# Patient Record
Sex: FEMALE | Race: WHITE | Employment: OTHER | URBAN - METROPOLITAN AREA
[De-identification: names, ages, dates, MRNs, and addresses within clinical notes are randomized per-mention and may not be internally consistent; named-entity substitution may affect disease eponyms.]

---

## 2020-01-01 ENCOUNTER — ANESTHESIA EVENT (OUTPATIENT)
Dept: ENDOSCOPY | Age: 65
DRG: 853 | End: 2020-01-01
Payer: MEDICARE

## 2020-01-01 ENCOUNTER — APPOINTMENT (OUTPATIENT)
Dept: CT IMAGING | Age: 65
DRG: 853 | End: 2020-01-01
Payer: MEDICARE

## 2020-01-01 ENCOUNTER — ANESTHESIA EVENT (OUTPATIENT)
Dept: OPERATING ROOM | Age: 65
DRG: 853 | End: 2020-01-01
Payer: MEDICARE

## 2020-01-01 ENCOUNTER — ANESTHESIA (OUTPATIENT)
Dept: ENDOSCOPY | Age: 65
DRG: 853 | End: 2020-01-01
Payer: MEDICARE

## 2020-01-01 ENCOUNTER — HOSPITAL ENCOUNTER (INPATIENT)
Age: 65
LOS: 24 days | Discharge: NURSING, REHAB OR LONG-TERM CARE | DRG: 853 | End: 2020-09-09
Attending: FAMILY MEDICINE | Admitting: FAMILY MEDICINE
Payer: MEDICARE

## 2020-01-01 ENCOUNTER — APPOINTMENT (OUTPATIENT)
Dept: GENERAL RADIOLOGY | Age: 65
DRG: 853 | End: 2020-01-01
Payer: MEDICARE

## 2020-01-01 ENCOUNTER — TELEPHONE (OUTPATIENT)
Dept: INFECTIOUS DISEASES | Age: 65
End: 2020-01-01

## 2020-01-01 ENCOUNTER — TELEPHONE (OUTPATIENT)
Dept: SURGERY | Age: 65
End: 2020-01-01

## 2020-01-01 ENCOUNTER — APPOINTMENT (OUTPATIENT)
Dept: INTERVENTIONAL RADIOLOGY/VASCULAR | Age: 65
DRG: 853 | End: 2020-01-01
Payer: MEDICARE

## 2020-01-01 ENCOUNTER — ANESTHESIA (OUTPATIENT)
Dept: OPERATING ROOM | Age: 65
DRG: 853 | End: 2020-01-01
Payer: MEDICARE

## 2020-01-01 ENCOUNTER — OFFICE VISIT (OUTPATIENT)
Dept: SURGERY | Age: 65
End: 2020-01-01

## 2020-01-01 VITALS
RESPIRATION RATE: 1 BRPM | OXYGEN SATURATION: 92 % | DIASTOLIC BLOOD PRESSURE: 66 MMHG | SYSTOLIC BLOOD PRESSURE: 149 MMHG | TEMPERATURE: 96.1 F

## 2020-01-01 VITALS
BODY MASS INDEX: 35.6 KG/M2 | OXYGEN SATURATION: 98 % | RESPIRATION RATE: 18 BRPM | WEIGHT: 226.8 LBS | SYSTOLIC BLOOD PRESSURE: 122 MMHG | HEIGHT: 67 IN | HEART RATE: 87 BPM | TEMPERATURE: 97.5 F | DIASTOLIC BLOOD PRESSURE: 92 MMHG

## 2020-01-01 VITALS
OXYGEN SATURATION: 100 % | SYSTOLIC BLOOD PRESSURE: 91 MMHG | DIASTOLIC BLOOD PRESSURE: 47 MMHG | RESPIRATION RATE: 1 BRPM

## 2020-01-01 VITALS — DIASTOLIC BLOOD PRESSURE: 60 MMHG | TEMPERATURE: 98.1 F | SYSTOLIC BLOOD PRESSURE: 101 MMHG

## 2020-01-01 VITALS
DIASTOLIC BLOOD PRESSURE: 68 MMHG | OXYGEN SATURATION: 100 % | SYSTOLIC BLOOD PRESSURE: 155 MMHG | TEMPERATURE: 96.4 F | RESPIRATION RATE: 1 BRPM

## 2020-01-01 LAB
A/G RATIO: 0.3 (ref 1.1–2.2)
A/G RATIO: 0.5 (ref 1.1–2.2)
A/G RATIO: 0.6 (ref 1.1–2.2)
A/G RATIO: 0.6 (ref 1.1–2.2)
A/G RATIO: 0.7 (ref 1.1–2.2)
A/G RATIO: 0.8 (ref 1.1–2.2)
A/G RATIO: 0.8 (ref 1.1–2.2)
A/G RATIO: 0.9 (ref 1.1–2.2)
A/G RATIO: 1 (ref 1.1–2.2)
ABO/RH: NORMAL
AFP: 2.4 UG/L
ALBUMIN FLUID: 1.1 G/DL
ALBUMIN SERPL-MCNC: 1.6 G/DL (ref 3.4–5)
ALBUMIN SERPL-MCNC: 1.7 G/DL (ref 3.4–5)
ALBUMIN SERPL-MCNC: 1.7 G/DL (ref 3.4–5)
ALBUMIN SERPL-MCNC: 1.8 G/DL (ref 3.4–5)
ALBUMIN SERPL-MCNC: 1.8 G/DL (ref 3.4–5)
ALBUMIN SERPL-MCNC: 1.9 G/DL (ref 3.4–5)
ALBUMIN SERPL-MCNC: 2 G/DL (ref 3.4–5)
ALBUMIN SERPL-MCNC: 2.1 G/DL (ref 3.4–5)
ALBUMIN SERPL-MCNC: 2.2 G/DL (ref 3.4–5)
ALBUMIN SERPL-MCNC: 2.3 G/DL (ref 3.4–5)
ALBUMIN SERPL-MCNC: 2.4 G/DL (ref 3.4–5)
ALBUMIN SERPL-MCNC: 2.4 G/DL (ref 3.4–5)
ALBUMIN SERPL-MCNC: 2.5 G/DL (ref 3.4–5)
ALBUMIN SERPL-MCNC: 2.8 G/DL (ref 3.4–5)
ALBUMIN SERPL-MCNC: 3 G/DL (ref 3.4–5)
ALP BLD-CCNC: 102 U/L (ref 40–129)
ALP BLD-CCNC: 124 U/L (ref 40–129)
ALP BLD-CCNC: 141 U/L (ref 40–129)
ALP BLD-CCNC: 143 U/L (ref 40–129)
ALP BLD-CCNC: 146 U/L (ref 40–129)
ALP BLD-CCNC: 174 U/L (ref 40–129)
ALP BLD-CCNC: 185 U/L (ref 40–129)
ALP BLD-CCNC: 191 U/L (ref 40–129)
ALP BLD-CCNC: 193 U/L (ref 40–129)
ALP BLD-CCNC: 211 U/L (ref 40–129)
ALP BLD-CCNC: 222 U/L (ref 40–129)
ALP BLD-CCNC: 78 U/L (ref 40–129)
ALP BLD-CCNC: 80 U/L (ref 40–129)
ALP BLD-CCNC: 85 U/L (ref 40–129)
ALP BLD-CCNC: 90 U/L (ref 40–129)
ALP BLD-CCNC: 92 U/L (ref 40–129)
ALP BLD-CCNC: 96 U/L (ref 40–129)
ALP BLD-CCNC: 96 U/L (ref 40–129)
ALPHA-1 ANTITRYPSIN PHENOTYPE: ABNORMAL
ALPHA-1 ANTITRYPSIN: 277 MG/DL (ref 90–200)
ALT SERPL-CCNC: 10 U/L (ref 10–40)
ALT SERPL-CCNC: 13 U/L (ref 10–40)
ALT SERPL-CCNC: 14 U/L (ref 10–40)
ALT SERPL-CCNC: 15 U/L (ref 10–40)
ALT SERPL-CCNC: 16 U/L (ref 10–40)
ALT SERPL-CCNC: 16 U/L (ref 10–40)
ALT SERPL-CCNC: 17 U/L (ref 10–40)
ALT SERPL-CCNC: 20 U/L (ref 10–40)
ALT SERPL-CCNC: 6 U/L (ref 10–40)
ALT SERPL-CCNC: 8 U/L (ref 10–40)
ALT SERPL-CCNC: 9 U/L (ref 10–40)
ALT SERPL-CCNC: <5 U/L (ref 10–40)
ALT SERPL-CCNC: <5 U/L (ref 10–40)
AMMONIA: 19 UMOL/L (ref 11–51)
AMMONIA: 23 UMOL/L (ref 11–51)
AMMONIA: 25 UMOL/L (ref 11–51)
AMMONIA: 29 UMOL/L (ref 11–51)
AMMONIA: 48 UMOL/L (ref 11–51)
AMMONIA: 66 UMOL/L (ref 11–51)
ANAEROBIC CULTURE: ABNORMAL
ANAEROBIC CULTURE: ABNORMAL
ANION GAP SERPL CALCULATED.3IONS-SCNC: 10 MMOL/L (ref 3–16)
ANION GAP SERPL CALCULATED.3IONS-SCNC: 10 MMOL/L (ref 3–16)
ANION GAP SERPL CALCULATED.3IONS-SCNC: 11 MMOL/L (ref 3–16)
ANION GAP SERPL CALCULATED.3IONS-SCNC: 12 MMOL/L (ref 3–16)
ANION GAP SERPL CALCULATED.3IONS-SCNC: 15 MMOL/L (ref 3–16)
ANION GAP SERPL CALCULATED.3IONS-SCNC: 6 MMOL/L (ref 3–16)
ANION GAP SERPL CALCULATED.3IONS-SCNC: 7 MMOL/L (ref 3–16)
ANION GAP SERPL CALCULATED.3IONS-SCNC: 8 MMOL/L (ref 3–16)
ANION GAP SERPL CALCULATED.3IONS-SCNC: 9 MMOL/L (ref 3–16)
ANTIBODY SCREEN: NORMAL
APPEARANCE FLUID: NORMAL
APTT: 28.6 SEC (ref 24.2–36.2)
APTT: 35.9 SEC (ref 24.2–36.2)
APTT: 37.5 SEC (ref 24.2–36.2)
APTT: 39.2 SEC (ref 24.2–36.2)
APTT: 39.8 SEC (ref 24.2–36.2)
APTT: 40.7 SEC (ref 24.2–36.2)
APTT: 41.4 SEC (ref 24.2–36.2)
APTT: 42.8 SEC (ref 24.2–36.2)
APTT: 43.8 SEC (ref 24.2–36.2)
APTT: 43.8 SEC (ref 24.2–36.2)
AST SERPL-CCNC: 15 U/L (ref 15–37)
AST SERPL-CCNC: 16 U/L (ref 15–37)
AST SERPL-CCNC: 17 U/L (ref 15–37)
AST SERPL-CCNC: 20 U/L (ref 15–37)
AST SERPL-CCNC: 22 U/L (ref 15–37)
AST SERPL-CCNC: 23 U/L (ref 15–37)
AST SERPL-CCNC: 26 U/L (ref 15–37)
AST SERPL-CCNC: 28 U/L (ref 15–37)
AST SERPL-CCNC: 28 U/L (ref 15–37)
AST SERPL-CCNC: 30 U/L (ref 15–37)
AST SERPL-CCNC: 33 U/L (ref 15–37)
AST SERPL-CCNC: 37 U/L (ref 15–37)
AST SERPL-CCNC: 41 U/L (ref 15–37)
AST SERPL-CCNC: 41 U/L (ref 15–37)
AST SERPL-CCNC: 44 U/L (ref 15–37)
AST SERPL-CCNC: 56 U/L (ref 15–37)
BACTERIA: ABNORMAL /HPF
BASE EXCESS VENOUS: -5.6 MMOL/L (ref -3–3)
BASOPHILS ABSOLUTE: 0 K/UL (ref 0–0.2)
BASOPHILS ABSOLUTE: 0.1 K/UL (ref 0–0.2)
BASOPHILS RELATIVE PERCENT: 0 %
BASOPHILS RELATIVE PERCENT: 0.1 %
BASOPHILS RELATIVE PERCENT: 0.2 %
BASOPHILS RELATIVE PERCENT: 0.3 %
BASOPHILS RELATIVE PERCENT: 0.3 %
BASOPHILS RELATIVE PERCENT: 0.4 %
BASOPHILS RELATIVE PERCENT: 0.5 %
BASOPHILS RELATIVE PERCENT: 0.5 %
BASOPHILS RELATIVE PERCENT: 0.6 %
BASOPHILS RELATIVE PERCENT: 0.6 %
BILIRUB SERPL-MCNC: 0.4 MG/DL (ref 0–1)
BILIRUB SERPL-MCNC: 0.5 MG/DL (ref 0–1)
BILIRUB SERPL-MCNC: 0.6 MG/DL (ref 0–1)
BILIRUB SERPL-MCNC: 0.7 MG/DL (ref 0–1)
BILIRUB SERPL-MCNC: 0.8 MG/DL (ref 0–1)
BILIRUB SERPL-MCNC: 0.8 MG/DL (ref 0–1)
BILIRUB SERPL-MCNC: 0.9 MG/DL (ref 0–1)
BILIRUB SERPL-MCNC: 0.9 MG/DL (ref 0–1)
BILIRUB SERPL-MCNC: 1 MG/DL (ref 0–1)
BILIRUBIN DIRECT: 0.3 MG/DL (ref 0–0.3)
BILIRUBIN DIRECT: 0.4 MG/DL (ref 0–0.3)
BILIRUBIN DIRECT: 0.4 MG/DL (ref 0–0.3)
BILIRUBIN DIRECT: 0.6 MG/DL (ref 0–0.3)
BILIRUBIN DIRECT: <0.2 MG/DL (ref 0–0.3)
BILIRUBIN DIRECT: <0.2 MG/DL (ref 0–0.3)
BILIRUBIN URINE: ABNORMAL
BILIRUBIN, INDIRECT: 0.3 MG/DL (ref 0–1)
BILIRUBIN, INDIRECT: 0.3 MG/DL (ref 0–1)
BILIRUBIN, INDIRECT: 0.4 MG/DL (ref 0–1)
BILIRUBIN, INDIRECT: 0.5 MG/DL (ref 0–1)
BILIRUBIN, INDIRECT: ABNORMAL MG/DL (ref 0–1)
BILIRUBIN, INDIRECT: ABNORMAL MG/DL (ref 0–1)
BLOOD BANK DISPENSE STATUS: NORMAL
BLOOD BANK DISPENSE STATUS: NORMAL
BLOOD BANK PRODUCT CODE: NORMAL
BLOOD BANK PRODUCT CODE: NORMAL
BLOOD CULTURE, ROUTINE: NORMAL
BLOOD CULTURE, ROUTINE: NORMAL
BLOOD, URINE: ABNORMAL
BODY FLUID CULTURE, STERILE: NORMAL
BPU ID: NORMAL
BPU ID: NORMAL
BUN BLDV-MCNC: 10 MG/DL (ref 7–20)
BUN BLDV-MCNC: 12 MG/DL (ref 7–20)
BUN BLDV-MCNC: 15 MG/DL (ref 7–20)
BUN BLDV-MCNC: 19 MG/DL (ref 7–20)
BUN BLDV-MCNC: 20 MG/DL (ref 7–20)
BUN BLDV-MCNC: 21 MG/DL (ref 7–20)
BUN BLDV-MCNC: 21 MG/DL (ref 7–20)
BUN BLDV-MCNC: 22 MG/DL (ref 7–20)
BUN BLDV-MCNC: 23 MG/DL (ref 7–20)
BUN BLDV-MCNC: 25 MG/DL (ref 7–20)
BUN BLDV-MCNC: 27 MG/DL (ref 7–20)
BUN BLDV-MCNC: 29 MG/DL (ref 7–20)
BUN BLDV-MCNC: 30 MG/DL (ref 7–20)
BUN BLDV-MCNC: 31 MG/DL (ref 7–20)
BUN BLDV-MCNC: 35 MG/DL (ref 7–20)
BUN BLDV-MCNC: 36 MG/DL (ref 7–20)
BUN BLDV-MCNC: 40 MG/DL (ref 7–20)
C-REACTIVE PROTEIN: 193.1 MG/L (ref 0–5.1)
CA 125: 150.5 U/ML (ref 0–35)
CALCIUM SERPL-MCNC: 7.7 MG/DL (ref 8.3–10.6)
CALCIUM SERPL-MCNC: 8 MG/DL (ref 8.3–10.6)
CALCIUM SERPL-MCNC: 8.1 MG/DL (ref 8.3–10.6)
CALCIUM SERPL-MCNC: 8.2 MG/DL (ref 8.3–10.6)
CALCIUM SERPL-MCNC: 8.2 MG/DL (ref 8.3–10.6)
CALCIUM SERPL-MCNC: 8.4 MG/DL (ref 8.3–10.6)
CALCIUM SERPL-MCNC: 8.4 MG/DL (ref 8.3–10.6)
CALCIUM SERPL-MCNC: 8.5 MG/DL (ref 8.3–10.6)
CALCIUM SERPL-MCNC: 8.6 MG/DL (ref 8.3–10.6)
CALCIUM SERPL-MCNC: 8.7 MG/DL (ref 8.3–10.6)
CALCIUM SERPL-MCNC: 8.8 MG/DL (ref 8.3–10.6)
CALCIUM SERPL-MCNC: 8.8 MG/DL (ref 8.3–10.6)
CALCIUM SERPL-MCNC: 8.9 MG/DL (ref 8.3–10.6)
CALCIUM SERPL-MCNC: 8.9 MG/DL (ref 8.3–10.6)
CALCIUM SERPL-MCNC: 9 MG/DL (ref 8.3–10.6)
CALCIUM SERPL-MCNC: 9.2 MG/DL (ref 8.3–10.6)
CALCIUM SERPL-MCNC: 9.6 MG/DL (ref 8.3–10.6)
CARBOXYHEMOGLOBIN: 2.9 % (ref 0–1.5)
CEA: 83.3 NG/ML (ref 0–5)
CELL COUNT FLUID TYPE: NORMAL
CHLORIDE BLD-SCNC: 102 MMOL/L (ref 99–110)
CHLORIDE BLD-SCNC: 105 MMOL/L (ref 99–110)
CHLORIDE BLD-SCNC: 105 MMOL/L (ref 99–110)
CHLORIDE BLD-SCNC: 106 MMOL/L (ref 99–110)
CHLORIDE BLD-SCNC: 106 MMOL/L (ref 99–110)
CHLORIDE BLD-SCNC: 107 MMOL/L (ref 99–110)
CHLORIDE BLD-SCNC: 108 MMOL/L (ref 99–110)
CHLORIDE BLD-SCNC: 109 MMOL/L (ref 99–110)
CHLORIDE BLD-SCNC: 109 MMOL/L (ref 99–110)
CHLORIDE BLD-SCNC: 110 MMOL/L (ref 99–110)
CHLORIDE BLD-SCNC: 110 MMOL/L (ref 99–110)
CHLORIDE BLD-SCNC: 111 MMOL/L (ref 99–110)
CHLORIDE BLD-SCNC: 111 MMOL/L (ref 99–110)
CHLORIDE BLD-SCNC: 112 MMOL/L (ref 99–110)
CHLORIDE BLD-SCNC: 113 MMOL/L (ref 99–110)
CHLORIDE BLD-SCNC: 113 MMOL/L (ref 99–110)
CHLORIDE BLD-SCNC: 114 MMOL/L (ref 99–110)
CHLORIDE BLD-SCNC: 94 MMOL/L (ref 99–110)
CHLORIDE BLD-SCNC: 99 MMOL/L (ref 99–110)
CHLORIDE URINE RANDOM: <20 MMOL/L
CLARITY: ABNORMAL
CLOT EVALUATION: NORMAL
CO2: 17 MMOL/L (ref 21–32)
CO2: 17 MMOL/L (ref 21–32)
CO2: 18 MMOL/L (ref 21–32)
CO2: 19 MMOL/L (ref 21–32)
CO2: 20 MMOL/L (ref 21–32)
CO2: 21 MMOL/L (ref 21–32)
CO2: 22 MMOL/L (ref 21–32)
CO2: 22 MMOL/L (ref 21–32)
CO2: 23 MMOL/L (ref 21–32)
CO2: 24 MMOL/L (ref 21–32)
CO2: 25 MMOL/L (ref 21–32)
CO2: 25 MMOL/L (ref 21–32)
COLOR FLUID: YELLOW
COLOR: ABNORMAL
COMMENT UA: ABNORMAL
CREAT SERPL-MCNC: 0.6 MG/DL (ref 0.6–1.2)
CREAT SERPL-MCNC: 0.7 MG/DL (ref 0.6–1.2)
CREAT SERPL-MCNC: 0.8 MG/DL (ref 0.6–1.2)
CREAT SERPL-MCNC: 0.9 MG/DL (ref 0.6–1.2)
CREAT SERPL-MCNC: 0.9 MG/DL (ref 0.6–1.2)
CREAT SERPL-MCNC: 1 MG/DL (ref 0.6–1.2)
CREAT SERPL-MCNC: 1.1 MG/DL (ref 0.6–1.2)
CREAT SERPL-MCNC: 1.4 MG/DL (ref 0.6–1.2)
CREAT SERPL-MCNC: 1.5 MG/DL (ref 0.6–1.2)
CREAT SERPL-MCNC: 1.5 MG/DL (ref 0.6–1.2)
CREAT SERPL-MCNC: 1.6 MG/DL (ref 0.6–1.2)
CREAT SERPL-MCNC: 2 MG/DL (ref 0.6–1.2)
CREAT SERPL-MCNC: 2.3 MG/DL (ref 0.6–1.2)
CREATININE URINE: 119.7 MG/DL (ref 28–259)
CREATININE URINE: 137.3 MG/DL (ref 28–259)
CULTURE SURGICAL: ABNORMAL
CULTURE SURGICAL: ABNORMAL
CULTURE, BLOOD 2: NORMAL
CULTURE, BLOOD 2: NORMAL
D DIMER: 4043 NG/ML DDU (ref 0–229)
D DIMER: 4243 NG/ML DDU (ref 0–229)
D DIMER: 4334 NG/ML DDU (ref 0–229)
D DIMER: 4549 NG/ML DDU (ref 0–229)
D DIMER: 4669 NG/ML DDU (ref 0–229)
D DIMER: 5215 NG/ML DDU (ref 0–229)
D DIMER: >5250 NG/ML DDU (ref 0–229)
DESCRIPTION BLOOD BANK: NORMAL
DESCRIPTION BLOOD BANK: NORMAL
EKG ATRIAL RATE: 95 BPM
EKG DIAGNOSIS: NORMAL
EKG P AXIS: 72 DEGREES
EKG P-R INTERVAL: 118 MS
EKG Q-T INTERVAL: 368 MS
EKG QRS DURATION: 80 MS
EKG QTC CALCULATION (BAZETT): 462 MS
EKG R AXIS: -32 DEGREES
EKG T AXIS: 46 DEGREES
EKG VENTRICULAR RATE: 95 BPM
EOSINOPHILS ABSOLUTE: 0 K/UL (ref 0–0.6)
EOSINOPHILS ABSOLUTE: 0.1 K/UL (ref 0–0.6)
EOSINOPHILS ABSOLUTE: 0.2 K/UL (ref 0–0.6)
EOSINOPHILS RELATIVE PERCENT: 0 %
EOSINOPHILS RELATIVE PERCENT: 0.1 %
EOSINOPHILS RELATIVE PERCENT: 0.3 %
EOSINOPHILS RELATIVE PERCENT: 0.3 %
EOSINOPHILS RELATIVE PERCENT: 0.6 %
EOSINOPHILS RELATIVE PERCENT: 0.7 %
EOSINOPHILS RELATIVE PERCENT: 0.7 %
EOSINOPHILS RELATIVE PERCENT: 0.8 %
EOSINOPHILS RELATIVE PERCENT: 1.2 %
EOSINOPHILS RELATIVE PERCENT: 1.6 %
EOSINOPHILS RELATIVE PERCENT: 1.6 %
EOSINOPHILS RELATIVE PERCENT: 1.8 %
EOSINOPHILS RELATIVE PERCENT: 1.9 %
EOSINOPHILS RELATIVE PERCENT: 2.2 %
EOSINOPHILS RELATIVE PERCENT: 2.3 %
EOSINOPHILS RELATIVE PERCENT: 2.5 %
EOSINOPHILS RELATIVE PERCENT: 2.6 %
EOSINOPHILS RELATIVE PERCENT: 2.9 %
EOSINOPHILS RELATIVE PERCENT: 3.7 %
EPITHELIAL CELLS, UA: 4 /HPF (ref 0–5)
F-ACTIN AB IGG: 25 UNITS (ref 0–19)
FERRITIN: 322.4 NG/ML (ref 15–150)
FERRITIN: 542.4 NG/ML (ref 15–150)
FIBRINOGEN: 311 MG/DL (ref 200–397)
FIBRINOGEN: 326 MG/DL (ref 200–397)
FIBRINOGEN: 347 MG/DL (ref 200–397)
FIBRINOGEN: 385 MG/DL (ref 200–397)
FIBRINOGEN: 442 MG/DL (ref 200–397)
FIBRINOGEN: 442 MG/DL (ref 200–397)
FIBRINOGEN: 528 MG/DL (ref 200–397)
FIBRINOGEN: 575 MG/DL (ref 200–397)
FLUID TYPE: NORMAL
FLUID TYPE: NORMAL
FOLATE: <2 NG/ML (ref 4.78–24.2)
GFR AFRICAN AMERICAN: 26
GFR AFRICAN AMERICAN: 30
GFR AFRICAN AMERICAN: 39
GFR AFRICAN AMERICAN: 42
GFR AFRICAN AMERICAN: 42
GFR AFRICAN AMERICAN: 45
GFR AFRICAN AMERICAN: >60
GFR NON-AFRICAN AMERICAN: 21
GFR NON-AFRICAN AMERICAN: 25
GFR NON-AFRICAN AMERICAN: 32
GFR NON-AFRICAN AMERICAN: 35
GFR NON-AFRICAN AMERICAN: 35
GFR NON-AFRICAN AMERICAN: 38
GFR NON-AFRICAN AMERICAN: 50
GFR NON-AFRICAN AMERICAN: 55
GFR NON-AFRICAN AMERICAN: >60
GLOBULIN: 2.6 G/DL
GLOBULIN: 3 G/DL
GLOBULIN: 3 G/DL
GLOBULIN: 3.1 G/DL
GLOBULIN: 3.3 G/DL
GLOBULIN: 3.4 G/DL
GLOBULIN: 3.5 G/DL
GLOBULIN: 3.5 G/DL
GLOBULIN: 3.7 G/DL
GLOBULIN: 5.8 G/DL
GLUCOSE BLD-MCNC: 100 MG/DL (ref 70–99)
GLUCOSE BLD-MCNC: 100 MG/DL (ref 70–99)
GLUCOSE BLD-MCNC: 101 MG/DL (ref 70–99)
GLUCOSE BLD-MCNC: 101 MG/DL (ref 70–99)
GLUCOSE BLD-MCNC: 102 MG/DL (ref 70–99)
GLUCOSE BLD-MCNC: 103 MG/DL (ref 70–99)
GLUCOSE BLD-MCNC: 107 MG/DL (ref 70–99)
GLUCOSE BLD-MCNC: 109 MG/DL (ref 70–99)
GLUCOSE BLD-MCNC: 109 MG/DL (ref 70–99)
GLUCOSE BLD-MCNC: 111 MG/DL (ref 70–99)
GLUCOSE BLD-MCNC: 112 MG/DL (ref 70–99)
GLUCOSE BLD-MCNC: 113 MG/DL (ref 70–99)
GLUCOSE BLD-MCNC: 114 MG/DL (ref 70–99)
GLUCOSE BLD-MCNC: 115 MG/DL (ref 70–99)
GLUCOSE BLD-MCNC: 117 MG/DL (ref 70–99)
GLUCOSE BLD-MCNC: 118 MG/DL (ref 70–99)
GLUCOSE BLD-MCNC: 119 MG/DL (ref 70–99)
GLUCOSE BLD-MCNC: 120 MG/DL (ref 70–99)
GLUCOSE BLD-MCNC: 121 MG/DL (ref 70–99)
GLUCOSE BLD-MCNC: 123 MG/DL (ref 70–99)
GLUCOSE BLD-MCNC: 125 MG/DL (ref 70–99)
GLUCOSE BLD-MCNC: 127 MG/DL (ref 70–99)
GLUCOSE BLD-MCNC: 128 MG/DL (ref 70–99)
GLUCOSE BLD-MCNC: 128 MG/DL (ref 70–99)
GLUCOSE BLD-MCNC: 130 MG/DL (ref 70–99)
GLUCOSE BLD-MCNC: 135 MG/DL (ref 70–99)
GLUCOSE BLD-MCNC: 135 MG/DL (ref 70–99)
GLUCOSE BLD-MCNC: 136 MG/DL (ref 70–99)
GLUCOSE BLD-MCNC: 137 MG/DL (ref 70–99)
GLUCOSE BLD-MCNC: 137 MG/DL (ref 70–99)
GLUCOSE BLD-MCNC: 140 MG/DL (ref 70–99)
GLUCOSE BLD-MCNC: 140 MG/DL (ref 70–99)
GLUCOSE BLD-MCNC: 142 MG/DL (ref 70–99)
GLUCOSE BLD-MCNC: 147 MG/DL (ref 70–99)
GLUCOSE BLD-MCNC: 149 MG/DL (ref 70–99)
GLUCOSE BLD-MCNC: 149 MG/DL (ref 70–99)
GLUCOSE BLD-MCNC: 151 MG/DL (ref 70–99)
GLUCOSE BLD-MCNC: 154 MG/DL (ref 70–99)
GLUCOSE BLD-MCNC: 156 MG/DL (ref 70–99)
GLUCOSE BLD-MCNC: 160 MG/DL (ref 70–99)
GLUCOSE BLD-MCNC: 164 MG/DL (ref 70–99)
GLUCOSE BLD-MCNC: 166 MG/DL (ref 70–99)
GLUCOSE BLD-MCNC: 167 MG/DL (ref 70–99)
GLUCOSE BLD-MCNC: 176 MG/DL (ref 70–99)
GLUCOSE BLD-MCNC: 202 MG/DL (ref 70–99)
GLUCOSE BLD-MCNC: 224 MG/DL (ref 70–99)
GLUCOSE BLD-MCNC: 228 MG/DL (ref 70–99)
GLUCOSE BLD-MCNC: 295 MG/DL (ref 70–99)
GLUCOSE BLD-MCNC: 64 MG/DL (ref 70–99)
GLUCOSE BLD-MCNC: 67 MG/DL (ref 70–99)
GLUCOSE BLD-MCNC: 68 MG/DL (ref 70–99)
GLUCOSE BLD-MCNC: 68 MG/DL (ref 70–99)
GLUCOSE BLD-MCNC: 69 MG/DL (ref 70–99)
GLUCOSE BLD-MCNC: 71 MG/DL (ref 70–99)
GLUCOSE BLD-MCNC: 75 MG/DL (ref 70–99)
GLUCOSE BLD-MCNC: 76 MG/DL (ref 70–99)
GLUCOSE BLD-MCNC: 78 MG/DL (ref 70–99)
GLUCOSE BLD-MCNC: 80 MG/DL (ref 70–99)
GLUCOSE BLD-MCNC: 80 MG/DL (ref 70–99)
GLUCOSE BLD-MCNC: 82 MG/DL (ref 70–99)
GLUCOSE BLD-MCNC: 82 MG/DL (ref 70–99)
GLUCOSE BLD-MCNC: 85 MG/DL (ref 70–99)
GLUCOSE BLD-MCNC: 86 MG/DL (ref 70–99)
GLUCOSE BLD-MCNC: 86 MG/DL (ref 70–99)
GLUCOSE BLD-MCNC: 87 MG/DL (ref 70–99)
GLUCOSE BLD-MCNC: 87 MG/DL (ref 70–99)
GLUCOSE BLD-MCNC: 88 MG/DL (ref 70–99)
GLUCOSE BLD-MCNC: 90 MG/DL (ref 70–99)
GLUCOSE BLD-MCNC: 92 MG/DL (ref 70–99)
GLUCOSE BLD-MCNC: 92 MG/DL (ref 70–99)
GLUCOSE BLD-MCNC: 95 MG/DL (ref 70–99)
GLUCOSE BLD-MCNC: 96 MG/DL (ref 70–99)
GLUCOSE BLD-MCNC: 98 MG/DL (ref 70–99)
GLUCOSE BLD-MCNC: 98 MG/DL (ref 70–99)
GLUCOSE BLD-MCNC: 99 MG/DL (ref 70–99)
GLUCOSE BLD-MCNC: ABNORMAL MG/DL (ref 70–99)
GLUCOSE URINE: NEGATIVE MG/DL
GRAM STAIN RESULT: ABNORMAL
GRAM STAIN RESULT: ABNORMAL
GRAM STAIN RESULT: NORMAL
HAV AB SERPL IA-ACNC: NEGATIVE
HAV IGM SER IA-ACNC: NORMAL
HBV SURFACE AB TITR SER: <3.5 MIU/ML
HCO3 VENOUS: 17.6 MMOL/L (ref 23–29)
HCT VFR BLD CALC: 19.9 % (ref 36–48)
HCT VFR BLD CALC: 21.9 % (ref 36–48)
HCT VFR BLD CALC: 22.3 % (ref 36–48)
HCT VFR BLD CALC: 23.5 % (ref 36–48)
HCT VFR BLD CALC: 23.6 % (ref 36–48)
HCT VFR BLD CALC: 23.8 % (ref 36–48)
HCT VFR BLD CALC: 23.9 % (ref 36–48)
HCT VFR BLD CALC: 24.8 % (ref 36–48)
HCT VFR BLD CALC: 24.8 % (ref 36–48)
HCT VFR BLD CALC: 25.5 % (ref 36–48)
HCT VFR BLD CALC: 25.7 % (ref 36–48)
HCT VFR BLD CALC: 26.7 % (ref 36–48)
HCT VFR BLD CALC: 27.1 % (ref 36–48)
HCT VFR BLD CALC: 27.4 % (ref 36–48)
HCT VFR BLD CALC: 27.6 % (ref 36–48)
HCT VFR BLD CALC: 28.1 % (ref 36–48)
HCT VFR BLD CALC: 28.4 % (ref 36–48)
HCT VFR BLD CALC: 28.5 % (ref 36–48)
HCT VFR BLD CALC: 28.6 % (ref 36–48)
HCT VFR BLD CALC: 28.8 % (ref 36–48)
HCT VFR BLD CALC: 29.1 % (ref 36–48)
HCT VFR BLD CALC: 29.3 % (ref 36–48)
HCT VFR BLD CALC: 29.6 % (ref 36–48)
HCT VFR BLD CALC: 29.9 % (ref 36–48)
HCT VFR BLD CALC: 30.9 % (ref 36–48)
HCT VFR BLD CALC: 31.8 % (ref 36–48)
HCT VFR BLD CALC: 32.1 % (ref 36–48)
HCT VFR BLD CALC: 33.1 % (ref 36–48)
HEMOGLOBIN: 10.1 G/DL (ref 12–16)
HEMOGLOBIN: 10.9 G/DL (ref 12–16)
HEMOGLOBIN: 6.5 G/DL (ref 12–16)
HEMOGLOBIN: 7.1 G/DL (ref 12–16)
HEMOGLOBIN: 7.1 G/DL (ref 12–16)
HEMOGLOBIN: 7.6 G/DL (ref 12–16)
HEMOGLOBIN: 7.6 G/DL (ref 12–16)
HEMOGLOBIN: 7.8 G/DL (ref 12–16)
HEMOGLOBIN: 8 G/DL (ref 12–16)
HEMOGLOBIN: 8.3 G/DL (ref 12–16)
HEMOGLOBIN: 8.3 G/DL (ref 12–16)
HEMOGLOBIN: 8.6 G/DL (ref 12–16)
HEMOGLOBIN: 8.6 G/DL (ref 12–16)
HEMOGLOBIN: 8.9 G/DL (ref 12–16)
HEMOGLOBIN: 8.9 G/DL (ref 12–16)
HEMOGLOBIN: 9 G/DL (ref 12–16)
HEMOGLOBIN: 9.1 G/DL (ref 12–16)
HEMOGLOBIN: 9.1 G/DL (ref 12–16)
HEMOGLOBIN: 9.3 G/DL (ref 12–16)
HEMOGLOBIN: 9.4 G/DL (ref 12–16)
HEMOGLOBIN: 9.7 G/DL (ref 12–16)
HEMOGLOBIN: 9.7 G/DL (ref 12–16)
HEMOGLOBIN: 9.8 G/DL (ref 12–16)
HEMOGLOBIN: 9.9 G/DL (ref 12–16)
HEPATITIS B CORE IGM ANTIBODY: NORMAL
HEPATITIS B SURFACE ANTIGEN INTERPRETATION: NORMAL
HEPATITIS C ANTIBODY INTERPRETATION: NORMAL
HYALINE CASTS: 21 /LPF (ref 0–8)
INR BLD: 1.08 (ref 0.86–1.14)
INR BLD: 1.09 (ref 0.86–1.14)
INR BLD: 1.11 (ref 0.86–1.14)
INR BLD: 1.12 (ref 0.86–1.14)
INR BLD: 1.12 (ref 0.86–1.14)
INR BLD: 1.13 (ref 0.86–1.14)
INR BLD: 1.17 (ref 0.86–1.14)
INR BLD: 1.19 (ref 0.86–1.14)
INR BLD: 1.2 (ref 0.86–1.14)
INR BLD: 1.2 (ref 0.86–1.14)
INR BLD: 1.22 (ref 0.86–1.14)
INR BLD: 1.23 (ref 0.86–1.14)
INR BLD: 1.32 (ref 0.86–1.14)
INR BLD: 1.34 (ref 0.86–1.14)
INR BLD: 1.44 (ref 0.86–1.14)
INR BLD: 1.46 (ref 0.86–1.14)
INR BLD: 1.65 (ref 0.86–1.14)
IRON SATURATION: 25 % (ref 15–50)
IRON: 26 UG/DL (ref 37–145)
KETONES, URINE: NEGATIVE MG/DL
LACTATE DEHYDROGENASE: 144 U/L (ref 100–190)
LACTIC ACID, SEPSIS: 3 MMOL/L (ref 0.4–1.9)
LACTIC ACID, SEPSIS: 4.5 MMOL/L (ref 0.4–1.9)
LEUKOCYTE ESTERASE, URINE: ABNORMAL
LYMPHOCYTES ABSOLUTE: 0.8 K/UL (ref 1–5.1)
LYMPHOCYTES ABSOLUTE: 0.9 K/UL (ref 1–5.1)
LYMPHOCYTES ABSOLUTE: 1 K/UL (ref 1–5.1)
LYMPHOCYTES ABSOLUTE: 1 K/UL (ref 1–5.1)
LYMPHOCYTES ABSOLUTE: 1.2 K/UL (ref 1–5.1)
LYMPHOCYTES ABSOLUTE: 1.3 K/UL (ref 1–5.1)
LYMPHOCYTES ABSOLUTE: 1.4 K/UL (ref 1–5.1)
LYMPHOCYTES ABSOLUTE: 1.4 K/UL (ref 1–5.1)
LYMPHOCYTES ABSOLUTE: 1.6 K/UL (ref 1–5.1)
LYMPHOCYTES ABSOLUTE: 1.9 K/UL (ref 1–5.1)
LYMPHOCYTES ABSOLUTE: 2 K/UL (ref 1–5.1)
LYMPHOCYTES ABSOLUTE: 2 K/UL (ref 1–5.1)
LYMPHOCYTES RELATIVE PERCENT: 10.4 %
LYMPHOCYTES RELATIVE PERCENT: 10.7 %
LYMPHOCYTES RELATIVE PERCENT: 11.6 %
LYMPHOCYTES RELATIVE PERCENT: 11.6 %
LYMPHOCYTES RELATIVE PERCENT: 12 %
LYMPHOCYTES RELATIVE PERCENT: 12.5 %
LYMPHOCYTES RELATIVE PERCENT: 13 %
LYMPHOCYTES RELATIVE PERCENT: 13.3 %
LYMPHOCYTES RELATIVE PERCENT: 13.5 %
LYMPHOCYTES RELATIVE PERCENT: 14 %
LYMPHOCYTES RELATIVE PERCENT: 14.2 %
LYMPHOCYTES RELATIVE PERCENT: 14.4 %
LYMPHOCYTES RELATIVE PERCENT: 17.8 %
LYMPHOCYTES RELATIVE PERCENT: 18.2 %
LYMPHOCYTES RELATIVE PERCENT: 21.7 %
LYMPHOCYTES RELATIVE PERCENT: 22.1 %
LYMPHOCYTES RELATIVE PERCENT: 6 %
LYMPHOCYTES RELATIVE PERCENT: 7.1 %
LYMPHOCYTES RELATIVE PERCENT: 7.2 %
LYMPHOCYTES RELATIVE PERCENT: 7.5 %
LYMPHOCYTES RELATIVE PERCENT: 8.9 %
LYMPHOCYTES, BODY FLUID: 9 %
MACROPHAGE FLUID: 4 %
MAGNESIUM: 1.6 MG/DL (ref 1.8–2.4)
MAGNESIUM: 1.6 MG/DL (ref 1.8–2.4)
MAGNESIUM: 1.7 MG/DL (ref 1.8–2.4)
MAGNESIUM: 1.8 MG/DL (ref 1.8–2.4)
MAGNESIUM: 1.9 MG/DL (ref 1.8–2.4)
MAGNESIUM: 2.3 MG/DL (ref 1.8–2.4)
MAGNESIUM: 2.6 MG/DL (ref 1.8–2.4)
MCH RBC QN AUTO: 27.5 PG (ref 26–34)
MCH RBC QN AUTO: 27.7 PG (ref 26–34)
MCH RBC QN AUTO: 27.7 PG (ref 26–34)
MCH RBC QN AUTO: 27.9 PG (ref 26–34)
MCH RBC QN AUTO: 28 PG (ref 26–34)
MCH RBC QN AUTO: 28.3 PG (ref 26–34)
MCH RBC QN AUTO: 28.4 PG (ref 26–34)
MCH RBC QN AUTO: 28.4 PG (ref 26–34)
MCH RBC QN AUTO: 28.6 PG (ref 26–34)
MCH RBC QN AUTO: 28.7 PG (ref 26–34)
MCH RBC QN AUTO: 28.7 PG (ref 26–34)
MCH RBC QN AUTO: 28.8 PG (ref 26–34)
MCH RBC QN AUTO: 28.8 PG (ref 26–34)
MCH RBC QN AUTO: 28.9 PG (ref 26–34)
MCH RBC QN AUTO: 28.9 PG (ref 26–34)
MCH RBC QN AUTO: 29 PG (ref 26–34)
MCH RBC QN AUTO: 29.1 PG (ref 26–34)
MCH RBC QN AUTO: 29.2 PG (ref 26–34)
MCH RBC QN AUTO: 29.2 PG (ref 26–34)
MCH RBC QN AUTO: 29.3 PG (ref 26–34)
MCH RBC QN AUTO: 29.4 PG (ref 26–34)
MCH RBC QN AUTO: 29.6 PG (ref 26–34)
MCH RBC QN AUTO: 29.6 PG (ref 26–34)
MCH RBC QN AUTO: 29.7 PG (ref 26–34)
MCHC RBC AUTO-ENTMCNC: 30.6 G/DL (ref 31–36)
MCHC RBC AUTO-ENTMCNC: 30.9 G/DL (ref 31–36)
MCHC RBC AUTO-ENTMCNC: 31.6 G/DL (ref 31–36)
MCHC RBC AUTO-ENTMCNC: 31.6 G/DL (ref 31–36)
MCHC RBC AUTO-ENTMCNC: 31.8 G/DL (ref 31–36)
MCHC RBC AUTO-ENTMCNC: 31.9 G/DL (ref 31–36)
MCHC RBC AUTO-ENTMCNC: 32 G/DL (ref 31–36)
MCHC RBC AUTO-ENTMCNC: 32.1 G/DL (ref 31–36)
MCHC RBC AUTO-ENTMCNC: 32.3 G/DL (ref 31–36)
MCHC RBC AUTO-ENTMCNC: 32.4 G/DL (ref 31–36)
MCHC RBC AUTO-ENTMCNC: 32.4 G/DL (ref 31–36)
MCHC RBC AUTO-ENTMCNC: 32.5 G/DL (ref 31–36)
MCHC RBC AUTO-ENTMCNC: 32.6 G/DL (ref 31–36)
MCHC RBC AUTO-ENTMCNC: 32.7 G/DL (ref 31–36)
MCHC RBC AUTO-ENTMCNC: 32.7 G/DL (ref 31–36)
MCHC RBC AUTO-ENTMCNC: 33 G/DL (ref 31–36)
MCHC RBC AUTO-ENTMCNC: 33.1 G/DL (ref 31–36)
MCHC RBC AUTO-ENTMCNC: 33.2 G/DL (ref 31–36)
MCV RBC AUTO: 86.1 FL (ref 80–100)
MCV RBC AUTO: 86.4 FL (ref 80–100)
MCV RBC AUTO: 86.7 FL (ref 80–100)
MCV RBC AUTO: 87.1 FL (ref 80–100)
MCV RBC AUTO: 88 FL (ref 80–100)
MCV RBC AUTO: 88.1 FL (ref 80–100)
MCV RBC AUTO: 88.5 FL (ref 80–100)
MCV RBC AUTO: 88.7 FL (ref 80–100)
MCV RBC AUTO: 88.7 FL (ref 80–100)
MCV RBC AUTO: 88.8 FL (ref 80–100)
MCV RBC AUTO: 89.1 FL (ref 80–100)
MCV RBC AUTO: 89.3 FL (ref 80–100)
MCV RBC AUTO: 89.7 FL (ref 80–100)
MCV RBC AUTO: 89.8 FL (ref 80–100)
MCV RBC AUTO: 89.9 FL (ref 80–100)
MCV RBC AUTO: 90.6 FL (ref 80–100)
MCV RBC AUTO: 90.6 FL (ref 80–100)
MCV RBC AUTO: 90.8 FL (ref 80–100)
MCV RBC AUTO: 91.3 FL (ref 80–100)
MCV RBC AUTO: 91.5 FL (ref 80–100)
MCV RBC AUTO: 91.7 FL (ref 80–100)
MCV RBC AUTO: 91.9 FL (ref 80–100)
MCV RBC AUTO: 92 FL (ref 80–100)
MCV RBC AUTO: 93 FL (ref 80–100)
METHEMOGLOBIN VENOUS: 0.3 %
MICROSCOPIC EXAMINATION: YES
MITOCHONDRIAL M2 AB, IGG: 2.4 UNITS (ref 0–24.9)
MONOCYTES ABSOLUTE: 0.4 K/UL (ref 0–1.3)
MONOCYTES ABSOLUTE: 0.5 K/UL (ref 0–1.3)
MONOCYTES ABSOLUTE: 0.5 K/UL (ref 0–1.3)
MONOCYTES ABSOLUTE: 0.6 K/UL (ref 0–1.3)
MONOCYTES ABSOLUTE: 0.7 K/UL (ref 0–1.3)
MONOCYTES ABSOLUTE: 0.8 K/UL (ref 0–1.3)
MONOCYTES ABSOLUTE: 0.9 K/UL (ref 0–1.3)
MONOCYTES ABSOLUTE: 0.9 K/UL (ref 0–1.3)
MONOCYTES ABSOLUTE: 1 K/UL (ref 0–1.3)
MONOCYTES ABSOLUTE: 1.1 K/UL (ref 0–1.3)
MONOCYTES ABSOLUTE: 1.1 K/UL (ref 0–1.3)
MONOCYTES ABSOLUTE: 1.5 K/UL (ref 0–1.3)
MONOCYTES RELATIVE PERCENT: 11.4 %
MONOCYTES RELATIVE PERCENT: 11.4 %
MONOCYTES RELATIVE PERCENT: 3.8 %
MONOCYTES RELATIVE PERCENT: 4.1 %
MONOCYTES RELATIVE PERCENT: 4.2 %
MONOCYTES RELATIVE PERCENT: 4.6 %
MONOCYTES RELATIVE PERCENT: 4.7 %
MONOCYTES RELATIVE PERCENT: 5.3 %
MONOCYTES RELATIVE PERCENT: 5.5 %
MONOCYTES RELATIVE PERCENT: 5.9 %
MONOCYTES RELATIVE PERCENT: 6.2 %
MONOCYTES RELATIVE PERCENT: 6.5 %
MONOCYTES RELATIVE PERCENT: 6.5 %
MONOCYTES RELATIVE PERCENT: 6.6 %
MONOCYTES RELATIVE PERCENT: 7 %
MONOCYTES RELATIVE PERCENT: 7.3 %
MONOCYTES RELATIVE PERCENT: 7.4 %
MONOCYTES RELATIVE PERCENT: 8.4 %
MONOCYTES RELATIVE PERCENT: 9.1 %
MONOCYTES RELATIVE PERCENT: 9.2 %
MONOCYTES RELATIVE PERCENT: 9.4 %
NEUTROPHIL, FLUID: 87 %
NEUTROPHILS ABSOLUTE: 10.3 K/UL (ref 1.7–7.7)
NEUTROPHILS ABSOLUTE: 10.3 K/UL (ref 1.7–7.7)
NEUTROPHILS ABSOLUTE: 11.1 K/UL (ref 1.7–7.7)
NEUTROPHILS ABSOLUTE: 11.2 K/UL (ref 1.7–7.7)
NEUTROPHILS ABSOLUTE: 13.6 K/UL (ref 1.7–7.7)
NEUTROPHILS ABSOLUTE: 13.7 K/UL (ref 1.7–7.7)
NEUTROPHILS ABSOLUTE: 14.3 K/UL (ref 1.7–7.7)
NEUTROPHILS ABSOLUTE: 15 K/UL (ref 1.7–7.7)
NEUTROPHILS ABSOLUTE: 15.1 K/UL (ref 1.7–7.7)
NEUTROPHILS ABSOLUTE: 17.9 K/UL (ref 1.7–7.7)
NEUTROPHILS ABSOLUTE: 19.3 K/UL (ref 1.7–7.7)
NEUTROPHILS ABSOLUTE: 3.4 K/UL (ref 1.7–7.7)
NEUTROPHILS ABSOLUTE: 3.9 K/UL (ref 1.7–7.7)
NEUTROPHILS ABSOLUTE: 4.2 K/UL (ref 1.7–7.7)
NEUTROPHILS ABSOLUTE: 6 K/UL (ref 1.7–7.7)
NEUTROPHILS ABSOLUTE: 6.2 K/UL (ref 1.7–7.7)
NEUTROPHILS ABSOLUTE: 6.8 K/UL (ref 1.7–7.7)
NEUTROPHILS ABSOLUTE: 7 K/UL (ref 1.7–7.7)
NEUTROPHILS ABSOLUTE: 7.6 K/UL (ref 1.7–7.7)
NEUTROPHILS ABSOLUTE: 7.7 K/UL (ref 1.7–7.7)
NEUTROPHILS ABSOLUTE: 7.9 K/UL (ref 1.7–7.7)
NEUTROPHILS RELATIVE PERCENT: 63 %
NEUTROPHILS RELATIVE PERCENT: 64.6 %
NEUTROPHILS RELATIVE PERCENT: 71 %
NEUTROPHILS RELATIVE PERCENT: 71.5 %
NEUTROPHILS RELATIVE PERCENT: 71.9 %
NEUTROPHILS RELATIVE PERCENT: 76.2 %
NEUTROPHILS RELATIVE PERCENT: 77 %
NEUTROPHILS RELATIVE PERCENT: 77.1 %
NEUTROPHILS RELATIVE PERCENT: 78.4 %
NEUTROPHILS RELATIVE PERCENT: 78.6 %
NEUTROPHILS RELATIVE PERCENT: 79 %
NEUTROPHILS RELATIVE PERCENT: 80.3 %
NEUTROPHILS RELATIVE PERCENT: 81.1 %
NEUTROPHILS RELATIVE PERCENT: 81.9 %
NEUTROPHILS RELATIVE PERCENT: 82.8 %
NEUTROPHILS RELATIVE PERCENT: 82.9 %
NEUTROPHILS RELATIVE PERCENT: 84.2 %
NEUTROPHILS RELATIVE PERCENT: 85.8 %
NEUTROPHILS RELATIVE PERCENT: 87.6 %
NEUTROPHILS RELATIVE PERCENT: 88.6 %
NEUTROPHILS RELATIVE PERCENT: 88.9 %
NITRITE, URINE: NEGATIVE
NUCLEATED CELLS FLUID: 3581 /CUMM
NUMBER OF CELLS COUNTED FLUID: 100
O2 CONTENT, VEN: 13 VOL %
O2 SAT, VEN: 99 %
O2 THERAPY: ABNORMAL
ORGANISM: ABNORMAL
PCO2, VEN: 26.3 MMHG (ref 40–50)
PDW BLD-RTO: 16.3 % (ref 12.4–15.4)
PDW BLD-RTO: 16.6 % (ref 12.4–15.4)
PDW BLD-RTO: 16.6 % (ref 12.4–15.4)
PDW BLD-RTO: 16.7 % (ref 12.4–15.4)
PDW BLD-RTO: 16.8 % (ref 12.4–15.4)
PDW BLD-RTO: 17 % (ref 12.4–15.4)
PDW BLD-RTO: 17.1 % (ref 12.4–15.4)
PDW BLD-RTO: 17.6 % (ref 12.4–15.4)
PDW BLD-RTO: 17.9 % (ref 12.4–15.4)
PDW BLD-RTO: 18 % (ref 12.4–15.4)
PDW BLD-RTO: 18 % (ref 12.4–15.4)
PDW BLD-RTO: 18.1 % (ref 12.4–15.4)
PDW BLD-RTO: 18.2 % (ref 12.4–15.4)
PDW BLD-RTO: 18.3 % (ref 12.4–15.4)
PDW BLD-RTO: 19.3 % (ref 12.4–15.4)
PDW BLD-RTO: 19.3 % (ref 12.4–15.4)
PDW BLD-RTO: 19.4 % (ref 12.4–15.4)
PDW BLD-RTO: 19.5 % (ref 12.4–15.4)
PDW BLD-RTO: 19.5 % (ref 12.4–15.4)
PDW BLD-RTO: 19.6 % (ref 12.4–15.4)
PDW BLD-RTO: 19.6 % (ref 12.4–15.4)
PDW BLD-RTO: 19.8 % (ref 12.4–15.4)
PDW BLD-RTO: 19.9 % (ref 12.4–15.4)
PDW BLD-RTO: 19.9 % (ref 12.4–15.4)
PDW BLD-RTO: 20.1 % (ref 12.4–15.4)
PDW BLD-RTO: 20.1 % (ref 12.4–15.4)
PERFORMED ON: ABNORMAL
PERFORMED ON: NORMAL
PH UA: 5 (ref 5–8)
PH VENOUS: 7.43 (ref 7.35–7.45)
PHOSPHORUS: 2.2 MG/DL (ref 2.5–4.9)
PHOSPHORUS: 2.3 MG/DL (ref 2.5–4.9)
PHOSPHORUS: 3 MG/DL (ref 2.5–4.9)
PHOSPHORUS: 3.3 MG/DL (ref 2.5–4.9)
PHOSPHORUS: 3.4 MG/DL (ref 2.5–4.9)
PHOSPHORUS: 3.4 MG/DL (ref 2.5–4.9)
PHOSPHORUS: 3.5 MG/DL (ref 2.5–4.9)
PHOSPHORUS: 3.7 MG/DL (ref 2.5–4.9)
PHOSPHORUS: 3.7 MG/DL (ref 2.5–4.9)
PHOSPHORUS: 3.8 MG/DL (ref 2.5–4.9)
PHOSPHORUS: 4 MG/DL (ref 2.5–4.9)
PHOSPHORUS: 4.2 MG/DL (ref 2.5–4.9)
PHOSPHORUS: 4.3 MG/DL (ref 2.5–4.9)
PHOSPHORUS: 4.3 MG/DL (ref 2.5–4.9)
PHOSPHORUS: 4.5 MG/DL (ref 2.5–4.9)
PHOSPHORUS: 4.6 MG/DL (ref 2.5–4.9)
PHOSPHORUS: 5 MG/DL (ref 2.5–4.9)
PHOSPHORUS: 5.2 MG/DL (ref 2.5–4.9)
PLATELET # BLD: 100 K/UL (ref 135–450)
PLATELET # BLD: 105 K/UL (ref 135–450)
PLATELET # BLD: 109 K/UL (ref 135–450)
PLATELET # BLD: 111 K/UL (ref 135–450)
PLATELET # BLD: 112 K/UL (ref 135–450)
PLATELET # BLD: 115 K/UL (ref 135–450)
PLATELET # BLD: 117 K/UL (ref 135–450)
PLATELET # BLD: 118 K/UL (ref 135–450)
PLATELET # BLD: 118 K/UL (ref 135–450)
PLATELET # BLD: 124 K/UL (ref 135–450)
PLATELET # BLD: 128 K/UL (ref 135–450)
PLATELET # BLD: 136 K/UL (ref 135–450)
PLATELET # BLD: 144 K/UL (ref 135–450)
PLATELET # BLD: 150 K/UL (ref 135–450)
PLATELET # BLD: 191 K/UL (ref 135–450)
PLATELET # BLD: 218 K/UL (ref 135–450)
PLATELET # BLD: 225 K/UL (ref 135–450)
PLATELET # BLD: 246 K/UL (ref 135–450)
PLATELET # BLD: 268 K/UL (ref 135–450)
PLATELET # BLD: 73 K/UL (ref 135–450)
PLATELET # BLD: 74 K/UL (ref 135–450)
PLATELET # BLD: 76 K/UL (ref 135–450)
PLATELET # BLD: 79 K/UL (ref 135–450)
PLATELET # BLD: 85 K/UL (ref 135–450)
PLATELET # BLD: 89 K/UL (ref 135–450)
PLATELET # BLD: 96 K/UL (ref 135–450)
PLATELET SLIDE REVIEW: ABNORMAL
PLATELET SLIDE REVIEW: ABNORMAL
PMV BLD AUTO: 7.6 FL (ref 5–10.5)
PMV BLD AUTO: 7.7 FL (ref 5–10.5)
PMV BLD AUTO: 7.9 FL (ref 5–10.5)
PMV BLD AUTO: 8 FL (ref 5–10.5)
PMV BLD AUTO: 8.1 FL (ref 5–10.5)
PMV BLD AUTO: 8.2 FL (ref 5–10.5)
PMV BLD AUTO: 8.2 FL (ref 5–10.5)
PMV BLD AUTO: 8.3 FL (ref 5–10.5)
PMV BLD AUTO: 8.3 FL (ref 5–10.5)
PMV BLD AUTO: 8.4 FL (ref 5–10.5)
PMV BLD AUTO: 8.4 FL (ref 5–10.5)
PMV BLD AUTO: 8.5 FL (ref 5–10.5)
PMV BLD AUTO: 8.6 FL (ref 5–10.5)
PMV BLD AUTO: 9 FL (ref 5–10.5)
PMV BLD AUTO: 9.2 FL (ref 5–10.5)
PMV BLD AUTO: 9.3 FL (ref 5–10.5)
PMV BLD AUTO: 9.4 FL (ref 5–10.5)
PMV BLD AUTO: 9.4 FL (ref 5–10.5)
PMV BLD AUTO: 9.7 FL (ref 5–10.5)
PMV BLD AUTO: 9.9 FL (ref 5–10.5)
PO2, VEN: 194 MMHG (ref 25–40)
POTASSIUM REFLEX MAGNESIUM: 3.6 MMOL/L (ref 3.5–5.1)
POTASSIUM REFLEX MAGNESIUM: 3.6 MMOL/L (ref 3.5–5.1)
POTASSIUM REFLEX MAGNESIUM: 3.7 MMOL/L (ref 3.5–5.1)
POTASSIUM REFLEX MAGNESIUM: 4.4 MMOL/L (ref 3.5–5.1)
POTASSIUM REFLEX MAGNESIUM: 4.5 MMOL/L (ref 3.5–5.1)
POTASSIUM REFLEX MAGNESIUM: 4.6 MMOL/L (ref 3.5–5.1)
POTASSIUM REFLEX MAGNESIUM: 4.6 MMOL/L (ref 3.5–5.1)
POTASSIUM REFLEX MAGNESIUM: 4.7 MMOL/L (ref 3.5–5.1)
POTASSIUM SERPL-SCNC: 3 MMOL/L (ref 3.5–5.1)
POTASSIUM SERPL-SCNC: 3 MMOL/L (ref 3.5–5.1)
POTASSIUM SERPL-SCNC: 3.5 MMOL/L (ref 3.5–5.1)
POTASSIUM SERPL-SCNC: 3.6 MMOL/L (ref 3.5–5.1)
POTASSIUM SERPL-SCNC: 3.6 MMOL/L (ref 3.5–5.1)
POTASSIUM SERPL-SCNC: 3.8 MMOL/L (ref 3.5–5.1)
POTASSIUM SERPL-SCNC: 3.9 MMOL/L (ref 3.5–5.1)
POTASSIUM SERPL-SCNC: 3.9 MMOL/L (ref 3.5–5.1)
POTASSIUM SERPL-SCNC: 4 MMOL/L (ref 3.5–5.1)
POTASSIUM SERPL-SCNC: 4.1 MMOL/L (ref 3.5–5.1)
POTASSIUM SERPL-SCNC: 4.2 MMOL/L (ref 3.5–5.1)
POTASSIUM SERPL-SCNC: 4.3 MMOL/L (ref 3.5–5.1)
POTASSIUM SERPL-SCNC: 4.4 MMOL/L (ref 3.5–5.1)
POTASSIUM SERPL-SCNC: 4.7 MMOL/L (ref 3.5–5.1)
POTASSIUM SERPL-SCNC: 4.9 MMOL/L (ref 3.5–5.1)
POTASSIUM, UR: 79.3 MMOL/L
PRO-BNP: 429 PG/ML (ref 0–124)
PROCALCITONIN: 5.64 NG/ML (ref 0–0.15)
PROTEIN FLUID: 2.1 G/DL
PROTEIN UA: ABNORMAL MG/DL
PROTHROMBIN TIME: 12.5 SEC (ref 10–13.2)
PROTHROMBIN TIME: 12.7 SEC (ref 10–13.2)
PROTHROMBIN TIME: 12.9 SEC (ref 10–13.2)
PROTHROMBIN TIME: 13 SEC (ref 10–13.2)
PROTHROMBIN TIME: 13 SEC (ref 10–13.2)
PROTHROMBIN TIME: 13.1 SEC (ref 10–13.2)
PROTHROMBIN TIME: 13.6 SEC (ref 10–13.2)
PROTHROMBIN TIME: 13.8 SEC (ref 10–13.2)
PROTHROMBIN TIME: 14 SEC (ref 10–13.2)
PROTHROMBIN TIME: 14 SEC (ref 10–13.2)
PROTHROMBIN TIME: 14.2 SEC (ref 10–13.2)
PROTHROMBIN TIME: 14.3 SEC (ref 10–13.2)
PROTHROMBIN TIME: 15.4 SEC (ref 10–13.2)
PROTHROMBIN TIME: 15.6 SEC (ref 10–13.2)
PROTHROMBIN TIME: 16.8 SEC (ref 10–13.2)
PROTHROMBIN TIME: 17 SEC (ref 10–13.2)
PROTHROMBIN TIME: 19.2 SEC (ref 10–13.2)
RBC # BLD: 2.23 M/UL (ref 4–5.2)
RBC # BLD: 2.4 M/UL (ref 4–5.2)
RBC # BLD: 2.46 M/UL (ref 4–5.2)
RBC # BLD: 2.64 M/UL (ref 4–5.2)
RBC # BLD: 2.65 M/UL (ref 4–5.2)
RBC # BLD: 2.71 M/UL (ref 4–5.2)
RBC # BLD: 2.74 M/UL (ref 4–5.2)
RBC # BLD: 2.81 M/UL (ref 4–5.2)
RBC # BLD: 2.89 M/UL (ref 4–5.2)
RBC # BLD: 2.9 M/UL (ref 4–5.2)
RBC # BLD: 3.1 M/UL (ref 4–5.2)
RBC # BLD: 3.12 M/UL (ref 4–5.2)
RBC # BLD: 3.18 M/UL (ref 4–5.2)
RBC # BLD: 3.18 M/UL (ref 4–5.2)
RBC # BLD: 3.2 M/UL (ref 4–5.2)
RBC # BLD: 3.23 M/UL (ref 4–5.2)
RBC # BLD: 3.26 M/UL (ref 4–5.2)
RBC # BLD: 3.27 M/UL (ref 4–5.2)
RBC # BLD: 3.33 M/UL (ref 4–5.2)
RBC # BLD: 3.33 M/UL (ref 4–5.2)
RBC # BLD: 3.37 M/UL (ref 4–5.2)
RBC # BLD: 3.58 M/UL (ref 4–5.2)
RBC # BLD: 3.61 M/UL (ref 4–5.2)
RBC # BLD: 3.76 M/UL (ref 4–5.2)
RBC # BLD: NORMAL 10*6/UL
RBC FLUID: 3600 /CUMM
RBC UA: 7 /HPF (ref 0–4)
REASON FOR REJECTION: NORMAL
REJECTED TEST: NORMAL
SARS-COV-2, NAAT: NOT DETECTED
SARS-COV-2, NAAT: NOT DETECTED
SARS-COV-2, PCR: NOT DETECTED
SEDIMENTATION RATE, ERYTHROCYTE: 36 MM/HR (ref 0–30)
SLIDE REVIEW: ABNORMAL
SLIDE REVIEW: ABNORMAL
SMOOTH MUSCLE AB IGG TITER: ABNORMAL
SODIUM BLD-SCNC: 127 MMOL/L (ref 136–145)
SODIUM BLD-SCNC: 129 MMOL/L (ref 136–145)
SODIUM BLD-SCNC: 131 MMOL/L (ref 136–145)
SODIUM BLD-SCNC: 132 MMOL/L (ref 136–145)
SODIUM BLD-SCNC: 133 MMOL/L (ref 136–145)
SODIUM BLD-SCNC: 134 MMOL/L (ref 136–145)
SODIUM BLD-SCNC: 134 MMOL/L (ref 136–145)
SODIUM BLD-SCNC: 135 MMOL/L (ref 136–145)
SODIUM BLD-SCNC: 135 MMOL/L (ref 136–145)
SODIUM BLD-SCNC: 136 MMOL/L (ref 136–145)
SODIUM BLD-SCNC: 137 MMOL/L (ref 136–145)
SODIUM BLD-SCNC: 138 MMOL/L (ref 136–145)
SODIUM BLD-SCNC: 138 MMOL/L (ref 136–145)
SODIUM BLD-SCNC: 139 MMOL/L (ref 136–145)
SODIUM BLD-SCNC: 140 MMOL/L (ref 136–145)
SODIUM BLD-SCNC: 140 MMOL/L (ref 136–145)
SODIUM BLD-SCNC: 141 MMOL/L (ref 136–145)
SODIUM BLD-SCNC: 142 MMOL/L (ref 136–145)
SODIUM URINE: <20 MMOL/L
SODIUM URINE: <20 MMOL/L
SOLUBLE TRANSFERRIN RECEPT: 2.3 MG/L (ref 1.9–4.4)
SPECIFIC GRAVITY UA: 1.02 (ref 1–1.03)
TCO2 CALC VENOUS: 41 MMOL/L
TOTAL CK: 310 U/L (ref 26–192)
TOTAL CK: 52 U/L (ref 26–192)
TOTAL IRON BINDING CAPACITY: 106 UG/DL (ref 260–445)
TOTAL PROTEIN: 5.1 G/DL (ref 6.4–8.2)
TOTAL PROTEIN: 5.2 G/DL (ref 6.4–8.2)
TOTAL PROTEIN: 5.3 G/DL (ref 6.4–8.2)
TOTAL PROTEIN: 5.4 G/DL (ref 6.4–8.2)
TOTAL PROTEIN: 5.4 G/DL (ref 6.4–8.2)
TOTAL PROTEIN: 5.5 G/DL (ref 6.4–8.2)
TOTAL PROTEIN: 5.5 G/DL (ref 6.4–8.2)
TOTAL PROTEIN: 5.6 G/DL (ref 6.4–8.2)
TOTAL PROTEIN: 5.7 G/DL (ref 6.4–8.2)
TOTAL PROTEIN: 5.8 G/DL (ref 6.4–8.2)
TOTAL PROTEIN: 5.8 G/DL (ref 6.4–8.2)
TOTAL PROTEIN: 5.9 G/DL (ref 6.4–8.2)
TOTAL PROTEIN: 5.9 G/DL (ref 6.4–8.2)
TOTAL PROTEIN: 6 G/DL (ref 6.4–8.2)
TOTAL PROTEIN: 6.7 G/DL (ref 6.4–8.2)
TOTAL PROTEIN: 7.8 G/DL (ref 6.4–8.2)
TRIGL SERPL-MCNC: 98 MG/DL (ref 0–150)
TROPONIN: <0.01 NG/ML
UREA NITROGEN, UR: 989.7 MG/DL (ref 800–1666)
URIC ACID, SERUM: 5.9 MG/DL (ref 2.6–6)
URINE REFLEX TO CULTURE: ABNORMAL
URINE TYPE: ABNORMAL
UROBILINOGEN, URINE: 1 E.U./DL
VITAMIN B-12: 846 PG/ML (ref 211–911)
WBC # BLD: 10.7 K/UL (ref 4–11)
WBC # BLD: 11.1 K/UL (ref 4–11)
WBC # BLD: 11.3 K/UL (ref 4–11)
WBC # BLD: 12.1 K/UL (ref 4–11)
WBC # BLD: 12.9 K/UL (ref 4–11)
WBC # BLD: 13.1 K/UL (ref 4–11)
WBC # BLD: 13.5 K/UL (ref 4–11)
WBC # BLD: 13.7 K/UL (ref 4–11)
WBC # BLD: 15.6 K/UL (ref 4–11)
WBC # BLD: 16.1 K/UL (ref 4–11)
WBC # BLD: 16.8 K/UL (ref 4–11)
WBC # BLD: 17.9 K/UL (ref 4–11)
WBC # BLD: 18.2 K/UL (ref 4–11)
WBC # BLD: 19.2 K/UL (ref 4–11)
WBC # BLD: 20.2 K/UL (ref 4–11)
WBC # BLD: 20.9 K/UL (ref 4–11)
WBC # BLD: 22.5 K/UL (ref 4–11)
WBC # BLD: 5.3 K/UL (ref 4–11)
WBC # BLD: 5.8 K/UL (ref 4–11)
WBC # BLD: 6 K/UL (ref 4–11)
WBC # BLD: 7.8 K/UL (ref 4–11)
WBC # BLD: 8.1 K/UL (ref 4–11)
WBC # BLD: 8.9 K/UL (ref 4–11)
WBC # BLD: 8.9 K/UL (ref 4–11)
WBC # BLD: 9.3 K/UL (ref 4–11)
WBC # BLD: 9.9 K/UL (ref 4–11)
WBC UA: 3 /HPF (ref 0–5)
WOUND/ABSCESS: ABNORMAL

## 2020-01-01 PROCEDURE — 6360000002 HC RX W HCPCS: Performed by: INTERNAL MEDICINE

## 2020-01-01 PROCEDURE — 99024 POSTOP FOLLOW-UP VISIT: CPT | Performed by: SURGERY

## 2020-01-01 PROCEDURE — 0DBM8ZZ EXCISION OF DESCENDING COLON, VIA NATURAL OR ARTIFICIAL OPENING ENDOSCOPIC: ICD-10-PCS | Performed by: INTERNAL MEDICINE

## 2020-01-01 PROCEDURE — 6370000000 HC RX 637 (ALT 250 FOR IP): Performed by: PHYSICIAN ASSISTANT

## 2020-01-01 PROCEDURE — 97164 PT RE-EVAL EST PLAN CARE: CPT

## 2020-01-01 PROCEDURE — 86923 COMPATIBILITY TEST ELECTRIC: CPT

## 2020-01-01 PROCEDURE — 6360000002 HC RX W HCPCS: Performed by: SURGERY

## 2020-01-01 PROCEDURE — 7100000000 HC PACU RECOVERY - FIRST 15 MIN: Performed by: INTERNAL MEDICINE

## 2020-01-01 PROCEDURE — C9113 INJ PANTOPRAZOLE SODIUM, VIA: HCPCS | Performed by: SURGERY

## 2020-01-01 PROCEDURE — 6360000002 HC RX W HCPCS

## 2020-01-01 PROCEDURE — 36415 COLL VENOUS BLD VENIPUNCTURE: CPT

## 2020-01-01 PROCEDURE — 86900 BLOOD TYPING SEROLOGIC ABO: CPT

## 2020-01-01 PROCEDURE — 0DB48ZX EXCISION OF ESOPHAGOGASTRIC JUNCTION, VIA NATURAL OR ARTIFICIAL OPENING ENDOSCOPIC, DIAGNOSTIC: ICD-10-PCS | Performed by: INTERNAL MEDICINE

## 2020-01-01 PROCEDURE — 6360000002 HC RX W HCPCS: Performed by: FAMILY MEDICINE

## 2020-01-01 PROCEDURE — APPNB30 APP NON BILLABLE TIME 0-30 MINS: Performed by: NURSE PRACTITIONER

## 2020-01-01 PROCEDURE — 80076 HEPATIC FUNCTION PANEL: CPT

## 2020-01-01 PROCEDURE — 84238 ASSAY NONENDOCRINE RECEPTOR: CPT

## 2020-01-01 PROCEDURE — 87070 CULTURE OTHR SPECIMN AEROBIC: CPT

## 2020-01-01 PROCEDURE — 82378 CARCINOEMBRYONIC ANTIGEN: CPT

## 2020-01-01 PROCEDURE — 83735 ASSAY OF MAGNESIUM: CPT

## 2020-01-01 PROCEDURE — 85025 COMPLETE CBC W/AUTO DIFF WBC: CPT

## 2020-01-01 PROCEDURE — 1200000000 HC SEMI PRIVATE

## 2020-01-01 PROCEDURE — 2580000003 HC RX 258: Performed by: SURGERY

## 2020-01-01 PROCEDURE — APPSS15 APP SPLIT SHARED TIME 0-15 MINUTES: Performed by: NURSE PRACTITIONER

## 2020-01-01 PROCEDURE — 6370000000 HC RX 637 (ALT 250 FOR IP): Performed by: INTERNAL MEDICINE

## 2020-01-01 PROCEDURE — 13160 SEC CLSR SURG WND/DEHSN XTN: CPT | Performed by: SURGERY

## 2020-01-01 PROCEDURE — 80048 BASIC METABOLIC PNL TOTAL CA: CPT

## 2020-01-01 PROCEDURE — 6370000000 HC RX 637 (ALT 250 FOR IP): Performed by: SURGERY

## 2020-01-01 PROCEDURE — 99222 1ST HOSP IP/OBS MODERATE 55: CPT | Performed by: SURGERY

## 2020-01-01 PROCEDURE — 97116 GAIT TRAINING THERAPY: CPT

## 2020-01-01 PROCEDURE — 80053 COMPREHEN METABOLIC PANEL: CPT

## 2020-01-01 PROCEDURE — 80069 RENAL FUNCTION PANEL: CPT

## 2020-01-01 PROCEDURE — 84540 ASSAY OF URINE/UREA-N: CPT

## 2020-01-01 PROCEDURE — 2580000003 HC RX 258: Performed by: NURSE ANESTHETIST, CERTIFIED REGISTERED

## 2020-01-01 PROCEDURE — 85018 HEMOGLOBIN: CPT

## 2020-01-01 PROCEDURE — 86140 C-REACTIVE PROTEIN: CPT

## 2020-01-01 PROCEDURE — 82570 ASSAY OF URINE CREATININE: CPT

## 2020-01-01 PROCEDURE — 87205 SMEAR GRAM STAIN: CPT

## 2020-01-01 PROCEDURE — 85730 THROMBOPLASTIN TIME PARTIAL: CPT

## 2020-01-01 PROCEDURE — 84295 ASSAY OF SERUM SODIUM: CPT

## 2020-01-01 PROCEDURE — 97530 THERAPEUTIC ACTIVITIES: CPT

## 2020-01-01 PROCEDURE — 94760 N-INVAS EAR/PLS OXIMETRY 1: CPT

## 2020-01-01 PROCEDURE — P9045 ALBUMIN (HUMAN), 5%, 250 ML: HCPCS | Performed by: INTERNAL MEDICINE

## 2020-01-01 PROCEDURE — 85610 PROTHROMBIN TIME: CPT

## 2020-01-01 PROCEDURE — 51702 INSERT TEMP BLADDER CATH: CPT

## 2020-01-01 PROCEDURE — 2060000000 HC ICU INTERMEDIATE R&B

## 2020-01-01 PROCEDURE — 2500000003 HC RX 250 WO HCPCS: Performed by: NURSE ANESTHETIST, CERTIFIED REGISTERED

## 2020-01-01 PROCEDURE — 2500000003 HC RX 250 WO HCPCS: Performed by: INTERNAL MEDICINE

## 2020-01-01 PROCEDURE — 85384 FIBRINOGEN ACTIVITY: CPT

## 2020-01-01 PROCEDURE — 6370000000 HC RX 637 (ALT 250 FOR IP): Performed by: FAMILY MEDICINE

## 2020-01-01 PROCEDURE — 6360000002 HC RX W HCPCS: Performed by: NURSE ANESTHETIST, CERTIFIED REGISTERED

## 2020-01-01 PROCEDURE — 70450 CT HEAD/BRAIN W/O DYE: CPT

## 2020-01-01 PROCEDURE — 2500000003 HC RX 250 WO HCPCS: Performed by: SURGERY

## 2020-01-01 PROCEDURE — 84300 ASSAY OF URINE SODIUM: CPT

## 2020-01-01 PROCEDURE — 97535 SELF CARE MNGMENT TRAINING: CPT

## 2020-01-01 PROCEDURE — 2709999900 HC NON-CHARGEABLE SUPPLY: Performed by: INTERNAL MEDICINE

## 2020-01-01 PROCEDURE — 87116 MYCOBACTERIA CULTURE: CPT

## 2020-01-01 PROCEDURE — P9016 RBC LEUKOCYTES REDUCED: HCPCS

## 2020-01-01 PROCEDURE — 87077 CULTURE AEROBIC IDENTIFY: CPT

## 2020-01-01 PROCEDURE — 84478 ASSAY OF TRIGLYCERIDES: CPT

## 2020-01-01 PROCEDURE — 2580000003 HC RX 258: Performed by: INTERNAL MEDICINE

## 2020-01-01 PROCEDURE — 82550 ASSAY OF CK (CPK): CPT

## 2020-01-01 PROCEDURE — 82436 ASSAY OF URINE CHLORIDE: CPT

## 2020-01-01 PROCEDURE — 82140 ASSAY OF AMMONIA: CPT

## 2020-01-01 PROCEDURE — 36569 INSJ PICC 5 YR+ W/O IMAGING: CPT

## 2020-01-01 PROCEDURE — 88341 IMHCHEM/IMCYTCHM EA ADD ANTB: CPT

## 2020-01-01 PROCEDURE — 85379 FIBRIN DEGRADATION QUANT: CPT

## 2020-01-01 PROCEDURE — 88305 TISSUE EXAM BY PATHOLOGIST: CPT

## 2020-01-01 PROCEDURE — 6360000004 HC RX CONTRAST MEDICATION: Performed by: SURGERY

## 2020-01-01 PROCEDURE — 84100 ASSAY OF PHOSPHORUS: CPT

## 2020-01-01 PROCEDURE — 3700000001 HC ADD 15 MINUTES (ANESTHESIA): Performed by: SURGERY

## 2020-01-01 PROCEDURE — 2580000003 HC RX 258: Performed by: PHYSICIAN ASSISTANT

## 2020-01-01 PROCEDURE — 87015 SPECIMEN INFECT AGNT CONCNTJ: CPT

## 2020-01-01 PROCEDURE — C1751 CATH, INF, PER/CENT/MIDLINE: HCPCS

## 2020-01-01 PROCEDURE — 83516 IMMUNOASSAY NONANTIBODY: CPT

## 2020-01-01 PROCEDURE — 88342 IMHCHEM/IMCYTCHM 1ST ANTB: CPT

## 2020-01-01 PROCEDURE — 0UDB7ZX EXTRACTION OF ENDOMETRIUM, VIA NATURAL OR ARTIFICIAL OPENING, DIAGNOSTIC: ICD-10-PCS | Performed by: OBSTETRICS & GYNECOLOGY

## 2020-01-01 PROCEDURE — 82104 ALPHA-1-ANTITRYPSIN PHENO: CPT

## 2020-01-01 PROCEDURE — 85027 COMPLETE CBC AUTOMATED: CPT

## 2020-01-01 PROCEDURE — 87040 BLOOD CULTURE FOR BACTERIA: CPT

## 2020-01-01 PROCEDURE — 82248 BILIRUBIN DIRECT: CPT

## 2020-01-01 PROCEDURE — 87075 CULTR BACTERIA EXCEPT BLOOD: CPT

## 2020-01-01 PROCEDURE — 84484 ASSAY OF TROPONIN QUANT: CPT

## 2020-01-01 PROCEDURE — P9047 ALBUMIN (HUMAN), 25%, 50ML: HCPCS | Performed by: INTERNAL MEDICINE

## 2020-01-01 PROCEDURE — 85652 RBC SED RATE AUTOMATED: CPT

## 2020-01-01 PROCEDURE — 82105 ALPHA-FETOPROTEIN SERUM: CPT

## 2020-01-01 PROCEDURE — 3700000000 HC ANESTHESIA ATTENDED CARE: Performed by: SURGERY

## 2020-01-01 PROCEDURE — P9047 ALBUMIN (HUMAN), 25%, 50ML: HCPCS | Performed by: PHYSICIAN ASSISTANT

## 2020-01-01 PROCEDURE — 83550 IRON BINDING TEST: CPT

## 2020-01-01 PROCEDURE — C9290 INJ, BUPIVACAINE LIPOSOME: HCPCS | Performed by: SURGERY

## 2020-01-01 PROCEDURE — 92526 ORAL FUNCTION THERAPY: CPT

## 2020-01-01 PROCEDURE — 80074 ACUTE HEPATITIS PANEL: CPT

## 2020-01-01 PROCEDURE — 2580000003 HC RX 258: Performed by: FAMILY MEDICINE

## 2020-01-01 PROCEDURE — 86850 RBC ANTIBODY SCREEN: CPT

## 2020-01-01 PROCEDURE — 74176 CT ABD & PELVIS W/O CONTRAST: CPT

## 2020-01-01 PROCEDURE — 3600000004 HC SURGERY LEVEL 4 BASE: Performed by: SURGERY

## 2020-01-01 PROCEDURE — 84157 ASSAY OF PROTEIN OTHER: CPT

## 2020-01-01 PROCEDURE — 2580000003 HC RX 258

## 2020-01-01 PROCEDURE — 6360000002 HC RX W HCPCS: Performed by: PHYSICIAN ASSISTANT

## 2020-01-01 PROCEDURE — 6370000000 HC RX 637 (ALT 250 FOR IP): Performed by: NURSE PRACTITIONER

## 2020-01-01 PROCEDURE — 74018 RADEX ABDOMEN 1 VIEW: CPT

## 2020-01-01 PROCEDURE — U0002 COVID-19 LAB TEST NON-CDC: HCPCS

## 2020-01-01 PROCEDURE — 6360000002 HC RX W HCPCS: Performed by: NURSE PRACTITIONER

## 2020-01-01 PROCEDURE — 82728 ASSAY OF FERRITIN: CPT

## 2020-01-01 PROCEDURE — 02HV33Z INSERTION OF INFUSION DEVICE INTO SUPERIOR VENA CAVA, PERCUTANEOUS APPROACH: ICD-10-PCS | Performed by: INTERNAL MEDICINE

## 2020-01-01 PROCEDURE — U0003 INFECTIOUS AGENT DETECTION BY NUCLEIC ACID (DNA OR RNA); SEVERE ACUTE RESPIRATORY SYNDROME CORONAVIRUS 2 (SARS-COV-2) (CORONAVIRUS DISEASE [COVID-19]), AMPLIFIED PROBE TECHNIQUE, MAKING USE OF HIGH THROUGHPUT TECHNOLOGIES AS DESCRIBED BY CMS-2020-01-R: HCPCS

## 2020-01-01 PROCEDURE — 96374 THER/PROPH/DIAG INJ IV PUSH: CPT

## 2020-01-01 PROCEDURE — 86304 IMMUNOASSAY TUMOR CA 125: CPT

## 2020-01-01 PROCEDURE — 84133 ASSAY OF URINE POTASSIUM: CPT

## 2020-01-01 PROCEDURE — 0DTF0ZZ RESECTION OF RIGHT LARGE INTESTINE, OPEN APPROACH: ICD-10-PCS | Performed by: SURGERY

## 2020-01-01 PROCEDURE — 86901 BLOOD TYPING SEROLOGIC RH(D): CPT

## 2020-01-01 PROCEDURE — 2700000000 HC OXYGEN THERAPY PER DAY

## 2020-01-01 PROCEDURE — 3609010300 HC COLONOSCOPY W/BIOPSY SINGLE/MULTIPLE: Performed by: INTERNAL MEDICINE

## 2020-01-01 PROCEDURE — 2580000003 HC RX 258: Performed by: NURSE PRACTITIONER

## 2020-01-01 PROCEDURE — 88360 TUMOR IMMUNOHISTOCHEM/MANUAL: CPT

## 2020-01-01 PROCEDURE — 3700000001 HC ADD 15 MINUTES (ANESTHESIA): Performed by: INTERNAL MEDICINE

## 2020-01-01 PROCEDURE — P9047 ALBUMIN (HUMAN), 25%, 50ML: HCPCS | Performed by: SURGERY

## 2020-01-01 PROCEDURE — 83605 ASSAY OF LACTIC ACID: CPT

## 2020-01-01 PROCEDURE — 99233 SBSQ HOSP IP/OBS HIGH 50: CPT | Performed by: INTERNAL MEDICINE

## 2020-01-01 PROCEDURE — 3700000000 HC ANESTHESIA ATTENDED CARE: Performed by: INTERNAL MEDICINE

## 2020-01-01 PROCEDURE — 36430 TRANSFUSION BLD/BLD COMPNT: CPT

## 2020-01-01 PROCEDURE — 3600000003 HC SURGERY LEVEL 3 BASE: Performed by: SURGERY

## 2020-01-01 PROCEDURE — 0DBK8ZX EXCISION OF ASCENDING COLON, VIA NATURAL OR ARTIFICIAL OPENING ENDOSCOPIC, DIAGNOSTIC: ICD-10-PCS | Performed by: INTERNAL MEDICINE

## 2020-01-01 PROCEDURE — 49083 ABD PARACENTESIS W/IMAGING: CPT

## 2020-01-01 PROCEDURE — 0W9J00Z DRAINAGE OF PELVIC CAVITY WITH DRAINAGE DEVICE, OPEN APPROACH: ICD-10-PCS | Performed by: RADIOLOGY

## 2020-01-01 PROCEDURE — 99232 SBSQ HOSP IP/OBS MODERATE 35: CPT | Performed by: SURGERY

## 2020-01-01 PROCEDURE — 99152 MOD SED SAME PHYS/QHP 5/>YRS: CPT

## 2020-01-01 PROCEDURE — 74177 CT ABD & PELVIS W/CONTRAST: CPT

## 2020-01-01 PROCEDURE — 88112 CYTOPATH CELL ENHANCE TECH: CPT

## 2020-01-01 PROCEDURE — 7100000000 HC PACU RECOVERY - FIRST 15 MIN: Performed by: SURGERY

## 2020-01-01 PROCEDURE — 0FB00ZX EXCISION OF LIVER, OPEN APPROACH, DIAGNOSTIC: ICD-10-PCS | Performed by: SURGERY

## 2020-01-01 PROCEDURE — 87186 SC STD MICRODIL/AGAR DIL: CPT

## 2020-01-01 PROCEDURE — 47001 NDL BIOPSY LVR TM OTH MAJ PX: CPT | Performed by: SURGERY

## 2020-01-01 PROCEDURE — 0W9G3ZZ DRAINAGE OF PERITONEAL CAVITY, PERCUTANEOUS APPROACH: ICD-10-PCS | Performed by: RADIOLOGY

## 2020-01-01 PROCEDURE — 99232 SBSQ HOSP IP/OBS MODERATE 35: CPT | Performed by: INTERNAL MEDICINE

## 2020-01-01 PROCEDURE — 89051 BODY FLUID CELL COUNT: CPT

## 2020-01-01 PROCEDURE — 97162 PT EVAL MOD COMPLEX 30 MIN: CPT

## 2020-01-01 PROCEDURE — 3600000014 HC SURGERY LEVEL 4 ADDTL 15MIN: Performed by: SURGERY

## 2020-01-01 PROCEDURE — 71260 CT THORAX DX C+: CPT

## 2020-01-01 PROCEDURE — 0DBL8ZZ EXCISION OF TRANSVERSE COLON, VIA NATURAL OR ARTIFICIAL OPENING ENDOSCOPIC: ICD-10-PCS | Performed by: INTERNAL MEDICINE

## 2020-01-01 PROCEDURE — P9045 ALBUMIN (HUMAN), 5%, 250 ML: HCPCS | Performed by: NURSE ANESTHETIST, CERTIFIED REGISTERED

## 2020-01-01 PROCEDURE — 2709999900 HC NON-CHARGEABLE SUPPLY: Performed by: SURGERY

## 2020-01-01 PROCEDURE — 6360000004 HC RX CONTRAST MEDICATION: Performed by: INTERNAL MEDICINE

## 2020-01-01 PROCEDURE — 85014 HEMATOCRIT: CPT

## 2020-01-01 PROCEDURE — 82042 OTHER SOURCE ALBUMIN QUAN EA: CPT

## 2020-01-01 PROCEDURE — 3609012400 HC EGD TRANSORAL BIOPSY SINGLE/MULTIPLE: Performed by: INTERNAL MEDICINE

## 2020-01-01 PROCEDURE — 92610 EVALUATE SWALLOWING FUNCTION: CPT

## 2020-01-01 PROCEDURE — 84132 ASSAY OF SERUM POTASSIUM: CPT

## 2020-01-01 PROCEDURE — 44160 REMOVAL OF COLON: CPT | Performed by: SURGERY

## 2020-01-01 PROCEDURE — 36592 COLLECT BLOOD FROM PICC: CPT

## 2020-01-01 PROCEDURE — 84550 ASSAY OF BLOOD/URIC ACID: CPT

## 2020-01-01 PROCEDURE — 83540 ASSAY OF IRON: CPT

## 2020-01-01 PROCEDURE — 97166 OT EVAL MOD COMPLEX 45 MIN: CPT

## 2020-01-01 PROCEDURE — 99285 EMERGENCY DEPT VISIT HI MDM: CPT

## 2020-01-01 PROCEDURE — 0DBN8ZZ EXCISION OF SIGMOID COLON, VIA NATURAL OR ARTIFICIAL OPENING ENDOSCOPIC: ICD-10-PCS | Performed by: INTERNAL MEDICINE

## 2020-01-01 PROCEDURE — 88313 SPECIAL STAINS GROUP 2: CPT

## 2020-01-01 PROCEDURE — 6360000002 HC RX W HCPCS: Performed by: RADIOLOGY

## 2020-01-01 PROCEDURE — 0JQ80ZZ REPAIR ABDOMEN SUBCUTANEOUS TISSUE AND FASCIA, OPEN APPROACH: ICD-10-PCS | Performed by: SURGERY

## 2020-01-01 PROCEDURE — 86708 HEPATITIS A ANTIBODY: CPT

## 2020-01-01 PROCEDURE — 86706 HEP B SURFACE ANTIBODY: CPT

## 2020-01-01 PROCEDURE — 97168 OT RE-EVAL EST PLAN CARE: CPT

## 2020-01-01 PROCEDURE — 93005 ELECTROCARDIOGRAM TRACING: CPT | Performed by: PHYSICIAN ASSISTANT

## 2020-01-01 PROCEDURE — 2720000010 HC SURG SUPPLY STERILE: Performed by: SURGERY

## 2020-01-01 PROCEDURE — 82746 ASSAY OF FOLIC ACID SERUM: CPT

## 2020-01-01 PROCEDURE — 7100000001 HC PACU RECOVERY - ADDTL 15 MIN: Performed by: SURGERY

## 2020-01-01 PROCEDURE — 73090 X-RAY EXAM OF FOREARM: CPT

## 2020-01-01 PROCEDURE — 87102 FUNGUS ISOLATION CULTURE: CPT

## 2020-01-01 PROCEDURE — 71045 X-RAY EXAM CHEST 1 VIEW: CPT

## 2020-01-01 PROCEDURE — 93010 ELECTROCARDIOGRAM REPORT: CPT | Performed by: INTERNAL MEDICINE

## 2020-01-01 PROCEDURE — 84145 PROCALCITONIN (PCT): CPT

## 2020-01-01 PROCEDURE — 87206 SMEAR FLUORESCENT/ACID STAI: CPT

## 2020-01-01 PROCEDURE — 74019 RADEX ABDOMEN 2 VIEWS: CPT

## 2020-01-01 PROCEDURE — 99255 IP/OBS CONSLTJ NEW/EST HI 80: CPT | Performed by: INTERNAL MEDICINE

## 2020-01-01 PROCEDURE — 82607 VITAMIN B-12: CPT

## 2020-01-01 PROCEDURE — 83615 LACTATE (LD) (LDH) ENZYME: CPT

## 2020-01-01 PROCEDURE — 83880 ASSAY OF NATRIURETIC PEPTIDE: CPT

## 2020-01-01 PROCEDURE — 81001 URINALYSIS AUTO W/SCOPE: CPT

## 2020-01-01 PROCEDURE — 88307 TISSUE EXAM BY PATHOLOGIST: CPT

## 2020-01-01 PROCEDURE — 82103 ALPHA-1-ANTITRYPSIN TOTAL: CPT

## 2020-01-01 PROCEDURE — C1729 CATH, DRAINAGE: HCPCS

## 2020-01-01 PROCEDURE — 82803 BLOOD GASES ANY COMBINATION: CPT

## 2020-01-01 PROCEDURE — 3600000013 HC SURGERY LEVEL 3 ADDTL 15MIN: Performed by: SURGERY

## 2020-01-01 PROCEDURE — 72125 CT NECK SPINE W/O DYE: CPT

## 2020-01-01 PROCEDURE — 3609010600 HC COLONOSCOPY POLYPECTOMY SNARE/COLD BIOPSY: Performed by: INTERNAL MEDICINE

## 2020-01-01 RX ORDER — SODIUM CHLORIDE 9 MG/ML
INJECTION, SOLUTION INTRAVENOUS CONTINUOUS
Status: DISCONTINUED | OUTPATIENT
Start: 2020-01-01 | End: 2020-01-01

## 2020-01-01 RX ORDER — ALBUMIN (HUMAN) 12.5 G/50ML
SOLUTION INTRAVENOUS
Status: DISPENSED
Start: 2020-01-01 | End: 2020-01-01

## 2020-01-01 RX ORDER — DEXTROSE MONOHYDRATE 25 G/50ML
12.5 INJECTION, SOLUTION INTRAVENOUS PRN
Status: DISCONTINUED | OUTPATIENT
Start: 2020-01-01 | End: 2020-01-01 | Stop reason: HOSPADM

## 2020-01-01 RX ORDER — ALBUMIN (HUMAN) 12.5 G/50ML
65 SOLUTION INTRAVENOUS ONCE
Status: COMPLETED | OUTPATIENT
Start: 2020-01-01 | End: 2020-01-01

## 2020-01-01 RX ORDER — MAGNESIUM HYDROXIDE 1200 MG/15ML
LIQUID ORAL CONTINUOUS PRN
Status: COMPLETED | OUTPATIENT
Start: 2020-01-01 | End: 2020-01-01

## 2020-01-01 RX ORDER — DEXAMETHASONE SODIUM PHOSPHATE 4 MG/ML
INJECTION, SOLUTION INTRA-ARTICULAR; INTRALESIONAL; INTRAMUSCULAR; INTRAVENOUS; SOFT TISSUE PRN
Status: DISCONTINUED | OUTPATIENT
Start: 2020-01-01 | End: 2020-01-01 | Stop reason: SDUPTHER

## 2020-01-01 RX ORDER — ALBUMIN, HUMAN INJ 5% 5 %
12.5 SOLUTION INTRAVENOUS 2 TIMES DAILY
Status: DISCONTINUED | OUTPATIENT
Start: 2020-01-01 | End: 2020-01-01

## 2020-01-01 RX ORDER — ALBUMIN, HUMAN INJ 5% 5 %
SOLUTION INTRAVENOUS PRN
Status: DISCONTINUED | OUTPATIENT
Start: 2020-01-01 | End: 2020-01-01 | Stop reason: SDUPTHER

## 2020-01-01 RX ORDER — LABETALOL HYDROCHLORIDE 5 MG/ML
5 INJECTION, SOLUTION INTRAVENOUS EVERY 10 MIN PRN
Status: DISCONTINUED | OUTPATIENT
Start: 2020-01-01 | End: 2020-01-01 | Stop reason: HOSPADM

## 2020-01-01 RX ORDER — ACETAMINOPHEN 650 MG
TABLET, EXTENDED RELEASE ORAL
Status: COMPLETED | OUTPATIENT
Start: 2020-01-01 | End: 2020-01-01

## 2020-01-01 RX ORDER — ONDANSETRON 2 MG/ML
INJECTION INTRAMUSCULAR; INTRAVENOUS PRN
Status: DISCONTINUED | OUTPATIENT
Start: 2020-01-01 | End: 2020-01-01 | Stop reason: SDUPTHER

## 2020-01-01 RX ORDER — FUROSEMIDE 10 MG/ML
20 INJECTION INTRAMUSCULAR; INTRAVENOUS ONCE
Status: COMPLETED | OUTPATIENT
Start: 2020-01-01 | End: 2020-01-01

## 2020-01-01 RX ORDER — 0.9 % SODIUM CHLORIDE 0.9 %
1000 INTRAVENOUS SOLUTION INTRAVENOUS ONCE
Status: COMPLETED | OUTPATIENT
Start: 2020-01-01 | End: 2020-01-01

## 2020-01-01 RX ORDER — HYDROMORPHONE HCL 110MG/55ML
0.25 PATIENT CONTROLLED ANALGESIA SYRINGE INTRAVENOUS EVERY 5 MIN PRN
Status: DISCONTINUED | OUTPATIENT
Start: 2020-01-01 | End: 2020-01-01 | Stop reason: HOSPADM

## 2020-01-01 RX ORDER — METOCLOPRAMIDE HYDROCHLORIDE 5 MG/ML
10 INJECTION INTRAMUSCULAR; INTRAVENOUS EVERY 6 HOURS
Status: DISPENSED | OUTPATIENT
Start: 2020-01-01 | End: 2020-01-01

## 2020-01-01 RX ORDER — LACTOBACILLUS RHAMNOSUS GG 10B CELL
1 CAPSULE ORAL 2 TIMES DAILY WITH MEALS
Qty: 60 CAPSULE | Refills: 0 | Status: SHIPPED | OUTPATIENT
Start: 2020-01-01

## 2020-01-01 RX ORDER — ACETAMINOPHEN 650 MG/1
650 SUPPOSITORY RECTAL EVERY 6 HOURS PRN
Status: DISCONTINUED | OUTPATIENT
Start: 2020-01-01 | End: 2020-01-01

## 2020-01-01 RX ORDER — 0.9 % SODIUM CHLORIDE 0.9 %
20 INTRAVENOUS SOLUTION INTRAVENOUS ONCE
Status: COMPLETED | OUTPATIENT
Start: 2020-01-01 | End: 2020-01-01

## 2020-01-01 RX ORDER — ALBUMIN (HUMAN) 12.5 G/50ML
12.5 SOLUTION INTRAVENOUS EVERY 6 HOURS
Status: COMPLETED | OUTPATIENT
Start: 2020-01-01 | End: 2020-01-01

## 2020-01-01 RX ORDER — POTASSIUM CHLORIDE 7.45 MG/ML
10 INJECTION INTRAVENOUS PRN
Status: DISCONTINUED | OUTPATIENT
Start: 2020-01-01 | End: 2020-01-01

## 2020-01-01 RX ORDER — KETOROLAC TROMETHAMINE 15 MG/ML
15 INJECTION, SOLUTION INTRAMUSCULAR; INTRAVENOUS EVERY 6 HOURS PRN
Status: DISPENSED | OUTPATIENT
Start: 2020-01-01 | End: 2020-01-01

## 2020-01-01 RX ORDER — PROPOFOL 10 MG/ML
INJECTION, EMULSION INTRAVENOUS PRN
Status: DISCONTINUED | OUTPATIENT
Start: 2020-01-01 | End: 2020-01-01 | Stop reason: SDUPTHER

## 2020-01-01 RX ORDER — ONDANSETRON 2 MG/ML
4 INJECTION INTRAMUSCULAR; INTRAVENOUS EVERY 4 HOURS PRN
Status: DISCONTINUED | OUTPATIENT
Start: 2020-01-01 | End: 2020-01-01 | Stop reason: HOSPADM

## 2020-01-01 RX ORDER — FENTANYL CITRATE 50 UG/ML
25 INJECTION, SOLUTION INTRAMUSCULAR; INTRAVENOUS EVERY 5 MIN PRN
Status: DISCONTINUED | OUTPATIENT
Start: 2020-01-01 | End: 2020-01-01 | Stop reason: HOSPADM

## 2020-01-01 RX ORDER — PANTOPRAZOLE SODIUM 40 MG/1
40 TABLET, DELAYED RELEASE ORAL
Qty: 30 TABLET | Refills: 0 | Status: SHIPPED | OUTPATIENT
Start: 2020-01-01

## 2020-01-01 RX ORDER — LIDOCAINE HYDROCHLORIDE 10 MG/ML
10 INJECTION, SOLUTION EPIDURAL; INFILTRATION; INTRACAUDAL; PERINEURAL ONCE
Status: COMPLETED | OUTPATIENT
Start: 2020-01-01 | End: 2020-01-01

## 2020-01-01 RX ORDER — CIPROFLOXACIN 2 MG/ML
400 INJECTION, SOLUTION INTRAVENOUS EVERY 12 HOURS
Status: DISCONTINUED | OUTPATIENT
Start: 2020-01-01 | End: 2020-01-01

## 2020-01-01 RX ORDER — DEXTROSE MONOHYDRATE 50 MG/ML
100 INJECTION, SOLUTION INTRAVENOUS PRN
Status: DISCONTINUED | OUTPATIENT
Start: 2020-01-01 | End: 2020-01-01 | Stop reason: HOSPADM

## 2020-01-01 RX ORDER — ACETAMINOPHEN 500 MG
1000 TABLET ORAL EVERY 6 HOURS PRN
Status: DISCONTINUED | OUTPATIENT
Start: 2020-01-01 | End: 2020-01-01 | Stop reason: HOSPADM

## 2020-01-01 RX ORDER — MORPHINE SULFATE 2 MG/ML
2 INJECTION, SOLUTION INTRAMUSCULAR; INTRAVENOUS
Status: DISCONTINUED | OUTPATIENT
Start: 2020-01-01 | End: 2020-01-01

## 2020-01-01 RX ORDER — SODIUM CHLORIDE 9 MG/ML
INJECTION, SOLUTION INTRAVENOUS
Status: COMPLETED
Start: 2020-01-01 | End: 2020-01-01

## 2020-01-01 RX ORDER — MIDAZOLAM HYDROCHLORIDE 1 MG/ML
INJECTION INTRAMUSCULAR; INTRAVENOUS
Status: COMPLETED | OUTPATIENT
Start: 2020-01-01 | End: 2020-01-01

## 2020-01-01 RX ORDER — HYDROMORPHONE HCL 110MG/55ML
0.5 PATIENT CONTROLLED ANALGESIA SYRINGE INTRAVENOUS EVERY 5 MIN PRN
Status: DISCONTINUED | OUTPATIENT
Start: 2020-01-01 | End: 2020-01-01 | Stop reason: HOSPADM

## 2020-01-01 RX ORDER — METOCLOPRAMIDE HYDROCHLORIDE 5 MG/ML
10 INJECTION INTRAMUSCULAR; INTRAVENOUS EVERY 6 HOURS
Status: DISCONTINUED | OUTPATIENT
Start: 2020-01-01 | End: 2020-01-01

## 2020-01-01 RX ORDER — PANTOPRAZOLE SODIUM 40 MG/10ML
40 INJECTION, POWDER, LYOPHILIZED, FOR SOLUTION INTRAVENOUS DAILY
Status: DISCONTINUED | OUTPATIENT
Start: 2020-01-01 | End: 2020-01-01 | Stop reason: HOSPADM

## 2020-01-01 RX ORDER — FENTANYL CITRATE 50 UG/ML
INJECTION, SOLUTION INTRAMUSCULAR; INTRAVENOUS
Status: COMPLETED
Start: 2020-01-01 | End: 2020-01-01

## 2020-01-01 RX ORDER — METOCLOPRAMIDE HYDROCHLORIDE 5 MG/ML
10 INJECTION INTRAMUSCULAR; INTRAVENOUS EVERY 6 HOURS
Status: COMPLETED | OUTPATIENT
Start: 2020-01-01 | End: 2020-01-01

## 2020-01-01 RX ORDER — ROCURONIUM BROMIDE 10 MG/ML
INJECTION, SOLUTION INTRAVENOUS PRN
Status: DISCONTINUED | OUTPATIENT
Start: 2020-01-01 | End: 2020-01-01 | Stop reason: SDUPTHER

## 2020-01-01 RX ORDER — SODIUM CHLORIDE 0.9 % (FLUSH) 0.9 %
10 SYRINGE (ML) INJECTION EVERY 12 HOURS SCHEDULED
Status: DISCONTINUED | OUTPATIENT
Start: 2020-01-01 | End: 2020-01-01 | Stop reason: HOSPADM

## 2020-01-01 RX ORDER — BISACODYL 10 MG
SUPPOSITORY, RECTAL RECTAL
Status: DISPENSED
Start: 2020-01-01 | End: 2020-01-01

## 2020-01-01 RX ORDER — LIDOCAINE HYDROCHLORIDE 20 MG/ML
INJECTION, SOLUTION EPIDURAL; INFILTRATION; INTRACAUDAL; PERINEURAL PRN
Status: DISCONTINUED | OUTPATIENT
Start: 2020-01-01 | End: 2020-01-01 | Stop reason: SDUPTHER

## 2020-01-01 RX ORDER — ACETAMINOPHEN 325 MG/1
650 TABLET ORAL EVERY 6 HOURS PRN
Status: DISCONTINUED | OUTPATIENT
Start: 2020-01-01 | End: 2020-01-01

## 2020-01-01 RX ORDER — ALBUMIN, HUMAN INJ 5% 5 %
12.5 SOLUTION INTRAVENOUS EVERY 6 HOURS
Status: DISCONTINUED | OUTPATIENT
Start: 2020-01-01 | End: 2020-01-01

## 2020-01-01 RX ORDER — SODIUM CHLORIDE, SODIUM LACTATE, POTASSIUM CHLORIDE, CALCIUM CHLORIDE 600; 310; 30; 20 MG/100ML; MG/100ML; MG/100ML; MG/100ML
30 INJECTION, SOLUTION INTRAVENOUS ONCE
Status: COMPLETED | OUTPATIENT
Start: 2020-01-01 | End: 2020-01-01

## 2020-01-01 RX ORDER — LACTULOSE 10 G/15ML
20 SOLUTION ORAL 3 TIMES DAILY
Status: DISCONTINUED | OUTPATIENT
Start: 2020-01-01 | End: 2020-01-01 | Stop reason: HOSPADM

## 2020-01-01 RX ORDER — 0.9 % SODIUM CHLORIDE 0.9 %
250 INTRAVENOUS SOLUTION INTRAVENOUS ONCE
Status: COMPLETED | OUTPATIENT
Start: 2020-01-01 | End: 2020-01-01

## 2020-01-01 RX ORDER — DIPHENHYDRAMINE HYDROCHLORIDE 50 MG/ML
12.5 INJECTION INTRAMUSCULAR; INTRAVENOUS
Status: DISCONTINUED | OUTPATIENT
Start: 2020-01-01 | End: 2020-01-01 | Stop reason: HOSPADM

## 2020-01-01 RX ORDER — GLYCOPYRROLATE 0.2 MG/ML
INJECTION INTRAMUSCULAR; INTRAVENOUS PRN
Status: DISCONTINUED | OUTPATIENT
Start: 2020-01-01 | End: 2020-01-01 | Stop reason: SDUPTHER

## 2020-01-01 RX ORDER — PROMETHAZINE HYDROCHLORIDE 25 MG/ML
12.5 INJECTION, SOLUTION INTRAMUSCULAR; INTRAVENOUS EVERY 6 HOURS PRN
Status: DISCONTINUED | OUTPATIENT
Start: 2020-01-01 | End: 2020-01-01 | Stop reason: HOSPADM

## 2020-01-01 RX ORDER — BISACODYL 10 MG
10 SUPPOSITORY, RECTAL RECTAL ONCE
Status: DISCONTINUED | OUTPATIENT
Start: 2020-01-01 | End: 2020-01-01 | Stop reason: HOSPADM

## 2020-01-01 RX ORDER — FOLIC ACID 1 MG/1
1 TABLET ORAL DAILY
Status: DISCONTINUED | OUTPATIENT
Start: 2020-01-01 | End: 2020-01-01 | Stop reason: HOSPADM

## 2020-01-01 RX ORDER — BISACODYL 10 MG
10 SUPPOSITORY, RECTAL RECTAL ONCE
Status: COMPLETED | OUTPATIENT
Start: 2020-01-01 | End: 2020-01-01

## 2020-01-01 RX ORDER — ACETAMINOPHEN 500 MG
500 TABLET ORAL EVERY 8 HOURS PRN
Status: DISCONTINUED | OUTPATIENT
Start: 2020-01-01 | End: 2020-01-01 | Stop reason: HOSPADM

## 2020-01-01 RX ORDER — FENTANYL CITRATE 50 UG/ML
50 INJECTION, SOLUTION INTRAMUSCULAR; INTRAVENOUS EVERY 5 MIN PRN
Status: DISCONTINUED | OUTPATIENT
Start: 2020-01-01 | End: 2020-01-01 | Stop reason: HOSPADM

## 2020-01-01 RX ORDER — SPIRONOLACTONE 25 MG/1
50 TABLET ORAL DAILY
Status: DISCONTINUED | OUTPATIENT
Start: 2020-01-01 | End: 2020-01-01

## 2020-01-01 RX ORDER — SODIUM CHLORIDE 0.9 % (FLUSH) 0.9 %
10 SYRINGE (ML) INJECTION PRN
Status: DISCONTINUED | OUTPATIENT
Start: 2020-01-01 | End: 2020-01-01 | Stop reason: HOSPADM

## 2020-01-01 RX ORDER — OXYCODONE HYDROCHLORIDE 5 MG/1
10 TABLET ORAL PRN
Status: DISCONTINUED | OUTPATIENT
Start: 2020-01-01 | End: 2020-01-01 | Stop reason: HOSPADM

## 2020-01-01 RX ORDER — METOCLOPRAMIDE HYDROCHLORIDE 5 MG/ML
5 INJECTION INTRAMUSCULAR; INTRAVENOUS EVERY 6 HOURS
Status: DISPENSED | OUTPATIENT
Start: 2020-01-01 | End: 2020-01-01

## 2020-01-01 RX ORDER — LIDOCAINE HYDROCHLORIDE 10 MG/ML
5 INJECTION, SOLUTION EPIDURAL; INFILTRATION; INTRACAUDAL; PERINEURAL ONCE
Status: DISCONTINUED | OUTPATIENT
Start: 2020-01-01 | End: 2020-01-01 | Stop reason: HOSPADM

## 2020-01-01 RX ORDER — MORPHINE SULFATE 2 MG/ML
2 INJECTION, SOLUTION INTRAMUSCULAR; INTRAVENOUS EVERY 4 HOURS PRN
Status: DISCONTINUED | OUTPATIENT
Start: 2020-01-01 | End: 2020-01-01

## 2020-01-01 RX ORDER — FERRIC SUBSULFATE 20-22G/100
SOLUTION, NON-ORAL MISCELLANEOUS
Status: COMPLETED | OUTPATIENT
Start: 2020-01-01 | End: 2020-01-01

## 2020-01-01 RX ORDER — DEXTROSE, SODIUM CHLORIDE, AND POTASSIUM CHLORIDE 5; .9; .15 G/100ML; G/100ML; G/100ML
INJECTION INTRAVENOUS CONTINUOUS
Status: DISCONTINUED | OUTPATIENT
Start: 2020-01-01 | End: 2020-01-01

## 2020-01-01 RX ORDER — POTASSIUM CHLORIDE 20 MEQ/1
40 TABLET, EXTENDED RELEASE ORAL EVERY 4 HOURS
Status: COMPLETED | OUTPATIENT
Start: 2020-01-01 | End: 2020-01-01

## 2020-01-01 RX ORDER — FENTANYL CITRATE 50 UG/ML
INJECTION, SOLUTION INTRAMUSCULAR; INTRAVENOUS PRN
Status: DISCONTINUED | OUTPATIENT
Start: 2020-01-01 | End: 2020-01-01 | Stop reason: SDUPTHER

## 2020-01-01 RX ORDER — KETOROLAC TROMETHAMINE 15 MG/ML
15 INJECTION, SOLUTION INTRAMUSCULAR; INTRAVENOUS EVERY 6 HOURS PRN
Status: DISCONTINUED | OUTPATIENT
Start: 2020-01-01 | End: 2020-01-01

## 2020-01-01 RX ORDER — SODIUM CHLORIDE, SODIUM LACTATE, POTASSIUM CHLORIDE, CALCIUM CHLORIDE 600; 310; 30; 20 MG/100ML; MG/100ML; MG/100ML; MG/100ML
INJECTION, SOLUTION INTRAVENOUS CONTINUOUS PRN
Status: DISCONTINUED | OUTPATIENT
Start: 2020-01-01 | End: 2020-01-01 | Stop reason: SDUPTHER

## 2020-01-01 RX ORDER — SODIUM CHLORIDE, SODIUM LACTATE, POTASSIUM CHLORIDE, CALCIUM CHLORIDE 600; 310; 30; 20 MG/100ML; MG/100ML; MG/100ML; MG/100ML
1000 INJECTION, SOLUTION INTRAVENOUS ONCE
Status: COMPLETED | OUTPATIENT
Start: 2020-01-01 | End: 2020-01-01

## 2020-01-01 RX ORDER — PEG-3350, SODIUM SULFATE, SODIUM CHLORIDE, POTASSIUM CHLORIDE, SODIUM ASCORBATE AND ASCORBIC ACID 7.5-2.691G
100 KIT ORAL ONCE
Status: COMPLETED | OUTPATIENT
Start: 2020-01-01 | End: 2020-01-01

## 2020-01-01 RX ORDER — PROMETHAZINE HYDROCHLORIDE 25 MG/1
12.5 TABLET ORAL EVERY 6 HOURS PRN
Status: DISCONTINUED | OUTPATIENT
Start: 2020-01-01 | End: 2020-01-01

## 2020-01-01 RX ORDER — ONDANSETRON 2 MG/ML
4 INJECTION INTRAMUSCULAR; INTRAVENOUS
Status: DISCONTINUED | OUTPATIENT
Start: 2020-01-01 | End: 2020-01-01 | Stop reason: HOSPADM

## 2020-01-01 RX ORDER — MORPHINE SULFATE 2 MG/ML
1 INJECTION, SOLUTION INTRAMUSCULAR; INTRAVENOUS EVERY 6 HOURS PRN
Status: DISCONTINUED | OUTPATIENT
Start: 2020-01-01 | End: 2020-01-01 | Stop reason: SDUPTHER

## 2020-01-01 RX ORDER — LACTULOSE 10 G/15ML
20 SOLUTION ORAL 3 TIMES DAILY
Qty: 2700 ML | Refills: 0 | Status: SHIPPED | OUTPATIENT
Start: 2020-01-01 | End: 2020-10-09

## 2020-01-01 RX ORDER — SODIUM CHLORIDE 9 MG/ML
INJECTION, SOLUTION INTRAVENOUS CONTINUOUS PRN
Status: DISCONTINUED | OUTPATIENT
Start: 2020-01-01 | End: 2020-01-01 | Stop reason: SDUPTHER

## 2020-01-01 RX ORDER — OXYCODONE HYDROCHLORIDE 5 MG/1
5 TABLET ORAL PRN
Status: DISCONTINUED | OUTPATIENT
Start: 2020-01-01 | End: 2020-01-01 | Stop reason: HOSPADM

## 2020-01-01 RX ORDER — PROPOFOL 10 MG/ML
INJECTION, EMULSION INTRAVENOUS CONTINUOUS PRN
Status: DISCONTINUED | OUTPATIENT
Start: 2020-01-01 | End: 2020-01-01 | Stop reason: SDUPTHER

## 2020-01-01 RX ORDER — DEXTROSE, SODIUM CHLORIDE, AND POTASSIUM CHLORIDE 5; .45; .15 G/100ML; G/100ML; G/100ML
INJECTION INTRAVENOUS CONTINUOUS
Status: DISCONTINUED | OUTPATIENT
Start: 2020-01-01 | End: 2020-01-01

## 2020-01-01 RX ORDER — BUPIVACAINE HYDROCHLORIDE 5 MG/ML
INJECTION, SOLUTION EPIDURAL; INTRACAUDAL
Status: COMPLETED | OUTPATIENT
Start: 2020-01-01 | End: 2020-01-01

## 2020-01-01 RX ORDER — MORPHINE SULFATE 4 MG/ML
4 INJECTION, SOLUTION INTRAMUSCULAR; INTRAVENOUS EVERY 4 HOURS PRN
Status: DISCONTINUED | OUTPATIENT
Start: 2020-01-01 | End: 2020-01-01

## 2020-01-01 RX ORDER — INSULIN LISPRO 100 [IU]/ML
0-6 INJECTION, SOLUTION INTRAVENOUS; SUBCUTANEOUS EVERY 4 HOURS
Status: DISCONTINUED | OUTPATIENT
Start: 2020-01-01 | End: 2020-01-01

## 2020-01-01 RX ORDER — FENTANYL CITRATE 50 UG/ML
50 INJECTION, SOLUTION INTRAMUSCULAR; INTRAVENOUS ONCE
Status: COMPLETED | OUTPATIENT
Start: 2020-01-01 | End: 2020-01-01

## 2020-01-01 RX ORDER — LACTOBACILLUS RHAMNOSUS GG 10B CELL
1 CAPSULE ORAL 2 TIMES DAILY WITH MEALS
Status: DISCONTINUED | OUTPATIENT
Start: 2020-01-01 | End: 2020-01-01 | Stop reason: HOSPADM

## 2020-01-01 RX ORDER — GLYCOPYRROLATE 1 MG/5 ML
SYRINGE (ML) INTRAVENOUS PRN
Status: DISCONTINUED | OUTPATIENT
Start: 2020-01-01 | End: 2020-01-01 | Stop reason: SDUPTHER

## 2020-01-01 RX ORDER — OXYMETAZOLINE HYDROCHLORIDE 0.05 G/100ML
2 SPRAY NASAL ONCE
Status: DISPENSED | OUTPATIENT
Start: 2020-01-01 | End: 2020-01-01

## 2020-01-01 RX ORDER — FENTANYL CITRATE 50 UG/ML
INJECTION, SOLUTION INTRAMUSCULAR; INTRAVENOUS
Status: COMPLETED | OUTPATIENT
Start: 2020-01-01 | End: 2020-01-01

## 2020-01-01 RX ORDER — SODIUM CHLORIDE 9 MG/ML
INJECTION, SOLUTION INTRAVENOUS
Status: DISCONTINUED
Start: 2020-01-01 | End: 2020-01-01 | Stop reason: WASHOUT

## 2020-01-01 RX ORDER — NICOTINE POLACRILEX 4 MG
15 LOZENGE BUCCAL PRN
Status: DISCONTINUED | OUTPATIENT
Start: 2020-01-01 | End: 2020-01-01 | Stop reason: HOSPADM

## 2020-01-01 RX ORDER — PHENYLEPHRINE HCL IN 0.9% NACL 1 MG/10 ML
SYRINGE (ML) INTRAVENOUS PRN
Status: DISCONTINUED | OUTPATIENT
Start: 2020-01-01 | End: 2020-01-01 | Stop reason: SDUPTHER

## 2020-01-01 RX ORDER — MORPHINE SULFATE 4 MG/ML
4 INJECTION, SOLUTION INTRAMUSCULAR; INTRAVENOUS
Status: DISCONTINUED | OUTPATIENT
Start: 2020-01-01 | End: 2020-01-01

## 2020-01-01 RX ORDER — LIDOCAINE HYDROCHLORIDE 20 MG/ML
INJECTION, SOLUTION INFILTRATION; PERINEURAL PRN
Status: DISCONTINUED | OUTPATIENT
Start: 2020-01-01 | End: 2020-01-01 | Stop reason: SDUPTHER

## 2020-01-01 RX ORDER — SUCCINYLCHOLINE CHLORIDE 20 MG/ML
INJECTION INTRAMUSCULAR; INTRAVENOUS PRN
Status: DISCONTINUED | OUTPATIENT
Start: 2020-01-01 | End: 2020-01-01 | Stop reason: SDUPTHER

## 2020-01-01 RX ORDER — FUROSEMIDE 10 MG/ML
10 INJECTION INTRAMUSCULAR; INTRAVENOUS 2 TIMES DAILY
Status: DISCONTINUED | OUTPATIENT
Start: 2020-01-01 | End: 2020-01-01

## 2020-01-01 RX ORDER — POTASSIUM CHLORIDE 20 MEQ/1
40 TABLET, EXTENDED RELEASE ORAL PRN
Status: DISCONTINUED | OUTPATIENT
Start: 2020-01-01 | End: 2020-01-01

## 2020-01-01 RX ORDER — MAGNESIUM SULFATE IN WATER 40 MG/ML
2 INJECTION, SOLUTION INTRAVENOUS ONCE
Status: COMPLETED | OUTPATIENT
Start: 2020-01-01 | End: 2020-01-01

## 2020-01-01 RX ORDER — ONDANSETRON 2 MG/ML
4 INJECTION INTRAMUSCULAR; INTRAVENOUS ONCE
Status: COMPLETED | OUTPATIENT
Start: 2020-01-01 | End: 2020-01-01

## 2020-01-01 RX ORDER — ONDANSETRON 2 MG/ML
4 INJECTION INTRAMUSCULAR; INTRAVENOUS EVERY 6 HOURS PRN
Status: DISCONTINUED | OUTPATIENT
Start: 2020-01-01 | End: 2020-01-01

## 2020-01-01 RX ORDER — POLYETHYLENE GLYCOL 3350 17 G/17G
17 POWDER, FOR SOLUTION ORAL DAILY PRN
Status: DISCONTINUED | OUTPATIENT
Start: 2020-01-01 | End: 2020-01-01 | Stop reason: HOSPADM

## 2020-01-01 RX ORDER — LIDOCAINE HYDROCHLORIDE 20 MG/ML
JELLY TOPICAL ONCE
Status: DISCONTINUED | OUTPATIENT
Start: 2020-01-01 | End: 2020-01-01 | Stop reason: HOSPADM

## 2020-01-01 RX ORDER — FOLIC ACID 1 MG/1
1 TABLET ORAL DAILY
Qty: 30 TABLET | Refills: 3 | Status: SHIPPED | OUTPATIENT
Start: 2020-01-01

## 2020-01-01 RX ORDER — PANTOPRAZOLE SODIUM 40 MG/1
40 TABLET, DELAYED RELEASE ORAL
Status: DISCONTINUED | OUTPATIENT
Start: 2020-01-01 | End: 2020-01-01 | Stop reason: SDUPTHER

## 2020-01-01 RX ORDER — FLUCONAZOLE 2 MG/ML
200 INJECTION, SOLUTION INTRAVENOUS EVERY 24 HOURS
Status: DISCONTINUED | OUTPATIENT
Start: 2020-01-01 | End: 2020-01-01 | Stop reason: HOSPADM

## 2020-01-01 RX ORDER — PROMETHAZINE HYDROCHLORIDE 25 MG/ML
6.25 INJECTION, SOLUTION INTRAMUSCULAR; INTRAVENOUS PRN
Status: DISCONTINUED | OUTPATIENT
Start: 2020-01-01 | End: 2020-01-01 | Stop reason: HOSPADM

## 2020-01-01 RX ORDER — MEPERIDINE HYDROCHLORIDE 25 MG/ML
12.5 INJECTION INTRAMUSCULAR; INTRAVENOUS; SUBCUTANEOUS EVERY 5 MIN PRN
Status: DISCONTINUED | OUTPATIENT
Start: 2020-01-01 | End: 2020-01-01 | Stop reason: HOSPADM

## 2020-01-01 RX ADMIN — PANTOPRAZOLE SODIUM 40 MG: 40 INJECTION, POWDER, FOR SOLUTION INTRAVENOUS at 08:19

## 2020-01-01 RX ADMIN — Medication 10 ML: at 21:46

## 2020-01-01 RX ADMIN — POTASSIUM CHLORIDE 10 MEQ: 7.46 INJECTION, SOLUTION INTRAVENOUS at 15:54

## 2020-01-01 RX ADMIN — FENTANYL CITRATE 50 MCG: 50 INJECTION, SOLUTION INTRAMUSCULAR; INTRAVENOUS at 13:02

## 2020-01-01 RX ADMIN — SODIUM CHLORIDE 250 ML: 9 INJECTION, SOLUTION INTRAVENOUS at 12:29

## 2020-01-01 RX ADMIN — FLUCONAZOLE 200 MG: 2 INJECTION, SOLUTION INTRAVENOUS at 15:33

## 2020-01-01 RX ADMIN — ENOXAPARIN SODIUM 30 MG: 30 INJECTION SUBCUTANEOUS at 20:33

## 2020-01-01 RX ADMIN — INSULIN LISPRO 1 UNITS: 100 INJECTION, SOLUTION INTRAVENOUS; SUBCUTANEOUS at 09:22

## 2020-01-01 RX ADMIN — Medication 1 CAPSULE: at 09:54

## 2020-01-01 RX ADMIN — ENOXAPARIN SODIUM 40 MG: 40 INJECTION SUBCUTANEOUS at 08:21

## 2020-01-01 RX ADMIN — LACTULOSE 20 G: 20 SOLUTION ORAL at 13:47

## 2020-01-01 RX ADMIN — ALBUMIN (HUMAN) 12.5 G: 12.5 INJECTION, SOLUTION INTRAVENOUS at 09:44

## 2020-01-01 RX ADMIN — METOCLOPRAMIDE 5 MG: 5 INJECTION, SOLUTION INTRAMUSCULAR; INTRAVENOUS at 13:38

## 2020-01-01 RX ADMIN — MORPHINE SULFATE 2 MG: 2 INJECTION, SOLUTION INTRAMUSCULAR; INTRAVENOUS at 09:18

## 2020-01-01 RX ADMIN — METOCLOPRAMIDE 5 MG: 5 INJECTION, SOLUTION INTRAMUSCULAR; INTRAVENOUS at 23:54

## 2020-01-01 RX ADMIN — PROPOFOL 120 MCG/KG/MIN: 10 INJECTION, EMULSION INTRAVENOUS at 12:16

## 2020-01-01 RX ADMIN — KETOROLAC TROMETHAMINE 15 MG: 15 INJECTION, SOLUTION INTRAMUSCULAR; INTRAVENOUS at 11:53

## 2020-01-01 RX ADMIN — Medication 10 ML: at 09:17

## 2020-01-01 RX ADMIN — RIFAXIMIN 550 MG: 550 TABLET ORAL at 09:04

## 2020-01-01 RX ADMIN — ROCURONIUM BROMIDE 10 MG: 10 INJECTION, SOLUTION INTRAVENOUS at 14:56

## 2020-01-01 RX ADMIN — Medication 10 ML: at 10:29

## 2020-01-01 RX ADMIN — Medication 10 ML: at 08:38

## 2020-01-01 RX ADMIN — IOHEXOL 50 ML: 240 INJECTION, SOLUTION INTRATHECAL; INTRAVASCULAR; INTRAVENOUS; ORAL at 11:24

## 2020-01-01 RX ADMIN — PIPERACILLIN AND TAZOBACTAM 3.38 G: 3; .375 INJECTION, POWDER, LYOPHILIZED, FOR SOLUTION INTRAVENOUS at 01:31

## 2020-01-01 RX ADMIN — METOCLOPRAMIDE 10 MG: 5 INJECTION, SOLUTION INTRAMUSCULAR; INTRAVENOUS at 04:48

## 2020-01-01 RX ADMIN — LACTULOSE 20 G: 20 SOLUTION ORAL at 21:15

## 2020-01-01 RX ADMIN — ENOXAPARIN SODIUM 30 MG: 30 INJECTION SUBCUTANEOUS at 10:09

## 2020-01-01 RX ADMIN — METOCLOPRAMIDE 10 MG: 5 INJECTION, SOLUTION INTRAMUSCULAR; INTRAVENOUS at 03:54

## 2020-01-01 RX ADMIN — MORPHINE SULFATE 4 MG: 4 INJECTION INTRAVENOUS at 03:20

## 2020-01-01 RX ADMIN — LACTULOSE 20 G: 20 SOLUTION ORAL at 09:37

## 2020-01-01 RX ADMIN — RIFAXIMIN 550 MG: 550 TABLET ORAL at 20:33

## 2020-01-01 RX ADMIN — Medication 10 ML: at 23:59

## 2020-01-01 RX ADMIN — SODIUM CHLORIDE: 9 INJECTION, SOLUTION INTRAVENOUS at 02:26

## 2020-01-01 RX ADMIN — SODIUM CHLORIDE: 9 INJECTION, SOLUTION INTRAVENOUS at 15:52

## 2020-01-01 RX ADMIN — MORPHINE SULFATE 1 MG: 2 INJECTION, SOLUTION INTRAMUSCULAR; INTRAVENOUS at 21:26

## 2020-01-01 RX ADMIN — FENTANYL CITRATE 50 MCG: 50 INJECTION, SOLUTION INTRAMUSCULAR; INTRAVENOUS at 15:12

## 2020-01-01 RX ADMIN — LACTULOSE 20 G: 20 SOLUTION ORAL at 20:50

## 2020-01-01 RX ADMIN — LACTULOSE 20 G: 20 SOLUTION ORAL at 13:38

## 2020-01-01 RX ADMIN — LACTULOSE 20 G: 20 SOLUTION ORAL at 07:59

## 2020-01-01 RX ADMIN — LACTULOSE 20 G: 20 SOLUTION ORAL at 23:59

## 2020-01-01 RX ADMIN — FENTANYL CITRATE 50 MCG: 50 INJECTION, SOLUTION INTRAMUSCULAR; INTRAVENOUS at 13:45

## 2020-01-01 RX ADMIN — SODIUM CHLORIDE: 9 INJECTION, SOLUTION INTRAVENOUS at 12:04

## 2020-01-01 RX ADMIN — KETOROLAC TROMETHAMINE 15 MG: 15 INJECTION, SOLUTION INTRAMUSCULAR; INTRAVENOUS at 20:51

## 2020-01-01 RX ADMIN — ENOXAPARIN SODIUM 40 MG: 40 INJECTION SUBCUTANEOUS at 08:44

## 2020-01-01 RX ADMIN — METOCLOPRAMIDE 5 MG: 5 INJECTION, SOLUTION INTRAMUSCULAR; INTRAVENOUS at 05:38

## 2020-01-01 RX ADMIN — ALBUMIN (HUMAN) 12.5 G: 0.25 INJECTION, SOLUTION INTRAVENOUS at 21:35

## 2020-01-01 RX ADMIN — MEROPENEM 1 G: 1 INJECTION, POWDER, FOR SOLUTION INTRAVENOUS at 23:53

## 2020-01-01 RX ADMIN — GLYCOPYRROLATE 0.2 MG: 0.2 INJECTION INTRAMUSCULAR; INTRAVENOUS at 11:54

## 2020-01-01 RX ADMIN — METOCLOPRAMIDE 10 MG: 5 INJECTION, SOLUTION INTRAMUSCULAR; INTRAVENOUS at 17:37

## 2020-01-01 RX ADMIN — POTASSIUM CHLORIDE 10 MEQ: 7.46 INJECTION, SOLUTION INTRAVENOUS at 17:16

## 2020-01-01 RX ADMIN — Medication 1 SPRAY: at 12:29

## 2020-01-01 RX ADMIN — PHENYLEPHRINE HYDROCHLORIDE 200 MCG: 10 INJECTION INTRAVENOUS at 11:54

## 2020-01-01 RX ADMIN — FUROSEMIDE 20 MG: 10 INJECTION, SOLUTION INTRAMUSCULAR; INTRAVENOUS at 00:00

## 2020-01-01 RX ADMIN — SODIUM CHLORIDE, POTASSIUM CHLORIDE, SODIUM LACTATE AND CALCIUM CHLORIDE 1000 ML: 600; 310; 30; 20 INJECTION, SOLUTION INTRAVENOUS at 17:00

## 2020-01-01 RX ADMIN — METOCLOPRAMIDE 10 MG: 5 INJECTION, SOLUTION INTRAMUSCULAR; INTRAVENOUS at 12:28

## 2020-01-01 RX ADMIN — RIFAXIMIN 550 MG: 550 TABLET ORAL at 08:11

## 2020-01-01 RX ADMIN — LACTULOSE 20 G: 20 SOLUTION ORAL at 08:21

## 2020-01-01 RX ADMIN — SUGAMMADEX 200 MG: 100 INJECTION, SOLUTION INTRAVENOUS at 12:08

## 2020-01-01 RX ADMIN — METOCLOPRAMIDE 10 MG: 5 INJECTION, SOLUTION INTRAMUSCULAR; INTRAVENOUS at 11:39

## 2020-01-01 RX ADMIN — POTASSIUM CHLORIDE, DEXTROSE MONOHYDRATE AND SODIUM CHLORIDE: 150; 5; 900 INJECTION, SOLUTION INTRAVENOUS at 05:52

## 2020-01-01 RX ADMIN — FLUCONAZOLE 200 MG: 2 INJECTION, SOLUTION INTRAVENOUS at 18:18

## 2020-01-01 RX ADMIN — CIPROFLOXACIN 400 MG: 2 INJECTION, SOLUTION INTRAVENOUS at 09:57

## 2020-01-01 RX ADMIN — Medication 10 ML: at 08:59

## 2020-01-01 RX ADMIN — SODIUM CHLORIDE: 9 INJECTION, SOLUTION INTRAVENOUS at 01:23

## 2020-01-01 RX ADMIN — RIFAXIMIN 550 MG: 550 TABLET ORAL at 11:23

## 2020-01-01 RX ADMIN — METOCLOPRAMIDE 10 MG: 5 INJECTION, SOLUTION INTRAMUSCULAR; INTRAVENOUS at 18:30

## 2020-01-01 RX ADMIN — KETOROLAC TROMETHAMINE 15 MG: 15 INJECTION, SOLUTION INTRAMUSCULAR; INTRAVENOUS at 16:29

## 2020-01-01 RX ADMIN — Medication 1 CAPSULE: at 18:23

## 2020-01-01 RX ADMIN — ENOXAPARIN SODIUM 30 MG: 30 INJECTION SUBCUTANEOUS at 21:15

## 2020-01-01 RX ADMIN — MORPHINE SULFATE 4 MG: 4 INJECTION INTRAVENOUS at 10:07

## 2020-01-01 RX ADMIN — PANTOPRAZOLE SODIUM 40 MG: 40 TABLET, DELAYED RELEASE ORAL at 10:28

## 2020-01-01 RX ADMIN — FLUCONAZOLE 200 MG: 2 INJECTION, SOLUTION INTRAVENOUS at 13:37

## 2020-01-01 RX ADMIN — ENOXAPARIN SODIUM 30 MG: 30 INJECTION SUBCUTANEOUS at 08:57

## 2020-01-01 RX ADMIN — ONDANSETRON 4 MG: 2 INJECTION INTRAMUSCULAR; INTRAVENOUS at 05:56

## 2020-01-01 RX ADMIN — Medication 10 ML: at 21:47

## 2020-01-01 RX ADMIN — Medication 0.2 MG: at 13:56

## 2020-01-01 RX ADMIN — POTASSIUM CHLORIDE, DEXTROSE MONOHYDRATE AND SODIUM CHLORIDE: 150; 5; 900 INJECTION, SOLUTION INTRAVENOUS at 19:12

## 2020-01-01 RX ADMIN — CIPROFLOXACIN 400 MG: 2 INJECTION, SOLUTION INTRAVENOUS at 10:34

## 2020-01-01 RX ADMIN — ENOXAPARIN SODIUM 30 MG: 30 INJECTION SUBCUTANEOUS at 22:24

## 2020-01-01 RX ADMIN — PANTOPRAZOLE SODIUM 40 MG: 40 INJECTION, POWDER, FOR SOLUTION INTRAVENOUS at 08:45

## 2020-01-01 RX ADMIN — LACTULOSE 20 G: 20 SOLUTION ORAL at 10:09

## 2020-01-01 RX ADMIN — ONDANSETRON 4 MG: 2 INJECTION INTRAMUSCULAR; INTRAVENOUS at 11:48

## 2020-01-01 RX ADMIN — Medication 10 ML: at 09:14

## 2020-01-01 RX ADMIN — SODIUM CHLORIDE: 9 INJECTION, SOLUTION INTRAVENOUS at 23:55

## 2020-01-01 RX ADMIN — Medication 10 ML: at 22:28

## 2020-01-01 RX ADMIN — PANTOPRAZOLE SODIUM 40 MG: 40 INJECTION, POWDER, FOR SOLUTION INTRAVENOUS at 09:37

## 2020-01-01 RX ADMIN — METOCLOPRAMIDE 10 MG: 5 INJECTION, SOLUTION INTRAMUSCULAR; INTRAVENOUS at 01:22

## 2020-01-01 RX ADMIN — POTASSIUM CHLORIDE 10 MEQ: 7.46 INJECTION, SOLUTION INTRAVENOUS at 15:01

## 2020-01-01 RX ADMIN — Medication 10 ML: at 23:58

## 2020-01-01 RX ADMIN — FOLIC ACID 1 MG: 1 TABLET ORAL at 10:10

## 2020-01-01 RX ADMIN — PIPERACILLIN AND TAZOBACTAM 3.38 G: 3; .375 INJECTION, POWDER, LYOPHILIZED, FOR SOLUTION INTRAVENOUS at 09:46

## 2020-01-01 RX ADMIN — METRONIDAZOLE 500 MG: 500 INJECTION, SOLUTION INTRAVENOUS at 05:44

## 2020-01-01 RX ADMIN — LIDOCAINE HYDROCHLORIDE 10 ML: 10 INJECTION, SOLUTION EPIDURAL; INFILTRATION; INTRACAUDAL; PERINEURAL at 09:59

## 2020-01-01 RX ADMIN — LEUCINE, PHENYLALANINE, LYSINE, METHIONINE, ISOLEUCINE, VALINE, HISTIDINE, THREONINE, TRYPTOPHAN, ALANINE, GLYCINE, ARGININE, PROLINE, SERINE, TYROSINE, SODIUM ACETATE, DIBASIC POTASSIUM PHOSPHATE, MAGNESIUM CHLORIDE, SODIUM CHLORIDE, CALCIUM CHLORIDE, DEXTROSE
365; 280; 290; 200; 300; 290; 240; 210; 90; 1035; 515; 575; 340; 250; 20; 340; 261; 51; 59; 33; 20 INJECTION INTRAVENOUS at 17:36

## 2020-01-01 RX ADMIN — CIPROFLOXACIN 400 MG: 2 INJECTION, SOLUTION INTRAVENOUS at 01:46

## 2020-01-01 RX ADMIN — RIFAXIMIN 550 MG: 550 TABLET ORAL at 20:14

## 2020-01-01 RX ADMIN — ENOXAPARIN SODIUM 30 MG: 30 INJECTION SUBCUTANEOUS at 09:13

## 2020-01-01 RX ADMIN — FOLIC ACID 1 MG: 1 TABLET ORAL at 08:36

## 2020-01-01 RX ADMIN — ALBUMIN (HUMAN) 12.5 G: 12.5 INJECTION, SOLUTION INTRAVENOUS at 17:14

## 2020-01-01 RX ADMIN — Medication 10 ML: at 09:05

## 2020-01-01 RX ADMIN — CIPROFLOXACIN 400 MG: 2 INJECTION, SOLUTION INTRAVENOUS at 20:58

## 2020-01-01 RX ADMIN — Medication 1 CAPSULE: at 16:47

## 2020-01-01 RX ADMIN — LACTULOSE 20 G: 20 SOLUTION ORAL at 22:19

## 2020-01-01 RX ADMIN — ASCORBIC ACID, VITAMIN A PALMITATE, CHOLECALCIFEROL, THIAMINE HYDROCHLORIDE, RIBOFLAVIN-5 PHOSPHATE SODIUM, PYRIDOXINE HYDROCHLORIDE, NIACINAMIDE, DEXPANTHENOL, ALPHA-TOCOPHEROL ACETATE, VITAMIN K1, FOLIC ACID, BIOTIN, CYANOCOBALAMIN: 200; 3300; 200; 6; 3.6; 6; 40; 15; 10; 150; 600; 60; 5 INJECTION, SOLUTION INTRAVENOUS at 18:08

## 2020-01-01 RX ADMIN — FUROSEMIDE 10 MG: 10 INJECTION, SOLUTION INTRAMUSCULAR; INTRAVENOUS at 21:13

## 2020-01-01 RX ADMIN — PANTOPRAZOLE SODIUM 40 MG: 40 TABLET, DELAYED RELEASE ORAL at 05:53

## 2020-01-01 RX ADMIN — PIPERACILLIN AND TAZOBACTAM 3.38 G: 3; .375 INJECTION, POWDER, LYOPHILIZED, FOR SOLUTION INTRAVENOUS at 12:07

## 2020-01-01 RX ADMIN — MAGNESIUM GLUCONATE 500 MG ORAL TABLET 400 MG: 500 TABLET ORAL at 21:15

## 2020-01-01 RX ADMIN — SODIUM CHLORIDE: 9 INJECTION, SOLUTION INTRAVENOUS at 19:31

## 2020-01-01 RX ADMIN — POLYETHYLENE GLYCOL 3350, SODIUM SULFATE, SODIUM CHLORIDE, POTASSIUM CHLORIDE, ASCORBIC ACID, SODIUM ASCORBATE 100 G: KIT at 17:31

## 2020-01-01 RX ADMIN — RIFAXIMIN 550 MG: 550 TABLET ORAL at 21:06

## 2020-01-01 RX ADMIN — PANTOPRAZOLE SODIUM 40 MG: 40 INJECTION, POWDER, FOR SOLUTION INTRAVENOUS at 08:08

## 2020-01-01 RX ADMIN — SODIUM CHLORIDE 20 ML: 9 INJECTION, SOLUTION INTRAVENOUS at 11:27

## 2020-01-01 RX ADMIN — Medication 1 CAPSULE: at 16:25

## 2020-01-01 RX ADMIN — INSULIN LISPRO 1 UNITS: 100 INJECTION, SOLUTION INTRAVENOUS; SUBCUTANEOUS at 09:39

## 2020-01-01 RX ADMIN — POTASSIUM CHLORIDE 20 MEQ: 1500 TABLET, EXTENDED RELEASE ORAL at 09:26

## 2020-01-01 RX ADMIN — LACTULOSE 20 G: 20 SOLUTION ORAL at 14:15

## 2020-01-01 RX ADMIN — ALBUMIN (HUMAN) 12.5 G: 12.5 INJECTION, SOLUTION INTRAVENOUS at 04:34

## 2020-01-01 RX ADMIN — MEROPENEM 1 G: 1 INJECTION, POWDER, FOR SOLUTION INTRAVENOUS at 00:07

## 2020-01-01 RX ADMIN — MEROPENEM 1 G: 1 INJECTION, POWDER, FOR SOLUTION INTRAVENOUS at 00:09

## 2020-01-01 RX ADMIN — PANTOPRAZOLE SODIUM 40 MG: 40 INJECTION, POWDER, FOR SOLUTION INTRAVENOUS at 08:33

## 2020-01-01 RX ADMIN — METRONIDAZOLE 500 MG: 500 INJECTION, SOLUTION INTRAVENOUS at 00:00

## 2020-01-01 RX ADMIN — INSULIN LISPRO 1 UNITS: 100 INJECTION, SOLUTION INTRAVENOUS; SUBCUTANEOUS at 12:30

## 2020-01-01 RX ADMIN — MAGNESIUM GLUCONATE 500 MG ORAL TABLET 400 MG: 500 TABLET ORAL at 11:44

## 2020-01-01 RX ADMIN — INSULIN LISPRO 3 UNITS: 100 INJECTION, SOLUTION INTRAVENOUS; SUBCUTANEOUS at 08:55

## 2020-01-01 RX ADMIN — FUROSEMIDE 10 MG: 10 INJECTION, SOLUTION INTRAMUSCULAR; INTRAVENOUS at 14:14

## 2020-01-01 RX ADMIN — POTASSIUM PHOSPHATE, MONOBASIC 250 MG: 500 TABLET, SOLUBLE ORAL at 05:19

## 2020-01-01 RX ADMIN — METOCLOPRAMIDE 10 MG: 5 INJECTION, SOLUTION INTRAMUSCULAR; INTRAVENOUS at 05:08

## 2020-01-01 RX ADMIN — METOCLOPRAMIDE 10 MG: 5 INJECTION, SOLUTION INTRAMUSCULAR; INTRAVENOUS at 00:10

## 2020-01-01 RX ADMIN — POTASSIUM CHLORIDE 10 MEQ: 7.46 INJECTION, SOLUTION INTRAVENOUS at 11:53

## 2020-01-01 RX ADMIN — Medication 100 MCG: at 13:56

## 2020-01-01 RX ADMIN — ENOXAPARIN SODIUM 30 MG: 30 INJECTION SUBCUTANEOUS at 21:55

## 2020-01-01 RX ADMIN — ENOXAPARIN SODIUM 30 MG: 30 INJECTION SUBCUTANEOUS at 10:06

## 2020-01-01 RX ADMIN — LACTULOSE 20 G: 20 SOLUTION ORAL at 21:10

## 2020-01-01 RX ADMIN — MAGNESIUM CITRATE 120 ML: 1.75 LIQUID ORAL at 06:18

## 2020-01-01 RX ADMIN — MEROPENEM 1 G: 1 INJECTION, POWDER, FOR SOLUTION INTRAVENOUS at 23:51

## 2020-01-01 RX ADMIN — MEROPENEM 1 G: 1 INJECTION, POWDER, FOR SOLUTION INTRAVENOUS at 00:12

## 2020-01-01 RX ADMIN — FENTANYL CITRATE 50 MCG: 50 INJECTION, SOLUTION INTRAMUSCULAR; INTRAVENOUS at 17:05

## 2020-01-01 RX ADMIN — FLUCONAZOLE 200 MG: 2 INJECTION, SOLUTION INTRAVENOUS at 15:58

## 2020-01-01 RX ADMIN — LACTULOSE 20 G: 20 SOLUTION ORAL at 16:05

## 2020-01-01 RX ADMIN — PROMETHAZINE HYDROCHLORIDE 12.5 MG: 25 TABLET ORAL at 05:26

## 2020-01-01 RX ADMIN — PANTOPRAZOLE SODIUM 40 MG: 40 INJECTION, POWDER, FOR SOLUTION INTRAVENOUS at 08:36

## 2020-01-01 RX ADMIN — POTASSIUM CHLORIDE 40 MEQ: 1500 TABLET, EXTENDED RELEASE ORAL at 13:27

## 2020-01-01 RX ADMIN — ALBUMIN (HUMAN) 12.5 G: 12.5 INJECTION, SOLUTION INTRAVENOUS at 21:06

## 2020-01-01 RX ADMIN — MORPHINE SULFATE 1 MG: 2 INJECTION, SOLUTION INTRAMUSCULAR; INTRAVENOUS at 05:26

## 2020-01-01 RX ADMIN — MEROPENEM 1 G: 1 INJECTION, POWDER, FOR SOLUTION INTRAVENOUS at 11:23

## 2020-01-01 RX ADMIN — ONDANSETRON 4 MG: 2 INJECTION INTRAMUSCULAR; INTRAVENOUS at 16:06

## 2020-01-01 RX ADMIN — POTASSIUM CHLORIDE 20 MEQ: 1500 TABLET, EXTENDED RELEASE ORAL at 08:58

## 2020-01-01 RX ADMIN — RIFAXIMIN 550 MG: 550 TABLET ORAL at 20:07

## 2020-01-01 RX ADMIN — POLYETHYLENE GLYCOL 3350, SODIUM SULFATE ANHYDROUS, SODIUM BICARBONATE, SODIUM CHLORIDE, POTASSIUM CHLORIDE 4000 ML: 236; 22.74; 6.74; 5.86; 2.97 POWDER, FOR SOLUTION ORAL at 15:54

## 2020-01-01 RX ADMIN — BISACODYL 10 MG: 10 SUPPOSITORY RECTAL at 17:09

## 2020-01-01 RX ADMIN — Medication 10 ML: at 21:11

## 2020-01-01 RX ADMIN — Medication 10 ML: at 21:53

## 2020-01-01 RX ADMIN — ALBUMIN (HUMAN) 12.5 G: 0.25 INJECTION, SOLUTION INTRAVENOUS at 16:25

## 2020-01-01 RX ADMIN — LACTULOSE 20 G: 20 SOLUTION ORAL at 08:36

## 2020-01-01 RX ADMIN — FLUCONAZOLE 200 MG: 2 INJECTION, SOLUTION INTRAVENOUS at 15:05

## 2020-01-01 RX ADMIN — ENOXAPARIN SODIUM 40 MG: 40 INJECTION SUBCUTANEOUS at 14:47

## 2020-01-01 RX ADMIN — ENOXAPARIN SODIUM 30 MG: 30 INJECTION SUBCUTANEOUS at 09:38

## 2020-01-01 RX ADMIN — INSULIN LISPRO 1 UNITS: 100 INJECTION, SOLUTION INTRAVENOUS; SUBCUTANEOUS at 20:40

## 2020-01-01 RX ADMIN — RIFAXIMIN 550 MG: 550 TABLET ORAL at 09:13

## 2020-01-01 RX ADMIN — LACTULOSE 20 G: 20 SOLUTION ORAL at 21:06

## 2020-01-01 RX ADMIN — KETOROLAC TROMETHAMINE 15 MG: 15 INJECTION, SOLUTION INTRAMUSCULAR; INTRAVENOUS at 04:06

## 2020-01-01 RX ADMIN — LACTULOSE 20 G: 20 SOLUTION ORAL at 13:37

## 2020-01-01 RX ADMIN — MEROPENEM 1 G: 1 INJECTION, POWDER, FOR SOLUTION INTRAVENOUS at 11:39

## 2020-01-01 RX ADMIN — MEROPENEM 1 G: 1 INJECTION, POWDER, FOR SOLUTION INTRAVENOUS at 11:31

## 2020-01-01 RX ADMIN — LACTULOSE 20 G: 20 SOLUTION ORAL at 21:35

## 2020-01-01 RX ADMIN — ALBUMIN (HUMAN) 12.5 G: 12.5 INJECTION, SOLUTION INTRAVENOUS at 21:14

## 2020-01-01 RX ADMIN — LACTULOSE 20 G: 20 SOLUTION ORAL at 12:28

## 2020-01-01 RX ADMIN — Medication 10 ML: at 22:27

## 2020-01-01 RX ADMIN — Medication 10 ML: at 05:38

## 2020-01-01 RX ADMIN — ACETAMINOPHEN 650 MG: 325 TABLET, FILM COATED ORAL at 10:50

## 2020-01-01 RX ADMIN — MEROPENEM 1 G: 1 INJECTION, POWDER, FOR SOLUTION INTRAVENOUS at 12:33

## 2020-01-01 RX ADMIN — MEROPENEM 1 G: 1 INJECTION, POWDER, FOR SOLUTION INTRAVENOUS at 14:15

## 2020-01-01 RX ADMIN — PHENYLEPHRINE HYDROCHLORIDE 200 MCG: 10 INJECTION INTRAVENOUS at 12:09

## 2020-01-01 RX ADMIN — METOCLOPRAMIDE 5 MG: 5 INJECTION, SOLUTION INTRAMUSCULAR; INTRAVENOUS at 05:44

## 2020-01-01 RX ADMIN — ENOXAPARIN SODIUM 30 MG: 30 INJECTION SUBCUTANEOUS at 08:18

## 2020-01-01 RX ADMIN — ENOXAPARIN SODIUM 30 MG: 30 INJECTION SUBCUTANEOUS at 09:04

## 2020-01-01 RX ADMIN — FOLIC ACID 1 MG: 1 TABLET ORAL at 09:05

## 2020-01-01 RX ADMIN — ROCURONIUM BROMIDE 40 MG: 10 INJECTION, SOLUTION INTRAVENOUS at 14:13

## 2020-01-01 RX ADMIN — ACETAMINOPHEN 650 MG: 325 TABLET, FILM COATED ORAL at 13:47

## 2020-01-01 RX ADMIN — RIFAXIMIN 550 MG: 550 TABLET ORAL at 08:35

## 2020-01-01 RX ADMIN — Medication 10 ML: at 10:10

## 2020-01-01 RX ADMIN — Medication 10 ML: at 20:59

## 2020-01-01 RX ADMIN — POTASSIUM PHOSPHATE, MONOBASIC 250 MG: 500 TABLET, SOLUBLE ORAL at 21:23

## 2020-01-01 RX ADMIN — ENOXAPARIN SODIUM 30 MG: 30 INJECTION SUBCUTANEOUS at 08:17

## 2020-01-01 RX ADMIN — KETOROLAC TROMETHAMINE 15 MG: 15 INJECTION, SOLUTION INTRAMUSCULAR; INTRAVENOUS at 07:00

## 2020-01-01 RX ADMIN — ALBUMIN (HUMAN) 12.5 G: 12.5 INJECTION, SOLUTION INTRAVENOUS at 02:48

## 2020-01-01 RX ADMIN — ONDANSETRON 4 MG: 2 INJECTION INTRAMUSCULAR; INTRAVENOUS at 02:39

## 2020-01-01 RX ADMIN — SODIUM CHLORIDE 1000 ML: 9 INJECTION, SOLUTION INTRAVENOUS at 14:34

## 2020-01-01 RX ADMIN — CIPROFLOXACIN 400 MG: 2 INJECTION, SOLUTION INTRAVENOUS at 14:29

## 2020-01-01 RX ADMIN — SODIUM CHLORIDE, POTASSIUM CHLORIDE, SODIUM LACTATE AND CALCIUM CHLORIDE 1000 ML: 600; 310; 30; 20 INJECTION, SOLUTION INTRAVENOUS at 18:33

## 2020-01-01 RX ADMIN — PHENYLEPHRINE HYDROCHLORIDE 200 MCG: 10 INJECTION INTRAVENOUS at 12:05

## 2020-01-01 RX ADMIN — SODIUM CHLORIDE: 9 INJECTION, SOLUTION INTRAVENOUS at 14:50

## 2020-01-01 RX ADMIN — CIPROFLOXACIN 400 MG: 2 INJECTION, SOLUTION INTRAVENOUS at 21:52

## 2020-01-01 RX ADMIN — Medication 10 ML: at 20:54

## 2020-01-01 RX ADMIN — RIFAXIMIN 550 MG: 550 TABLET ORAL at 08:36

## 2020-01-01 RX ADMIN — PIPERACILLIN AND TAZOBACTAM 3.38 G: 3; .375 INJECTION, POWDER, LYOPHILIZED, FOR SOLUTION INTRAVENOUS at 11:12

## 2020-01-01 RX ADMIN — Medication 10 ML: at 20:53

## 2020-01-01 RX ADMIN — LACTULOSE 20 G: 20 SOLUTION ORAL at 19:46

## 2020-01-01 RX ADMIN — Medication 10 ML: at 21:56

## 2020-01-01 RX ADMIN — SODIUM CHLORIDE: 9 INJECTION, SOLUTION INTRAVENOUS at 23:53

## 2020-01-01 RX ADMIN — FENTANYL CITRATE 50 MCG: 50 INJECTION, SOLUTION INTRAMUSCULAR; INTRAVENOUS at 16:01

## 2020-01-01 RX ADMIN — FUROSEMIDE 20 MG: 10 INJECTION, SOLUTION INTRAMUSCULAR; INTRAVENOUS at 15:06

## 2020-01-01 RX ADMIN — MEROPENEM 1 G: 1 INJECTION, POWDER, FOR SOLUTION INTRAVENOUS at 00:40

## 2020-01-01 RX ADMIN — MEROPENEM 1 G: 1 INJECTION, POWDER, FOR SOLUTION INTRAVENOUS at 00:00

## 2020-01-01 RX ADMIN — Medication 1 CAPSULE: at 09:13

## 2020-01-01 RX ADMIN — ACETAMINOPHEN 1000 MG: 500 TABLET, FILM COATED ORAL at 03:24

## 2020-01-01 RX ADMIN — PANTOPRAZOLE SODIUM 40 MG: 40 INJECTION, POWDER, FOR SOLUTION INTRAVENOUS at 08:35

## 2020-01-01 RX ADMIN — ENOXAPARIN SODIUM 30 MG: 30 INJECTION SUBCUTANEOUS at 20:42

## 2020-01-01 RX ADMIN — METOCLOPRAMIDE 5 MG: 5 INJECTION, SOLUTION INTRAMUSCULAR; INTRAVENOUS at 19:47

## 2020-01-01 RX ADMIN — LACTULOSE 20 G: 20 SOLUTION ORAL at 09:50

## 2020-01-01 RX ADMIN — MEROPENEM 1 G: 1 INJECTION, POWDER, FOR SOLUTION INTRAVENOUS at 15:45

## 2020-01-01 RX ADMIN — FUROSEMIDE 20 MG: 10 INJECTION, SOLUTION INTRAMUSCULAR; INTRAVENOUS at 14:29

## 2020-01-01 RX ADMIN — Medication 1 CAPSULE: at 08:58

## 2020-01-01 RX ADMIN — MEROPENEM 1 G: 1 INJECTION, POWDER, FOR SOLUTION INTRAVENOUS at 12:59

## 2020-01-01 RX ADMIN — SODIUM CHLORIDE: 9 INJECTION, SOLUTION INTRAVENOUS at 22:43

## 2020-01-01 RX ADMIN — SUGAMMADEX 200 MG: 100 INJECTION, SOLUTION INTRAVENOUS at 15:59

## 2020-01-01 RX ADMIN — MORPHINE SULFATE 4 MG: 4 INJECTION INTRAVENOUS at 21:24

## 2020-01-01 RX ADMIN — RIFAXIMIN 550 MG: 550 TABLET ORAL at 21:55

## 2020-01-01 RX ADMIN — MEROPENEM 1 G: 1 INJECTION, POWDER, FOR SOLUTION INTRAVENOUS at 13:28

## 2020-01-01 RX ADMIN — ENOXAPARIN SODIUM 40 MG: 40 INJECTION SUBCUTANEOUS at 08:27

## 2020-01-01 RX ADMIN — Medication 10 ML: at 21:58

## 2020-01-01 RX ADMIN — ALBUMIN (HUMAN) 12.5 G: 12.5 INJECTION, SOLUTION INTRAVENOUS at 09:09

## 2020-01-01 RX ADMIN — FENTANYL CITRATE 100 MCG: 50 INJECTION, SOLUTION INTRAMUSCULAR; INTRAVENOUS at 11:24

## 2020-01-01 RX ADMIN — INSULIN LISPRO 1 UNITS: 100 INJECTION, SOLUTION INTRAVENOUS; SUBCUTANEOUS at 11:21

## 2020-01-01 RX ADMIN — POTASSIUM CHLORIDE, DEXTROSE MONOHYDRATE AND SODIUM CHLORIDE: 150; 5; 900 INJECTION, SOLUTION INTRAVENOUS at 22:06

## 2020-01-01 RX ADMIN — POLYETHYLENE GLYCOL 3350, SODIUM SULFATE, SODIUM CHLORIDE, POTASSIUM CHLORIDE, ASCORBIC ACID, SODIUM ASCORBATE 100 G: KIT at 22:49

## 2020-01-01 RX ADMIN — PANTOPRAZOLE SODIUM 40 MG: 40 TABLET, DELAYED RELEASE ORAL at 05:19

## 2020-01-01 RX ADMIN — Medication 10 ML: at 08:36

## 2020-01-01 RX ADMIN — PHENYLEPHRINE HYDROCHLORIDE 100 MCG: 10 INJECTION INTRAVENOUS at 11:48

## 2020-01-01 RX ADMIN — METOCLOPRAMIDE 10 MG: 5 INJECTION, SOLUTION INTRAMUSCULAR; INTRAVENOUS at 11:54

## 2020-01-01 RX ADMIN — MEROPENEM 1 G: 1 INJECTION, POWDER, FOR SOLUTION INTRAVENOUS at 23:43

## 2020-01-01 RX ADMIN — LACTULOSE 20 G: 20 SOLUTION ORAL at 09:13

## 2020-01-01 RX ADMIN — CIPROFLOXACIN 400 MG: 2 INJECTION, SOLUTION INTRAVENOUS at 11:01

## 2020-01-01 RX ADMIN — FLUCONAZOLE 200 MG: 2 INJECTION, SOLUTION INTRAVENOUS at 14:48

## 2020-01-01 RX ADMIN — SODIUM CHLORIDE: 9 INJECTION, SOLUTION INTRAVENOUS at 04:32

## 2020-01-01 RX ADMIN — PHENYLEPHRINE HYDROCHLORIDE 200 MCG: 10 INJECTION INTRAVENOUS at 12:17

## 2020-01-01 RX ADMIN — IOPAMIDOL 75 ML: 755 INJECTION, SOLUTION INTRAVENOUS at 13:50

## 2020-01-01 RX ADMIN — METOCLOPRAMIDE 5 MG: 5 INJECTION, SOLUTION INTRAMUSCULAR; INTRAVENOUS at 18:56

## 2020-01-01 RX ADMIN — Medication 1 CAPSULE: at 17:36

## 2020-01-01 RX ADMIN — Medication 10 ML: at 03:54

## 2020-01-01 RX ADMIN — IOHEXOL 50 ML: 240 INJECTION, SOLUTION INTRATHECAL; INTRAVASCULAR; INTRAVENOUS; ORAL at 15:23

## 2020-01-01 RX ADMIN — FUROSEMIDE 3 MG/HR: 10 INJECTION, SOLUTION INTRAMUSCULAR; INTRAVENOUS at 15:05

## 2020-01-01 RX ADMIN — PANTOPRAZOLE SODIUM 40 MG: 40 INJECTION, POWDER, FOR SOLUTION INTRAVENOUS at 10:06

## 2020-01-01 RX ADMIN — MAGNESIUM CITRATE 120 ML: 1.75 LIQUID ORAL at 04:18

## 2020-01-01 RX ADMIN — FOLIC ACID 1 MG: 1 TABLET ORAL at 09:14

## 2020-01-01 RX ADMIN — POTASSIUM CHLORIDE 10 MEQ: 7.46 INJECTION, SOLUTION INTRAVENOUS at 13:49

## 2020-01-01 RX ADMIN — METOCLOPRAMIDE 10 MG: 5 INJECTION, SOLUTION INTRAMUSCULAR; INTRAVENOUS at 23:42

## 2020-01-01 RX ADMIN — INSULIN LISPRO 1 UNITS: 100 INJECTION, SOLUTION INTRAVENOUS; SUBCUTANEOUS at 17:28

## 2020-01-01 RX ADMIN — ONDANSETRON 4 MG: 2 INJECTION INTRAMUSCULAR; INTRAVENOUS at 03:19

## 2020-01-01 RX ADMIN — ASCORBIC ACID, VITAMIN A PALMITATE, CHOLECALCIFEROL, THIAMINE HYDROCHLORIDE, RIBOFLAVIN-5 PHOSPHATE SODIUM, PYRIDOXINE HYDROCHLORIDE, NIACINAMIDE, DEXPANTHENOL, ALPHA-TOCOPHEROL ACETATE, VITAMIN K1, FOLIC ACID, BIOTIN, CYANOCOBALAMIN: 200; 3300; 200; 6; 3.6; 6; 40; 15; 10; 150; 600; 60; 5 INJECTION, SOLUTION INTRAVENOUS at 18:23

## 2020-01-01 RX ADMIN — HYDROMORPHONE HYDROCHLORIDE 0.5 MG: 2 INJECTION, SOLUTION INTRAMUSCULAR; INTRAVENOUS; SUBCUTANEOUS at 17:11

## 2020-01-01 RX ADMIN — DEXTROSE MONOHYDRATE 100 ML/HR: 50 INJECTION, SOLUTION INTRAVENOUS at 13:00

## 2020-01-01 RX ADMIN — METOCLOPRAMIDE 5 MG: 5 INJECTION, SOLUTION INTRAMUSCULAR; INTRAVENOUS at 00:00

## 2020-01-01 RX ADMIN — ENOXAPARIN SODIUM 30 MG: 30 INJECTION SUBCUTANEOUS at 20:06

## 2020-01-01 RX ADMIN — FLUCONAZOLE 200 MG: 2 INJECTION, SOLUTION INTRAVENOUS at 16:04

## 2020-01-01 RX ADMIN — MEROPENEM 1 G: 1 INJECTION, POWDER, FOR SOLUTION INTRAVENOUS at 11:52

## 2020-01-01 RX ADMIN — PANTOPRAZOLE SODIUM 40 MG: 40 INJECTION, POWDER, FOR SOLUTION INTRAVENOUS at 09:50

## 2020-01-01 RX ADMIN — MIDAZOLAM 0.5 MG: 1 INJECTION INTRAMUSCULAR; INTRAVENOUS at 11:37

## 2020-01-01 RX ADMIN — KETOROLAC TROMETHAMINE 15 MG: 15 INJECTION, SOLUTION INTRAMUSCULAR; INTRAVENOUS at 00:10

## 2020-01-01 RX ADMIN — SUCCINYLCHOLINE CHLORIDE 120 MG: 20 INJECTION, SOLUTION INTRAMUSCULAR; INTRAVENOUS at 13:49

## 2020-01-01 RX ADMIN — MEROPENEM 1 G: 1 INJECTION, POWDER, FOR SOLUTION INTRAVENOUS at 15:08

## 2020-01-01 RX ADMIN — SPIRONOLACTONE 50 MG: 25 TABLET ORAL at 09:05

## 2020-01-01 RX ADMIN — INSULIN LISPRO 2 UNITS: 100 INJECTION, SOLUTION INTRAVENOUS; SUBCUTANEOUS at 02:33

## 2020-01-01 RX ADMIN — Medication 10 ML: at 09:55

## 2020-01-01 RX ADMIN — ENOXAPARIN SODIUM 30 MG: 30 INJECTION SUBCUTANEOUS at 21:06

## 2020-01-01 RX ADMIN — ENOXAPARIN SODIUM 30 MG: 30 INJECTION SUBCUTANEOUS at 08:37

## 2020-01-01 RX ADMIN — FOLIC ACID 1 MG: 1 TABLET ORAL at 10:07

## 2020-01-01 RX ADMIN — CIPROFLOXACIN 400 MG: 2 INJECTION, SOLUTION INTRAVENOUS at 14:41

## 2020-01-01 RX ADMIN — INSULIN LISPRO 2 UNITS: 100 INJECTION, SOLUTION INTRAVENOUS; SUBCUTANEOUS at 12:20

## 2020-01-01 RX ADMIN — PROPOFOL 60 MG: 10 INJECTION, EMULSION INTRAVENOUS at 12:15

## 2020-01-01 RX ADMIN — MEROPENEM 1 G: 1 INJECTION, POWDER, FOR SOLUTION INTRAVENOUS at 11:44

## 2020-01-01 RX ADMIN — Medication 10 ML: at 20:15

## 2020-01-01 RX ADMIN — Medication 10 ML: at 22:49

## 2020-01-01 RX ADMIN — PHENYLEPHRINE HYDROCHLORIDE 200 MCG: 10 INJECTION INTRAVENOUS at 12:00

## 2020-01-01 RX ADMIN — ROCURONIUM BROMIDE 50 MG: 10 INJECTION, SOLUTION INTRAVENOUS at 11:31

## 2020-01-01 RX ADMIN — SODIUM CHLORIDE: 9 INJECTION, SOLUTION INTRAVENOUS at 14:12

## 2020-01-01 RX ADMIN — ALBUMIN (HUMAN) 12.5 G: 0.25 INJECTION, SOLUTION INTRAVENOUS at 08:33

## 2020-01-01 RX ADMIN — PIPERACILLIN AND TAZOBACTAM 3.38 G: 3; .375 INJECTION, POWDER, LYOPHILIZED, FOR SOLUTION INTRAVENOUS at 02:50

## 2020-01-01 RX ADMIN — FENTANYL CITRATE 25 MCG: 50 INJECTION, SOLUTION INTRAMUSCULAR; INTRAVENOUS at 11:36

## 2020-01-01 RX ADMIN — PANTOPRAZOLE SODIUM 40 MG: 40 INJECTION, POWDER, FOR SOLUTION INTRAVENOUS at 08:58

## 2020-01-01 RX ADMIN — METRONIDAZOLE 500 MG: 500 INJECTION, SOLUTION INTRAVENOUS at 12:29

## 2020-01-01 RX ADMIN — ASCORBIC ACID, VITAMIN A PALMITATE, CHOLECALCIFEROL, THIAMINE HYDROCHLORIDE, RIBOFLAVIN-5 PHOSPHATE SODIUM, PYRIDOXINE HYDROCHLORIDE, NIACINAMIDE, DEXPANTHENOL, ALPHA-TOCOPHEROL ACETATE, VITAMIN K1, FOLIC ACID, BIOTIN, CYANOCOBALAMIN: 200; 3300; 200; 6; 3.6; 6; 40; 15; 10; 150; 600; 60; 5 INJECTION, SOLUTION INTRAVENOUS at 17:33

## 2020-01-01 RX ADMIN — PIPERACILLIN AND TAZOBACTAM 3.38 G: 3; .375 INJECTION, POWDER, LYOPHILIZED, FOR SOLUTION INTRAVENOUS at 02:37

## 2020-01-01 RX ADMIN — FOLIC ACID 1 MG: 1 TABLET ORAL at 08:35

## 2020-01-01 RX ADMIN — ACETAMINOPHEN 1000 MG: 500 TABLET, FILM COATED ORAL at 21:10

## 2020-01-01 RX ADMIN — SODIUM CHLORIDE: 9 INJECTION, SOLUTION INTRAVENOUS at 10:09

## 2020-01-01 RX ADMIN — ALBUMIN (HUMAN) 12.5 G: 0.25 INJECTION, SOLUTION INTRAVENOUS at 16:53

## 2020-01-01 RX ADMIN — ACETAMINOPHEN 650 MG: 325 TABLET, FILM COATED ORAL at 20:58

## 2020-01-01 RX ADMIN — LACTULOSE 20 G: 20 SOLUTION ORAL at 15:20

## 2020-01-01 RX ADMIN — POTASSIUM CHLORIDE 10 MEQ: 7.46 INJECTION, SOLUTION INTRAVENOUS at 12:50

## 2020-01-01 RX ADMIN — METOCLOPRAMIDE 10 MG: 5 INJECTION, SOLUTION INTRAMUSCULAR; INTRAVENOUS at 18:46

## 2020-01-01 RX ADMIN — ALBUMIN (HUMAN) 12.5 G: 0.25 INJECTION, SOLUTION INTRAVENOUS at 19:47

## 2020-01-01 RX ADMIN — FLUCONAZOLE 200 MG: 2 INJECTION, SOLUTION INTRAVENOUS at 17:26

## 2020-01-01 RX ADMIN — ONDANSETRON 4 MG: 2 INJECTION INTRAMUSCULAR; INTRAVENOUS at 22:58

## 2020-01-01 RX ADMIN — ALBUMIN (HUMAN) 250 ML: 12.5 INJECTION, SOLUTION INTRAVENOUS at 14:51

## 2020-01-01 RX ADMIN — FOLIC ACID 1 MG: 1 TABLET ORAL at 09:37

## 2020-01-01 RX ADMIN — RIFAXIMIN 550 MG: 550 TABLET ORAL at 10:07

## 2020-01-01 RX ADMIN — LIDOCAINE HYDROCHLORIDE 100 MG: 20 INJECTION, SOLUTION INFILTRATION; PERINEURAL at 11:31

## 2020-01-01 RX ADMIN — Medication 10 ML: at 23:47

## 2020-01-01 RX ADMIN — LACTULOSE 20 G: 20 SOLUTION ORAL at 08:58

## 2020-01-01 RX ADMIN — FLUCONAZOLE 200 MG: 2 INJECTION, SOLUTION INTRAVENOUS at 14:15

## 2020-01-01 RX ADMIN — Medication 1 CAPSULE: at 17:37

## 2020-01-01 RX ADMIN — ALBUMIN (HUMAN) 12.5 G: 0.25 INJECTION, SOLUTION INTRAVENOUS at 17:12

## 2020-01-01 RX ADMIN — Medication 1 CAPSULE: at 10:07

## 2020-01-01 RX ADMIN — PROPOFOL 40 MG: 10 INJECTION, EMULSION INTRAVENOUS at 12:24

## 2020-01-01 RX ADMIN — FLUCONAZOLE 200 MG: 2 INJECTION, SOLUTION INTRAVENOUS at 15:20

## 2020-01-01 RX ADMIN — FLUCONAZOLE 200 MG: 2 INJECTION, SOLUTION INTRAVENOUS at 13:48

## 2020-01-01 RX ADMIN — MEROPENEM 1 G: 1 INJECTION, POWDER, FOR SOLUTION INTRAVENOUS at 23:42

## 2020-01-01 RX ADMIN — MORPHINE SULFATE 2 MG: 2 INJECTION, SOLUTION INTRAMUSCULAR; INTRAVENOUS at 08:07

## 2020-01-01 RX ADMIN — MEROPENEM 1 G: 1 INJECTION, POWDER, FOR SOLUTION INTRAVENOUS at 12:20

## 2020-01-01 RX ADMIN — ONDANSETRON 4 MG: 2 INJECTION INTRAMUSCULAR; INTRAVENOUS at 15:00

## 2020-01-01 RX ADMIN — PANTOPRAZOLE SODIUM 40 MG: 40 INJECTION, POWDER, FOR SOLUTION INTRAVENOUS at 10:09

## 2020-01-01 RX ADMIN — FUROSEMIDE 20 MG: 10 INJECTION, SOLUTION INTRAMUSCULAR; INTRAVENOUS at 04:37

## 2020-01-01 RX ADMIN — LEUCINE, PHENYLALANINE, LYSINE, METHIONINE, ISOLEUCINE, VALINE, HISTIDINE, THREONINE, TRYPTOPHAN, ALANINE, GLYCINE, ARGININE, PROLINE, SERINE, TYROSINE, SODIUM ACETATE, DIBASIC POTASSIUM PHOSPHATE, MAGNESIUM CHLORIDE, SODIUM CHLORIDE, CALCIUM CHLORIDE, DEXTROSE
365; 280; 290; 200; 300; 290; 240; 210; 90; 1035; 515; 575; 340; 250; 20; 340; 261; 51; 59; 33; 20 INJECTION INTRAVENOUS at 18:25

## 2020-01-01 RX ADMIN — MEROPENEM 1 G: 1 INJECTION, POWDER, FOR SOLUTION INTRAVENOUS at 14:06

## 2020-01-01 RX ADMIN — METOCLOPRAMIDE 10 MG: 5 INJECTION, SOLUTION INTRAMUSCULAR; INTRAVENOUS at 11:40

## 2020-01-01 RX ADMIN — METOCLOPRAMIDE 10 MG: 5 INJECTION, SOLUTION INTRAMUSCULAR; INTRAVENOUS at 17:05

## 2020-01-01 RX ADMIN — LACTULOSE 20 G: 20 SOLUTION ORAL at 22:10

## 2020-01-01 RX ADMIN — SODIUM CHLORIDE, POTASSIUM CHLORIDE, SODIUM LACTATE AND CALCIUM CHLORIDE: 600; 310; 30; 20 INJECTION, SOLUTION INTRAVENOUS at 15:39

## 2020-01-01 RX ADMIN — PROPOFOL 70 MG: 10 INJECTION, EMULSION INTRAVENOUS at 11:31

## 2020-01-01 RX ADMIN — MORPHINE SULFATE 2 MG: 2 INJECTION, SOLUTION INTRAMUSCULAR; INTRAVENOUS at 20:41

## 2020-01-01 RX ADMIN — RIFAXIMIN 550 MG: 550 TABLET ORAL at 21:11

## 2020-01-01 RX ADMIN — Medication 10 ML: at 02:41

## 2020-01-01 RX ADMIN — Medication 10 ML: at 08:45

## 2020-01-01 RX ADMIN — PROMETHAZINE HYDROCHLORIDE 12.5 MG: 25 INJECTION INTRAMUSCULAR; INTRAVENOUS at 21:15

## 2020-01-01 RX ADMIN — LACTULOSE 20 G: 20 SOLUTION ORAL at 13:42

## 2020-01-01 RX ADMIN — MAGNESIUM GLUCONATE 500 MG ORAL TABLET 400 MG: 500 TABLET ORAL at 10:06

## 2020-01-01 RX ADMIN — FENTANYL CITRATE 25 MCG: 50 INJECTION, SOLUTION INTRAMUSCULAR; INTRAVENOUS at 11:48

## 2020-01-01 RX ADMIN — INSULIN LISPRO 2 UNITS: 100 INJECTION, SOLUTION INTRAVENOUS; SUBCUTANEOUS at 14:15

## 2020-01-01 RX ADMIN — METOCLOPRAMIDE 10 MG: 5 INJECTION, SOLUTION INTRAMUSCULAR; INTRAVENOUS at 16:32

## 2020-01-01 RX ADMIN — Medication 10 ML: at 20:48

## 2020-01-01 RX ADMIN — LACTULOSE 20 G: 20 SOLUTION ORAL at 22:24

## 2020-01-01 RX ADMIN — METOCLOPRAMIDE 5 MG: 5 INJECTION, SOLUTION INTRAMUSCULAR; INTRAVENOUS at 13:00

## 2020-01-01 RX ADMIN — MEROPENEM 1 G: 1 INJECTION, POWDER, FOR SOLUTION INTRAVENOUS at 12:34

## 2020-01-01 RX ADMIN — METOCLOPRAMIDE 10 MG: 5 INJECTION, SOLUTION INTRAMUSCULAR; INTRAVENOUS at 17:00

## 2020-01-01 RX ADMIN — PANTOPRAZOLE SODIUM 40 MG: 40 INJECTION, POWDER, FOR SOLUTION INTRAVENOUS at 09:13

## 2020-01-01 RX ADMIN — ALBUMIN (HUMAN) 12.5 G: 12.5 INJECTION, SOLUTION INTRAVENOUS at 21:11

## 2020-01-01 RX ADMIN — SODIUM CHLORIDE 1000 ML: 9 INJECTION, SOLUTION INTRAVENOUS at 09:17

## 2020-01-01 RX ADMIN — POTASSIUM CHLORIDE, DEXTROSE MONOHYDRATE AND SODIUM CHLORIDE: 150; 5; 450 INJECTION, SOLUTION INTRAVENOUS at 18:47

## 2020-01-01 RX ADMIN — ALBUMIN (HUMAN) 12.5 G: 0.25 INJECTION, SOLUTION INTRAVENOUS at 03:28

## 2020-01-01 RX ADMIN — ENOXAPARIN SODIUM 40 MG: 40 INJECTION SUBCUTANEOUS at 14:37

## 2020-01-01 RX ADMIN — RIFAXIMIN 550 MG: 550 TABLET ORAL at 22:24

## 2020-01-01 RX ADMIN — Medication 1 CAPSULE: at 09:05

## 2020-01-01 RX ADMIN — Medication 10 ML: at 10:07

## 2020-01-01 RX ADMIN — CIPROFLOXACIN 400 MG: 2 INJECTION, SOLUTION INTRAVENOUS at 21:22

## 2020-01-01 RX ADMIN — ALBUMIN (HUMAN) 250 ML: 12.5 INJECTION, SOLUTION INTRAVENOUS at 15:07

## 2020-01-01 RX ADMIN — ALBUMIN (HUMAN) 12.5 G: 0.25 INJECTION, SOLUTION INTRAVENOUS at 22:18

## 2020-01-01 RX ADMIN — IOPAMIDOL 75 ML: 755 INJECTION, SOLUTION INTRAVENOUS at 17:37

## 2020-01-01 RX ADMIN — DEXAMETHASONE SODIUM PHOSPHATE 10 MG: 4 INJECTION, SOLUTION INTRAMUSCULAR; INTRAVENOUS at 11:45

## 2020-01-01 RX ADMIN — Medication 10 ML: at 09:35

## 2020-01-01 RX ADMIN — ENOXAPARIN SODIUM 40 MG: 40 INJECTION SUBCUTANEOUS at 08:59

## 2020-01-01 RX ADMIN — SODIUM CHLORIDE: 9 INJECTION, SOLUTION INTRAVENOUS at 12:33

## 2020-01-01 RX ADMIN — MEROPENEM 1 G: 1 INJECTION, POWDER, FOR SOLUTION INTRAVENOUS at 01:15

## 2020-01-01 RX ADMIN — FLUCONAZOLE 200 MG: 2 INJECTION, SOLUTION INTRAVENOUS at 14:14

## 2020-01-01 RX ADMIN — ALBUMIN (HUMAN) 12.5 G: 12.5 INJECTION, SOLUTION INTRAVENOUS at 14:42

## 2020-01-01 RX ADMIN — LACTULOSE 20 G: 20 SOLUTION ORAL at 09:04

## 2020-01-01 RX ADMIN — ACETAMINOPHEN 1000 MG: 500 TABLET, FILM COATED ORAL at 09:37

## 2020-01-01 RX ADMIN — LACTULOSE 20 G: 20 SOLUTION ORAL at 21:23

## 2020-01-01 RX ADMIN — PANTOPRAZOLE SODIUM 40 MG: 40 INJECTION, POWDER, FOR SOLUTION INTRAVENOUS at 18:46

## 2020-01-01 RX ADMIN — ONDANSETRON 4 MG: 2 INJECTION INTRAMUSCULAR; INTRAVENOUS at 15:34

## 2020-01-01 RX ADMIN — RIFAXIMIN 550 MG: 550 TABLET ORAL at 09:37

## 2020-01-01 RX ADMIN — SODIUM CHLORIDE: 9 INJECTION, SOLUTION INTRAVENOUS at 05:27

## 2020-01-01 RX ADMIN — PIPERACILLIN AND TAZOBACTAM 3.38 G: 3; .375 INJECTION, POWDER, LYOPHILIZED, FOR SOLUTION INTRAVENOUS at 17:31

## 2020-01-01 RX ADMIN — LACTULOSE 20 G: 20 SOLUTION ORAL at 15:11

## 2020-01-01 RX ADMIN — BISACODYL 10 MG: 10 SUPPOSITORY RECTAL at 11:43

## 2020-01-01 RX ADMIN — PROMETHAZINE HYDROCHLORIDE 12.5 MG: 25 TABLET ORAL at 07:02

## 2020-01-01 RX ADMIN — PIPERACILLIN AND TAZOBACTAM 3.38 G: 3; .375 INJECTION, POWDER, LYOPHILIZED, FOR SOLUTION INTRAVENOUS at 18:12

## 2020-01-01 RX ADMIN — LIDOCAINE HYDROCHLORIDE 100 MG: 20 INJECTION, SOLUTION INFILTRATION; PERINEURAL at 12:15

## 2020-01-01 RX ADMIN — MEROPENEM 1 G: 1 INJECTION, POWDER, FOR SOLUTION INTRAVENOUS at 23:45

## 2020-01-01 RX ADMIN — PANTOPRAZOLE SODIUM 40 MG: 40 INJECTION, POWDER, FOR SOLUTION INTRAVENOUS at 07:59

## 2020-01-01 RX ADMIN — LACTULOSE 20 G: 20 SOLUTION ORAL at 20:59

## 2020-01-01 RX ADMIN — Medication 10 ML: at 08:30

## 2020-01-01 RX ADMIN — LACTULOSE 20 G: 20 SOLUTION ORAL at 08:27

## 2020-01-01 RX ADMIN — Medication 10 ML: at 08:17

## 2020-01-01 RX ADMIN — PIPERACILLIN AND TAZOBACTAM 3.38 G: 3; .375 INJECTION, POWDER, LYOPHILIZED, FOR SOLUTION INTRAVENOUS at 02:30

## 2020-01-01 RX ADMIN — MAGNESIUM SULFATE HEPTAHYDRATE 2 G: 40 INJECTION, SOLUTION INTRAVENOUS at 11:43

## 2020-01-01 RX ADMIN — LACTULOSE 20 G: 20 SOLUTION ORAL at 15:45

## 2020-01-01 RX ADMIN — SODIUM CHLORIDE: 9 INJECTION, SOLUTION INTRAVENOUS at 14:45

## 2020-01-01 RX ADMIN — MEROPENEM 1 G: 1 INJECTION, POWDER, FOR SOLUTION INTRAVENOUS at 03:09

## 2020-01-01 RX ADMIN — CIPROFLOXACIN 400 MG: 2 INJECTION, SOLUTION INTRAVENOUS at 10:30

## 2020-01-01 RX ADMIN — ALBUMIN (HUMAN) 12.5 G: 12.5 INJECTION, SOLUTION INTRAVENOUS at 14:14

## 2020-01-01 RX ADMIN — METOCLOPRAMIDE 10 MG: 5 INJECTION, SOLUTION INTRAMUSCULAR; INTRAVENOUS at 12:35

## 2020-01-01 RX ADMIN — MEROPENEM 1 G: 1 INJECTION, POWDER, FOR SOLUTION INTRAVENOUS at 12:28

## 2020-01-01 RX ADMIN — RIFAXIMIN 550 MG: 550 TABLET ORAL at 08:58

## 2020-01-01 RX ADMIN — SPIRONOLACTONE 50 MG: 25 TABLET ORAL at 14:14

## 2020-01-01 RX ADMIN — LACTULOSE 20 G: 20 SOLUTION ORAL at 14:43

## 2020-01-01 RX ADMIN — RIFAXIMIN 550 MG: 550 TABLET ORAL at 09:50

## 2020-01-01 RX ADMIN — METOCLOPRAMIDE 10 MG: 5 INJECTION, SOLUTION INTRAMUSCULAR; INTRAVENOUS at 05:37

## 2020-01-01 RX ADMIN — POTASSIUM CHLORIDE, DEXTROSE MONOHYDRATE AND SODIUM CHLORIDE: 150; 5; 450 INJECTION, SOLUTION INTRAVENOUS at 01:21

## 2020-01-01 RX ADMIN — PROPOFOL 100 MG: 10 INJECTION, EMULSION INTRAVENOUS at 13:49

## 2020-01-01 RX ADMIN — Medication 10 ML: at 21:22

## 2020-01-01 RX ADMIN — CIPROFLOXACIN 400 MG: 2 INJECTION, SOLUTION INTRAVENOUS at 22:11

## 2020-01-01 RX ADMIN — FLUCONAZOLE 200 MG: 2 INJECTION, SOLUTION INTRAVENOUS at 13:34

## 2020-01-01 RX ADMIN — ENOXAPARIN SODIUM 30 MG: 30 INJECTION SUBCUTANEOUS at 09:44

## 2020-01-01 RX ADMIN — PANTOPRAZOLE SODIUM 40 MG: 40 INJECTION, POWDER, FOR SOLUTION INTRAVENOUS at 08:17

## 2020-01-01 RX ADMIN — ENOXAPARIN SODIUM 30 MG: 30 INJECTION SUBCUTANEOUS at 08:36

## 2020-01-01 RX ADMIN — ALBUMIN (HUMAN) 12.5 G: 0.25 INJECTION, SOLUTION INTRAVENOUS at 08:18

## 2020-01-01 RX ADMIN — ENOXAPARIN SODIUM 30 MG: 30 INJECTION SUBCUTANEOUS at 20:14

## 2020-01-01 RX ADMIN — FOLIC ACID 1 MG: 1 TABLET ORAL at 09:50

## 2020-01-01 RX ADMIN — DEXTROSE MONOHYDRATE 12.5 G: 25 INJECTION, SOLUTION INTRAVENOUS at 10:25

## 2020-01-01 RX ADMIN — PANTOPRAZOLE SODIUM 40 MG: 40 INJECTION, POWDER, FOR SOLUTION INTRAVENOUS at 08:30

## 2020-01-01 RX ADMIN — PANTOPRAZOLE SODIUM 40 MG: 40 INJECTION, POWDER, FOR SOLUTION INTRAVENOUS at 09:04

## 2020-01-01 RX ADMIN — LEUCINE, PHENYLALANINE, LYSINE, METHIONINE, ISOLEUCINE, VALINE, HISTIDINE, THREONINE, TRYPTOPHAN, ALANINE, GLYCINE, ARGININE, PROLINE, SERINE, TYROSINE, SODIUM ACETATE, DIBASIC POTASSIUM PHOSPHATE, MAGNESIUM CHLORIDE, SODIUM CHLORIDE, CALCIUM CHLORIDE, DEXTROSE
365; 280; 290; 200; 300; 290; 240; 210; 90; 1035; 515; 575; 340; 250; 20; 340; 261; 51; 59; 33; 20 INJECTION INTRAVENOUS at 17:09

## 2020-01-01 RX ADMIN — METOCLOPRAMIDE 10 MG: 5 INJECTION, SOLUTION INTRAMUSCULAR; INTRAVENOUS at 23:53

## 2020-01-01 RX ADMIN — PIPERACILLIN SODIUM AND TAZOBACTAM SODIUM 4.5 G: 4; .5 INJECTION, POWDER, LYOPHILIZED, FOR SOLUTION INTRAVENOUS at 19:00

## 2020-01-01 RX ADMIN — Medication 10 ML: at 10:06

## 2020-01-01 RX ADMIN — SODIUM CHLORIDE 100 ML: 9 INJECTION, SOLUTION INTRAVENOUS at 22:47

## 2020-01-01 RX ADMIN — PANTOPRAZOLE SODIUM 40 MG: 40 TABLET, DELAYED RELEASE ORAL at 05:39

## 2020-01-01 RX ADMIN — PHENYLEPHRINE HYDROCHLORIDE 50 MCG/MIN: 10 INJECTION INTRAVENOUS at 13:56

## 2020-01-01 RX ADMIN — POTASSIUM CHLORIDE, DEXTROSE MONOHYDRATE AND SODIUM CHLORIDE: 150; 5; 900 INJECTION, SOLUTION INTRAVENOUS at 10:35

## 2020-01-01 RX ADMIN — SODIUM CHLORIDE: 9 INJECTION, SOLUTION INTRAVENOUS at 11:20

## 2020-01-01 RX ADMIN — FUROSEMIDE 10 MG: 10 INJECTION, SOLUTION INTRAMUSCULAR; INTRAVENOUS at 09:08

## 2020-01-01 RX ADMIN — ENOXAPARIN SODIUM 30 MG: 30 INJECTION SUBCUTANEOUS at 21:11

## 2020-01-01 RX ADMIN — METOCLOPRAMIDE 10 MG: 5 INJECTION, SOLUTION INTRAMUSCULAR; INTRAVENOUS at 07:00

## 2020-01-01 RX ADMIN — ENOXAPARIN SODIUM 40 MG: 40 INJECTION SUBCUTANEOUS at 10:27

## 2020-01-01 RX ADMIN — ENOXAPARIN SODIUM 40 MG: 40 INJECTION SUBCUTANEOUS at 07:59

## 2020-01-01 RX ADMIN — ALBUMIN (HUMAN) 12.5 G: 0.25 INJECTION, SOLUTION INTRAVENOUS at 04:14

## 2020-01-01 RX ADMIN — Medication 1 CAPSULE: at 08:35

## 2020-01-01 RX ADMIN — ALBUMIN (HUMAN) 65 G: 0.25 INJECTION, SOLUTION INTRAVENOUS at 12:27

## 2020-01-01 RX ADMIN — RIFAXIMIN 550 MG: 550 TABLET ORAL at 21:14

## 2020-01-01 RX ADMIN — Medication 10 ML: at 08:27

## 2020-01-01 RX ADMIN — LIDOCAINE HYDROCHLORIDE 60 MG: 20 INJECTION, SOLUTION EPIDURAL; INFILTRATION; INTRACAUDAL; PERINEURAL at 13:49

## 2020-01-01 RX ADMIN — PROPOFOL 40 MG: 10 INJECTION, EMULSION INTRAVENOUS at 12:35

## 2020-01-01 RX ADMIN — LACTULOSE 20 G: 20 SOLUTION ORAL at 20:33

## 2020-01-01 RX ADMIN — ACETAMINOPHEN 1000 MG: 500 TABLET, FILM COATED ORAL at 23:46

## 2020-01-01 RX ADMIN — LACTULOSE 20 G: 20 SOLUTION ORAL at 15:49

## 2020-01-01 RX ADMIN — ALBUMIN (HUMAN) 12.5 G: 0.25 INJECTION, SOLUTION INTRAVENOUS at 02:39

## 2020-01-01 RX ADMIN — Medication 1 CAPSULE: at 18:45

## 2020-01-01 RX ADMIN — FUROSEMIDE 3 MG/HR: 10 INJECTION, SOLUTION INTRAMUSCULAR; INTRAVENOUS at 22:40

## 2020-01-01 RX ADMIN — METOCLOPRAMIDE 10 MG: 5 INJECTION, SOLUTION INTRAMUSCULAR; INTRAVENOUS at 23:43

## 2020-01-01 RX ADMIN — ENOXAPARIN SODIUM 30 MG: 30 INJECTION SUBCUTANEOUS at 08:30

## 2020-01-01 RX ADMIN — ONDANSETRON 4 MG: 2 INJECTION INTRAMUSCULAR; INTRAVENOUS at 05:44

## 2020-01-01 RX ADMIN — FOLIC ACID 1 MG: 1 TABLET ORAL at 08:58

## 2020-01-01 RX ADMIN — SODIUM CHLORIDE: 9 INJECTION, SOLUTION INTRAVENOUS at 13:37

## 2020-01-01 RX ADMIN — MIDAZOLAM 0.5 MG: 1 INJECTION INTRAMUSCULAR; INTRAVENOUS at 11:48

## 2020-01-01 RX ADMIN — HYDROMORPHONE HYDROCHLORIDE 0.5 MG: 2 INJECTION, SOLUTION INTRAMUSCULAR; INTRAVENOUS; SUBCUTANEOUS at 17:17

## 2020-01-01 RX ADMIN — FLUCONAZOLE 200 MG: 2 INJECTION, SOLUTION INTRAVENOUS at 14:43

## 2020-01-01 RX ADMIN — MORPHINE SULFATE 2 MG: 2 INJECTION, SOLUTION INTRAMUSCULAR; INTRAVENOUS at 16:40

## 2020-01-01 RX ADMIN — MORPHINE SULFATE 4 MG: 4 INJECTION INTRAVENOUS at 10:16

## 2020-01-01 RX ADMIN — PIPERACILLIN AND TAZOBACTAM 3.38 G: 3; .375 INJECTION, POWDER, LYOPHILIZED, FOR SOLUTION INTRAVENOUS at 17:50

## 2020-01-01 RX ADMIN — METRONIDAZOLE 500 MG: 500 INJECTION, SOLUTION INTRAVENOUS at 13:40

## 2020-01-01 RX ADMIN — ALBUMIN (HUMAN) 12.5 G: 0.25 INJECTION, SOLUTION INTRAVENOUS at 12:32

## 2020-01-01 RX ADMIN — METOCLOPRAMIDE 10 MG: 5 INJECTION, SOLUTION INTRAMUSCULAR; INTRAVENOUS at 22:26

## 2020-01-01 RX ADMIN — PHENYLEPHRINE HYDROCHLORIDE 100 MCG: 10 INJECTION INTRAVENOUS at 11:40

## 2020-01-01 RX ADMIN — MEROPENEM 1 G: 1 INJECTION, POWDER, FOR SOLUTION INTRAVENOUS at 01:09

## 2020-01-01 RX ADMIN — PHENYLEPHRINE HYDROCHLORIDE 200 MCG: 10 INJECTION INTRAVENOUS at 12:13

## 2020-01-01 RX ADMIN — DEXAMETHASONE SODIUM PHOSPHATE 8 MG: 4 INJECTION, SOLUTION INTRAMUSCULAR; INTRAVENOUS at 15:00

## 2020-01-01 RX ADMIN — Medication 10 ML: at 08:24

## 2020-01-01 RX ADMIN — MORPHINE SULFATE 4 MG: 4 INJECTION INTRAVENOUS at 18:46

## 2020-01-01 SDOH — HEALTH STABILITY: MENTAL HEALTH: HOW OFTEN DO YOU HAVE A DRINK CONTAINING ALCOHOL?: NEVER

## 2020-01-01 ASSESSMENT — PAIN SCALES - GENERAL
PAINLEVEL_OUTOF10: 6
PAINLEVEL_OUTOF10: 0
PAINLEVEL_OUTOF10: 8
PAINLEVEL_OUTOF10: 7
PAINLEVEL_OUTOF10: 0
PAINLEVEL_OUTOF10: 0
PAINLEVEL_OUTOF10: 7
PAINLEVEL_OUTOF10: 0
PAINLEVEL_OUTOF10: 0
PAINLEVEL_OUTOF10: 10
PAINLEVEL_OUTOF10: 0
PAINLEVEL_OUTOF10: 9
PAINLEVEL_OUTOF10: 0
PAINLEVEL_OUTOF10: 7
PAINLEVEL_OUTOF10: 0
PAINLEVEL_OUTOF10: 7
PAINLEVEL_OUTOF10: 1
PAINLEVEL_OUTOF10: 0
PAINLEVEL_OUTOF10: 8
PAINLEVEL_OUTOF10: 0
PAINLEVEL_OUTOF10: 10
PAINLEVEL_OUTOF10: 4
PAINLEVEL_OUTOF10: 0
PAINLEVEL_OUTOF10: 5
PAINLEVEL_OUTOF10: 5
PAINLEVEL_OUTOF10: 1
PAINLEVEL_OUTOF10: 0
PAINLEVEL_OUTOF10: 5
PAINLEVEL_OUTOF10: 0
PAINLEVEL_OUTOF10: 0
PAINLEVEL_OUTOF10: 8
PAINLEVEL_OUTOF10: 0
PAINLEVEL_OUTOF10: 4
PAINLEVEL_OUTOF10: 0
PAINLEVEL_OUTOF10: 8
PAINLEVEL_OUTOF10: 0
PAINLEVEL_OUTOF10: 2
PAINLEVEL_OUTOF10: 8
PAINLEVEL_OUTOF10: 0
PAINLEVEL_OUTOF10: 5
PAINLEVEL_OUTOF10: 0
PAINLEVEL_OUTOF10: 7
PAINLEVEL_OUTOF10: 7
PAINLEVEL_OUTOF10: 0
PAINLEVEL_OUTOF10: 10
PAINLEVEL_OUTOF10: 0
PAINLEVEL_OUTOF10: 5
PAINLEVEL_OUTOF10: 4
PAINLEVEL_OUTOF10: 0
PAINLEVEL_OUTOF10: 3
PAINLEVEL_OUTOF10: 4
PAINLEVEL_OUTOF10: 4
PAINLEVEL_OUTOF10: 0
PAINLEVEL_OUTOF10: 0
PAINLEVEL_OUTOF10: 8
PAINLEVEL_OUTOF10: 0
PAINLEVEL_OUTOF10: 8
PAINLEVEL_OUTOF10: 0
PAINLEVEL_OUTOF10: 7
PAINLEVEL_OUTOF10: 5
PAINLEVEL_OUTOF10: 7
PAINLEVEL_OUTOF10: 2
PAINLEVEL_OUTOF10: 5
PAINLEVEL_OUTOF10: 5
PAINLEVEL_OUTOF10: 0
PAINLEVEL_OUTOF10: 6
PAINLEVEL_OUTOF10: 7
PAINLEVEL_OUTOF10: 8
PAINLEVEL_OUTOF10: 0
PAINLEVEL_OUTOF10: 4
PAINLEVEL_OUTOF10: 7

## 2020-01-01 ASSESSMENT — ENCOUNTER SYMPTOMS
APNEA: 0
DIARRHEA: 0
BLOOD IN STOOL: 0
BLOOD IN STOOL: 0
ABDOMINAL DISTENTION: 0
NAUSEA: 0
EYE DISCHARGE: 0
COLOR CHANGE: 0
PHOTOPHOBIA: 0
SHORTNESS OF BREATH: 0
STRIDOR: 0
RHINORRHEA: 0
ABDOMINAL PAIN: 0
ABDOMINAL PAIN: 0
SHORTNESS OF BREATH: 0
CHEST TIGHTNESS: 0
CHOKING: 0
APNEA: 0
COLOR CHANGE: 0
ABDOMINAL PAIN: 0
STRIDOR: 0
BLOOD IN STOOL: 0
CONSTIPATION: 0
VOMITING: 0
VOMITING: 0
CHEST TIGHTNESS: 0
CONSTIPATION: 0
NAUSEA: 1
CHEST TIGHTNESS: 0
CHEST TIGHTNESS: 0
COUGH: 0
RHINORRHEA: 0
APNEA: 0
EYE REDNESS: 0
EYE REDNESS: 0
ABDOMINAL DISTENTION: 0
COUGH: 0
NAUSEA: 0
ABDOMINAL PAIN: 0
NAUSEA: 0
FACIAL SWELLING: 0
ABDOMINAL PAIN: 0
DIARRHEA: 0
BLOOD IN STOOL: 0
CHEST TIGHTNESS: 0
COLOR CHANGE: 0
RHINORRHEA: 0
COUGH: 0
CHEST TIGHTNESS: 0
EYE REDNESS: 0
DIARRHEA: 0
FACIAL SWELLING: 0
COUGH: 0
TROUBLE SWALLOWING: 0
NAUSEA: 0
SHORTNESS OF BREATH: 0
EYE REDNESS: 0
SHORTNESS OF BREATH: 0
PHOTOPHOBIA: 0
PHOTOPHOBIA: 0
COLOR CHANGE: 0
CHEST TIGHTNESS: 0
COLOR CHANGE: 0
FACIAL SWELLING: 0
PHOTOPHOBIA: 0
FACIAL SWELLING: 0
EYE PAIN: 0
BLOOD IN STOOL: 0
BLOOD IN STOOL: 0
VOMITING: 0
CHOKING: 0
NAUSEA: 0
VOMITING: 0
NAUSEA: 0
COUGH: 0
TROUBLE SWALLOWING: 0
COLOR CHANGE: 0
ABDOMINAL PAIN: 0
ABDOMINAL PAIN: 0
SHORTNESS OF BREATH: 0
CHOKING: 0
EYE REDNESS: 0
SHORTNESS OF BREATH: 0
RHINORRHEA: 0
STRIDOR: 0
APNEA: 0
FACIAL SWELLING: 0
DIARRHEA: 0
CONSTIPATION: 0
EYES NEGATIVE: 1
RESPIRATORY NEGATIVE: 1
BACK PAIN: 0
EYE DISCHARGE: 0
NAUSEA: 0
FACIAL SWELLING: 0
DIARRHEA: 0
COLOR CHANGE: 0
FACIAL SWELLING: 0
APNEA: 0
COUGH: 0
CHEST TIGHTNESS: 0
STRIDOR: 0
CHOKING: 0
EYE REDNESS: 0
APNEA: 0
BLOOD IN STOOL: 0
EYE DISCHARGE: 0
ABDOMINAL PAIN: 0
BLOOD IN STOOL: 0
DIARRHEA: 0
CHEST TIGHTNESS: 0
DIARRHEA: 0
APNEA: 0
SHORTNESS OF BREATH: 0
DIARRHEA: 0
CHOKING: 0
EYE DISCHARGE: 0
TROUBLE SWALLOWING: 0
EYE REDNESS: 0
CHOKING: 0
CONSTIPATION: 0
EYE DISCHARGE: 0
SHORTNESS OF BREATH: 0
RHINORRHEA: 0
PHOTOPHOBIA: 0
EYE REDNESS: 0
EYE DISCHARGE: 0
PHOTOPHOBIA: 0
ABDOMINAL PAIN: 0
COUGH: 0
COUGH: 0
SHORTNESS OF BREATH: 0
PHOTOPHOBIA: 0
NAUSEA: 0
STRIDOR: 0
FACIAL SWELLING: 0
BLOOD IN STOOL: 0
COUGH: 0
ABDOMINAL DISTENTION: 0
COUGH: 0
COUGH: 0
EYE REDNESS: 0
SHORTNESS OF BREATH: 1
STRIDOR: 0
CHEST TIGHTNESS: 0
STRIDOR: 0
EYE REDNESS: 0
TROUBLE SWALLOWING: 0
NAUSEA: 0
NAUSEA: 0
EYE DISCHARGE: 0
FACIAL SWELLING: 0
RHINORRHEA: 0
ABDOMINAL PAIN: 0
RHINORRHEA: 0
DIARRHEA: 0
CHEST TIGHTNESS: 0
BLOOD IN STOOL: 0
DIARRHEA: 0
EYE DISCHARGE: 0
ABDOMINAL PAIN: 0
ABDOMINAL PAIN: 1
EYE DISCHARGE: 0
PHOTOPHOBIA: 0
EYE DISCHARGE: 0
TROUBLE SWALLOWING: 0
DIARRHEA: 0
EYE REDNESS: 0
PHOTOPHOBIA: 0
SORE THROAT: 0
SHORTNESS OF BREATH: 0
BLOOD IN STOOL: 0
VOMITING: 0
TROUBLE SWALLOWING: 0
FACIAL SWELLING: 0
ABDOMINAL PAIN: 0
PHOTOPHOBIA: 0
EYE DISCHARGE: 0
ABDOMINAL DISTENTION: 0
FACIAL SWELLING: 0
PHOTOPHOBIA: 0
SHORTNESS OF BREATH: 0
NAUSEA: 0
CHOKING: 0
TROUBLE SWALLOWING: 0

## 2020-01-01 ASSESSMENT — PAIN SCALES - WONG BAKER
WONGBAKER_NUMERICALRESPONSE: 0
WONGBAKER_NUMERICALRESPONSE: 4
WONGBAKER_NUMERICALRESPONSE: 4
WONGBAKER_NUMERICALRESPONSE: 0
WONGBAKER_NUMERICALRESPONSE: 0
WONGBAKER_NUMERICALRESPONSE: 4
WONGBAKER_NUMERICALRESPONSE: 0
WONGBAKER_NUMERICALRESPONSE: 4
WONGBAKER_NUMERICALRESPONSE: 0
WONGBAKER_NUMERICALRESPONSE: 4
WONGBAKER_NUMERICALRESPONSE: 4
WONGBAKER_NUMERICALRESPONSE: 0
WONGBAKER_NUMERICALRESPONSE: 4
WONGBAKER_NUMERICALRESPONSE: 0
WONGBAKER_NUMERICALRESPONSE: 4
WONGBAKER_NUMERICALRESPONSE: 0
WONGBAKER_NUMERICALRESPONSE: 4
WONGBAKER_NUMERICALRESPONSE: 4
WONGBAKER_NUMERICALRESPONSE: 8
WONGBAKER_NUMERICALRESPONSE: 4
WONGBAKER_NUMERICALRESPONSE: 4
WONGBAKER_NUMERICALRESPONSE: 0
WONGBAKER_NUMERICALRESPONSE: 0
WONGBAKER_NUMERICALRESPONSE: 4
WONGBAKER_NUMERICALRESPONSE: 0
WONGBAKER_NUMERICALRESPONSE: 4
WONGBAKER_NUMERICALRESPONSE: 0
WONGBAKER_NUMERICALRESPONSE: 4
WONGBAKER_NUMERICALRESPONSE: 4

## 2020-01-01 ASSESSMENT — PULMONARY FUNCTION TESTS
PIF_VALUE: 18
PIF_VALUE: 3
PIF_VALUE: 4
PIF_VALUE: 32
PIF_VALUE: 16
PIF_VALUE: 3
PIF_VALUE: 20
PIF_VALUE: 19
PIF_VALUE: 3
PIF_VALUE: 2
PIF_VALUE: 21
PIF_VALUE: 20
PIF_VALUE: 23
PIF_VALUE: 23
PIF_VALUE: 3
PIF_VALUE: 19
PIF_VALUE: 1
PIF_VALUE: 20
PIF_VALUE: 25
PIF_VALUE: 1
PIF_VALUE: 19
PIF_VALUE: 23
PIF_VALUE: 18
PIF_VALUE: 19
PIF_VALUE: 0
PIF_VALUE: 3
PIF_VALUE: 3
PIF_VALUE: 24
PIF_VALUE: 1
PIF_VALUE: 3
PIF_VALUE: 19
PIF_VALUE: 3
PIF_VALUE: 20
PIF_VALUE: 20
PIF_VALUE: 32
PIF_VALUE: 3
PIF_VALUE: 1
PIF_VALUE: 3
PIF_VALUE: 19
PIF_VALUE: 1
PIF_VALUE: 20
PIF_VALUE: 3
PIF_VALUE: 3
PIF_VALUE: 6
PIF_VALUE: 4
PIF_VALUE: 19
PIF_VALUE: 19
PIF_VALUE: 23
PIF_VALUE: 19
PIF_VALUE: 19
PIF_VALUE: 2
PIF_VALUE: 27
PIF_VALUE: 3
PIF_VALUE: 3
PIF_VALUE: 2
PIF_VALUE: 18
PIF_VALUE: 19
PIF_VALUE: 19
PIF_VALUE: 4
PIF_VALUE: 19
PIF_VALUE: 19
PIF_VALUE: 32
PIF_VALUE: 4
PIF_VALUE: 3
PIF_VALUE: 20
PIF_VALUE: 32
PIF_VALUE: 1
PIF_VALUE: 3
PIF_VALUE: 19
PIF_VALUE: 19
PIF_VALUE: 4
PIF_VALUE: 0
PIF_VALUE: 19
PIF_VALUE: 3
PIF_VALUE: 19
PIF_VALUE: 3
PIF_VALUE: 1
PIF_VALUE: 42
PIF_VALUE: 1
PIF_VALUE: 19
PIF_VALUE: 32
PIF_VALUE: 2
PIF_VALUE: 3
PIF_VALUE: 19
PIF_VALUE: 3
PIF_VALUE: 32
PIF_VALUE: 20
PIF_VALUE: 20
PIF_VALUE: 19
PIF_VALUE: 19
PIF_VALUE: 3
PIF_VALUE: 13
PIF_VALUE: 3
PIF_VALUE: 19
PIF_VALUE: 4
PIF_VALUE: 19
PIF_VALUE: 3
PIF_VALUE: 3
PIF_VALUE: 16
PIF_VALUE: 19
PIF_VALUE: 19
PIF_VALUE: 32
PIF_VALUE: 3
PIF_VALUE: 3
PIF_VALUE: 20
PIF_VALUE: 24
PIF_VALUE: 25
PIF_VALUE: 1
PIF_VALUE: 18
PIF_VALUE: 3
PIF_VALUE: 1
PIF_VALUE: 26
PIF_VALUE: 3
PIF_VALUE: 2
PIF_VALUE: 19
PIF_VALUE: 25
PIF_VALUE: 20
PIF_VALUE: 3
PIF_VALUE: 19
PIF_VALUE: 3
PIF_VALUE: 0
PIF_VALUE: 21
PIF_VALUE: 3
PIF_VALUE: 19
PIF_VALUE: 24
PIF_VALUE: 21
PIF_VALUE: 21
PIF_VALUE: 20
PIF_VALUE: 3
PIF_VALUE: 28
PIF_VALUE: 18
PIF_VALUE: 21
PIF_VALUE: 19
PIF_VALUE: 19
PIF_VALUE: 23
PIF_VALUE: 3
PIF_VALUE: 20
PIF_VALUE: 32
PIF_VALUE: 21
PIF_VALUE: 3
PIF_VALUE: 20
PIF_VALUE: 0
PIF_VALUE: 2
PIF_VALUE: 19
PIF_VALUE: 15
PIF_VALUE: 1
PIF_VALUE: 3
PIF_VALUE: 5
PIF_VALUE: 5
PIF_VALUE: 18
PIF_VALUE: 24
PIF_VALUE: 1
PIF_VALUE: 22
PIF_VALUE: 21
PIF_VALUE: 21
PIF_VALUE: 20
PIF_VALUE: 3
PIF_VALUE: 1
PIF_VALUE: 32
PIF_VALUE: 1
PIF_VALUE: 3
PIF_VALUE: 3
PIF_VALUE: 20
PIF_VALUE: 3
PIF_VALUE: 26
PIF_VALUE: 29
PIF_VALUE: 19
PIF_VALUE: 20
PIF_VALUE: 20
PIF_VALUE: 19
PIF_VALUE: 19
PIF_VALUE: 3
PIF_VALUE: 19
PIF_VALUE: 1
PIF_VALUE: 4
PIF_VALUE: 32
PIF_VALUE: 19
PIF_VALUE: 3
PIF_VALUE: 24
PIF_VALUE: 0
PIF_VALUE: 19
PIF_VALUE: 19
PIF_VALUE: 26
PIF_VALUE: 30
PIF_VALUE: 20
PIF_VALUE: 0
PIF_VALUE: 27
PIF_VALUE: 32
PIF_VALUE: 2
PIF_VALUE: 2
PIF_VALUE: 32
PIF_VALUE: 3
PIF_VALUE: 2
PIF_VALUE: 19
PIF_VALUE: 19
PIF_VALUE: 2
PIF_VALUE: 3
PIF_VALUE: 3
PIF_VALUE: 21
PIF_VALUE: 32
PIF_VALUE: 1
PIF_VALUE: 19
PIF_VALUE: 21
PIF_VALUE: 18
PIF_VALUE: 21
PIF_VALUE: 5
PIF_VALUE: 3
PIF_VALUE: 30
PIF_VALUE: 32
PIF_VALUE: 22
PIF_VALUE: 5
PIF_VALUE: 3
PIF_VALUE: 3
PIF_VALUE: 16
PIF_VALUE: 19
PIF_VALUE: 18
PIF_VALUE: 18
PIF_VALUE: 19
PIF_VALUE: 6
PIF_VALUE: 32
PIF_VALUE: 3
PIF_VALUE: 32
PIF_VALUE: 3
PIF_VALUE: 3
PIF_VALUE: 19
PIF_VALUE: 27
PIF_VALUE: 21
PIF_VALUE: 15
PIF_VALUE: 3
PIF_VALUE: 3
PIF_VALUE: 19
PIF_VALUE: 1
PIF_VALUE: 2
PIF_VALUE: 5
PIF_VALUE: 32
PIF_VALUE: 0
PIF_VALUE: 23
PIF_VALUE: 25
PIF_VALUE: 27
PIF_VALUE: 19
PIF_VALUE: 31
PIF_VALUE: 4
PIF_VALUE: 19
PIF_VALUE: 3
PIF_VALUE: 21
PIF_VALUE: 3
PIF_VALUE: 19
PIF_VALUE: 3
PIF_VALUE: 3
PIF_VALUE: 32
PIF_VALUE: 1
PIF_VALUE: 19
PIF_VALUE: 21
PIF_VALUE: 26
PIF_VALUE: 24
PIF_VALUE: 1
PIF_VALUE: 18
PIF_VALUE: 19
PIF_VALUE: 4
PIF_VALUE: 1
PIF_VALUE: 27
PIF_VALUE: 3
PIF_VALUE: 4
PIF_VALUE: 20
PIF_VALUE: 3
PIF_VALUE: 31
PIF_VALUE: 21
PIF_VALUE: 19
PIF_VALUE: 3
PIF_VALUE: 19
PIF_VALUE: 27
PIF_VALUE: 32
PIF_VALUE: 19
PIF_VALUE: 32
PIF_VALUE: 4
PIF_VALUE: 20
PIF_VALUE: 3
PIF_VALUE: 19
PIF_VALUE: 6
PIF_VALUE: 3
PIF_VALUE: 18
PIF_VALUE: 20
PIF_VALUE: 32
PIF_VALUE: 29
PIF_VALUE: 0
PIF_VALUE: 2

## 2020-01-01 ASSESSMENT — PAIN DESCRIPTION - DESCRIPTORS
DESCRIPTORS: OTHER (COMMENT)
DESCRIPTORS: CRAMPING;SORE
DESCRIPTORS: ACHING
DESCRIPTORS: SHARP
DESCRIPTORS: ACHING
DESCRIPTORS: SHARP
DESCRIPTORS: SHARP

## 2020-01-01 ASSESSMENT — PAIN DESCRIPTION - LOCATION
LOCATION: ABDOMEN
LOCATION: PELVIS;PERINEUM
LOCATION: ABDOMEN

## 2020-01-01 ASSESSMENT — PAIN DESCRIPTION - PAIN TYPE
TYPE: SURGICAL PAIN
TYPE: SURGICAL PAIN
TYPE: ACUTE PAIN
TYPE: SURGICAL PAIN
TYPE: ACUTE PAIN
TYPE: SURGICAL PAIN
TYPE: ACUTE PAIN
TYPE: SURGICAL PAIN
TYPE: ACUTE PAIN
TYPE: SURGICAL PAIN

## 2020-01-01 ASSESSMENT — PAIN DESCRIPTION - ONSET
ONSET: ON-GOING

## 2020-01-01 ASSESSMENT — PAIN DESCRIPTION - ORIENTATION
ORIENTATION: RIGHT
ORIENTATION: LOWER
ORIENTATION: MID
ORIENTATION: LOWER;UPPER
ORIENTATION: MID
ORIENTATION: LOWER
ORIENTATION: MID

## 2020-01-01 ASSESSMENT — PAIN DESCRIPTION - FREQUENCY
FREQUENCY: INTERMITTENT

## 2020-01-01 ASSESSMENT — PAIN DESCRIPTION - PROGRESSION
CLINICAL_PROGRESSION: NOT CHANGED
CLINICAL_PROGRESSION: GRADUALLY IMPROVING

## 2020-01-01 ASSESSMENT — LIFESTYLE VARIABLES: SMOKING_STATUS: 1

## 2020-01-01 ASSESSMENT — PAIN - FUNCTIONAL ASSESSMENT: PAIN_FUNCTIONAL_ASSESSMENT: 0-10

## 2020-08-16 PROBLEM — N73.9 PELVIC ABSCESS IN FEMALE: Status: ACTIVE | Noted: 2020-01-01

## 2020-08-16 NOTE — ED PROVIDER NOTES
This patient was seen by the Mid-Level Provider. I have seen and examined the patient, agree with the workup, evaluation, management and diagnosis. Care plan has been discussed. My assessment reveals a 58-year-old female with a fall. This is a 58-year-old female with apparent history of frequent falls who presents after being found on the ground by her family. When the patient arrived she was disheveled, incontinent. She denies any specific complaints. The patient's work-up today was extensive. Her work-up included a CT of the abdomen that shows findings worrisome for possible abscess versus perforation. These findings were discussed with surgery on-call who will be consulting on the patient. The patient be admitted to the medical service for IV hydration and antibiotics. She has no peritoneal signs at this time. The patient's work-up did include a CBC that shows a leukocytosis. She was admitted and stable condition. Radiology results:    CT ABDOMEN PELVIS WO CONTRAST Additional Contrast? None   Final Result   1. Non loculated fluid and gas collection in the pelvis concerning for   perforated viscus/possible abscess formation. 2. Prominent thickening of the ascending colon which may be related to   infectious or inflammatory process or underlying neoplastic process. 3. Bowel-gas pattern typical of adynamic ileus. 4. Hepatic cirrhosis with numerous hepatic nodules suggestive of   regenerative/siderotic nodules. MRI abdomen without and with IV contrast   correlation recommended. Metastatic disease is a consideration. 5. Hepatosplenomegaly and multifocal collateral vessels about the stomach   esophagus concerning for varices, all suggestive of portal venous   hypertension. 6. Abdominal and pelvic ascites. 7.  Diverticulosis coli without CT evidence of acute diverticulitis. Critical results were called by Dr. Jess Lynch to [address] [place] [address]   on 8/16/2020 at 18:09.          CT Cervical Spine WO Contrast   Final Result   No acute abnormality of the cervical spine. CT Head WO Contrast   Final Result   No acute intracranial abnormality. XR CHEST PORTABLE   Final Result   No acute process. XR RADIUS ULNA LEFT (2 VIEWS)   Final Result   No acute osseous abnormality. LABS:    Labs Reviewed   CBC WITH AUTO DIFFERENTIAL - Abnormal; Notable for the following components:       Result Value    WBC 22.5 (*)     RBC 3.76 (*)     Hemoglobin 10.9 (*)     Hematocrit 33.1 (*)     RDW 16.7 (*)     Neutrophils Absolute 19.3 (*)     Monocytes Absolute 1.5 (*)     All other components within normal limits    Narrative:     Performed at:  [facility]  [address],  [place], [facility]   Phone [phone]   COMPREHENSIVE METABOLIC PANEL W/ REFLEX TO MG FOR LOW K - Abnormal; Notable for the following components:    Sodium 127 (*)     Chloride 94 (*)     CO2 18 (*)     Glucose 127 (*)     BUN 29 (*)     CREATININE 1.6 (*)     GFR Non- 32 (*)     GFR  39 (*)     Alb 2.0 (*)     Albumin/Globulin Ratio 0.3 (*)     Alkaline Phosphatase 143 (*)     ALT 8 (*)     All other components within normal limits    Narrative:     Performed at:  [facility]  [address] [facility], [facility]   Phone [phone]   URINE RT REFLEX TO CULTURE - Abnormal; Notable for the following components:    Color, UA ORANGE (*)     Clarity, UA CLOUDY (*)     Bilirubin Urine SMALL (*)     Blood, Urine LARGE (*)     Protein, UA TRACE (*)     Leukocyte Esterase, Urine SMALL (*)     All other components within normal limits    Narrative:     Performed at:  [facility]  [address] [facility], [facility]   Phone [phone]   CK - Abnormal; Notable for the following components:     Total  (*)     All other components within normal limits    Narrative: Performed at:  [facility] [address], [place], [facility]   Phone [phone]   LACTATE, SEPSIS - Abnormal; Notable for the following components:    Lactic Acid, Sepsis 4.5 (*)     All other components within normal limits    Narrative:     Lisa Rice  [facility] tel. [phone],  Chemistry results called to and read back by estefani worley, 2020 17:47, by  ANDREW  Performed at:  [facility] [address] [facility] [facility],  [place], [facility]   Phone [phone]   MICROSCOPIC URINALYSIS - Abnormal; Notable for the following components:    Bacteria, UA RARE (*)     Hyaline Casts, UA 21 (*)     RBC, UA 7 (*)     All other components within normal limits    Narrative:     Performed at:  [facility] [address],  [place], [facility]   Phone [phone]   CULTURE, BLOOD 1   CULTURE, BLOOD 2   TROPONIN    Narrative:     Performed at:  [facility] [address], [place], [facility]   Phone [phone]   LACTATE, SEPSIS   PROCALCITONIN           EKG:    Sinus rhythm at a rate of 95 beats a minute with no acute ST elevations or depressions or pathologic Q waves. Exam:    Well-nourished female in no acute distress. Her abdominal exam was unremarkable and nontender. Medical decision makin-year-old female who was found on the ground by family members. She denies any specific complaints at this time. The patient's work-up did include some laboratory results showing an acute kidney injury, and elevated lactic acid level and a possible intra-abdominal free air versus abscess. Surgery has been consulted and the patient has been admitted to the medical service. FINAL IMPRESSION:    1. Septicemia ([address]    2. PAPITO (acute kidney injury) ([facility]    3. Intra-abdominal free air of unknown etiology    4.  Fall, initial encounter      Critical care time: 40 minutes of critical care time spent with this patient with multiple visits to the bedside.         Velma Khoury MD  08/16/20 5503

## 2020-08-16 NOTE — ED NOTES
Pt states nauesa is better. Pt is resting in bed. Started 2nd liter of fluids, and ATB. Pt is on a continuous pulse oximetry and telemetry monitoring. Pt continued on cycling blood pressure. Fall risk precautions in place, call light in reach, bed side table within reach, bed alarm on, will continue to monitor.          Denise Lazaro, [place]  08/16/20 3360

## 2020-08-16 NOTE — ED PROVIDER NOTES
[facility]        Pt Name: Rox Dumont  MRN: [id number]  [place] 1955  Date of evaluation: 8/16/2020  Provider: MARGUERITE Nash  PCP: No primary care provider on file. I have seen and evaluated this patient with my supervising physician Dr. Raffaele Johnson. CHIEF COMPLAINT       Chief Complaint   Patient presents with    Fall     pt brought in [facility], pt has had frequent falls at home. EMS reports that the home is very unclean and stuff everywhere. pt has no complaints, states she has been having vaginal bleeding for years, but not sure if she has it today        HISTORY OF PRESENT ILLNESS   (Location, Timing/Onset, Context/Setting, Quality, Duration, Modifying Factors, Severity, Associated Signs and Symptoms)  Note limiting factors. Rox Dumont is a 76 y.o. female presents to the emergency department via EMS for fall. Patient reports that when she was getting out of bed this morning, she stood up quickly, felt lightheaded and fell slightly forward catching herself with her left arm. Denies head injury. Per EMS report, daughter called because he was concerned because patient has had multiple falls over the last 2 to 3 weeks. Patient states that she has not followed with a doctor in many years, is not taking any medications at home. Patient initially denied any medical complaints, later said that she had a little bit of nausea, dysuria, urinary hesitancy over the last 4 to 5 days. Denies chest pain, shortness of breath, abdominal pain, vomiting, headache, vision changes, numbness, weakness. EMS reported that the house was filthy, there were bugs in the house and large amounts of trash, children's toys. Daughter has plans to move in with the patient tomorrow. EMS planned on filing report with adult protective services.     Nursing Notes were all reviewed and agreed with or any disagreements were addressed in the is not diaphoretic. HENT:      Head: Normocephalic and atraumatic. Nose: No congestion or rhinorrhea. Eyes:      General: No scleral icterus. Conjunctiva/sclera: Conjunctivae normal.   Neck:      Musculoskeletal: Normal range of motion and neck supple. No muscular tenderness. Cardiovascular:      Rate and Rhythm: Normal rate and regular rhythm. Pulses: Normal pulses. Heart sounds: Normal heart sounds. No murmur. No friction rub. No gallop. Pulmonary:      Effort: Pulmonary effort is normal. No respiratory distress. Breath sounds: Normal breath sounds. No stridor. No wheezing, rhonchi or rales. Abdominal:      General: There is no distension. Palpations: Abdomen is soft. Tenderness: There is no abdominal tenderness. There is no right CVA tenderness, left CVA tenderness, guarding or rebound. Hernia: No hernia is present. Musculoskeletal: Normal range of motion. Comments: Multicolored subacute ecchymosis of radial left forearm. No specific bony tenderness appreciated. Distal motor and sensory of upper and lower extremities intact. Full range of motion of upper and lower extremities. Skin:     General: Skin is warm and dry. Capillary Refill: Capillary refill takes less than 2 seconds. Neurological:      General: No focal deficit present. Mental Status: She is alert and oriented to person, place, and time. Cranial Nerves: No cranial nerve deficit. Sensory: No sensory deficit. Motor: No weakness.       Coordination: Coordination normal.   Psychiatric:         Mood and Affect: Mood normal.         Behavior: Behavior normal.         DIAGNOSTIC RESULTS   LABS:    Labs Reviewed   CBC WITH AUTO DIFFERENTIAL - Abnormal; Notable for the following components:       Result Value    WBC 22.5 (*)     RBC 3.76 (*)     Hemoglobin 10.9 (*)     Hematocrit 33.1 (*)     RDW 16.7 (*)     Neutrophils Absolute 19.3 (*)     Monocytes Absolute 1.5 (*) All other components within normal limits    Narrative:     Performed at:  [facility] [address], [employer]   Phone [phone]   COMPREHENSIVE METABOLIC PANEL W/ REFLEX TO MG FOR LOW K - Abnormal; Notable for the following components:    Sodium 127 (*)     Chloride 94 (*)     CO2 18 (*)     Glucose 127 (*)     BUN 29 (*)     CREATININE 1.6 (*)     GFR Non- 32 (*)     GFR  39 (*)     Alb 2.0 (*)     Albumin/Globulin Ratio 0.3 (*)     Alkaline Phosphatase 143 (*)     ALT 8 (*)     All other components within normal limits    Narrative:     Performed at:  [facility] [address], [employer]   Phone [phone]   URINE RT REFLEX TO CULTURE - Abnormal; Notable for the following components:    Color, UA ORANGE (*)     Clarity, UA CLOUDY (*)     Bilirubin Urine SMALL (*)     Blood, Urine LARGE (*)     Protein, UA TRACE (*)     Leukocyte Esterase, Urine SMALL (*)     All other components within normal limits    Narrative:     Performed at:  [facility] [address], [employer]   Phone [phone]   CK - Abnormal; Notable for the following components:     Total  (*)     All other components within normal limits    Narrative:     Performed at:  [facility] [address], [employer]   Phone [phone]   LACTATE, SEPSIS - Abnormal; Notable for the following components:    Lactic Acid, Sepsis 3.0 (*)     All other components within normal limits    Narrative:     Performed at:  [facility] [address], [employer]   Phone [phone]   LACTATE, SEPSIS - Abnormal; Notable for the following components:    Lactic Acid, Sepsis 4.5 (*)     All other components within normal limits    Narrative:     Wayne Williamson. [phone],  Chemistry results called to and read back by estefani worley, 08/16/2020 17:47, by  ANDREW  Performed at:  [facility] [address] [facility], [facility]   Phone [phone]   MICROSCOPIC URINALYSIS - Abnormal; Notable for the following components:    Bacteria, UA RARE (*)     Hyaline Casts, UA 21 (*)     RBC, UA 7 (*)     All other components within normal limits    Narrative:     Performed at:  [facility] [address], [place], [address] [place] [address]   Phone [phone]   PROCALCITONIN - Abnormal; Notable for the following components:    Procalcitonin 5.64 (*)     All other components within normal limits    Narrative:     Performed at:  [facility] [address], [place], [address] [place] [address]   Phone [phone]   CULTURE, BLOOD 1   CULTURE, BLOOD 2   TROPONIN    Narrative:     Performed at:  [facility] [address], [place], [facility]   Phone [phone]   HEPATITIS PANEL, ACUTE       All other labs were within normal range or not returned as of this dictation. EKG: All EKG's are interpreted by the Emergency Department Physician in the absence of a cardiologist.  Please see their note for interpretation of EKG. RADIOLOGY:   Non-plain film images such as CT, Ultrasound and MRI are read by the radiologist. Plain radiographic images are visualized and preliminarily interpreted by the ED Provider with the below findings:        Interpretation per the Radiologist below, if available at the time of this note:    CT ABDOMEN PELVIS WO CONTRAST Additional Contrast? None   Final Result   1. Non loculated fluid and gas collection in the pelvis concerning for   perforated viscus/possible abscess formation. 2. Prominent thickening of the ascending colon which may be related to   infectious or inflammatory process or underlying neoplastic process. 3. Bowel-gas pattern typical of adynamic ileus.    4. Hepatic cirrhosis with numerous hepatic nodules suggestive of   regenerative/siderotic nodules. MRI abdomen without and with IV contrast   correlation recommended. Metastatic disease is a consideration. 5. Hepatosplenomegaly and multifocal collateral vessels about the stomach   esophagus concerning for varices, all suggestive of portal venous   hypertension. 6. Abdominal and pelvic ascites. 7.  Diverticulosis coli without CT evidence of acute diverticulitis. Critical results were called by Dr. Carly Fournier to [address] [place] [address]   on 8/16/2020 at 18:09. CT Cervical Spine WO Contrast   Final Result   No acute abnormality of the cervical spine. CT Head WO Contrast   Final Result   No acute intracranial abnormality. XR CHEST PORTABLE   Final Result   No acute process. XR RADIUS ULNA LEFT (2 VIEWS)   Final Result   No acute osseous abnormality. No results found.         PROCEDURES   Unless otherwise noted below, none     Procedures    CRITICAL CARE TIME   N/A    CONSULTS:  IP CONSULT TO SOCIAL WORK  IP CONSULT TO GENERAL SURGERY  IP CONSULT TO HOSPITALIST  IP CONSULT TO GI      EMERGENCY DEPARTMENT COURSE and DIFFERENTIAL DIAGNOSIS/MDM:   Vitals:    Vitals:    08/16/20 1926 08/16/20 1930 08/16/20 1945 08/16/20 2100   BP:  (!) 109/52 (!) 115/58 108/75   Pulse: 93 92 94 93   Resp: 25 20 21 20   Temp:    97.7 °F (36.5 °C)   TempSrc:    Oral   SpO2: 94% 96% 93% 94%   Weight:       Height:           Patient was given the following medications:  Medications   ondansetron (ZOFRAN) injection 4 mg (4 mg Intravenous Given 8/16/20 1606)   lactated ringers infusion 1,000 mL (1,000 mLs Intravenous New Bag 8/16/20 1700)   lactated ringers infusion 2,994 mL (1,000 mLs Intravenous New Bag 8/16/20 1833)   piperacillin-tazobactam (ZOSYN) 4.5 g in dextrose 5 % 100 mL IVPB (mini-bag) (4.5 g Intravenous New Bag 8/16/20 1900)           58-year-old female presents the emergency department via EMS after being found down at her home by her daughter. Patient states that she had a fall this morning, denies syncope or head injury. Patient initially denied any complaints, then complained of mild nausea, dysuria, urinary hesitancy. She not have any abdominal tenderness on her exam.  I ordered labs which were notable for leukocytosis, lactic acid was added which was elevated 4.5 concerning for severe hypoperfusion. Chemistries notable for PAPITO, GFR 32. Urine did not show obvious signs of infection, I did not have a source for her leukocytosis and elevated lactic acid and therefore ordered a CT of her abdomen pelvis without contrast for further assessment. CT of his of her abdomen was notable for intra-abdominal free air versus abscess as well as other nonemergent findings. Repeat abdominal exams were non-peritoneal.  Discussed the case with on-call general surgeon Dr. Haider Ervin, recommended the patient remain n.p.o. and that he would follow her in the hospital for that as her abdominal exam is not peritoneal at this time that emergent surgery can be held for if patient is worsening symptoms. Hospitalist Dr. Leonel Vela agreed to admit the patient. FINAL IMPRESSION      1. Septicemia ([address]    2. PAPITO (acute kidney injury) ([facility]    3. Intra-abdominal free air of unknown etiology    4. Fall, initial encounter          DISPOSITION/PLAN   DISPOSITION Admitted 08/16/2020 06:47:08 PM      PATIENT REFERREDTO:  No follow-up provider specified. DISCHARGE MEDICATIONS:  There are no discharge medications for this patient. DISCONTINUED MEDICATIONS:  There are no discharge medications for this patient.              (Please note that portions of this note were completed with a voice recognition program.  Efforts were made to edit the dictations but occasionally words are mis-transcribed.)    MARGUERITE Bautista (electronically signed)         MARGUERITE Bautista  08/16/20 2766

## 2020-08-17 NOTE — CARE COORDINATION
Discharge Planning Assessment    SW discharge planner met with patient and her daughter to discuss reason for admission, current living situation, and potential needs at the time of discharge    Demographics/Insurance verified Yes/No: yes Medicare    Current type of dwelling: Patient lives in a [place] home. No steps to enter. Patient from ECF/SW confirmed with: N/A    Living arrangements: Lives with daughter. EMS stated that when patient was transport from home that home was very unkept. Level of function/Support: Patient states that she needs assistance with ADLs and IADLs. Has had recent falls. Patient's daughter stating that patient uses furniture to ambulate. PCP: No PCP    Last Visit to PCP:    DME: Has walker. Does not use at home. Active with any community resources/agencies/skilled home care: None    Medication compliance issues:None. Patient states that she takes no medication. Financial issues that could impact healthcare:None        Tentative discharge plan: PT/OT evaluations pending. Patient stating that if SNF placement is recommended that she is agreeable to placement. Patient also stated that she is agreeable to [facility] if recommended. Discussed and provided facilities of choice if transition to a skilled nursing facility is required at the time of discharge    Discussed with patient and/or family that on the day of discharge home tentative time of discharge will be between 10 AM and noon. Transportation at the time of discharge: Family    SW/CM will continue to follow progress and update patient's discharge plan a needed.     Electronically signed by JAMILA Anderson on 8/17/2020 at 12:35 PM

## 2020-08-17 NOTE — CONSULTS
[facility] GENERAL AND LAPAROSCOPIC SURGERY                       PATIENT NAME: Joseph Shaikh     ADMISSION DATE: 8/16/2020  2:18 PM      TODAY'S DATE: 8/17/2020    Reason for Consult:  Abd pain    Requesting Physician:  Dr. Faith Boswell:              The patient is a 76 y.o. female who presents with abd pain, mid, mild, constant, more wiuth pressure. Was found down at home per daughter, and brought to ER. Initially denied pain. Pt admitted. Has had several falls, and somewhat forgetful. No recent medical care, no prior recent colonoscopy. No prior EGD, no known PUD, no prior intestinal surgery. Past Medical History:    History reviewed. No pertinent past medical history. Past Surgical History:        Procedure Laterality Date    EYE SURGERY      TONSILLECTOMY         Current Medications:   Current Facility-Administered Medications: piperacillin-tazobactam (ZOSYN) 3.375 g in dextrose 5 % 50 mL IVPB extended infusion (mini-bag), 3.375 g, Intravenous, Q8H  0.9 % sodium chloride infusion, , Intravenous, Continuous  morphine injection 4 mg, 4 mg, Intravenous, Q4H PRN  sodium chloride flush 0.9 % injection 10 mL, 10 mL, Intravenous, 2 times per day  sodium chloride flush 0.9 % injection 10 mL, 10 mL, Intravenous, PRN  acetaminophen (TYLENOL) tablet 650 mg, 650 mg, Oral, Q6H PRN **OR** acetaminophen (TYLENOL) suppository 650 mg, 650 mg, Rectal, Q6H PRN  polyethylene glycol (GLYCOLAX) packet 17 g, 17 g, Oral, Daily PRN  promethazine (PHENERGAN) tablet 12.5 mg, 12.5 mg, Oral, Q6H PRN **OR** ondansetron (ZOFRAN) injection 4 mg, 4 mg, Intravenous, Q6H PRN  enoxaparin (LOVENOX) injection 40 mg, 40 mg, Subcutaneous, Daily  Prior to Admission medications    Not on File        Allergies:  Patient has no known allergies. Social History:    reports that she has been smoking cigarettes. She has a 25.00 pack-year smoking history.  She does not have any smokeless tobacco history on for input(s): PHOS in the last 72 hours. INR: No results for input(s): INR in the last 72 hours. LIPASE: No results for input(s): LIPASE in the last 72 hours. AMYLASE: No results for input(s): AMYLASE in the last 72 hours. Radiology Review:     Ct Abdomen Pelvis Wo Contrast Additional Contrast? None    Result Date: 8/16/2020  EXAMINATION: CT OF THE ABDOMEN AND PELVIS WITHOUT CONTRAST 8/16/2020 5:11 pm TECHNIQUE: CT of the abdomen and pelvis was performed without the administration of intravenous contrast. Multiplanar reformatted images are provided for review. Dose modulation, iterative reconstruction, and/or weight based adjustment of the mA/kV was utilized to reduce the radiation dose to as low as reasonably achievable. COMPARISON: None. HISTORY: Acute nausea FINDINGS: Lower Chest: FOUR solid soft tissue pulmonary nodules right lower lobe, 7 x 8 mm (axial image 19), 11 x 12 mm (axial 11), 9 x 9 mm (axial image 10) and 4 x 4 mm (axial image 10). 3 mm pulmonary nodules are seen posterior basal segment left lower lobe. Mild nodular fullness about the distal esophagus. Heart size is upper normal. Organs: Prominent caudate lobe hypertrophy and poly lobular configuration of the liver as well as diffuse tiny hypoattenuating lesions throughout the liver suggestive of hepatic cirrhosis and siderotic nodules. No discrete mass lesion evident. 19 cm craniocaudal liver length. The spleen is mildly enlarged. The kidneys, gallbladder, adrenal glands and pancreas appear unremarkable. Multifocal collateral vessels are seen about the gastrohepatic ligament extending to the posterior mediastinum. GI/Bowel: Prominent diffuse thickening of the wall of the ascending colon. Other portions of colon and small bowel appear unremarkable with exception of few gas distended small bowel loops typical of adynamic ileus. There are diverticula involving the descending and sigmoid colon.  Pelvis: Fluid collection within the pelvis HISTORY: ORDERING SYSTEM PROVIDED HISTORY: Fall TECHNOLOGIST PROVIDED HISTORY: Reason for exam:->Fall Reason for Exam: Frequent falls at home. Bruising mid shaft L forearm Acuity: Acute Type of Exam: Initial FINDINGS: The lungs are without acute focal process. There is no effusion or pneumothorax. The cardiomediastinal silhouette is without acute process. The osseous structures are without acute process. No acute process. IMPRESSION/RECOMMENDATIONS:    Sepsis, likely related to intraabdominal abscess. Improved from admit with IVF, and antibiotics  Needs better CT, po and IV contrast to delineate bowel and abscess areas, then likely IR  perc drain. After that, would do GI studies, needs colonoscopy and EGD. Liver labs / farrell also ordered for concern of liver pathology / Cirrhosis. Hold diet today until after CT is completed. Additional IVF ordered. If Cr not low enough, then wait til tomorrow to do repeat scan.         Alex Thapa

## 2020-08-17 NOTE — PROGRESS NOTES
[facility] PROGRESS NOTE    8/17/2020 12:32 PM        Name: Flaca Adan . Admitted: 8/16/2020  Primary Care Provider: No primary care provider on file. (Tel: None)    Brief Course:    Patient is a 67-year-old  female with history of tobacco use who presented to ED after she was found on the floor by her daughter. As per daughter, patient was probably on the floor all night. Patient reports multiple falls over the last couple of months, decreased appetite and weight loss of over 60 obese in the last 1 year. CT abdomen/pelvis in ED showed pelvic abscess versus viscus perforation. Noted to have colonic wall thickening concerning for malignancy, liver cirrhosis and portal hypertension. Patient has been smoking 2 PPD for over 50 years. Does not have a PCP. Never had cancer screening. CC: History of multiple falls, found on floor at home  Subjective: Patient reports feeling weak. Unable to void any urine. Her daughter Maria Teresa Joel is at bedside. All her questions have been answered in detail.      Reviewed interval ancillary notes    Current Medications  piperacillin-tazobactam (ZOSYN) 3.375 g in dextrose 5 % 50 mL IVPB extended infusion (mini-bag), Q8H  0.9 % sodium chloride infusion, Continuous  morphine injection 4 mg, Q4H PRN  sodium chloride flush 0.9 % injection 10 mL, 2 times per day  sodium chloride flush 0.9 % injection 10 mL, PRN  acetaminophen (TYLENOL) tablet 650 mg, Q6H PRN    Or  acetaminophen (TYLENOL) suppository 650 mg, Q6H PRN  polyethylene glycol (GLYCOLAX) packet 17 g, Daily PRN  promethazine (PHENERGAN) tablet 12.5 mg, Q6H PRN    Or  ondansetron (ZOFRAN) injection 4 mg, Q6H PRN  enoxaparin (LOVENOX) injection 40 mg, Daily        Objective:  /64   Pulse 86   Temp 97.8 °F (36.6 °C) (Oral)   Resp 18   Ht 5' 7\" (1.702 m)   Wt 215 lb 9.6 oz (97.8 kg)   SpO2 94%   BMI 33.77 kg/m² No intake or output data in the 24 hours ending 08/17/20 1232   Wt Readings from Last 3 Encounters:   08/17/20 215 lb 9.6 oz (97.8 kg)       General appearance: Obese white female, lethargic, responding appropriately  Eyes: Sclera clear. Pupils equal.  ENT: Dry oral mucosa. Trachea midline, no adenopathy. Cardiovascular: Regular rhythm, normal S1, S2. No murmur. No edema in lower extremities  Respiratory: Not using accessory muscles. Good inspiratory effort. Clear to auscultation bilaterally, no wheeze or crackles. GI: Abdomen soft, diffuse mild tenderness, not distended, normal bowel sounds  Musculoskeletal: No cyanosis in digits, neck supple  Neurology: CN 2-12 grossly intact. No speech or motor deficits  Psych: Normal affect. Lethargic, oriented to place and person  Skin: Warm, dry, normal turgor  Extremity exam shows brisk capillary refill. Peripheral pulses are palpable in lower extremities     Labs and Tests:  CBC:   Recent Labs     08/16/20  1610 08/17/20  1028   WBC 22.5* 19.2*   HGB 10.9* 9.7*    246     BMP:    Recent Labs     08/16/20  1610 08/17/20  0032 08/17/20  1028   * 129* 132*   K 4.7  --  4.7   CL 94*  --  99   CO2 18*  --  23   BUN 29*  --  40*   CREATININE 1.6*  --  1.5*   GLUCOSE 127*  --  88     Hepatic:   Recent Labs     08/16/20  1610 08/17/20  1028   AST 37 28   ALT 8* 8*   BILITOT 1.0 0.9   ALKPHOS 143* 141*     CT ABDOMEN PELVIS WO CONTRAST Additional Contrast? None   Final Result   1. Non loculated fluid and gas collection in the pelvis concerning for   perforated viscus/possible abscess formation. 2. Prominent thickening of the ascending colon which may be related to   infectious or inflammatory process or underlying neoplastic process. 3. Bowel-gas pattern typical of adynamic ileus. 4. Hepatic cirrhosis with numerous hepatic nodules suggestive of   regenerative/siderotic nodules. MRI abdomen without and with IV contrast   correlation recommended.   Metastatic disease is a consideration. 5. Hepatosplenomegaly and multifocal collateral vessels about the stomach   esophagus concerning for varices, all suggestive of portal venous   hypertension. 6. Abdominal and pelvic ascites. 7.  Diverticulosis coli without CT evidence of acute diverticulitis. Critical results were called by Dr. Johnathan Metz to [address] [place] [address]   on 8/16/2020 at 18:09. CT Cervical Spine WO Contrast   Final Result   No acute abnormality of the cervical spine. CT Head WO Contrast   Final Result   No acute intracranial abnormality. XR CHEST PORTABLE   Final Result   No acute process. XR RADIUS ULNA LEFT (2 VIEWS)   Final Result   No acute osseous abnormality. CT ABDOMEN PELVIS W IV CONTRAST Additional Contrast? Oral    (Results Pending)     Problem List  Active Problems:    Pelvic abscess in female  Resolved Problems:    * No resolved hospital problems. *     Assessment & Plan:     #1. Abnormal CT abdomen/pelvis without contrast:  In ED, patient was noted to have thickening of the ascending colon and nonloculated fluid/gas collection in the pelvis concerning for perforation vs viscus rupture. General surgery consulted. Patient is receiving IV fluids after which she will undergo CT abdomen/pelvis with IV contrast for further evaluation. Nephrology service consulted given her renal insufficiency. N.p.o. for now for possible surgical intervention. #2.  Liver cirrhosis and portal hypertension:  Patient denies history of alcohol abuse. Etiology unclear. CT abdomen without contrast showed abdominal and pelvic ascites. CT abd/pelvis with IV contrast for further evaluation. GI service consulted. Labs for further work-up ordered. #3. Hyponatremia:  Serum sodium level of 127 on presentation. Likely from volume depletion and poor oral intake. Patient received IV fluids and repeat serum sodium level of 132.   Further work-up and management as per nephrology service. #4.  Renal insufficiency:  Patient's baseline is unknown. She did not have medical care as outpatient. Not on any nephrotoxic meds. However patient will need IV contrast for further evaluation of the above issues.   Nephrology service on board.      IV Access: peripheral  Amado: no  Diet: Diet NPO Effective Now  Code:Full Code  DVT PPX lovenox sq qd  Disposition - pending evaluation      Channing Navarro MD   8/17/2020 12:32 PM

## 2020-08-17 NOTE — CARE COORDINATION
EDD ordered COVID test stat for possible placement. SW notified pt's RN, Navjot Short. SW/CM will continue to follow progress and update patient's discharge plan a needed.     Electronically signed by JAMILA Hayward on 8/17/2020 at 3:23 PM

## 2020-08-17 NOTE — FLOWSHEET NOTE
Inserted F/C immediate return of 500 sathish hazy urine. Urine spec sent. Sent note to Dr. Colleen Hirsch to confirm if ok to proceed with CT scan. Spoke with Dr. Jevon Denney who states pt needs to stay NPO. Fluid bolus running per Dr. Anayeli Roberts.

## 2020-08-17 NOTE — PLAN OF CARE
Problem: Falls - Risk of:  Goal: Will remain free from falls  Description: Will remain free from falls  Outcome: Met This Shift     Problem: Skin Integrity:  Goal: Absence of new skin breakdown  Description: Absence of new skin breakdown  Outcome: Met This Shift     Problem: Safety:  Goal: Free from accidental physical injury  Description: Free from accidental physical injury  Outcome: Met This Shift     Problem: Daily Care:  Goal: Daily care needs are met  Description: Daily care needs are met  Outcome: Met This Shift     Problem: Pain:  Goal: Patient's pain/discomfort is manageable  Description: Patient's pain/discomfort is manageable  Outcome: Met This Shift   Pt remained free of injury and no new skin breakdown was observed. Pain was controlled well. Daily care needs met this shift.

## 2020-08-17 NOTE — CONSULTS
MD Sherita Ruffin MD Epimenio Blowers, MD               Office: [phone]                      Fax: [phone]             [facility]                   NEPHROLOGY INITIAL CONSULT NOTE:     PATIENT NAME: Flaca Adan  : 1955  MRN: [id number]  REASON FOR CONSULT: I am asked to see this patient in consultation for my opinion regarding management of Acute Kidney Injury needing CT w/ IV contrast . My recommendations will be communicated by way of shared medical record. PRIMARY CARE PHYSICIAN: No primary care provider on file. IMPRESSION:       PAPITO (on ? CKD): Oligouric  - BL Scr- unknown ---> 1.6, eGFR 32  on admission  : Etiology of PAPITO - presumed pre-renal / early ATN / unlikely HRS     - other differentials: ruled out obstruction , unlikely AIN or GN,   - UA : bili, trace pro, LE - follow Ucx, ? AIN - unlikely    : large bl. + only 7 RBC = myoglobulinemia     - Renal imaging: w/ CT - kidney unremarkable   - CK - 310 on admission   - Pro-calcitonin 5.6       RECOMMENDATIONS:   Re: CI-PAPITO:   - This patient is at risk: (per Butch risk scoring)   - would suggest to avoid in non-urgent situation  - would try to maximize BEHZAD prophyalaxic measure with :     : IVFs  W/ NS: 1 mL/kg/hour for 6 to 12 hours preprocedure, intraprocedure, and for 6 to 12 hours     = NS at 100 cc/hr      + High dose statin (but more helpful if h/o CAD)     + NAC + Ascorbic acid (these may help, but inconsistent data) - cautious w/ LFts     - check urine lytes  - avoid hypotonic fluids like LR as w/ hyponatremia  - empirical iv abx  - f/u UCx.    - w/ ascites - bladder scan may not be accurate, low threshold for perkins insertion  - Sx consult f/u    - At higher risk of worsening renal fx - needing close f/u       Associated problems:   - Azotemia: moderate - worsening - ?pre-renal  - Electrolytes: K: WNL  : check Mag in am   - Volume status: mild hypo-volemic, although w/ ascites   : Na: Hyponatremia - moderate - 127 on admission - likely hypovolemia / PAPITO / Cirrhosis   : BP: Ok to keep slightly higher   - Acid-Base: Acidosis - mild - non-AGMA - d/to LA / PAPITO / GI loss - improving   - Anemia: worsening - follow        Other problems: Management per primary and other consulting teams. # Cirrhosis , Portal HTN : new diagnosis, no h/o EtOH, w/u ordered by GI     # Sepsis, intra-abdominal abscess     Hospital Problems           Last Modified POA    Pelvic abscess in female 8/16/2020 Yes          : other supportive care :   - Check daily renal function panel with electrolytes-phosphorus  - Strict monitoring of I/Os, daily weight  - Renal feeds/diet  - Current medications reviewed. - Nephrotoxic medications have been discontinued. - Dose adjusted and appropriate. - Dose meds for eGFR <15 mL/min/1.73m2 during PAPITO    - Avoid heavy opioids due to renal failure - may use very low dose dilaudid / fentanyl with close monitoring of CNS and respiratory depression. Please refer to the orders. High Complexity. Multiple complex problems. Discussed with patient, her daughter and treatment team- referring Dr Edwige Victor and GI team: Ashly Butts. Himanshu Espitia ,      Thank you for allowing me to participate in this patient's care. Please do not hesitate to contact me with any questions/concerns. We will follow along with you. Bianca Villa MD       [facility] [address] [place]: [phone] or [address] [employer]  Fax: [phone]        ========================================================   ========================================================       CHIEF COMPLAINT:   Chief Complaint   Patient presents with    Fall     pt brought in [facility], pt has had frequent falls at home. EMS reports that the home is very unclean and stuff everywhere.  pt has no complaints, states she has been having vaginal bleeding for years, but not sure if she has it today      History Obtained From:  patient, family member - her daughter + treatment team + Electronic Medical Records    HPI: Ms. Leeanna Acevedo is a 76 y.o. female with significant past medical history as below: History reviewed. No pertinent past medical history. Presents with Fall (pt brought in [facility], pt has had frequent falls at home. EMS reports that the home is very unclean and stuff everywhere. pt has no complaints, states she has been having vaginal bleeding for years, but not sure if she has it today )    Admitted with Pelvic abscess in female [N73.9]  Pelvic abscess in female [N73.9]   Found to have PAPITO , so we are called for that. She does not go any drs so unclear medical history, unclear CKD duration. Regarding: PAPITO ?on ? CKD  · Duration: acute? · Location: kidneys  · Severity: unknown BL -> Scr 1.6, eGFR 32 on admission   · Timing: continous  · Context (ex: related to condition): pelvic abscess, hypovolemia, cirrhosis   · Modifying factors (ex: medications, interventions): IVF   · Associated signs & symptoms (ex: edema, SOB): As mentioned above in CC and HPI      Past medical, Surgical, Social, Family medical history reviewed by me. PAST MEDICAL HISTORY: History reviewed. No pertinent past medical history. PAST SURGICAL HISTORY:   Past Surgical History:   Procedure Laterality Date    EYE SURGERY      TONSILLECTOMY       FAMILY HISTORY: History reviewed. No pertinent family history.   SOCIAL HISTORY:   Social History     Socioeconomic History    Marital status: Single     Spouse name: None    Number of children: None    Years of education: None    Highest education level: None   Occupational History    None   Social Needs    Financial resource strain: None    Food insecurity     Worry: None     Inability: None    Transportation needs     Medical: None     Non-medical: None   Tobacco Use    Smoking status: Current Every Day Smoker     Packs/day: 0.50     Years: 50.00     Pack years: 25.00 Types: Cigarettes   Substance and Sexual Activity    Alcohol use: Never     Frequency: Never    Drug use: Never    Sexual activity: None   Lifestyle    Physical activity     Days per week: None     Minutes per session: None    Stress: None   Relationships    Social connections     Talks on phone: None     Gets together: None     Attends Restorationism service: None     Active member of club or organization: None     Attends meetings of clubs or organizations: None     Relationship status: None    Intimate partner violence     Fear of current or ex partner: None     Emotionally abused: None     Physically abused: None     Forced sexual activity: None   Other Topics Concern    None   Social History Narrative    None          MEDICATIONS: reviewed by me. Medications Prior to Admission:  No current facility-administered medications on file prior to encounter. No current outpatient medications on file prior to encounter. No medications prior to admission. Scheduled Meds:   piperacillin-tazobactam  3.375 g Intravenous Q8H    sodium chloride flush  10 mL Intravenous 2 times per day    enoxaparin  40 mg Subcutaneous Daily     Continuous Infusions:   sodium chloride 100 mL/hr at 08/16/20 2355     PRN Meds:.morphine, sodium chloride flush, acetaminophen **OR** acetaminophen, polyethylene glycol, promethazine **OR** ondansetron      Allergies reviewed by me: Patient has no known allergies. REVIEW OF SYSTEMS:  As mentioned in chief complaint and HPI , Subjective   All other 10-point review of systems: negative. PHYSICAL EXAM:  Recent vital signs and recent I/Os reviewed by me.      Wt Readings from Last 3 Encounters:   08/17/20 215 lb 9.6 oz (97.8 kg)     BP Readings from Last 3 Encounters:   08/17/20 106/64     Patient Vitals for the past 24 hrs:   BP Temp Temp src Pulse Resp SpO2 Height Weight   08/17/20 0845 106/64 97.8 °F (36.6 °C) Oral 86 18 94 % -- --   08/17/20 0728 -- -- -- -- -- -- -- 215 lb 9.6 oz (97.8 kg)   08/17/20 0350 123/68 98.2 °F (36.8 °C) Oral 88 18 92 % -- --   08/17/20 0000 (!) 126/90 98.4 °F (36.9 °C) Oral 94 22 93 % -- --   08/16/20 2100 108/75 97.7 °F (36.5 °C) Oral 93 20 94 % -- --   08/16/20 1945 (!) 115/58 -- -- 94 21 93 % -- --   08/16/20 1930 (!) 109/52 -- -- 92 20 96 % -- --   08/16/20 1926 -- -- -- 93 25 94 % -- --   08/16/20 1922 -- -- -- 89 17 -- -- --   08/16/20 1921 -- -- -- 89 19 -- -- --   08/16/20 1920 -- -- -- 90 18 -- -- --   08/16/20 1919 -- -- -- 91 18 -- -- --   08/16/20 1845 (!) 113/59 -- -- 95 19 -- -- --   08/16/20 1700 (!) 125/56 -- -- 93 20 92 % -- --   08/16/20 1645 126/64 -- -- 99 18 93 % -- --   08/16/20 1630 (!) 121/49 -- -- 102 22 95 % -- --   08/16/20 1600 115/67 -- -- 96 20 94 % -- --   08/16/20 1545 130/65 -- -- 96 14 96 % -- --   08/16/20 1443 129/75 97 °F (36.1 °C) Oral 102 22 94 % 5' 7\" (1.702 m) 220 lb (99.8 kg)     No intake or output data in the 24 hours ending 08/17/20 1233    Physical Exam  Vitals signs reviewed. Constitutional:       General: She is not in acute distress. Comments: Obese body habitus   HENT:      Head: Normocephalic and atraumatic. Right Ear: External ear normal.      Left Ear: External ear normal.      Mouth/Throat:      Mouth: Mucous membranes are not dry. Eyes:      General: No scleral icterus. Conjunctiva/sclera: Conjunctivae normal.   Neck:      Musculoskeletal: Neck supple. Thyroid: No thyroid mass. Vascular: No JVD. Trachea: Trachea normal.   Cardiovascular:      Rate and Rhythm: Normal rate and regular rhythm. Pulmonary:      Effort: Pulmonary effort is normal.      Breath sounds: Normal breath sounds. Abdominal:      General: Bowel sounds are normal. There is distension. Palpations: Abdomen is soft. Musculoskeletal:         General: No deformity. Right lower leg: Edema present. Left lower leg: Edema (mild) present. Skin:     General: Skin is warm and dry. Neurological:      Mental Status: She is alert and oriented to person, place, and time. Psychiatric:         Behavior: Behavior normal.            DATA:  Diagnostic tests reviewed by me for today's visit:    Recent Labs     08/16/20  1610 08/17/20  1028   WBC 22.5* 19.2*   HCT 33.1* 29.1*    246     Iron Saturation:  No components found for: PERCENTFE  FERRITIN:  No results found for: FERRITIN  IRON:  No results found for: IRON  TIBC:  No results found for: TIBC    Recent Labs     08/16/20  1610 08/17/20  0032 08/17/20  1028   * 129* 132*   K 4.7  --  4.7   CL 94*  --  99   CO2 18*  --  23   BUN 29*  --  40*   CREATININE 1.6*  --  1.5*     Recent Labs     08/16/20 1610 08/17/20  1028   CALCIUM 9.6 8.9     No results for input(s): PH, PCO2, PO2 in the last 72 hours.     Invalid input(s): German Marino    ABG:  No results found for: PH, PCO2, PO2, HCO3, BE, THGB, TCO2, O2SAT  VBG:  No results found for: PHVEN, [id number], BEVEN, Q5OWTRWB    LDH:  No results found for: LDH  Uric Acid:  No results found for: LABURIC, URICACID    PT/INR:    Lab Results   Component Value Date    PROTIME 17.0 08/17/2020    INR 1.46 08/17/2020     Warfarin PT/INR:  No components found for: PTPATWAR, PTINRWAR  PTT:  No results found for: APTT, PTT[APTT}  Last 3 Troponin:    Lab Results   Component Value Date    TROPONINI <0.01 08/16/2020       U/A:    Lab Results   Component Value Date    COLORU ORANGE 08/16/2020    PROTEINU TRACE 08/16/2020    PHUR 5.0 08/16/2020    WBCUA 3 08/16/2020    RBCUA 7 08/16/2020    BACTERIA RARE 08/16/2020    CLARITYU CLOUDY 08/16/2020    SPECGRAV 1.022 08/16/2020    LEUKOCYTESUR SMALL 08/16/2020    UROBILINOGEN 1.0 08/16/2020    BILIRUBINUR SMALL 08/16/2020    BLOODU LARGE 08/16/2020    GLUCOSEU Negative 08/16/2020     Microalbumen/Creatinine ratio:  No components found for: RUCREAT  24 Hour Urine for Protein:  No components found for: Salvador Garcia, [id number], UTV3  24 Hour Urine for Creatinine Clearance:  No components found for: CREAT4, UHRS10, UTV10  Urine Toxicology:  No components found for: IAMMENTA, IBARBIT, IBENZO, ICOCAINE, IMARTHC, IOPIATES, IPHENCYC    HgBA1c:  No results found for: LABA1C  RPR:  No results found for: RPR  HIV:  No results found for: HIV  KULWANT:  No results found for: ANATITER, KULWANT  RF:  No results found for: RF  DSDNA:  No components found for: DNA  AMYLASE:  No results found for: AMYLASE  LIPASE:  No results found for: LIPASE  Fibrinogen Level:  No components found for: FIB       BELOW MENTIONED RADIOLOGY STUDY RESULTS BY ME:    Ct Abdomen Pelvis Wo Contrast Additional Contrast? None    Result Date: 8/16/2020  EXAMINATION: CT OF THE ABDOMEN AND PELVIS WITHOUT CONTRAST 8/16/2020 5:11 pm TECHNIQUE: CT of the abdomen and pelvis was performed without the administration of intravenous contrast. Multiplanar reformatted images are provided for review. Dose modulation, iterative reconstruction, and/or weight based adjustment of the mA/kV was utilized to reduce the radiation dose to as low as reasonably achievable. COMPARISON: None. HISTORY: Acute nausea FINDINGS: Lower Chest: FOUR solid soft tissue pulmonary nodules right lower lobe, 7 x 8 mm (axial image 19), 11 x 12 mm (axial 11), 9 x 9 mm (axial image 10) and 4 x 4 mm (axial image 10). 3 mm pulmonary nodules are seen posterior basal segment left lower lobe. Mild nodular fullness about the distal esophagus. Heart size is upper normal. Organs: Prominent caudate lobe hypertrophy and poly lobular configuration of the liver as well as diffuse tiny hypoattenuating lesions throughout the liver suggestive of hepatic cirrhosis and siderotic nodules. No discrete mass lesion evident. 19 cm craniocaudal liver length. The spleen is mildly enlarged. The kidneys, gallbladder, adrenal glands and pancreas appear unremarkable.   Multifocal collateral vessels are seen about the gastrohepatic ligament extending to the posterior mediastinum. GI/Bowel: Prominent diffuse thickening of the wall of the ascending colon. Other portions of colon and small bowel appear unremarkable with exception of few gas distended small bowel loops typical of adynamic ileus. There are diverticula involving the descending and sigmoid colon. Pelvis: Fluid collection within the pelvis without definite margins has air bubbles within it concerning for sequela from perforated bowel/abscess formation. Uterus and adnexal structures appear unremarkable. Peritoneum/Retroperitoneum: Abdominal ascites. Calcific atherosclerosis aorta. Bones/Soft Tissues: No acute superficial soft tissue or osseous structure abnormality evident. 1. Non loculated fluid and gas collection in the pelvis concerning for perforated viscus/possible abscess formation. 2. Prominent thickening of the ascending colon which may be related to infectious or inflammatory process or underlying neoplastic process. 3. Bowel-gas pattern typical of adynamic ileus. 4. Hepatic cirrhosis with numerous hepatic nodules suggestive of regenerative/siderotic nodules. MRI abdomen without and with IV contrast correlation recommended. Metastatic disease is a consideration. 5. Hepatosplenomegaly and multifocal collateral vessels about the stomach esophagus concerning for varices, all suggestive of portal venous hypertension. 6. Abdominal and pelvic ascites. 7.  Diverticulosis coli without CT evidence of acute diverticulitis. Critical results were called by Dr. Citlaly Turner to [address] [place] [address] on 8/16/2020 at 18:09. Xr Radius Ulna Left (2 Views)    Result Date: 8/16/2020  EXAMINATION: TWO XRAY VIEWS OF THE LEFT FOREARM 8/16/2020 2:55 pm COMPARISON: None. HISTORY: ORDERING SYSTEM PROVIDED HISTORY: Fall TECHNOLOGIST PROVIDED HISTORY: Reason for exam:->Fall Reason for Exam: Frequent falls at home.  Bruising mid shaft L forearm Acuity: Acute Type of Exam: Initial FINDINGS: There is no evidence of acute fracture. There is normal alignment. No acute joint abnormality. No focal osseous lesion. No focal soft tissue abnormality. No acute osseous abnormality. Ct Head Wo Contrast    Result Date: 8/16/2020  EXAMINATION: CT OF THE HEAD WITHOUT CONTRAST  8/16/2020 3:15 pm TECHNIQUE: CT of the head was performed without the administration of intravenous contrast. Dose modulation, iterative reconstruction, and/or weight based adjustment of the mA/kV was utilized to reduce the radiation dose to as low as reasonably achievable. COMPARISON: None. HISTORY: ORDERING SYSTEM PROVIDED HISTORY: Fall TECHNOLOGIST PROVIDED HISTORY: Reason for exam:->Fall Has a \"code stroke\" or \"stroke alert\" been called? ->No Reason for Exam: Fall Acuity: Acute Type of Exam: Initial FINDINGS: BRAIN/VENTRICLES: The ventricles and sulci are diffusely enlarged. Low attenuation is seen in the periventricular and subcortical white matter. No acute intracranial hemorrhage or acute infarct is identified. ORBITS: The visualized portion of the orbits demonstrate no acute abnormality. SINUSES: The visualized paranasal sinuses and mastoid air cells demonstrate no acute abnormality. SOFT TISSUES/SKULL:  No acute abnormality of the visualized skull or soft tissues. No acute intracranial abnormality. Ct Cervical Spine Wo Contrast    Result Date: 8/16/2020  EXAMINATION: CT OF THE CERVICAL SPINE WITHOUT CONTRAST 8/16/2020 3:15 pm TECHNIQUE: CT of the cervical spine was performed without the administration of intravenous contrast. Multiplanar reformatted images are provided for review. Dose modulation, iterative reconstruction, and/or weight based adjustment of the mA/kV was utilized to reduce the radiation dose to as low as reasonably achievable. COMPARISON: None.  HISTORY: ORDERING SYSTEM PROVIDED HISTORY: Fall TECHNOLOGIST PROVIDED HISTORY: Reason for exam:->Fall Reason for Exam: Fall Acuity: Acute Type of Exam: Initial FINDINGS: BONES/ALIGNMENT: There is no acute fracture or traumatic malalignment. DEGENERATIVE CHANGES: Multilevel degenerative changes. SOFT TISSUES: There is no prevertebral soft tissue swelling. No acute abnormality of the cervical spine. Xr Chest Portable    Result Date: 8/16/2020  EXAMINATION: ONE XRAY VIEW OF THE CHEST 8/16/2020 2:55 pm COMPARISON: None. HISTORY: ORDERING SYSTEM PROVIDED HISTORY: Fall TECHNOLOGIST PROVIDED HISTORY: Reason for exam:->Fall Reason for Exam: Frequent falls at home. Bruising mid shaft L forearm Acuity: Acute Type of Exam: Initial FINDINGS: The lungs are without acute focal process. There is no effusion or pneumothorax. The cardiomediastinal silhouette is without acute process. The osseous structures are without acute process. No acute process.

## 2020-08-17 NOTE — CONSULTS
Gastroenterology Consult Note      Patient: Greg Valerio  : 1955  Acct#:      Date:  2020    Subjective:       History of Present Illness  Patient is a 76 y.o.  female admitted with Pelvic abscess in female [N73.9]  Pelvic abscess in female [N73.9] who is seen in consult for cirrhosis, colitis. She is mildly confused and gives limited history. She was found on the floor at home but her daughter. She has had multiple falls over the past few months. Also with decreased appetite and unintentional weight loss of 60 pounds. W/u in the ED revealed PAPITO, hyponatremia. CT with nonloculated fluid and gas collection in the pelvis concerning for perforation and abscess. CT also showed cirrhotic appearing liver. She denies abdominal pain, N/V. No melena or hematochezia. No prior dx of liver disease. No prior alcohol abuse. No prior colonoscopy. History reviewed. No pertinent past medical history. Past Surgical History:   Procedure Laterality Date    EYE SURGERY      TONSILLECTOMY        Past Endoscopic History: none     Admission Meds  No current facility-administered medications on file prior to encounter. No current outpatient medications on file prior to encounter. Allergies  No Known Allergies   Social   Social History     Tobacco Use    Smoking status: Current Every Day Smoker     Packs/day: 0.50     Years: 50.00     Pack years: 25.00     Types: Cigarettes   Substance Use Topics    Alcohol use: Never     Frequency: Never        History reviewed. No pertinent family history.       Review of Systems  Constitutional: negative for fevers, chills + sweats    Ears, nose, mouth, throat, and face: negative for nasal congestion and sore throat   Respiratory: negative for cough and shortness of breath   Cardiovascular: negative for chest pain and dyspnea   Gastrointestinal: see hpi   Genitourinary:negative for dysuria and frequency   Integument/breast: negative for pruritus and rash   Hematologic/lymphatic: negative for bleeding and easy bruising   Musculoskeletal:negative for arthralgias and myalgias   Neurological: negative for dizziness and weakness         Physical Exam  Blood pressure 123/68, pulse 88, temperature 98.2 °F (36.8 °C), temperature source Oral, resp. rate 18, height 5' 7\" (1.702 m), weight 215 lb 9.6 oz (97.8 kg), SpO2 92 %. General appearance: alert, cooperative, no distress, appears stated age  Eyes: Anicteric  Head: Normocephalic, without obvious abnormality  Lungs: clear to auscultation bilaterally, Normal Effort  Heart: regular rate and rhythm, normal S1 and S2, no murmurs or rubs  Abdomen: obese, soft, mild lower abdominal tenderness. Bowel sounds normal. No masses,  no organomegaly. Extremities: atraumatic, no cyanosis or edema  Skin: warm and dry, no jaundice  Neuro: Grossly intact, alert but slow to respond. Oriented to place and time but not person. No asterixis. Musculoskeletal: 5/5  strength BUE      Data Review:    Recent Labs     08/16/20  1610   WBC 22.5*   HGB 10.9*   HCT 33.1*   MCV 88.1        Recent Labs     08/16/20  1610 08/17/20  0032   * 129*   K 4.7  --    CL 94*  --    CO2 18*  --    BUN 29*  --    CREATININE 1.6*  --      Recent Labs     08/16/20  1610   AST 37   ALT 8*   BILITOT 1.0   ALKPHOS 143*     No results for input(s): LIPASE, AMYLASE in the last 72 hours. No results for input(s): PROTIME, INR in the last 72 hours. No results for input(s): PTT in the last 72 hours. No results for input(s): OCCULTBLD in the last 72 hours. Imaging Studies:                 CT-scan of abdomen and pelvis wo contrast 8/16/20  FINDINGS:    Lower Chest: FOUR solid soft tissue pulmonary nodules right lower lobe, 7 x 8    mm (axial image 19), 11 x 12 mm (axial 11), 9 x 9 mm (axial image 10) and 4 x    4 mm (axial image 10).   3 mm pulmonary nodules are seen posterior basal    segment left lower lobe.  Mild nodular fullness about the distal esophagus. Heart size is upper normal.         Organs: Prominent caudate lobe hypertrophy and poly lobular configuration of    the liver as well as diffuse tiny hypoattenuating lesions throughout the    liver suggestive of hepatic cirrhosis and siderotic nodules.  No discrete    mass lesion evident.  19 cm craniocaudal liver length.  The spleen is mildly    enlarged.  The kidneys, gallbladder, adrenal glands and pancreas appear    unremarkable.  Multifocal collateral vessels are seen about the gastrohepatic    ligament extending to the posterior mediastinum.         GI/Bowel: Prominent diffuse thickening of the wall of the ascending colon. Other portions of colon and small bowel appear unremarkable with exception of    few gas distended small bowel loops typical of adynamic ileus.  There are    diverticula involving the descending and sigmoid colon.         Pelvis: Fluid collection within the pelvis without definite margins has air    bubbles within it concerning for sequela from perforated bowel/abscess    formation.  Uterus and adnexal structures appear unremarkable.         Peritoneum/Retroperitoneum: Abdominal ascites.  Calcific atherosclerosis    aorta.         Bones/Soft Tissues: No acute superficial soft tissue or osseous structure    abnormality evident.                Impression    1. Non loculated fluid and gas collection in the pelvis concerning for    perforated viscus/possible abscess formation. 2. Prominent thickening of the ascending colon which may be related to    infectious or inflammatory process or underlying neoplastic process. 3. Bowel-gas pattern typical of adynamic ileus. 4. Hepatic cirrhosis with numerous hepatic nodules suggestive of    regenerative/siderotic nodules.  MRI abdomen without and with IV contrast    correlation recommended.  Metastatic disease is a consideration.     5. Hepatosplenomegaly and multifocal collateral vessels about the cirrhosis and portal HTN. She denies abd pain to me. Daughter states MS is at baseline, but depressed. On exam there is mild asterixis, abd soft and NT. She has a leukocytosis. She needs repeat CT with IV and po contrast to better evaluate for pelvic abscess, colitis, liver lesions, etc.  Her Creatinine is 1.5, so will need Nephrology guidance for IV contrast.  Will consider proceeding with EGD/Colonoscopy depending on above. Cirrhosis w/u ordered.     Marlene Villalobos MD  [address]  8/17/2020

## 2020-08-18 NOTE — PROGRESS NOTES
Physical/Occupational Therapy  Agustin Model  PT/OT evaluations on hold as the pt just returned from having a pelvic abscess drain placed. Pt is sleeping soundly. Discussed with RN. PT/OT will follow-up with this pt as the schedule allows. Thanks.   Apolonia Freeman, PT DPT [id number]  Marquis Vazquez, OTR/L [id number]

## 2020-08-18 NOTE — PROGRESS NOTES
the hepatic dome.  Hepatic metastatic disease is a consideration as is    cirrhosis with regenerative nodules and hepatocellular carcinoma. 4. Bilateral lower lobe pulmonary nodules suspicious for metastatic disease. 5. Abdominal and pelvic ascites. 6. Sequela from portosystemic shunting with numerous collateral vessels about    the stomach, left upper quadrant and distal esophagus.  Relatively small    esophageal and gastric varices. 7.  Calcific atherosclerotic disease aorta.               ASSESSMENT :    Cirrhosis - new diagnosis per CT. No alcohol history. Likely from fatty liver. Some ascites on CT. Seems mildly confused initially but ammonia was normal.      Abnormal CT of the colon and liver - CT with thickening of the ascending colon concerning for malignancy. Also with liver and lung lesions concerning for metastatic disease. Pt denies diarrhea. No prior colonoscopy.      Pelvic abscess - CT with nonloculated fluid and gas collection in the pelvis which could have been from diverticulitis. S/p IR drainage today.      Hyponatremia     PAPITO     PLAN :  - full liquids  - antibiotics  - If pt doing well tomorrow will give bowel prep and plan for colonoscopy Thursday  - can do screening EGD at that time as well to eval for varices and f/u CT findings of mild nodular fullness about the distal esophagus.   - hepatitis panel, f-actin, AMA, A1AT  - f/u AFP  - f/u Hep A total AB  and Hep B surface AB for vaccination purposes     Discussed with Dr. Stephen Mayes, PA-BIBI  [facility] [email or web address] [facility]  I have personally performed a face to face diagnostic evaluation on this patient. I have interviewed and examined the patient and I agree with the findings and recommended plan of care. In summary, my findings and plan are the following: As above, now s/p perc drainage of pelvic abscess. She is somnolent but awakens. Abd is soft and NT.   Liver lesion(s) were not amenable to IR-guided biopsy. So will need to get tissue dx from right colon mass. Will plan colon prep tomorrow if feeling better and proceed with EGD/Colonoscopy Thurs.     Meghana Dumont MD  [address]  8/18/2020

## 2020-08-18 NOTE — PROGRESS NOTES
[facility]        Progress Note    Patient Name: Natacha Albert  MRN: [id number]  YOB: 1955  Date of Evaluation: 2020    Chief Complaint: Abdominal pain    Subjective:  No acute events overnight  Pain improving  No complaints of nausea or vomiting  Resting in bed at this time      Vital Signs:  Patient Vitals for the past 24 hrs:   BP Temp Temp src Pulse Resp SpO2 Weight   20 1215 138/82 98 °F (36.7 °C) Oral 94 18 94 % --   20 1155 (!) 152/62 -- -- 95 15 92 % --   20 1150 (!) 146/71 -- -- 93 15 97 % --   20 1145 (!) 145/67 -- -- 94 15 97 % --   20 1140 (!) 153/67 -- -- 97 10 96 % --   20 1132 139/79 -- -- 78 16 98 % --   20 1045 103/67 98.2 °F (36.8 °C) Oral 85 18 92 % --   20 0900 116/79 98 °F (36.7 °C) Oral 84 18 92 % --   20 0745 -- -- -- -- -- -- 217 lb 8 oz (98.7 kg)   20 0345 109/82 98.2 °F (36.8 °C) Oral 85 16 91 % --   20 0012 125/77 98 °F (36.7 °C) Oral 82 16 93 % --   20 2030 103/66 97.8 °F (36.6 °C) Oral 86 16 93 % --   20 1700 125/79 98 °F (36.7 °C) Oral 95 18 94 % --      TEMPERATURE HISTORY 24H: Temp (24hrs), Av °F (36.7 °C), Min:97.8 °F (36.6 °C), Max:98.2 °F (36.8 °C)    BLOOD PRESSURE HISTORY: Systolic (25EJO), SANTOS:720 , Min:103 , UIV:079    Diastolic (16DYT), DED:85, Min:62, Max:82      Intake/Output:  I/O last 3 completed shifts:  In: -   Out: 1625 [Urine:1625]  I/O this shift:  In: -   Out: 630 [Urine:500; Drains:130]  Drain/tube Output:  Closed/Suction Drain Left Back Bulb 10 Taiwanese-Output (ml): 50 ml    Physical Exam:  General: awake, alert, oriented to  person, place, time  Cardiovascular:  regular rate and rhythm and no murmur noted  Lungs: clear to auscultation  Abdomen: soft, non-distended, right-sided and lower tenderness without peritonitis, bowel sounds present     Labs:  CBC:    Recent Labs     20  1610 20  1028 20  0455 WBC 22.5* 19.2* 17.9*   HGB 10.9* 9.7* 8.9*   HCT 33.1* 29.1* 27.4*    246 218     BMP:    Recent Labs     08/16/20  1610 08/17/20  0032 08/17/20  1028 08/18/20  0455   * 129* 132* 133*   K 4.7  --  4.7 4.2   CL 94*  --  99 102   CO2 18*  --  23 19*   BUN 29*  --  40* 29*   CREATININE 1.6*  --  1.5* 1.1   GLUCOSE 127*  --  88 76     Hepatic:    Recent Labs     08/16/20  1610 08/17/20  1028   AST 37 28   ALT 8* 8*   BILITOT 1.0 0.9   ALKPHOS 143* 141*     Amylase:  No results found for: AMYLASE  Lipase:  No results found for: LIPASE   Mag:  No results found for: MG  Phos:     Lab Results   Component Value Date    PHOS 4.3 08/18/2020      Coags:   Lab Results   Component Value Date    PROTIME 17.0 08/17/2020    INR 1.46 08/17/2020       Cultures:  Anaerobic culture  No results found for: LABANAE  Fungus stain  No results found for requested labs within last 30 days. Gram stain  No results found for requested labs within last 30 days. Organism  No results found for: [facility]  Surgical culture  No results found for: CXSURG  Blood culture 1  Results in Past 30 Days  Result Component Current Result Ref Range Previous Result Ref Range   Blood Culture, Routine No Growth to date. Any change in status will be called. (8/16/2020)  Not in Time Range      Blood culture 2  Results in Past 30 Days  Result Component Current Result Ref Range Previous Result Ref Range   Culture, Blood 2 No Growth to date. Any change in status will be called. (8/16/2020)  Not in Time Range      Fecal occult  No results found for requested labs within last 30 days. GI bacterial pathogens by PCR  No results found for requested labs within last 30 days. C. difficile  No results found for requested labs within last 30 days.      Urine culture  No results found for: LABURIN    Pathology:  No relevant pathology     Imaging:  I have personally reviewed the following films:    Ct Abdomen Pelvis Wo Contrast Additional Contrast? None    Result Date: 8/16/2020  EXAMINATION: CT OF THE ABDOMEN AND PELVIS WITHOUT CONTRAST 8/16/2020 5:11 pm TECHNIQUE: CT of the abdomen and pelvis was performed without the administration of intravenous contrast. Multiplanar reformatted images are provided for review. Dose modulation, iterative reconstruction, and/or weight based adjustment of the mA/kV was utilized to reduce the radiation dose to as low as reasonably achievable. COMPARISON: None. HISTORY: Acute nausea FINDINGS: Lower Chest: FOUR solid soft tissue pulmonary nodules right lower lobe, 7 x 8 mm (axial image 19), 11 x 12 mm (axial 11), 9 x 9 mm (axial image 10) and 4 x 4 mm (axial image 10). 3 mm pulmonary nodules are seen posterior basal segment left lower lobe. Mild nodular fullness about the distal esophagus. Heart size is upper normal. Organs: Prominent caudate lobe hypertrophy and poly lobular configuration of the liver as well as diffuse tiny hypoattenuating lesions throughout the liver suggestive of hepatic cirrhosis and siderotic nodules. No discrete mass lesion evident. 19 cm craniocaudal liver length. The spleen is mildly enlarged. The kidneys, gallbladder, adrenal glands and pancreas appear unremarkable. Multifocal collateral vessels are seen about the gastrohepatic ligament extending to the posterior mediastinum. GI/Bowel: Prominent diffuse thickening of the wall of the ascending colon. Other portions of colon and small bowel appear unremarkable with exception of few gas distended small bowel loops typical of adynamic ileus. There are diverticula involving the descending and sigmoid colon. Pelvis: Fluid collection within the pelvis without definite margins has air bubbles within it concerning for sequela from perforated bowel/abscess formation. Uterus and adnexal structures appear unremarkable. Peritoneum/Retroperitoneum: Abdominal ascites. Calcific atherosclerosis aorta.  Bones/Soft Tissues: No acute superficial soft tissue or osseous structure abnormality evident. 1. Non loculated fluid and gas collection in the pelvis concerning for perforated viscus/possible abscess formation. 2. Prominent thickening of the ascending colon which may be related to infectious or inflammatory process or underlying neoplastic process. 3. Bowel-gas pattern typical of adynamic ileus. 4. Hepatic cirrhosis with numerous hepatic nodules suggestive of regenerative/siderotic nodules. MRI abdomen without and with IV contrast correlation recommended. Metastatic disease is a consideration. 5. Hepatosplenomegaly and multifocal collateral vessels about the stomach esophagus concerning for varices, all suggestive of portal venous hypertension. 6. Abdominal and pelvic ascites. 7.  Diverticulosis coli without CT evidence of acute diverticulitis. Critical results were called by Dr. Tien Gabriel to [address] [place] [address] on 8/16/2020 at 18:09. Xr Radius Ulna Left (2 Views)    Result Date: 8/16/2020  EXAMINATION: TWO XRAY VIEWS OF THE LEFT FOREARM 8/16/2020 2:55 pm COMPARISON: None. HISTORY: ORDERING SYSTEM PROVIDED HISTORY: Fall TECHNOLOGIST PROVIDED HISTORY: Reason for exam:->Fall Reason for Exam: Frequent falls at home. Bruising mid shaft L forearm Acuity: Acute Type of Exam: Initial FINDINGS: There is no evidence of acute fracture. There is normal alignment. No acute joint abnormality. No focal osseous lesion. No focal soft tissue abnormality. No acute osseous abnormality. Ct Head Wo Contrast    Result Date: 8/16/2020  EXAMINATION: CT OF THE HEAD WITHOUT CONTRAST  8/16/2020 3:15 pm TECHNIQUE: CT of the head was performed without the administration of intravenous contrast. Dose modulation, iterative reconstruction, and/or weight based adjustment of the mA/kV was utilized to reduce the radiation dose to as low as reasonably achievable. COMPARISON: None.  HISTORY: ORDERING SYSTEM PROVIDED HISTORY: Fall TECHNOLOGIST PROVIDED HISTORY: Reason for exam:->Fall Has a \"code stroke\" or \"stroke alert\" been called? ->No Reason for Exam: Fall Acuity: Acute Type of Exam: Initial FINDINGS: BRAIN/VENTRICLES: The ventricles and sulci are diffusely enlarged. Low attenuation is seen in the periventricular and subcortical white matter. No acute intracranial hemorrhage or acute infarct is identified. ORBITS: The visualized portion of the orbits demonstrate no acute abnormality. SINUSES: The visualized paranasal sinuses and mastoid air cells demonstrate no acute abnormality. SOFT TISSUES/SKULL:  No acute abnormality of the visualized skull or soft tissues. No acute intracranial abnormality. Ct Cervical Spine Wo Contrast    Result Date: 8/16/2020  EXAMINATION: CT OF THE CERVICAL SPINE WITHOUT CONTRAST 8/16/2020 3:15 pm TECHNIQUE: CT of the cervical spine was performed without the administration of intravenous contrast. Multiplanar reformatted images are provided for review. Dose modulation, iterative reconstruction, and/or weight based adjustment of the mA/kV was utilized to reduce the radiation dose to as low as reasonably achievable. COMPARISON: None. HISTORY: ORDERING SYSTEM PROVIDED HISTORY: Fall TECHNOLOGIST PROVIDED HISTORY: Reason for exam:->Fall Reason for Exam: Fall Acuity: Acute Type of Exam: Initial FINDINGS: BONES/ALIGNMENT: There is no acute fracture or traumatic malalignment. DEGENERATIVE CHANGES: Multilevel degenerative changes. SOFT TISSUES: There is no prevertebral soft tissue swelling. No acute abnormality of the cervical spine.      Ct Abscess Drainage W Cath Placement S&i    Result Date: 8/18/2020  PROCEDURE: CT GUIDEDPELVICABSCESS DRAINAGE CATHETER PLACEMENT MODERATE CONSCIOUS SEDATION <Completed Date> HISTORY: ORDERING SYSTEM PROVIDED HISTORY: Pelvic abscess possibly diverticular in origin TECHNOLOGIST PROVIDED HISTORY: Reason for exam:->Pelvic abscess possibly diverticular in origin Reason for Exam: Pelvic Abscess Acuity: Unknown Type of Exam: Unknown SEDATION: 1 mgversed and 50 mcg fentanyl were titrated intravenously for moderate sedation monitored under my direction. Total intraservice time of sedation was 21 minutes. The patient's vital signs were monitored throughout the procedure and recorded in the patient's medical record by the nurse. TECHNIQUE: Informed consent was obtained after a detailed explanation of the procedure including risks, benefits, and alternatives. Universal protocol was followed. The patient was placed on the CT table in the prone position. A suitable skin site was prepped and draped in sterile fashion following CT localization. An 18 gauge needle was advanced under CT guidance into the pelvic fluid collection and a 0.035 guidewire was used to place a 10 Frisian abscess drainage catheter after the fashion tract was dilated. The catheter was sutured to the skin and the patient tolerated the procedure well. The catheter was attached to CAROLINE suction drainage. Dose modulation, iterative reconstruction, and/or weight based adjustment of the mA/kV was utilized to reduce the radiation dose to as low as reasonably achievable. DLP: 201.39 mGy-cm Estimated blood loss: Less than 5 cc FINDINGS: A total of 80 mL of purulent fluid was removed and sent for diagnostic tests. Successful CT guided placement of a drainage catheter in the pelvic abscess. Ct Abdomen Pelvis W Iv Contrast Additional Contrast? Oral    Result Date: 8/17/2020  EXAMINATION: CT OF THE ABDOMEN AND PELVIS WITH CONTRAST 8/17/2020 5:35 pm TECHNIQUE: CT of the abdomen and pelvis was performed with the administration of intravenous contrast. Multiplanar reformatted images are provided for review. Dose modulation, iterative reconstruction, and/or weight based adjustment of the mA/kV was utilized to reduce the radiation dose to as low as reasonably achievable.  COMPARISON: CT abdomen and pelvis 08/16/2020 HISTORY: ORDERING SYSTEM PROVIDED HISTORY: Pelvic abscess on non contrast CT Reason for Exam: follow up scan from yesterday Acuity: Acute Type of Exam: Subsequent/Follow-up FINDINGS: Lower Chest: Minimal subsegmental atelectasis posterior both lung bases. Pulmonary nodules right and left lower lobes. Cardiac and posterior mediastinal structures visualized are unremarkable. Liver: Hepatic morphologic features of cirrhosis with diffuse tiny nodular densities throughout the liver. Not evident on the CT performed yesterday's an area of masslike density posterior at the hepatic dome axial image 31 measuring 2.8 cm. Other organs: The spleen, pancreas, adrenal glands, gallbladder and kidneys appear unremarkable. GI/Bowel: Multifocal diverticula involve the descending and sigmoid colon without evidence of acute inflammatory change. Inflammatory changes may be present in the pelvis adjacent to apparent abscess, however evaluation the bowel is significantly limited because of the presence of ascites as well. There is prominent masslike thickening involving the ascending colon concerning for malignancy, conglomerate member measurement 9 x 12 x 10 cm axial image 114, coronal image 81. Pelvis: As demonstrated on yesterday's study there is a prominent air-fluid level collection, following contrast showing a thin rim of enhancement, separate from the bowel, 5.5 x 10 cm axial series 2, image 164, craniocaudal length 7 cm on coronal image 119. Prominent hypoattenuation central uterus, 5 cm axial series 2, image 158. Ascites is noted. Urinary bladder is decompressed by Amado catheter. Peritoneum/Retroperitoneum: Ascites is present. 12 mm ann hepatis lymph node axial image 70.  13 mm portacaval lymph node axial image 79. Multifocal collateral vessels are seen about the spleen, stomach and distal esophagus. Bones/Soft Tissues: No acute superficial soft tissue or osseous structure abnormality evident.      1. Pelvic air/fluid collection almost certainly reflects an abscess or giant colonic diverticula. This may be the result of acute diverticulitis. 2. Mass lesion ascending colon almost certainly reflecting primary adenocarcinoma of the colon. 3. Diffuse hepatic lesions with newly visualized (contrast enhancement) mass at the hepatic dome. Hepatic metastatic disease is a consideration as is cirrhosis with regenerative nodules and hepatocellular carcinoma. 4. Bilateral lower lobe pulmonary nodules suspicious for metastatic disease. 5. Abdominal and pelvic ascites. 6. Sequela from portosystemic shunting with numerous collateral vessels about the stomach, left upper quadrant and distal esophagus. Relatively small esophageal and gastric varices. 7.  Calcific atherosclerotic disease aorta. Xr Chest Portable    Result Date: 8/16/2020  EXAMINATION: ONE XRAY VIEW OF THE CHEST 8/16/2020 2:55 pm COMPARISON: None. HISTORY: ORDERING SYSTEM PROVIDED HISTORY: Fall TECHNOLOGIST PROVIDED HISTORY: Reason for exam:->Fall Reason for Exam: Frequent falls at home. Bruising mid shaft L forearm Acuity: Acute Type of Exam: Initial FINDINGS: The lungs are without acute focal process. There is no effusion or pneumothorax. The cardiomediastinal silhouette is without acute process. The osseous structures are without acute process. No acute process. Scheduled Meds:   piperacillin-tazobactam  3.375 g Intravenous Q8H    sodium chloride flush  10 mL Intravenous 2 times per day    enoxaparin  40 mg Subcutaneous Daily     Continuous Infusions:   sodium chloride 100 mL/hr at 08/18/20 0123     PRN Meds:.lip balm petroleum free, morphine, sodium chloride flush, acetaminophen **OR** acetaminophen, polyethylene glycol, promethazine **OR** ondansetron      Assessment:  76 y.o. female admitted with   1. Septicemia ([facility] [address]    2. PAPITO (acute kidney injury) ([facility] [address]    3. Intra-abdominal free air of unknown etiology    4.  Fall, initial encounter        Intraabdominal/pelvic abscess, status-post CT-guided drain placement on 8/18/2020  Abnormal CT of colon/liver concerning for malignancy  Sepsis   Cirrhosis  Acute kidney injury  Hyponatremia      Plan:  1. IR drainage of pelvic abscess today  2. NPO for procedure  3. IV hydration; monitor and correct electrolytes  4. Antibiotics; leukocytosis improving  5. Activity as tolerated  6. PRN analgesics and antiemetics--minimizing narcotics as tolerated  7. DVT prophylaxis with Lovenox  8. Management of medical comorbid etiologies per primary team and consulting services    EDUCATION:  Educated patient on plan of care and disease process--all questions answered. Plans discussed with patient and nursing. Reviewed and discussed with Dr. Danish Nuñez.       Signed:  VIN Agosto - CNP  8/18/2020 1:49 PM     Surg Staff:   Pt seen and examined with NP  See full note above  Tender in RLQ and lower abd  IR abscess drain today  Cont IV and atbx- overall improving  Prep and colonoscopy this week as pt improvers / tolerates  DW pt, all Q answered    Lara Lao

## 2020-08-18 NOTE — PLAN OF CARE
Nutrition Problem #1: Inadequate protein-energy intake  Intervention: Food and/or Nutrient Delivery: Continue Current Diet, Start Oral Nutrition Supplement  Nutritional Goals: Pt will consume at least 50% of meals and supplements

## 2020-08-18 NOTE — FLOWSHEET NOTE
Pt had small amount of carolina blood from eulalio area eulalio care completed and pt repositioned. Pt blood sugar 69 8oz of juice given. Daughter at bedside.

## 2020-08-18 NOTE — PROGRESS NOTES
Comprehensive Nutrition Assessment    Type and Reason for Visit:  Positive Nutrition Screen(MST 5)    Nutrition Recommendations/Plan:   1. Trial Ensure Enlive BID  2. Encourage PO intake  3. Document all PO intake in flow sheets     Nutrition Assessment:  Difficult to assess pt's nutrition status upon admission as pt is able to provide minimal information, answering most questions as \"I don't know. \" Per pt's sister at bedside, pt has had a 60 lb unintentional weight loss over the past 6-7 months despite good appetite and adequate PO intake. I am unable to confirm this weight loss in EMR as their is no weight hx for review. Pt does not think she has eaten anything during admission. Full liquid diet currently ordered. Will offer Ensure Enlive BID. Malnutrition Assessment:  Malnutrition Status:  Insufficient data      Estimated Daily Nutrient Needs:  Energy (kcal):  1082-4496; Weight Used for Energy Requirements:  Current(99 kg)     Protein (g):  119-149 grams; Weight Used for Protein Requirements:  Current(99 kg; 1.2-1.5 grams per kg)        Fluid (ml/day):  1 mL per kcal; Weight Used for Fluid Requirements:  Current      Nutrition Related Findings:  No edema noted. Na+ 133. Poor historian. Wounds:  None       Current Nutrition Therapies:    DIET FULL LIQUID; Anthropometric Measures:  · Height: 5' 7\" (170.2 cm)  · Current Body Weight: 217 lb (98.4 kg)   · Ideal Body Weight: 135 lbs  · BMI: 34  · BMI Categories: Obese Class 1 (BMI 30.0-34. 9)       Nutrition Diagnosis:   · Inadequate protein-energy intake related to inadequate protein-energy intake as evidenced by intake 0-25%      Nutrition Interventions:   Food and/or Nutrient Delivery:  Continue Current Diet, Start Oral Nutrition Supplement  Nutrition Education/Counseling:  Education not indicated   Coordination of Nutrition Care:  Continued Inpatient Monitoring    Goals:  Pt will consume at least 50% of meals and supplements       Nutrition

## 2020-08-18 NOTE — BRIEF OP NOTE
Brief Postoperative Note    Giselle Martinez  YOB: 1955  [id number]    Pre-operative Diagnosis: pelvic abscess    Post-operative Diagnosis: Same    Procedure: CT-guided drain placement    Anesthesia: Moderate Sedation    Surgeons: Baylee Andrade MD    Estimated Blood Loss: Less than 5 mL    Complications: None    Specimens: Was Obtained: 80mL purulent fluid drained and sent for culture    Findings: Successful placement of a 10F drain in the pelvic abscess.     Electronically signed by Baylee Andrade MD on 8/18/2020 at 12:07 PM

## 2020-08-18 NOTE — PROGRESS NOTES
Shift assessment complete and night medications given. Patient is resting in bed at this time. Denies additional needs. Call light is in reach.  Will continue to monitor

## 2020-08-18 NOTE — PROGRESS NOTES
[facility] PROGRESS NOTE    8/18/2020 2:55 PM        Name: Acacia Evans . Admitted: 8/16/2020  Primary Care Provider: No primary care provider on file. (Tel: None)    Brief Course:  Patient is a 54-year-old  female with history of tobacco use who presented to ED after she was found on the floor by her daughter. As per daughter, patient was probably on the floor all night. Patient reports multiple falls over the last couple of months, decreased appetite and weight loss of over 60 obese in the last 1 year. CT abdomen/pelvis in ED showed pelvic abscess versus viscus perforation. Noted to have colonic wall thickening concerning for malignancy, liver cirrhosis and portal hypertension. Patient has been smoking 2 PPD for over 50 years. Does not have a PCP. Never had cancer screening. CC: History of multiple falls, found on floor at home  Subjective: Patient is NPO.  Scheduled to undergo pelvic abscess drainage and tube placement later today.       Reviewed interval ancillary notes    Current Medications  lip balm petroleum free (PHYTOPLEX) stick, PRN  piperacillin-tazobactam (ZOSYN) 3.375 g in dextrose 5 % 50 mL IVPB extended infusion (mini-bag), Q8H  0.9 % sodium chloride infusion, Continuous  morphine injection 4 mg, Q4H PRN  sodium chloride flush 0.9 % injection 10 mL, 2 times per day  sodium chloride flush 0.9 % injection 10 mL, PRN  acetaminophen (TYLENOL) tablet 650 mg, Q6H PRN    Or  acetaminophen (TYLENOL) suppository 650 mg, Q6H PRN  polyethylene glycol (GLYCOLAX) packet 17 g, Daily PRN  promethazine (PHENERGAN) tablet 12.5 mg, Q6H PRN    Or  ondansetron (ZOFRAN) injection 4 mg, Q6H PRN  enoxaparin (LOVENOX) injection 40 mg, Daily    Objective:  /82   Pulse 94   Temp 98 °F (36.7 °C) (Oral)   Resp 18   Ht 5' 7\" (1.702 m)   Wt 217 lb 8 oz (98.7 kg)   SpO2 94%   BMI 34.07 kg/m² with newly visualized (contrast enhancement) mass   at the hepatic dome. Hepatic metastatic disease is a consideration as is   cirrhosis with regenerative nodules and hepatocellular carcinoma. 4. Bilateral lower lobe pulmonary nodules suspicious for metastatic disease. 5. Abdominal and pelvic ascites. 6. Sequela from portosystemic shunting with numerous collateral vessels about   the stomach, left upper quadrant and distal esophagus. Relatively small   esophageal and gastric varices. 7.  Calcific atherosclerotic disease aorta. CT ABDOMEN PELVIS WO CONTRAST Additional Contrast? None   Final Result   1. Non loculated fluid and gas collection in the pelvis concerning for   perforated viscus/possible abscess formation. 2. Prominent thickening of the ascending colon which may be related to   infectious or inflammatory process or underlying neoplastic process. 3. Bowel-gas pattern typical of adynamic ileus. 4. Hepatic cirrhosis with numerous hepatic nodules suggestive of   regenerative/siderotic nodules. MRI abdomen without and with IV contrast   correlation recommended. Metastatic disease is a consideration. 5. Hepatosplenomegaly and multifocal collateral vessels about the stomach   esophagus concerning for varices, all suggestive of portal venous   hypertension. 6. Abdominal and pelvic ascites. 7.  Diverticulosis coli without CT evidence of acute diverticulitis. Critical results were called by Dr. Toma Beard to [address] [place] [address]   on 8/16/2020 at 18:09. CT Cervical Spine WO Contrast   Final Result   No acute abnormality of the cervical spine. CT Head WO Contrast   Final Result   No acute intracranial abnormality. XR CHEST PORTABLE   Final Result   No acute process. XR RADIUS ULNA LEFT (2 VIEWS)   Final Result   No acute osseous abnormality.              Problem List  Active Problems:    Pelvic abscess in female    Septicemia [facility])  Resolved Problems:    * No resolved hospital problems. *       Assessment & Plan:     Colonic mass with possible mets to liver and lungs:  Liver cirrhosis and portal hypertension:  GI service consulted. Labs for further work-up ordered. GI planning to perform EGD and colonoscopy with biopsy of colonic mass on Thursday. Pelvic abscess:   General surgery consulted. Underwent abscess drainage and tube placement earlier today.      Hyponatremia:  Serum sodium level improved with iv fluids.      Renal insufficiency:  Improved compared to yesterday. Likely pre-renal cause. On iv fluids. Updated pt's sister (was at bedside) about pt's new findings on CT, and plan of care. She told me that she would update pt's daughter Praveen Max of the clinical findings and plan.      IV Access: peripheral  Amado: no  Diet: DIET FULL LIQUID;  Dietary Nutrition Supplements: Standard High Calorie Oral Supplement  Code:Full Code  DVT PPX heparin sq tid  Disposition pending acute issues      Marcie Pérez MD   8/18/2020 2:55 PM

## 2020-08-18 NOTE — PROGRESS NOTES
MD Kirby Kingston MD Jarvis Lambing, MD               Office: [phone]                      Fax: [phone]             [facility]                   NEPHROLOGY INPATIENT PROGRESS NOTE:     PATIENT NAME: Dylan Golden  : 1955  MRN: [id number]       IMPRESSION:       PAPITO (on ? CKD): better   : Oligouric -> non-oliguric   - BL Scr- unknown ---> 1.6, eGFR 32  on admission  : Etiology of PAPITO - presumed pre-renal / early ATN / unlikely HRS     - other differentials: ruled out obstruction , unlikely AIN or GN,   - UA : bili, trace pro, LE - follow Ucx, ? AIN - unlikely    : large bl. + only 7 RBC = myoglobulinemia     - Renal imaging: w/ CT - kidney unremarkable   - CK - 310 on admission   - Pro-calcitonin 5.6     - urine lytes - all very lower = pre-renal ,    : likely hypovolemia - not concerning for HRS or CRS       RECOMMENDATIONS:   - continue IVF for 1 more day   - avoid hypotonic fluids like LR as w/ hyponatremia  - empirical iv abx  - f/u UCx.    - w/ ascites - bladder scan may not be accurate, low threshold for perkins insertion  - Sx consult f/u    - At higher risk of worsening renal fx - needing close f/u       Associated problems:   - Azotemia: moderate - worsening - ?pre-renal  - Electrolytes: K: WNL  : check Mag in am - order   - Volume status: mild hypo-volemic, although w/ ascites - better   : Na: Hyponatremia - moderate - 127 on admission - likely hypovolemia / PAPITO / Cirrhosis - improving at goal   : BP: Ok to keep slightly higher   - Acid-Base: Acidosis - mild - non-AGMA - d/to LA / PAPITO / GI loss - improving   - Anemia: worsening - follow        Other problems: Management per primary and other consulting teams.      # Cirrhosis , Portal HTN : new diagnosis, no h/o EtOH, w/u ordered by GI     # Sepsis, intra-abdominal abscess     Hospital Problems           Last Modified POA    Pelvic abscess in female 2020 Yes    Septicemia ([address] chief complaint and HPI , Subjective     PHYSICAL EXAM:  Recent vital signs and recent I/Os reviewed by me. Wt Readings from Last 3 Encounters:   08/18/20 217 lb 8 oz (98.7 kg)     BP Readings from Last 3 Encounters:   08/18/20 109/82     Patient Vitals for the past 24 hrs:   BP Temp Temp src Pulse Resp SpO2 Weight   08/18/20 0745 -- -- -- -- -- -- 217 lb 8 oz (98.7 kg)   08/18/20 0345 109/82 98.2 °F (36.8 °C) Oral 85 16 91 % --   08/18/20 0012 125/77 98 °F (36.7 °C) Oral 82 16 93 % --   08/17/20 2030 103/66 97.8 °F (36.6 °C) Oral 86 16 93 % --   08/17/20 1700 125/79 98 °F (36.7 °C) Oral 95 18 94 % --   08/17/20 1345 118/82 98.1 °F (36.7 °C) Oral 92 18 95 % --       Intake/Output Summary (Last 24 hours) at 8/18/2020 0910  Last data filed at 8/18/2020 0348  Gross per 24 hour   Intake --   Output 1625 ml   Net -1625 ml       Physical Exam  Vitals signs reviewed. Constitutional:       General: She is not in acute distress. Comments: Obese body habitus   HENT:      Head: Normocephalic and atraumatic. Right Ear: External ear normal.      Left Ear: External ear normal.      Mouth/Throat:      Mouth: Mucous membranes are not dry. Eyes:      General: No scleral icterus. Conjunctiva/sclera: Conjunctivae normal.   Neck:      Musculoskeletal: Neck supple. Thyroid: No thyroid mass. Vascular: No JVD. Trachea: Trachea normal.   Cardiovascular:      Rate and Rhythm: Normal rate and regular rhythm. Pulmonary:      Effort: Pulmonary effort is normal.      Breath sounds: Normal breath sounds. Abdominal:      General: Bowel sounds are normal. There is distension. Palpations: Abdomen is soft. Musculoskeletal:         General: No deformity. Right lower leg: Edema present. Left lower leg: Edema (mild) present. Skin:     General: Skin is warm and dry. Neurological:      Mental Status: She is alert and oriented to person, place, and time.    Psychiatric:         Behavior: Behavior normal.            DATA:  Diagnostic tests reviewed by me for today's visit:    Recent Labs     08/16/20  1610 08/17/20  1028 08/18/20  0455   WBC 22.5* 19.2* 17.9*   HCT 33.1* 29.1* 27.4*    246 218     Iron Saturation:  No components found for: PERCENTFE  FERRITIN:  No results found for: FERRITIN  IRON:    Lab Results   Component Value Date    IRON 26 08/17/2020     TIBC:    Lab Results   Component Value Date    TIBC 106 08/17/2020       Recent Labs     08/16/20  1610 08/17/20  0032 08/17/20  1028 08/18/20  0455   * 129* 132* 133*   K 4.7  --  4.7 4.2   CL 94*  --  99 102   CO2 18*  --  23 19*   BUN 29*  --  40* 29*   CREATININE 1.6*  --  1.5* 1.1     Recent Labs     08/16/20  1610 08/17/20  1028 08/18/20  0455   CALCIUM 9.6 8.9 8.2*   PHOS  --   --  4.3     No results for input(s): PH, PCO2, PO2 in the last 72 hours.     Invalid input(s): Percell Kevin    ABG:  No results found for: PH, PCO2, PO2, HCO3, BE, THGB, TCO2, O2SAT  VBG:  No results found for: PHVEN, HWT5LVH, BEVEN, F2CHDALD    LDH:  No results found for: LDH  Uric Acid:    Lab Results   Component Value Date    LABURIC 5.9 08/17/2020       PT/INR:    Lab Results   Component Value Date    PROTIME 17.0 08/17/2020    INR 1.46 08/17/2020     Warfarin PT/INR:  No components found for: PTPATWAR, PTINRWAR  PTT:  No results found for: APTT, PTT[APTT}  Last 3 Troponin:    Lab Results   Component Value Date    TROPONINI <0.01 08/16/2020       U/A:    Lab Results   Component Value Date    COLORU ORANGE 08/16/2020    PROTEINU TRACE 08/16/2020    PHUR 5.0 08/16/2020    WBCUA 3 08/16/2020    RBCUA 7 08/16/2020    BACTERIA RARE 08/16/2020    CLARITYU CLOUDY 08/16/2020    SPECGRAV 1.022 08/16/2020    LEUKOCYTESUR SMALL 08/16/2020    UROBILINOGEN 1.0 08/16/2020    BILIRUBINUR SMALL 08/16/2020    BLOODU LARGE 08/16/2020    GLUCOSEU Negative 08/16/2020     Microalbumen/Creatinine ratio:  No components found for: RUCREAT  24 Hour Urine for Protein: of Exam: Initial FINDINGS: There is no evidence of acute fracture. There is normal alignment. No acute joint abnormality. No focal osseous lesion. No focal soft tissue abnormality. No acute osseous abnormality. Ct Head Wo Contrast    Result Date: 8/16/2020  EXAMINATION: CT OF THE HEAD WITHOUT CONTRAST  8/16/2020 3:15 pm TECHNIQUE: CT of the head was performed without the administration of intravenous contrast. Dose modulation, iterative reconstruction, and/or weight based adjustment of the mA/kV was utilized to reduce the radiation dose to as low as reasonably achievable. COMPARISON: None. HISTORY: ORDERING SYSTEM PROVIDED HISTORY: Fall TECHNOLOGIST PROVIDED HISTORY: Reason for exam:->Fall Has a \"code stroke\" or \"stroke alert\" been called? ->No Reason for Exam: Fall Acuity: Acute Type of Exam: Initial FINDINGS: BRAIN/VENTRICLES: The ventricles and sulci are diffusely enlarged. Low attenuation is seen in the periventricular and subcortical white matter. No acute intracranial hemorrhage or acute infarct is identified. ORBITS: The visualized portion of the orbits demonstrate no acute abnormality. SINUSES: The visualized paranasal sinuses and mastoid air cells demonstrate no acute abnormality. SOFT TISSUES/SKULL:  No acute abnormality of the visualized skull or soft tissues. No acute intracranial abnormality. Ct Cervical Spine Wo Contrast    Result Date: 8/16/2020  EXAMINATION: CT OF THE CERVICAL SPINE WITHOUT CONTRAST 8/16/2020 3:15 pm TECHNIQUE: CT of the cervical spine was performed without the administration of intravenous contrast. Multiplanar reformatted images are provided for review. Dose modulation, iterative reconstruction, and/or weight based adjustment of the mA/kV was utilized to reduce the radiation dose to as low as reasonably achievable. COMPARISON: None.  HISTORY: ORDERING SYSTEM PROVIDED HISTORY: Fall TECHNOLOGIST PROVIDED HISTORY: Reason for exam:->Fall Reason for Exam: Fall Acuity: Acute Type of Exam: Initial FINDINGS: BONES/ALIGNMENT: There is no acute fracture or traumatic malalignment. DEGENERATIVE CHANGES: Multilevel degenerative changes. SOFT TISSUES: There is no prevertebral soft tissue swelling. No acute abnormality of the cervical spine. Xr Chest Portable    Result Date: 8/16/2020  EXAMINATION: ONE XRAY VIEW OF THE CHEST 8/16/2020 2:55 pm COMPARISON: None. HISTORY: ORDERING SYSTEM PROVIDED HISTORY: Fall TECHNOLOGIST PROVIDED HISTORY: Reason for exam:->Fall Reason for Exam: Frequent falls at home. Bruising mid shaft L forearm Acuity: Acute Type of Exam: Initial FINDINGS: The lungs are without acute focal process. There is no effusion or pneumothorax. The cardiomediastinal silhouette is without acute process. The osseous structures are without acute process. No acute process.

## 2020-08-19 NOTE — PROGRESS NOTES
[facility]  Gastroenterology Progress Note    Sharmon Soulier is a 76 y.o. female patient. Active Problems:    Pelvic abscess in female    Septicemia [facility])  Resolved Problems:    * No resolved hospital problems. *      SUBJECTIVE:  Reports lower abdominal pain. ROS:  No fever, chills  No chest pain, palpitations  No SOB, cough  Gastrointestinal ROS: no N/V/D     Physical    VITALS:  BP 99/63   Pulse 78   Temp 97.9 °F (36.6 °C) (Oral)   Resp 18   Ht 5' 7\" (1.702 m)   Wt 217 lb 8 oz (98.7 kg)   SpO2 94%   BMI 34.07 kg/m²   TEMPERATURE:  Current - Temp: 97.9 °F (36.6 °C); Max - Temp  Av °F (36.7 °C)  Min: 97.6 °F (36.4 °C)  Max: 98.3 °F (36.8 °C)    NAD  Regular rate   Lungs CTA Bilaterally  Abdomen soft, ND, diffuse tenderness,  No guarding or rebound. Bowel sounds normal.  Alert and oriented to place and person. No asterixis. Data    Data Review:    Recent Labs     20  1028 20  0455 20  0452   WBC 19.2* 17.9* 12.9*   HGB 9.7* 8.9* 8.6*   HCT 29.1* 27.4* 27.1*   MCV 86.4 86.1 86.7    218 191     Recent Labs     20  1028 20  0455 20  0452   * 133* 135*   K 4.7 4.2 3.8   CL 99 102 108   CO2 23 19* 21   PHOS  --  4.3 3.3   BUN 40* 29* 20   CREATININE 1.5* 1.1 0.9     Recent Labs     20  1610 20  1028   AST 37 28   ALT 8* 8*   BILIDIR  --  0.6*   BILITOT 1.0 0.9   ALKPHOS 143* 141*     No results for input(s): LIPASE, AMYLASE in the last 72 hours. Recent Labs     20  1028   PROTIME 17.0*   INR 1.46*     No results for input(s): PTT in the last 72 hours. CT abd/pelvis w IV and PO contrast 20  Impression    1. Pelvic air/fluid collection almost certainly reflects an abscess or giant    colonic diverticula.  This may be the result of acute diverticulitis. 2. Mass lesion ascending colon almost certainly reflecting primary    adenocarcinoma of the colon.     3. Diffuse hepatic lesions with newly visualized (contrast enhancement) mass    at the hepatic dome.  Hepatic metastatic disease is a consideration as is    cirrhosis with regenerative nodules and hepatocellular carcinoma. 4. Bilateral lower lobe pulmonary nodules suspicious for metastatic disease. 5. Abdominal and pelvic ascites. 6. Sequela from portosystemic shunting with numerous collateral vessels about    the stomach, left upper quadrant and distal esophagus.  Relatively small    esophageal and gastric varices. 7.  Calcific atherosclerotic disease aorta.               ASSESSMENT :    Cirrhosis - new diagnosis per CT. No alcohol history. Likely from fatty liver. Some ascites on CT. Seems mildly confused (baseline per her daughter) but ammonia was normal.   Negative: AMA. f-actin weak positive at 25. Abnormal CT of the colon and liver - CT with thickening of the ascending colon concerning for malignancy. Also with liver and lung lesions concerning for metastatic disease. Pt denies diarrhea. No prior colonoscopy. Liver lesions no amenable to IR biopsy so will plan colonoscopy with bx.     Pelvic abscess - CT with nonloculated fluid and gas collection in the pelvis which could have been from diverticulitis. S/p IR drainage 8/18.          PLAN :  - clear liquids  - antibiotics  - bowel prep today  - NPO midnight  - plan colonoscopy tomorrow to eval area of abnormal colon on CT  - can do screening EGD at that time as well to eval for varices and f/u CT findings of mild nodular fullness about the distal esophagus.   - hepatitis panel, A1AT  - f/u AFP  - f/u Hep B surface AB for vaccination purposes. Will need Hep A vaccine.     Discussed with Dr. Jaz Carter, PA-C  [facility]  I have personally performed a face to face diagnostic evaluation on this patient. I have interviewed and examined the patient and I agree with the findings and recommended plan of care.   In summary, my findings and plan are the following: As above, more alert today, still some abd discomfort but tolerating liquid diet. On exam has right posterior pelvic drain, abd soft but BLQ tender R>L. Will start clear liquids and bowel prep today in anticipation of EGD and colonoscopy in AM 8/20 as above. Discussed risks and benefits including increased risk of perforation in this setting. They agree to proceed.     Marck Lawrence MD  [address]  8/19/2020

## 2020-08-19 NOTE — CARE COORDINATION
SW spoke with pt's daughter regarding discharge planning. Daughter, Mya Chew, states he has not been able to have a conversation with pt due to pt's confusion. States pt is normally able to carry on conversation with her and express her wishes. She requested SW follow up with her at later time.     Mariam Arana MSW, [address]

## 2020-08-19 NOTE — PROGRESS NOTES
Physical Therapy    Facility/Department: [address]  Initial Assessment    NAME: Ema Gonzalez  : 1955  MRN: [id number]    Date of Service: 2020    Discharge Giana Simons scored a  on the AM-PAC short mobility form. Current research shows that an AM-PAC score of 17 or less is typically not associated with a discharge to the patient's home setting. Based on the patient's AM-PAC score and their current functional mobility deficits, it is recommended that the patient have 3-5 sessions per week of Physical Therapy at d/c to increase the patient's independence. Please see assessment section for further patient specific details. If patient discharges prior to next session this note will serve as a discharge summary. Please see below for the latest assessment towards goals. 3-5 sessions per week, Subacute/Skilled Nursing Facility   PT Equipment Recommendations  Equipment Needed: No    Assessment   Body structures, Functions, Activity limitations: Decreased functional mobility ; Decreased ADL status; Decreased ROM; Decreased strength;Decreased safe awareness;Decreased cognition;Decreased endurance;Decreased balance; Increased pain;Decreased posture  Assessment: Pt with increased pain and decreased mobility and balance, frequent falls. Pt is not functioning at her normal level and in need of skilled PT services to address these issues. Treatment Diagnosis: weakness, difficulty with gait. Prognosis: Good  Decision Making: Medium Complexity  PT Education: PT Role;Plan of Care;Transfer Training;General Safety;Orientation; Family Education; Functional Mobility Training  Patient Education: Pt needing reinforcement with education, family verbalized good understanding. Barriers to Learning: cognition  REQUIRES PT FOLLOW UP: Yes  Activity Tolerance  Activity Tolerance: Patient limited by cognitive status  Activity Tolerance: Much difficulty following instructions.        Patient Diagnosis(es): The primary encounter diagnosis was Septicemia ([facility] [address] Diagnoses of PAPITO (acute kidney injury) ([facility] [address] Intra-abdominal free air of unknown etiology, and Fall, initial encounter were also pertinent to this visit. has no past medical history on file. has a past surgical history that includes Tonsillectomy and Eye surgery. Restrictions  Restrictions/Precautions  Restrictions/Precautions: Fall Risk(high fall risk)  Position Activity Restriction  Other position/activity restrictions: This is a 58-year-old female with apparent history of frequent falls who presents after being found on the ground by her family. When the patient arrived she was disheveled, incontinent. She denies any specific complaints. The patient's work-up today was extensive. Her work-up included a CT of the abdomen that shows findings worrisome for possible abscess versus perforation. Questionable colon mass with metastatic lesions to liver and lung. Diagnosed with sepsis. Vision/Hearing      Vision : pt reports \"I dont see very well\"  Hearing : [place]  Subjective  General  Chart Reviewed: Yes  Response To Previous Treatment: Patient with no complaints from previous session. Family / Caregiver Present: Yes(OT for eval, 2 children for part of treatment who assisted with social hx questions.)  Diagnosis: pelvic abcess  Follows Commands: Impaired  Other (Comment): slow to respond and needing many repeat instructions to follow commands. General Comment  Comments: Pt supine in bed upon arrival. Decreased alertness, slow to respond. Juice spilled all over gown. Subjective  Subjective: Pt reporting lower abdomen pain. Agreeable to getting up in chair and bathed.   Pain Screening  Patient Currently in Pain: Yes  Pain Assessment  Pain Assessment: Faces  Lee-Baker Pain Rating: Hurts little more  Pain Type: Acute pain  Pain Location: Abdomen  Pain Orientation: Lower  Vital Signs  Patient Currently in Pain: Yes Orientation  Orientation  Overall Orientation Status: Impaired  Orientation Level: Oriented to person;Disoriented to situation;Disoriented to time;Disoriented to place  Social/Functional History  Social/Functional History  Lives With: Alone  Type of Home: House  Home Layout: One level(doesn't go to basement)  Home Access: Stairs to enter without rails  Entrance Stairs - Number of Steps: 1 step to landing, plus 1 step  Bathroom Shower/Tub: Tub/Shower unit, Shower chair with back(half bath & full bath in the home)  Bathroom Toilet: Standard  Bathroom Equipment: Grab bars in shower, Hand-held shower, Shower chair  Bathroom Accessibility: Walker accessible(1 bathroom, but both a tight fit.)  Home Equipment: Rolling walker  ADL Assistance: Needs assistance(occasional supervision for safety with shower; sits on EOB)  Homemaking Assistance: Needs assistance(Pt does some housework, dtr assists, son does yardwork.)  Homemaking Responsibilities: (family brings food)  Ambulation Assistance: Independent(furniture walks, not using the walker)  Transfer Assistance: Independent  Active : Yes(hasn't been driving recently, but does)  Leisure & Hobbies: TV, games on phone, reading  Additional Comments: Daughter able to stay w/pt 24/7. Frequent falls recently. Pt poor historian. Son & daughter assisted with PLOF. Cognition        Objective     Observation/Palpation  Observation: pelvic drain post L buttock area. AROM RLE (degrees)  RLE AROM: WFL  AROM LLE (degrees)  LLE AROM : WFL  Strength RLE  Comment: grossly +3/5  Strength LLE  Comment: grossly +3/5     Sensation  Overall Sensation Status: WFL  Bed mobility  Supine to Sit: Moderate assistance  Scooting: Moderate assistance   Comment: Pt sat EOB 25 min for gown change, hygiene. Much additional time needed due to cognition. Some blood noted from eulalio area on karis pad. Eulalio hygiene performed. Pt attempting to help but very slow with follow through.   Transfers  Sit to Stand: Moderate Assistance  Stand to sit: Modified independent  Comment: Pt needing repeat VCs/TCs for transfers. Ambulation  Ambulation?: Yes  Ambulation 1  Surface: level tile  Device: Rolling Walker  Other Apparatus: (pelvic drain, catheter, IV)  Assistance: Moderate assistance  Gait Deviations: Slow Emily;Decreased step length;Decreased step height;Shuffles  Distance: Pt amb 5 ft with RW and mod A. Comments: Pt not following VCs and needing chair brought up behind her, TCs/VCs constantly for task. Pt moving very slowly and taking much additional time to complete task. Stairs/Curb  Stairs?: No     Balance  Posture: Fair  Sitting - Static: Good  Sitting - Dynamic: Fair;-  Standing - Static: Fair(with RW)  Standing - Dynamic: Fair(with RW)        Plan   Plan  Times per week: 3-5x/week  Times per day: Daily  Current Treatment Recommendations: Strengthening, Balance Training, Functional Mobility Training, Transfer Training, Cognitive/Perceptual Training, ADL/Self-care Training, Endurance Training, Gait Training, Stair training, Safety Education & Training, Home Exercise Program, Pain Management, Cognitive Reorientation, Patient/Caregiver Education & Training, Equipment Evaluation, Education, & procurement  Safety Devices  Type of devices: All fall risk precautions in place, Call light within reach, Chair alarm in place, Gait belt, Left in chair, Nurse notified  Restraints  Initially in place: No    G-Code       OutComes Score                                                  AM-PAC Score  AM-PAC Inpatient Mobility Raw Score : 12 (08/19/20 1315)  AM-PAC Inpatient T-Scale Score : 35.33 (08/19/20 1315)  Mobility Inpatient CMS 0-100% Score: 68.66 (08/19/20 1315)  Mobility Inpatient CMS G-Code Modifier : CL (08/19/20 1315)          Goals  Short term goals  Time Frame for Short term goals: To be met by DC. Short term goal 1: Pt to perform bed mob with SBA. Short term goal 2: Pt to perform transfers with SBA.   Short term goal 3: Pt to perform amb with AAD and SBA for 100 ft. Short term goal 4: Pt to amb up 2 steps with CGA. Long term goals  Time Frame for Long term goals : LTGs=STGs  Patient Goals   Patient goals : Did not state.        Therapy Time   Individual Concurrent Group Co-treatment   Time In 3739         Time Out 1258         Minutes 55         Timed Code Treatment Minutes: [address] [place], [id number]

## 2020-08-19 NOTE — PROGRESS NOTES
([facility] [address] 8/17/2020 Yes          : other supportive care :   - Check daily renal function panel with electrolytes-phosphorus  - Strict monitoring of I/Os, daily weight  - Renal feeds/diet  - Current medications reviewed. - Nephrotoxic medications have been discontinued. - Dose adjusted and appropriate. - Dose meds for eGFR <15 mL/min/1.73m2 during PAPITO    - Avoid heavy opioids due to renal failure - may use very low dose dilaudid / fentanyl with close monitoring of CNS and respiratory depression. Please refer to the orders. High Complexity. Multiple complex problems. Discussed with patient, and treatment team- hospitalist (Dr Brendan Bagley)     Thank you for allowing me to participate in this patient's care. Please do not hesitate to contact me with any questions/concerns. We will follow along with you. Earline Forbes MD       [facility] [address] [place]: [phone] or [address]  Fax: [phone]        ========================================================   ========================================================   SUBJECTIVE:   Seen resting in bed   Had no active complaints,   Renal function still better      Past medical, Surgical, Social, Family medical history reviewed by me. MEDICATIONS: reviewed by me. Medications Prior to Admission:  No current facility-administered medications on file prior to encounter. No current outpatient medications on file prior to encounter. No medications prior to admission.      Scheduled Meds:   piperacillin-tazobactam  3.375 g Intravenous Q8H    sodium chloride flush  10 mL Intravenous 2 times per day    enoxaparin  40 mg Subcutaneous Daily     Continuous Infusions:   sodium chloride 100 mL/hr at 08/18/20 5053     PRN Meds:.lip balm petroleum free, morphine, sodium chloride flush, acetaminophen **OR** acetaminophen, polyethylene glycol, promethazine **OR** ondansetron    REVIEW OF SYSTEMS:  As mentioned in chief Right lower leg: Edema present. Left lower leg: Edema (mild) present. Skin:     General: Skin is warm and dry. Neurological:      Mental Status: She is alert and oriented to person, place, and time. Psychiatric:         Behavior: Behavior normal.            DATA:  Diagnostic tests reviewed by me for today's visit:    Recent Labs     08/16/20  1610 08/17/20  1028 08/18/20  0455 08/19/20  0452   WBC 22.5* 19.2* 17.9* 12.9*   HCT 33.1* 29.1* 27.4* 27.1*    246 218 191     Iron Saturation:  No components found for: PERCENTFE  FERRITIN:  No results found for: FERRITIN  IRON:    Lab Results   Component Value Date    IRON 26 08/17/2020     TIBC:    Lab Results   Component Value Date    TIBC 106 08/17/2020       Recent Labs     08/16/20  1610 08/17/20  0032 08/17/20  1028 08/18/20  0455 08/19/20  0452   * 129* 132* 133* 135*   K 4.7  --  4.7 4.2 3.8   CL 94*  --  99 102 108   CO2 18*  --  23 19* 21   BUN 29*  --  40* 29* 20   CREATININE 1.6*  --  1.5* 1.1 0.9     Recent Labs     08/16/20  1610 08/17/20  1028 08/18/20  0455 08/19/20  0452   CALCIUM 9.6 8.9 8.2* 8.4   PHOS  --   --  4.3 3.3     No results for input(s): PH, PCO2, PO2 in the last 72 hours.     Invalid input(s): Johann Carroll    ABG:  No results found for: PH, PCO2, PO2, HCO3, BE, THGB, TCO2, O2SAT  VBG:  No results found for: PHVEN, VXH8AIU, BEVEN, A0WXTISN    LDH:  No results found for: LDH  Uric Acid:    Lab Results   Component Value Date    LABURIC 5.9 08/17/2020       PT/INR:    Lab Results   Component Value Date    PROTIME 17.0 08/17/2020    INR 1.46 08/17/2020     Warfarin PT/INR:  No components found for: PTPATWAR, PTINRWAR  PTT:  No results found for: APTT, PTT[APTT}  Last 3 Troponin:    Lab Results   Component Value Date    TROPONINI <0.01 08/16/2020       U/A:    Lab Results   Component Value Date    COLORU ORANGE 08/16/2020    PROTEINU TRACE 08/16/2020    PHUR 5.0 08/16/2020    WBCUA 3 08/16/2020    RBCUA 7 configuration of the liver as well as diffuse tiny hypoattenuating lesions throughout the liver suggestive of hepatic cirrhosis and siderotic nodules. No discrete mass lesion evident. 19 cm craniocaudal liver length. The spleen is mildly enlarged. The kidneys, gallbladder, adrenal glands and pancreas appear unremarkable. Multifocal collateral vessels are seen about the gastrohepatic ligament extending to the posterior mediastinum. GI/Bowel: Prominent diffuse thickening of the wall of the ascending colon. Other portions of colon and small bowel appear unremarkable with exception of few gas distended small bowel loops typical of adynamic ileus. There are diverticula involving the descending and sigmoid colon. Pelvis: Fluid collection within the pelvis without definite margins has air bubbles within it concerning for sequela from perforated bowel/abscess formation. Uterus and adnexal structures appear unremarkable. Peritoneum/Retroperitoneum: Abdominal ascites. Calcific atherosclerosis aorta. Bones/Soft Tissues: No acute superficial soft tissue or osseous structure abnormality evident. 1. Non loculated fluid and gas collection in the pelvis concerning for perforated viscus/possible abscess formation. 2. Prominent thickening of the ascending colon which may be related to infectious or inflammatory process or underlying neoplastic process. 3. Bowel-gas pattern typical of adynamic ileus. 4. Hepatic cirrhosis with numerous hepatic nodules suggestive of regenerative/siderotic nodules. MRI abdomen without and with IV contrast correlation recommended. Metastatic disease is a consideration. 5. Hepatosplenomegaly and multifocal collateral vessels about the stomach esophagus concerning for varices, all suggestive of portal venous hypertension. 6. Abdominal and pelvic ascites. 7.  Diverticulosis coli without CT evidence of acute diverticulitis.  Critical results were called by Dr. Zoë Wright to [facility] on 8/16/2020 at 18:09. Xr Radius Ulna Left (2 Views)    Result Date: 8/16/2020  EXAMINATION: TWO XRAY VIEWS OF THE LEFT FOREARM 8/16/2020 2:55 pm COMPARISON: None. HISTORY: ORDERING SYSTEM PROVIDED HISTORY: Fall TECHNOLOGIST PROVIDED HISTORY: Reason for exam:->Fall Reason for Exam: Frequent falls at home. Bruising mid shaft L forearm Acuity: Acute Type of Exam: Initial FINDINGS: There is no evidence of acute fracture. There is normal alignment. No acute joint abnormality. No focal osseous lesion. No focal soft tissue abnormality. No acute osseous abnormality. Ct Head Wo Contrast    Result Date: 8/16/2020  EXAMINATION: CT OF THE HEAD WITHOUT CONTRAST  8/16/2020 3:15 pm TECHNIQUE: CT of the head was performed without the administration of intravenous contrast. Dose modulation, iterative reconstruction, and/or weight based adjustment of the mA/kV was utilized to reduce the radiation dose to as low as reasonably achievable. COMPARISON: None. HISTORY: ORDERING SYSTEM PROVIDED HISTORY: Fall TECHNOLOGIST PROVIDED HISTORY: Reason for exam:->Fall Has a \"code stroke\" or \"stroke alert\" been called? ->No Reason for Exam: Fall Acuity: Acute Type of Exam: Initial FINDINGS: BRAIN/VENTRICLES: The ventricles and sulci are diffusely enlarged. Low attenuation is seen in the periventricular and subcortical white matter. No acute intracranial hemorrhage or acute infarct is identified. ORBITS: The visualized portion of the orbits demonstrate no acute abnormality. SINUSES: The visualized paranasal sinuses and mastoid air cells demonstrate no acute abnormality. SOFT TISSUES/SKULL:  No acute abnormality of the visualized skull or soft tissues. No acute intracranial abnormality.      Ct Cervical Spine Wo Contrast    Result Date: 8/16/2020  EXAMINATION: CT OF THE CERVICAL SPINE WITHOUT CONTRAST 8/16/2020 3:15 pm TECHNIQUE: CT of the cervical spine was performed without the administration of intravenous contrast. Multiplanar reformatted images are provided for review. Dose modulation, iterative reconstruction, and/or weight based adjustment of the mA/kV was utilized to reduce the radiation dose to as low as reasonably achievable. COMPARISON: None. HISTORY: ORDERING SYSTEM PROVIDED HISTORY: Fall TECHNOLOGIST PROVIDED HISTORY: Reason for exam:->Fall Reason for Exam: Fall Acuity: Acute Type of Exam: Initial FINDINGS: BONES/ALIGNMENT: There is no acute fracture or traumatic malalignment. DEGENERATIVE CHANGES: Multilevel degenerative changes. SOFT TISSUES: There is no prevertebral soft tissue swelling. No acute abnormality of the cervical spine. Xr Chest Portable    Result Date: 8/16/2020  EXAMINATION: ONE XRAY VIEW OF THE CHEST 8/16/2020 2:55 pm COMPARISON: None. HISTORY: ORDERING SYSTEM PROVIDED HISTORY: Fall TECHNOLOGIST PROVIDED HISTORY: Reason for exam:->Fall Reason for Exam: Frequent falls at home. Bruising mid shaft L forearm Acuity: Acute Type of Exam: Initial FINDINGS: The lungs are without acute focal process. There is no effusion or pneumothorax. The cardiomediastinal silhouette is without acute process. The osseous structures are without acute process. No acute process.

## 2020-08-19 NOTE — PROGRESS NOTES
[facility] PROGRESS NOTE    8/19/2020 1:26 PM        Name: Davidson Mayen . Admitted: 8/16/2020  Primary Care Provider: No primary care provider on file. (Tel: None)    Brief Course:    Patient is a 59-year-old  female with history of tobacco use who presented to ED after she was found on the floor by her daughter. Zeyad Hester per daughter, patient was probably on the floor all night.  Patient reports multiple falls over the last couple of months, decreased appetite and weight loss of over 60 obese in the last 1 year.  CT abdomen/pelvis in ED showed pelvic abscess versus viscus perforation.  Noted to have colonic wall thickening concerning for malignancy, liver cirrhosis and portal hypertension.  Patient has been smoking 2 PPD for over 50 years. Marijane Prader not have a PCP. Chas Brewer had cancer screening.     CC: History of multiple falls, found on floor at home  Subjective: Underwent placement of drain in the pelvic cavity for drainage of pelvic abscess. Noted to have purulent drain in the tube.   As per family, patient is more lethargic today.     Reviewed interval ancillary notes    Current Medications  lip balm petroleum free (PHYTOPLEX) stick, PRN  piperacillin-tazobactam (ZOSYN) 3.375 g in dextrose 5 % 50 mL IVPB extended infusion (mini-bag), Q8H  0.9 % sodium chloride infusion, Continuous  morphine injection 4 mg, Q4H PRN  sodium chloride flush 0.9 % injection 10 mL, 2 times per day  sodium chloride flush 0.9 % injection 10 mL, PRN  acetaminophen (TYLENOL) tablet 650 mg, Q6H PRN    Or  acetaminophen (TYLENOL) suppository 650 mg, Q6H PRN  polyethylene glycol (GLYCOLAX) packet 17 g, Daily PRN  promethazine (PHENERGAN) tablet 12.5 mg, Q6H PRN    Or  ondansetron (ZOFRAN) injection 4 mg, Q6H PRN  enoxaparin (LOVENOX) injection 40 mg, Daily      Objective:  /65   Pulse 88   Temp 97.9 °F (36.6 °C) (Oral)   Resp 18 Ht 5' 7\" (1.702 m)   Wt 217 lb 8 oz (98.7 kg)   SpO2 96%   BMI 34.07 kg/m²     Intake/Output Summary (Last 24 hours) at 8/19/2020 1326  Last data filed at 8/19/2020 1134  Gross per 24 hour   Intake 35 ml   Output 955 ml   Net -920 ml      Wt Readings from Last 3 Encounters:   08/19/20 217 lb 8 oz (98.7 kg)       General appearance: Obese white female, lethargic,  working with therapy during my visit  Cardiovascular: Regular rhythm, normal S1, S2. No murmur. No edema in lower extremities  Respiratory: Not using accessory muscles. Good inspiratory effort. Clear to auscultation bilaterally, no wheeze or crackles. GI: Abdomen soft, diffuse mild tenderness, not distended, normal bowel sounds  Musculoskeletal: No cyanosis in digits, neck supple  Neurology: CN 2-12 grossly intact. No speech or motor deficits  Psych: Normal affect.  Lethargic, oriented to place and person  Skin: Warm, dry, normal turgor  Extremity exam shows brisk capillary refill.  Peripheral pulses are palpable in lower extremities     Labs and Tests:  CBC:   Recent Labs     08/17/20  1028 08/18/20  0455 08/19/20  0452   WBC 19.2* 17.9* 12.9*   HGB 9.7* 8.9* 8.6*    218 191     BMP:    Recent Labs     08/17/20  1028 08/18/20  0455 08/19/20  0452   * 133* 135*   K 4.7 4.2 3.8   CL 99 102 108   CO2 23 19* 21   BUN 40* 29* 20   CREATININE 1.5* 1.1 0.9   GLUCOSE 88 76 90     Hepatic:   Recent Labs     08/16/20  1610 08/17/20  1028   AST 37 28   ALT 8* 8*   BILITOT 1.0 0.9   ALKPHOS 143* 141*     CT ABSCESS DRAINAGE W CATH PLACEMENT S&I   Final Result   Successful CT guided placement of a drainage catheter in the pelvic abscess. CT ABDOMEN PELVIS W IV CONTRAST Additional Contrast? Oral   Final Result   1. Pelvic air/fluid collection almost certainly reflects an abscess or giant   colonic diverticula. This may be the result of acute diverticulitis.    2. Mass lesion ascending colon almost certainly reflecting primary   adenocarcinoma of the colon. 3. Diffuse hepatic lesions with newly visualized (contrast enhancement) mass   at the hepatic dome. Hepatic metastatic disease is a consideration as is   cirrhosis with regenerative nodules and hepatocellular carcinoma. 4. Bilateral lower lobe pulmonary nodules suspicious for metastatic disease. 5. Abdominal and pelvic ascites. 6. Sequela from portosystemic shunting with numerous collateral vessels about   the stomach, left upper quadrant and distal esophagus. Relatively small   esophageal and gastric varices. 7.  Calcific atherosclerotic disease aorta. CT ABDOMEN PELVIS WO CONTRAST Additional Contrast? None   Final Result   1. Non loculated fluid and gas collection in the pelvis concerning for   perforated viscus/possible abscess formation. 2. Prominent thickening of the ascending colon which may be related to   infectious or inflammatory process or underlying neoplastic process. 3. Bowel-gas pattern typical of adynamic ileus. 4. Hepatic cirrhosis with numerous hepatic nodules suggestive of   regenerative/siderotic nodules. MRI abdomen without and with IV contrast   correlation recommended. Metastatic disease is a consideration. 5. Hepatosplenomegaly and multifocal collateral vessels about the stomach   esophagus concerning for varices, all suggestive of portal venous   hypertension. 6. Abdominal and pelvic ascites. 7.  Diverticulosis coli without CT evidence of acute diverticulitis. Critical results were called by Dr. Lucrecia Roche to [address] [place] [address]   on 8/16/2020 at 18:09. CT Cervical Spine WO Contrast   Final Result   No acute abnormality of the cervical spine. CT Head WO Contrast   Final Result   No acute intracranial abnormality. XR CHEST PORTABLE   Final Result   No acute process. XR RADIUS ULNA LEFT (2 VIEWS)   Final Result   No acute osseous abnormality.              Problem List  Active Problems:    Pelvic abscess in female    Septicemia [facility])  Resolved Problems:    * No resolved hospital problems. *       Assessment & Plan:     Colonic mass with possible mets to liver and lungs:  Liver cirrhosis and portal hypertension:  Oncology and GI service consulted.  Labs for further work-up ordered. Patient will be n.p.o. past midnight for EGD and colonoscopy with biopsy of colonic mass on Thursday.      Pelvic abscess:   Underwent abscess drainage and tube placement. Purulent drainage noted. Patient is getting morphine 1 mg IV every 6 as needed.     Hyponatremia:  Serum sodium level improved with iv fluids.       Renal insufficiency:  Improved compared to yesterday. Likely pre-renal cause.   Nephrology following.     IV Access: peripheral  Amado: no  Diet: Dietary Nutrition Supplements: Standard High Calorie Oral Supplement  DIET CLEAR LIQUID;  Code:Full Code  DVT PPX heparin sq tid  Disposition pending acute issues      Samantha Mistry MD   8/19/2020 1:26 PM

## 2020-08-19 NOTE — PROGRESS NOTES
Pt laying in bed at the start of shift, repositioned with Staff assist X2. Rates abdominal pain at 8/10, mid belly, denies any nausea, Vomiting or diarrhea.

## 2020-08-19 NOTE — PROGRESS NOTES
Occupational Therapy   Occupational Therapy Initial Assessment  Date: 2020   Patient Name: Lexis Mckeon  MRN: [id number]     : 1955    Date of Service: 2020    Discharge Recommendations:  Lexis Mckeon scored a 10/24 on the AM-PAC ADL Inpatient form. Current research shows that an AM-PAC score of 17 or less is typically not associated with a discharge to the patient's home setting. Based on the patient's AM-PAC score and their current ADL deficits, it is recommended that the patient have 3-5 sessions per week of Occupational Therapy at d/c to increase the patient's independence. Please see assessment section for further patient specific details. If patient discharges prior to next session this note will serve as a discharge summary. Please see below for the latest assessment towards goals. OT Equipment Recommendations  Equipment Needed: No  Other: TBD next level of care    Assessment   Performance deficits / Impairments: Decreased functional mobility ; Decreased ADL status; Decreased cognition;Decreased endurance;Decreased balance;Decreased safe awareness;Decreased high-level IADLs  Assessment: Pt is not at her baseline level of occupational function, based on the above deficits associated wtih pelvic abscess. Pt would benefit from continued skilled acute OT services to address these deficits. Treatment Diagnosis: Decreased ADL/IADL status, functional mobility, endurance, cognition, balance and safety awareness associated with pelvic abscess  Prognosis: Fair;Good  Decision Making: Medium Complexity  History: Pt 75 yo, lives alone, poor living conditions, per chart, has family locally, occasional Supervision for shower, otherwise Independent, furniture walks in the home, frequent falls. PMH: smoker.   Exam: ROM, MMT, 6 clicks, 7 performance deficits/impairments, evolving presentation  Assistance / Modification: Max cueing d/t cognitive deficits, Mod A bed mobility, transfers w/RW  OT Education: OT Role;Plan of Care;Transfer Training;Family Education  Patient Education: OT michelle, d/c recommendation. Pt needs continued reinforcement d/t cognitive deficits. Barriers to Learning: Cognition  REQUIRES OT FOLLOW UP: Yes  Activity Tolerance  Activity Tolerance: Treatment limited secondary to decreased cognition;Patient Tolerated treatment well;Patient limited by fatigue  Safety Devices  Safety Devices in place: Yes  Type of devices: All fall risk precautions in place;Call light within reach; Left in chair;Patient at risk for falls;Nurse notified; Chair alarm in place;Gait belt           Patient Diagnosis(es): The primary encounter diagnosis was Septicemia ([facility] [address] Diagnoses of PAPITO (acute kidney injury) ([facility] [address] Intra-abdominal free air of unknown etiology, and Fall, initial encounter were also pertinent to this visit. has no past medical history on file. has a past surgical history that includes Tonsillectomy and Eye surgery. Treatment Diagnosis: Decreased ADL/IADL status, functional mobility, endurance, cognition, balance and safety awareness associated with pelvic abscess      Restrictions  Restrictions/Precautions  Restrictions/Precautions: Fall Risk, Modified Diet(high fall risk; clear liquid diet)  Position Activity Restriction  Other position/activity restrictions: This is a 48-year-old female with apparent history of frequent falls who presents after being found on the ground by her family. When the patient arrived she was disheveled, incontinent. She denies any specific complaints. The patient's work-up today was extensive. Her work-up included a CT of the abdomen that shows findings worrisome for possible abscess versus perforation. Questionable colon mass with metastatic lesions to liver and lung. Diagnosed with sepsis.     Subjective   General  Chart Reviewed: Yes  Family / Caregiver Present: Yes(son & dtr; Family left during session.)  Referring Practitioner: Joseph Garcia MD , for if she needs it. Objective   Vision: Impaired(Not wearing)  Vision Exceptions: Wears glasses at all times  Hearing: Within functional limits    Orientation  Overall Orientation Status: Impaired  Orientation Level: Disoriented to time;Oriented to place;Oriented to situation;Oriented to person  Observation/Palpation  Observation: pelvic drain post L buttock area. Balance  Sitting Balance: Stand by assistance(~20-25 min on EOB)  Standing Balance: Moderate assistance(w/RW)  Standing Balance  Time: ~1 min  Activity: stand step transfer ~3 ft to chair  Comment: Max verbal and tactile cues to move walker and transfer to chair  Functional Mobility  Functional - Mobility Device: Rolling Walker  Activity: Other  Assist Level: Moderate assistance  Functional Mobility Comments: very slow, ~3 ft to chair, max cues, chair pulled up behind pt. ADL  Feeding: Minimal assistance;Bringing food to mouth assist;Increased time to complete; Beverage management;Verbal cueing;Setup(Needed assist to tip cup into mouth for drinking. Holding w/B hands, very slow. Used spoon to take 1 bite of lemon ice; holding lemon ice and not taking spoon to mouth.)  Grooming: Setup;Supervision(wash face)  UE Bathing: Moderate assistance; Increased time to complete(Pt with slow mobility, needing cueing throughout)  LE Bathing: Dependent/Total(anterior & posterior eulalio hygiene)  UE Dressing: Maximum assistance(gown)  LE Dressing: Dependent/Total(socks)  Coordination  Movements Are Fluid And Coordinated: No  Coordination and Movement description: Fine motor impairments;Right UE;Left UE;Tremors;Gross motor impairments     Bed mobility  Supine to Sit: Moderate assistance(HOB elevated)  Scooting: Moderate assistance  Transfers  Stand Step Transfers: Moderate assistance(w/RW)  Sit to stand:  Moderate assistance  Stand to sit: Moderate assistance  Transfer Comments: Removed pt's hand from walker and assisted in reaching to chair  Vision - Basic Assessment  Prior Vision: Wears glasses all the time  Visual History: Surgical intervention(unspecified)  Patient Visual Report: No visual complaint reported. Oculo Motor Control: (Unable to follow commands to track OT's finger. When told to look at OT's finger (at the side) and tell number of fingers, pt's response accurate.)  Vision Comments: Pt lying in bed gazing at the ceiling while talking to therapists. Cognition  Overall Cognitive Status: Exceptions  Arousal/Alertness: Delayed responses to stimuli;Inconsistent responses to stimuli  Following Commands: Follows one step commands with repetition; Follows one step commands with increased time; Inconsistently follows commands  Attention Span: Difficulty dividing attention; Attends with cues to redirect  Memory: Decreased recall of biographical Information;Decreased recall of recent events;Decreased short term memory  Safety Judgement: Decreased awareness of need for safety  Problem Solving: Decreased awareness of errors;Assistance required to correct errors made;Assistance required to implement solutions;Assistance required to generate solutions  Insights: Not aware of deficits  Initiation: Requires cues for all  Sequencing: Requires cues for all  Cognition Comment: Pt very slow and with difficulty processing commands.         Sensation  Overall Sensation Status: WFL        LUE AROM (degrees)  LUE AROM : WFL(for bed mobility and ADLs)  RUE AROM (degrees)  RUE AROM : WFL(for bed mobility and ADLs)  Right Hand AROM (degrees)  Right Hand AROM: WFL  LUE Strength  Gross LUE Strength: WFL  L Hand General: NT(WFL)  RUE Strength  Gross RUE Strength: WFL  R Hand General: NT(WFL)                   Plan   Plan  Times per week: 3-5  Current Treatment Recommendations: Patient/Caregiver Education & Training, Functional Mobility Training, Safety Education & Training, Self-Care / ADL, Endurance Training, Cognitive Reorientation      AM-PAC Score        AM-PAC Inpatient Daily Activity Raw Score: 10 (08/19/20 1341)  AM-PAC Inpatient ADL T-Scale Score : 27.31 (08/19/20 1341)  ADL Inpatient CMS 0-100% Score: 74.7 (08/19/20 1341)  ADL Inpatient CMS G-Code Modifier : CL (08/19/20 1341)    Goals  Short term goals  Time Frame for Short term goals: Discharge  Short term goal 1: SBA for bed mobility  Short term goal 2: S/U for feeding/grooming  Short term goal 3: Supervision for UB bathing/Min A for UB dressing  Short term goal 4: Mod A for LB bathing/dressing  Short term goal 5: CGA for functional transfers to ADL surfaces w/RW  Long term goals  Time Frame for Long term goals : LTG=STG  Long term goal 1: CGA for functional  mobiltiy w/RW for ADL activity       Therapy Time   Individual Concurrent Group Co-treatment   Time In 1204         Time Out 1305         Minutes 61               Timed Code Treatment Minutes:  46    Total Treatment Minutes: 39 Amaris Del Angel [id number], OTR/L, [id number]

## 2020-08-19 NOTE — PLAN OF CARE
Problem: Falls - Risk of:  Goal: Will remain free from falls  Description: Will remain free from falls  8/19/2020 0247 by Aide Ponce RN  Outcome: Met This Shift  8/18/2020 1431 by Arvin Seymour RN  Outcome: Ongoing     Problem: Skin Integrity:  Goal: Will show no infection signs and symptoms  Description: Will show no infection signs and symptoms  Outcome: Met This Shift     Problem: Safety:  Goal: Free from accidental physical injury  Description: Free from accidental physical injury  Outcome: Met This Shift     Problem: Daily Care:  Goal: Daily care needs are met  Description: Daily care needs are met  8/19/2020 0247 by Aide Ponce RN  Outcome: Met This Shift  8/18/2020 1431 by Arvin Seymour RN  Outcome: Ongoing     Problem: Pain:  Goal: Patient's pain/discomfort is manageable  Description: Patient's pain/discomfort is manageable  Outcome: Met This Shift

## 2020-08-19 NOTE — PROGRESS NOTES
[facility] Laparoscopic [facility]        Progress Note    Patient Name: Joseph Shaikh  MRN: [id number]  YOB: 1955  Date of Evaluation: 2020    Chief Complaint: Abdominal pain    Subjective:  No acute events overnight  Pain improving, denies any at present  Denies nausea or vomiting, tolerating full liquids  Resting in bed at this time      Vital Signs:  Patient Vitals for the past 24 hrs:   BP Temp Temp src Pulse Resp SpO2 Weight   20 0512 99/63 97.9 °F (36.6 °C) Oral 78 18 94 % 217 lb 8 oz (98.7 kg)   20 0026 116/75 97.6 °F (36.4 °C) Oral 82 18 95 % --   20 2030 111/70 98.3 °F (36.8 °C) Oral 90 20 94 % --   20 1600 119/75 98.3 °F (36.8 °C) Oral 89 18 95 % --      TEMPERATURE HISTORY 24H: Temp (24hrs), Av °F (36.7 °C), Min:97.6 °F (36.4 °C), Max:98.3 °F (36.8 °C)    BLOOD PRESSURE HISTORY: Systolic (15MGP), KCC:887 , Min:99 , WW    Diastolic (86ITY), QIF:92, Min:62, Max:82      Intake/Output:  I/O last 3 completed shifts:  In: -   Out: 1085 [Urine:910; Drains:175]  I/O this shift:  In: 35 [P.O.:25; I.V.:10]  Out: -   Drain/tube Output:  Closed/Suction Drain Left Back Bulb 10 Urdu-Output (ml): 45 ml    Physical Exam:  General: awake, alert, oriented to  person, place, time--delayed responses  Cardiovascular:  regular rate and rhythm and no murmur noted  Lungs: clear to auscultation  Abdomen: soft, non-distended, nontender, bowel sounds present   Drain: yellow, cloudy    Labs:  CBC:    Recent Labs     20  1028 20  0455 20  0452   WBC 19.2* 17.9* 12.9*   HGB 9.7* 8.9* 8.6*   HCT 29.1* 27.4* 27.1*    218 191     BMP:    Recent Labs     20  1028 20  0455 20  0452   * 133* 135*   K 4.7 4.2 3.8   CL 99 102 108   CO2 23 19* 21   BUN 40* 29* 20   CREATININE 1.5* 1.1 0.9   GLUCOSE 88 76 90     Hepatic:    Recent Labs     20  1610 20  1028   AST 37 28   ALT 8* 8*   BILITOT 1.0 0.9   ALKPHOS abscess possibly diverticular in origin Reason for Exam: Pelvic Abscess Acuity: Unknown Type of Exam: Unknown SEDATION: 1 mgversed and 50 mcg fentanyl were titrated intravenously for moderate sedation monitored under my direction. Total intraservice time of sedation was 21 minutes. The patient's vital signs were monitored throughout the procedure and recorded in the patient's medical record by the nurse. TECHNIQUE: Informed consent was obtained after a detailed explanation of the procedure including risks, benefits, and alternatives. Universal protocol was followed. The patient was placed on the CT table in the prone position. A suitable skin site was prepped and draped in sterile fashion following CT localization. An 18 gauge needle was advanced under CT guidance into the pelvic fluid collection and a 0.035 guidewire was used to place a 10 Dutch abscess drainage catheter after the fashion tract was dilated. The catheter was sutured to the skin and the patient tolerated the procedure well. The catheter was attached to CAROLINE suction drainage. Dose modulation, iterative reconstruction, and/or weight based adjustment of the mA/kV was utilized to reduce the radiation dose to as low as reasonably achievable. DLP: 201.39 mGy-cm Estimated blood loss: Less than 5 cc FINDINGS: A total of 80 mL of purulent fluid was removed and sent for diagnostic tests. Successful CT guided placement of a drainage catheter in the pelvic abscess. Ct Abdomen Pelvis W Iv Contrast Additional Contrast? Oral    Result Date: 8/17/2020  EXAMINATION: CT OF THE ABDOMEN AND PELVIS WITH CONTRAST 8/17/2020 5:35 pm TECHNIQUE: CT of the abdomen and pelvis was performed with the administration of intravenous contrast. Multiplanar reformatted images are provided for review. Dose modulation, iterative reconstruction, and/or weight based adjustment of the mA/kV was utilized to reduce the radiation dose to as low as reasonably achievable. COMPARISON: CT abdomen and pelvis 08/16/2020 HISTORY: ORDERING SYSTEM PROVIDED HISTORY: Pelvic abscess on non contrast CT Reason for Exam: follow up scan from yesterday Acuity: Acute Type of Exam: Subsequent/Follow-up FINDINGS: Lower Chest: Minimal subsegmental atelectasis posterior both lung bases. Pulmonary nodules right and left lower lobes. Cardiac and posterior mediastinal structures visualized are unremarkable. Liver: Hepatic morphologic features of cirrhosis with diffuse tiny nodular densities throughout the liver. Not evident on the CT performed yesterday's an area of masslike density posterior at the hepatic dome axial image 31 measuring 2.8 cm. Other organs: The spleen, pancreas, adrenal glands, gallbladder and kidneys appear unremarkable. GI/Bowel: Multifocal diverticula involve the descending and sigmoid colon without evidence of acute inflammatory change. Inflammatory changes may be present in the pelvis adjacent to apparent abscess, however evaluation the bowel is significantly limited because of the presence of ascites as well. There is prominent masslike thickening involving the ascending colon concerning for malignancy, conglomerate member measurement 9 x 12 x 10 cm axial image 114, coronal image 81. Pelvis: As demonstrated on yesterday's study there is a prominent air-fluid level collection, following contrast showing a thin rim of enhancement, separate from the bowel, 5.5 x 10 cm axial series 2, image 164, craniocaudal length 7 cm on coronal image 119. Prominent hypoattenuation central uterus, 5 cm axial series 2, image 158. Ascites is noted. Urinary bladder is decompressed by Amado catheter. Peritoneum/Retroperitoneum: Ascites is present. 12 mm ann hepatis lymph node axial image 70.  13 mm portacaval lymph node axial image 79. Multifocal collateral vessels are seen about the spleen, stomach and distal esophagus.  Bones/Soft Tissues: No acute superficial soft tissue or osseous structure abnormality evident. 1. Pelvic air/fluid collection almost certainly reflects an abscess or giant colonic diverticula. This may be the result of acute diverticulitis. 2. Mass lesion ascending colon almost certainly reflecting primary adenocarcinoma of the colon. 3. Diffuse hepatic lesions with newly visualized (contrast enhancement) mass at the hepatic dome. Hepatic metastatic disease is a consideration as is cirrhosis with regenerative nodules and hepatocellular carcinoma. 4. Bilateral lower lobe pulmonary nodules suspicious for metastatic disease. 5. Abdominal and pelvic ascites. 6. Sequela from portosystemic shunting with numerous collateral vessels about the stomach, left upper quadrant and distal esophagus. Relatively small esophageal and gastric varices. 7.  Calcific atherosclerotic disease aorta. Scheduled Meds:   piperacillin-tazobactam  3.375 g Intravenous Q8H    sodium chloride flush  10 mL Intravenous 2 times per day    enoxaparin  40 mg Subcutaneous Daily     Continuous Infusions:   sodium chloride 100 mL/hr at 08/19/20 1009     PRN Meds:.lip balm petroleum free, morphine, sodium chloride flush, acetaminophen **OR** acetaminophen, polyethylene glycol, promethazine **OR** ondansetron      Assessment:  76 y.o. female admitted with   1. Septicemia ([facility] [address]    2. PAPITO (acute kidney injury) ([facility] [address]    3. Intra-abdominal free air of unknown etiology    4. Fall, initial encounter        Intraabdominal/pelvic abscess, status-post CT-guided drain placement on 8/18/2020  Abnormal CT of colon/liver concerning for malignancy  Sepsis   Cirrhosis  Acute kidney injury  Hyponatremia      Plan:  1. Pain and labs improving, nontender on exam this morning, no plans for surgical intervention at this time; anticipate probable colonoscopy with GI tomorrow  2. Drain care and monitoring  3. Full liquid diet as tolerated  4. IV hydration; monitor and correct electrolytes  5.  Antibiotics; leukocytosis improving, afebrile  6. Activity as tolerated  7. PRN analgesics and antiemetics--minimizing narcotics as tolerated  8. DVT prophylaxis with Lovenox  9. Management of medical comorbid etiologies per primary team and consulting services    EDUCATION:  Educated patient on plan of care and disease process--all questions answered. Plans discussed with patient and nursing. Reviewed and discussed with Dr. Eduardo Quevedo.       Signed:  VIN Carlin - CNP  8/19/2020 12:44 PM     Surg Staff:  Pt seen and examined with NP  See full note above  Pt has had less pain over the past day  Drain working well for abscess  Will cont atbx, do prep, and do colonoscopy tomorrow    Soham Pagan

## 2020-08-20 NOTE — PROGRESS NOTES
[facility] PROGRESS NOTE    8/20/2020 7:04 PM        Name: Lexis Mckeon . Admitted: 8/16/2020  Primary Care Provider: No primary care provider on file. (Tel: None)    Brief Course:    Patient is a 77-year-old  female with history of tobacco use who presented to ED after she was found on the floor by her daughter. Chrystie Peal per daughter, patient was probably on the floor all night.  Patient reports multiple falls over the last couple of months, decreased appetite and weight loss of over 60 obese in the last 1 year.  CT abdomen/pelvis in ED showed pelvic abscess versus viscus perforation.  Noted to have colonic wall thickening concerning for malignancy, liver cirrhosis and portal hypertension.  Patient has been smoking 2 PPD for over 50 years. [facility] not have a PCP. Justin Salinas had cancer screening. Pt CT-guided placement of drain for pelvic abscess on 8/18. Patient is scheduled to undergo colonoscopy with biopsy of colonic mass today, 8/20.     CC: History of multiple falls, found on floor at home  Subjective: Drain into pleural cavity noted to put out scant purulent fluid. Patient's mental status improved after lowering the dose of IV morphine yesterday. No acute events reported overnight. Currently n.p.o. for colonoscopy.   Reviewed interval ancillary notes    Current Medications  ciprofloxacin (CIPRO) IVPB 400 mg, Q12H  [START ON 8/21/2020] pantoprazole (PROTONIX) tablet 40 mg, QAM AC  morphine (PF) injection 1 mg, Q6H PRN  lip balm petroleum free (PHYTOPLEX) stick, PRN  0.9 % sodium chloride infusion, Continuous  sodium chloride flush 0.9 % injection 10 mL, 2 times per day  sodium chloride flush 0.9 % injection 10 mL, PRN  acetaminophen (TYLENOL) tablet 650 mg, Q6H PRN    Or  acetaminophen (TYLENOL) suppository 650 mg, Q6H PRN  polyethylene glycol (GLYCOLAX) packet 17 g, Daily PRN  promethazine (PHENERGAN) tablet 12.5 mg, Q6H PRN    Or  ondansetron (ZOFRAN) injection 4 mg, Q6H PRN  enoxaparin (LOVENOX) injection 40 mg, Daily        Objective:  /66   Pulse 76   Temp 98.1 °F (36.7 °C) (Temporal)   Resp 16   Ht 5' 7\" (1.702 m)   Wt 219 lb 11.2 oz (99.7 kg)   SpO2 91%   BMI 34.41 kg/m²     Intake/Output Summary (Last 24 hours) at 8/20/2020 1904  Last data filed at 8/20/2020 1258  Gross per 24 hour   Intake 2550 ml   Output 1425 ml   Net 1125 ml      Wt Readings from Last 3 Encounters:   08/20/20 219 lb 11.2 oz (99.7 kg)     General appearance: Obese white female, lethargic,  working with therapy during my visit  Cardiovascular: Regular rhythm, normal S1, S2. No murmur. No edema in lower extremities  Respiratory: Not using accessory muscles. Good inspiratory effort. Clear to auscultation bilaterally, no wheeze or crackles. GI: Abdomen soft, diffuse mild tenderness, not distended, normal bowel sounds  Neurology: CN 2-12 grossly intact. No speech or motor deficits  Extremity exam shows brisk capillary refill.  Peripheral pulses are palpable in lower extremities     Labs and Tests:  CBC:   Recent Labs     08/18/20  0455 08/19/20  0452 08/20/20  0501   WBC 17.9* 12.9* 16.8*   HGB 8.9* 8.6* 9.9*    191 225     BMP:    Recent Labs     08/18/20  0455 08/19/20  0452 08/20/20  0501   * 135* 139   K 4.2 3.8 3.5    108 109   CO2 19* 21 18*   BUN 29* 20 19   CREATININE 1.1 0.9 0.8   GLUCOSE 76 90 107*     Hepatic: No results for input(s): AST, ALT, ALB, BILITOT, ALKPHOS in the last 72 hours. CT ABSCESS DRAINAGE W CATH PLACEMENT S&I   Final Result   Successful CT guided placement of a drainage catheter in the pelvic abscess. CT ABDOMEN PELVIS W IV CONTRAST Additional Contrast? Oral   Final Result   1. Pelvic air/fluid collection almost certainly reflects an abscess or giant   colonic diverticula. This may be the result of acute diverticulitis.    2. Mass lesion ascending colon almost certainly reflecting primary   adenocarcinoma of the colon. 3. Diffuse hepatic lesions with newly visualized (contrast enhancement) mass   at the hepatic dome. Hepatic metastatic disease is a consideration as is   cirrhosis with regenerative nodules and hepatocellular carcinoma. 4. Bilateral lower lobe pulmonary nodules suspicious for metastatic disease. 5. Abdominal and pelvic ascites. 6. Sequela from portosystemic shunting with numerous collateral vessels about   the stomach, left upper quadrant and distal esophagus. Relatively small   esophageal and gastric varices. 7.  Calcific atherosclerotic disease aorta. CT ABDOMEN PELVIS WO CONTRAST Additional Contrast? None   Final Result   1. Non loculated fluid and gas collection in the pelvis concerning for   perforated viscus/possible abscess formation. 2. Prominent thickening of the ascending colon which may be related to   infectious or inflammatory process or underlying neoplastic process. 3. Bowel-gas pattern typical of adynamic ileus. 4. Hepatic cirrhosis with numerous hepatic nodules suggestive of   regenerative/siderotic nodules. MRI abdomen without and with IV contrast   correlation recommended. Metastatic disease is a consideration. 5. Hepatosplenomegaly and multifocal collateral vessels about the stomach   esophagus concerning for varices, all suggestive of portal venous   hypertension. 6. Abdominal and pelvic ascites. 7.  Diverticulosis coli without CT evidence of acute diverticulitis. Critical results were called by Dr. Keven Ramirez to [address] [place] [address]   on 8/16/2020 at 18:09. CT Cervical Spine WO Contrast   Final Result   No acute abnormality of the cervical spine. CT Head WO Contrast   Final Result   No acute intracranial abnormality. XR CHEST PORTABLE   Final Result   No acute process. XR RADIUS ULNA LEFT (2 VIEWS)   Final Result   No acute osseous abnormality.              Problem List  Active Problems:    Pelvic abscess in female    Septicemia [facility])  Resolved Problems:    * No resolved hospital problems. *       Assessment & Plan:     Colonic mass with possible mets to liver and lungs:  Liver cirrhosis and portal hypertension:  Oncology and GI service consulted. Patient will undergo colonoscopy with biopsy of colonic mass today. EGD planned if possible.     Pelvic abscess:   Underwent CT-guided drain placement on 8/18. Purulent drainage noted. Patient is getting morphine 1 mg IV every 6hrs as needed. Lethargy:  Reported yesterday. Improved after lowering the dose of Morphine IV yesterday.      Hyponatremia:  Serum sodium level improved with iv fluids.       Renal insufficiency:  Improving renal parameters.  Etiology is likely pre-renal cause.      IV Access: peripheral  Amado: no  Diet: Dietary Nutrition Supplements: Standard High Calorie Oral Supplement  DIET CLEAR LIQUID;  Code:Full Code  DVT PPX heparin sq tid  Disposition pending acute issues      Nelsy Ragland MD   8/20/2020 7:04 PM

## 2020-08-20 NOTE — PROGRESS NOTES
MD Douglas Nava MD Lizabeth Citizen, MD               Office: [phone]                      Fax: [phone]              [facility]                   NEPHROLOGY INPATIENT PROGRESS NOTE:     PATIENT NAME: Dana Donohue  : 1955  MRN: [id number]      IMPRESSION:       PAPITO (on ? CKD): improving. ..   : Oligouric -> non-oliguric   - BL SCr - unknown ---> 1.6, eGFR 32  on admission  : Etiology of PAPITO - presumed pre-renal / early ATN / unlikely HRS     - other differentials: ruled out obstruction , unlikely AIN or GN,   - UA : bili, trace pro, LE - follow Ucx, ? AIN - unlikely    : large bl. + only 7 RBC = myoglobulinemia   - Renal imaging: w/ CT - kidney unremarkable   - CK - 310 on admission   - Pro-calcitonin 5.6   - urine lytes - all very lower = pre-renal ,    : likely hypovolemia - not concerning for HRS or CRS     Associated problems:   - Azotemia: moderate -\ ? pre-renal - now improving  - Electrolytes: K: WNL  : check Mag in am - order   - Volume status: mild hypo-volemic, although w/ ascites - better   : Na: Hyponatremia - moderate - 127 on admission - likely hypovolemia / PAPITO / Cirrhosis - improving at goal   : BP: Ok to keep slightly higher   - Acid-Base: Acidosis - mild - non-AGMA - d/to LA / PAPITO / GI loss - improving   - Anemia: worsening - follow        RECOMMENDATIONS:   - no need for IVF   - allow PO hydration   - avoid hypotonic fluids like LR as w/ recent hyponatremia  - empirical iv abx- f/u UCx.  **  - Sx consult f/u      Other problems: Management per primary and other consulting teams.      # Cirrhosis , Portal HTN : new diagnosis, no h/o EtOH, w/u ordered by GI     # Sepsis, intra-abdominal abscess   - status-post CT-guided drain placement on 2020  [facility] Problems           Last Modified POA    Pelvic abscess in female 2020 Yes    Septicemia ([facility] [address] 2020 Yes          : other supportive care :   - Check daily renal function panel with electrolytes-phosphorus  - Strict monitoring of I/Os, daily weight  - Renal feeds/diet  - Current medications reviewed. - Nephrotoxic medications have been discontinued. - Dose adjusted and appropriate. - Dose meds for eGFR <15 mL/min/1.73m2 during PAPITO    - Avoid heavy opioids due to renal failure - may use very low dose dilaudid / fentanyl with close monitoring of CNS and respiratory depression. Please refer to the orders. High Complexity. Multiple complex problems. Thank you for allowing me to participate in this patient's care. Please do not hesitate to contact me with any questions/concerns. We will follow along with you. Yue Mendes MD       [facility] [address] [place] [address] [place]: [phone] or Via [employer]  Fax: [phone]        ========================================================   ========================================================     Nonbillable note    SUBJECTIVE:  Chart reviewed today only, unable to see patient, was in EGD,    Past medical, Surgical, Social, Family medical history reviewed by me. MEDICATIONS: reviewed by me. Medications Prior to Admission:  No current facility-administered medications on file prior to encounter. No current outpatient medications on file prior to encounter. No medications prior to admission. Scheduled Meds:   piperacillin-tazobactam  3.375 g Intravenous Q8H    sodium chloride flush  10 mL Intravenous 2 times per day    enoxaparin  40 mg Subcutaneous Daily     Continuous Infusions:   sodium chloride 100 mL/hr at 08/19/20 1009     PRN Meds:.morphine, lip balm petroleum free, sodium chloride flush, acetaminophen **OR** acetaminophen, polyethylene glycol, promethazine **OR** ondansetron       PHYSICAL EXAM:  Recent vital signs and recent I/Os reviewed by me.      Wt Readings from Last 3 Encounters:   08/19/20 217 lb 8 oz (98.7 kg)     BP Readings from Last 3 Encounters: 08/20/20 137/80     Patient Vitals for the past 24 hrs:   BP Temp Temp src Pulse Resp SpO2   08/20/20 0420 137/80 97.6 °F (36.4 °C) Axillary 91 16 95 %   08/20/20 0025 (!) 145/83 97.6 °F (36.4 °C) Oral 97 20 95 %   08/19/20 2242 (!) 144/79 97.8 °F (36.6 °C) Axillary 91 19 98 %   08/19/20 1544 133/85 98.2 °F (36.8 °C) Oral 84 18 92 %   08/19/20 1300 107/65 -- -- 88 18 96 %       Intake/Output Summary (Last 24 hours) at 8/20/2020 0900  Last data filed at 8/20/2020 0552  Gross per 24 hour   Intake 2035 ml   Output 1875 ml   Net 160 ml           DATA:  Diagnostic tests reviewed by me for today's visit:    Recent Labs     08/17/20  1028 08/18/20  0455 08/19/20  0452 08/20/20  0501   WBC 19.2* 17.9* 12.9* 16.8*   HCT 29.1* 27.4* 27.1* 32.1*    218 191 225     Iron Saturation:  No components found for: PERCENTFE  FERRITIN:    Lab Results   Component Value Date    FERRITIN 542.4 08/17/2020     IRON:    Lab Results   Component Value Date    IRON 26 08/17/2020     TIBC:    Lab Results   Component Value Date    TIBC 106 08/17/2020       Recent Labs     08/17/20  1028 08/18/20  0455 08/19/20  0452 08/20/20  0501   * 133* 135* 139   K 4.7 4.2 3.8 3.5   CL 99 102 108 109   CO2 23 19* 21 18*   BUN 40* 29* 20 19   CREATININE 1.5* 1.1 0.9 0.8     Recent Labs     08/17/20  1028 08/18/20  0455 08/19/20  0452 08/20/20  0501   CALCIUM 8.9 8.2* 8.4 8.7   PHOS  --  4.3 3.3 3.4     No results for input(s): PH, PCO2, PO2 in the last 72 hours.     Invalid input(s): [place]    ABG:  No results found for: PH, PCO2, PO2, HCO3, BE, THGB, TCO2, O2SAT  VBG:  No results found for: PHVEN, ZHZ2ADC, BEVEN, B0MFZVRL    LDH:  No results found for: LDH  Uric Acid:    Lab Results   Component Value Date    LABURIC 5.9 08/17/2020       PT/INR:    Lab Results   Component Value Date    PROTIME 17.0 08/17/2020    INR 1.46 08/17/2020     Warfarin PT/INR:  No components found for: PTPATWAR, PTINRWAR  PTT:  No results found for: APTT, PTT[APTT}  Last 3 Troponin:    Lab Results   Component Value Date    TROPONINI <0.01 08/16/2020       U/A:    Lab Results   Component Value Date    COLORU ORANGE 08/16/2020    PROTEINU TRACE 08/16/2020    PHUR 5.0 08/16/2020    WBCUA 3 08/16/2020    RBCUA 7 08/16/2020    BACTERIA RARE 08/16/2020    CLARITYU CLOUDY 08/16/2020    SPECGRAV 1.022 08/16/2020    LEUKOCYTESUR SMALL 08/16/2020    UROBILINOGEN 1.0 08/16/2020    BILIRUBINUR SMALL 08/16/2020    BLOODU LARGE 08/16/2020    GLUCOSEU Negative 08/16/2020     Microalbumen/Creatinine ratio:  No components found for: RUCREAT  24 Hour Urine for Protein:  No components found for: RAWUPRO, UHRS3, KOII24BI, UTV3  24 Hour Urine for Creatinine Clearance:  No components found for: CREAT4, UHRS10, UTV10  Urine Toxicology:  No components found for: IAMMENTA, IBARBIT, IBENZO, ICOCAINE, IMARTHC, IOPIATES, IPHENCYC    HgBA1c:  No results found for: LABA1C  RPR:  No results found for: RPR  HIV:  No results found for: HIV  KULWANT:  No results found for: ANATITER, KULWANT  RF:  No results found for: RF  DSDNA:  No components found for: DNA  AMYLASE:  No results found for: AMYLASE  LIPASE:  No results found for: LIPASE  Fibrinogen Level:  No components found for: FIB       BELOW MENTIONED RADIOLOGY STUDY RESULTS BY ME:    Ct Abdomen Pelvis Wo Contrast Additional Contrast? None    Result Date: 8/16/2020  EXAMINATION: CT OF THE ABDOMEN AND PELVIS WITHOUT CONTRAST 8/16/2020 5:11 pm TECHNIQUE: CT of the abdomen and pelvis was performed without the administration of intravenous contrast. Multiplanar reformatted images are provided for review. Dose modulation, iterative reconstruction, and/or weight based adjustment of the mA/kV was utilized to reduce the radiation dose to as low as reasonably achievable. COMPARISON: None.  HISTORY: Acute nausea FINDINGS: Lower Chest: FOUR solid soft tissue pulmonary nodules right lower lobe, 7 x 8 mm (axial image 19), 11 x 12 mm (axial 11), 9 x 9 mm (axial image 10) and 4 x 4 mm (axial image 10). 3 mm pulmonary nodules are seen posterior basal segment left lower lobe. Mild nodular fullness about the distal esophagus. Heart size is upper normal. Organs: Prominent caudate lobe hypertrophy and poly lobular configuration of the liver as well as diffuse tiny hypoattenuating lesions throughout the liver suggestive of hepatic cirrhosis and siderotic nodules. No discrete mass lesion evident. 19 cm craniocaudal liver length. The spleen is mildly enlarged. The kidneys, gallbladder, adrenal glands and pancreas appear unremarkable. Multifocal collateral vessels are seen about the gastrohepatic ligament extending to the posterior mediastinum. GI/Bowel: Prominent diffuse thickening of the wall of the ascending colon. Other portions of colon and small bowel appear unremarkable with exception of few gas distended small bowel loops typical of adynamic ileus. There are diverticula involving the descending and sigmoid colon. Pelvis: Fluid collection within the pelvis without definite margins has air bubbles within it concerning for sequela from perforated bowel/abscess formation. Uterus and adnexal structures appear unremarkable. Peritoneum/Retroperitoneum: Abdominal ascites. Calcific atherosclerosis aorta. Bones/Soft Tissues: No acute superficial soft tissue or osseous structure abnormality evident. 1. Non loculated fluid and gas collection in the pelvis concerning for perforated viscus/possible abscess formation. 2. Prominent thickening of the ascending colon which may be related to infectious or inflammatory process or underlying neoplastic process. 3. Bowel-gas pattern typical of adynamic ileus. 4. Hepatic cirrhosis with numerous hepatic nodules suggestive of regenerative/siderotic nodules. MRI abdomen without and with IV contrast correlation recommended. Metastatic disease is a consideration.  5. Hepatosplenomegaly and multifocal collateral vessels about the stomach abnormality. Ct Cervical Spine Wo Contrast    Result Date: 8/16/2020  EXAMINATION: CT OF THE CERVICAL SPINE WITHOUT CONTRAST 8/16/2020 3:15 pm TECHNIQUE: CT of the cervical spine was performed without the administration of intravenous contrast. Multiplanar reformatted images are provided for review. Dose modulation, iterative reconstruction, and/or weight based adjustment of the mA/kV was utilized to reduce the radiation dose to as low as reasonably achievable. COMPARISON: None. HISTORY: ORDERING SYSTEM PROVIDED HISTORY: Fall TECHNOLOGIST PROVIDED HISTORY: Reason for exam:->Fall Reason for Exam: Fall Acuity: Acute Type of Exam: Initial FINDINGS: BONES/ALIGNMENT: There is no acute fracture or traumatic malalignment. DEGENERATIVE CHANGES: Multilevel degenerative changes. SOFT TISSUES: There is no prevertebral soft tissue swelling. No acute abnormality of the cervical spine. Xr Chest Portable    Result Date: 8/16/2020  EXAMINATION: ONE XRAY VIEW OF THE CHEST 8/16/2020 2:55 pm COMPARISON: None. HISTORY: ORDERING SYSTEM PROVIDED HISTORY: Fall TECHNOLOGIST PROVIDED HISTORY: Reason for exam:->Fall Reason for Exam: Frequent falls at home. Bruising mid shaft L forearm Acuity: Acute Type of Exam: Initial FINDINGS: The lungs are without acute focal process. There is no effusion or pneumothorax. The cardiomediastinal silhouette is without acute process. The osseous structures are without acute process. No acute process.

## 2020-08-20 NOTE — PROGRESS NOTES
Pt drank all of Moviprep by midnight. Pt had loose bowel movement, but not clear. Pt then had 1050ml clear emesis. Pt unable to clear bowels. Verbal order from [facility] [place] Sharon Guadalupe MD for mag citrate 120ml now. If no BM or not clear another dose in 2 hours. 2 doses given. Last dose @ 0618.

## 2020-08-20 NOTE — CARE COORDINATION
The Plan for Transition of Care is related to the following treatment goals: SNF    The Patient and/or patient representative daughter was provided with a choice of provider and agrees   with the discharge plan. [x] Yes [] No    Freedom of choice list was provided with basic dialogue that supports the patient's individualized plan of care/goals, treatment preferences and shares the quality data associated with the providers. [x] Yes [] No  Daughter requested a referral to [address] [place] [address]. [facility] [phone] has beds.   Notified MD via perfect serve, Re: Jazzmine Aguero,  therapy is recommending a SNF placement which will require a CoVid test.  Thank you Rene Ann  [phone]

## 2020-08-20 NOTE — ANESTHESIA POSTPROCEDURE EVALUATION
Department of Anesthesiology  Postprocedure Note    Patient: Alondra Rahman  MRN: [id number]  YOB: 1955  Date of evaluation: 8/20/2020  Time:  2:02 PM     Procedure Summary     Date:  08/20/20 Room / Location:  [address] [place] [address]    Anesthesia Start:  1204 Anesthesia Stop:  1301    Procedures:       EGD BIOPSY (N/A Abdomen)      COLONOSCOPY WITH BIOPSY (N/A )      COLONOSCOPY POLYPECTOMY SNARE/COLD BIOPSY Diagnosis:  (Colitis)    Surgeon:  Vern Hopkins MD Responsible Provider:  Manuel Oleary MD    Anesthesia Type:  MAC ASA Status:  3          Anesthesia Type: MAC    Madisyn Phase I: Madisyn Score: 9    Madisyn Phase II:      Last vitals: Reviewed and per EMR flowsheets.        Anesthesia Post Evaluation    Level of consciousness: awake  Complications: no

## 2020-08-20 NOTE — PROGRESS NOTES
[facility]        Progress Note    Patient Name: Alondra Rahman  MRN: [id number]  YOB: 1955  Date of Evaluation: 2020    Chief Complaint: Abdominal pain    Subjective:  Pt had poor tolerance of prep, with emesis, has had some BM tho  Pain the same  Resting in bed at this time  No CP or SOB  Has been voiding      Vital Signs:  Patient Vitals for the past 24 hrs:   BP Temp Temp src Pulse Resp SpO2 Weight   20 0957 126/61 96.9 °F (36.1 °C) Temporal 85 16 99 % --   20 0928 -- -- -- -- -- -- 219 lb 11.2 oz (99.7 kg)   20 0900 120/83 97.4 °F (36.3 °C) Oral 86 16 97 % --   20 0420 137/80 97.6 °F (36.4 °C) Axillary 91 16 95 % --   20 0025 (!) 145/83 97.6 °F (36.4 °C) Oral 97 20 95 % --   20 2242 (!) 144/79 97.8 °F (36.6 °C) Axillary 91 19 98 % --   20 1544 133/85 98.2 °F (36.8 °C) Oral 84 18 92 % --   20 1300 107/65 -- -- 88 18 96 % --      TEMPERATURE HISTORY 24H: Temp (24hrs), Av.6 °F (36.4 °C), Min:96.9 °F (36.1 °C), Max:98.2 °F (36.8 °C)    BLOOD PRESSURE HISTORY: Systolic (28VCG), PRD:367 , Min:99 , DOQ:341    Diastolic (78TTR), JUSTIN:32, Min:61, Max:85      Intake/Output:  I/O last 3 completed shifts: In:  [P.O.:; I.V.:10]  Out:  [Urine:800; Emesis/NG output:1050; Drains:25]  No intake/output data recorded.   Drain/tube Output:  Closed/Suction Drain Left Back Bulb 10 Danish-Output (ml): 25 ml    Physical Exam:  General: awake, alert, oriented to  person, place, a little more focused today  Cardiovascular:  regular rate and rhythm and no murmur noted  Lungs: clear to auscultation  Abdomen: soft, non-distended, lower, right mildly tender, bowel sounds present   Drain: yellow, cloudy - fairly low output    Labs:  CBC:    Recent Labs     20  0455 20  0452 20  0501   WBC 17.9* 12.9* 16.8*   HGB 8.9* 8.6* 9.9*   HCT 27.4* 27.1* 32.1*    191 225     BMP:    Recent Labs 08/18/20  0455 08/19/20  0452 08/20/20  0501   * 135* 139   K 4.2 3.8 3.5    108 109   CO2 19* 21 18*   BUN 29* 20 19   CREATININE 1.1 0.9 0.8   GLUCOSE 76 90 107*     Hepatic:    No results for input(s): AST, ALT, ALB, BILITOT, ALKPHOS in the last 72 hours. Amylase:  No results found for: AMYLASE  Lipase:  No results found for: LIPASE   Mag:  No results found for: MG  Phos:     Lab Results   Component Value Date    PHOS 3.4 08/20/2020    PHOS 3.3 08/19/2020      Coags:   Lab Results   Component Value Date    PROTIME 17.0 08/17/2020    INR 1.46 08/17/2020       Cultures:  Anaerobic culture  Lab Results   Component Value Date    LABANAE Anaerobic culture further report to follow 08/18/2020     Fungus stain  No results found for requested labs within last 30 days. Gram stain  Results in Past 30 Days  Result Component Current Result Ref Range Previous Result Ref Range   Gram Stain Result 1+ WBC's (Mononuclear)  1+ WBC's (Polymorphonuclear)  2+ Gram negative rods   (8/18/2020)  Not in Time Range      Organism  Lab Results   Component Value Date/Time    ORG Escherichia coli (A) 08/18/2020 11:45 AM     Surgical culture  No results found for: CXSURG  Blood culture 1  Results in Past 30 Days  Result Component Current Result Ref Range Previous Result Ref Range   Blood Culture, Routine No Growth to date. Any change in status will be called. (8/16/2020)  Not in Time Range      Blood culture 2  Results in Past 30 Days  Result Component Current Result Ref Range Previous Result Ref Range   Culture, Blood 2 No Growth to date. Any change in status will be called. (8/16/2020)  Not in Time Range      Fecal occult  No results found for requested labs within last 30 days. GI bacterial pathogens by PCR  No results found for requested labs within last 30 days. C. difficile  No results found for requested labs within last 30 days.      Urine culture  No results found for: LABURIN    Pathology:  No relevant pathology     Imaging:  I have personally reviewed the following films:    Ct Abscess Drainage W Cath Placement S&i    Result Date: 8/18/2020  PROCEDURE: CT GUIDEDPELVICABSCESS DRAINAGE CATHETER PLACEMENT MODERATE CONSCIOUS SEDATION <Completed Date> HISTORY: ORDERING SYSTEM PROVIDED HISTORY: Pelvic abscess possibly diverticular in origin TECHNOLOGIST PROVIDED HISTORY: Reason for exam:->Pelvic abscess possibly diverticular in origin Reason for Exam: Pelvic Abscess Acuity: Unknown Type of Exam: Unknown SEDATION: 1 mgversed and 50 mcg fentanyl were titrated intravenously for moderate sedation monitored under my direction. Total intraservice time of sedation was 21 minutes. The patient's vital signs were monitored throughout the procedure and recorded in the patient's medical record by the nurse. TECHNIQUE: Informed consent was obtained after a detailed explanation of the procedure including risks, benefits, and alternatives. Universal protocol was followed. The patient was placed on the CT table in the prone position. A suitable skin site was prepped and draped in sterile fashion following CT localization. An 18 gauge needle was advanced under CT guidance into the pelvic fluid collection and a 0.035 guidewire was used to place a 10 Jordanian abscess drainage catheter after the fashion tract was dilated. The catheter was sutured to the skin and the patient tolerated the procedure well. The catheter was attached to CAROLINE suction drainage. Dose modulation, iterative reconstruction, and/or weight based adjustment of the mA/kV was utilized to reduce the radiation dose to as low as reasonably achievable. DLP: 201.39 mGy-cm Estimated blood loss: Less than 5 cc FINDINGS: A total of 80 mL of purulent fluid was removed and sent for diagnostic tests. Successful CT guided placement of a drainage catheter in the pelvic abscess.      Ct Abdomen Pelvis W Iv Contrast Additional Contrast? Oral    Result Date: 8/17/2020  EXAMINATION: CT OF THE ABDOMEN AND PELVIS WITH CONTRAST 8/17/2020 5:35 pm TECHNIQUE: CT of the abdomen and pelvis was performed with the administration of intravenous contrast. Multiplanar reformatted images are provided for review. Dose modulation, iterative reconstruction, and/or weight based adjustment of the mA/kV was utilized to reduce the radiation dose to as low as reasonably achievable. COMPARISON: CT abdomen and pelvis 08/16/2020 HISTORY: ORDERING SYSTEM PROVIDED HISTORY: Pelvic abscess on non contrast CT Reason for Exam: follow up scan from yesterday Acuity: Acute Type of Exam: Subsequent/Follow-up FINDINGS: Lower Chest: Minimal subsegmental atelectasis posterior both lung bases. Pulmonary nodules right and left lower lobes. Cardiac and posterior mediastinal structures visualized are unremarkable. Liver: Hepatic morphologic features of cirrhosis with diffuse tiny nodular densities throughout the liver. Not evident on the CT performed yesterday's an area of masslike density posterior at the hepatic dome axial image 31 measuring 2.8 cm. Other organs: The spleen, pancreas, adrenal glands, gallbladder and kidneys appear unremarkable. GI/Bowel: Multifocal diverticula involve the descending and sigmoid colon without evidence of acute inflammatory change. Inflammatory changes may be present in the pelvis adjacent to apparent abscess, however evaluation the bowel is significantly limited because of the presence of ascites as well. There is prominent masslike thickening involving the ascending colon concerning for malignancy, conglomerate member measurement 9 x 12 x 10 cm axial image 114, coronal image 81. Pelvis: As demonstrated on yesterday's study there is a prominent air-fluid level collection, following contrast showing a thin rim of enhancement, separate from the bowel, 5.5 x 10 cm axial series 2, image 164, craniocaudal length 7 cm on coronal image 119.   Prominent hypoattenuation central uterus, 5 cm axial series 2, image 158. Ascites is noted. Urinary bladder is decompressed by Amado catheter. Peritoneum/Retroperitoneum: Ascites is present. 12 mm ann hepatis lymph node axial image 70.  13 mm portacaval lymph node axial image 79. Multifocal collateral vessels are seen about the spleen, stomach and distal esophagus. Bones/Soft Tissues: No acute superficial soft tissue or osseous structure abnormality evident. 1. Pelvic air/fluid collection almost certainly reflects an abscess or giant colonic diverticula. This may be the result of acute diverticulitis. 2. Mass lesion ascending colon almost certainly reflecting primary adenocarcinoma of the colon. 3. Diffuse hepatic lesions with newly visualized (contrast enhancement) mass at the hepatic dome. Hepatic metastatic disease is a consideration as is cirrhosis with regenerative nodules and hepatocellular carcinoma. 4. Bilateral lower lobe pulmonary nodules suspicious for metastatic disease. 5. Abdominal and pelvic ascites. 6. Sequela from portosystemic shunting with numerous collateral vessels about the stomach, left upper quadrant and distal esophagus. Relatively small esophageal and gastric varices. 7.  Calcific atherosclerotic disease aorta. Scheduled Meds:   ciprofloxacin  400 mg Intravenous Q12H    sodium chloride flush  10 mL Intravenous 2 times per day    enoxaparin  40 mg Subcutaneous Daily     Continuous Infusions:   sodium chloride 100 mL/hr at 08/19/20 1009     PRN Meds:.morphine, lip balm petroleum free, sodium chloride flush, acetaminophen **OR** acetaminophen, polyethylene glycol, promethazine **OR** ondansetron      Assessment:  76 y.o. female admitted with   1. Septicemia ([facility] [address]    2. PAPITO (acute kidney injury) ([facility] [address]    3. Intra-abdominal free air of unknown etiology    4.  Fall, initial encounter        Intraabdominal/pelvic abscess, status-post CT-guided drain placement on 8/18/2020  Abnormal CT of colon/liver concerning

## 2020-08-20 NOTE — ANESTHESIA PRE PROCEDURE
Department of Anesthesiology  Preprocedure Note       Name:  Agustin Márquez   Age:  76 y.o.  :  1955                                          MRN:  [id number]         Date:  2020      Surgeon: Curtis Matta):  Corby Polk MD    Procedure: Procedure(s):  EGD DIAGNOSTIC ONLY  COLONOSCOPY DIAGNOSTIC    Medications prior to admission:   Prior to Admission medications    Not on File       Current medications:    Current Facility-Administered Medications   Medication Dose Route Frequency Provider Last Rate Last Dose    ciprofloxacin (CIPRO) IVPB 400 mg  400 mg Intravenous Q12H Lesley Smith MD        morphine (PF) injection 1 mg  1 mg Intravenous Q6H PRN Lesley Smith MD        lip balm petroleum free (PHYTOPLEX) stick   Topical PRN Lesley Smith MD        0.9 % sodium chloride infusion   Intravenous Continuous Eva Mccrary  mL/hr at 20 1009      sodium chloride flush 0.9 % injection 10 mL  10 mL Intravenous 2 times per day Eva Mccrary MD   10 mL at 20 0905    sodium chloride flush 0.9 % injection 10 mL  10 mL Intravenous PRN Eva Mccrary MD        acetaminophen (TYLENOL) tablet 650 mg  650 mg Oral Q6H PRN Eva Mccrary MD        Or   [facility] acetaminophen (TYLENOL) suppository 650 mg  650 mg Rectal Q6H PRN Eva Mccrary MD        polyethylene glycol (GLYCOLAX) packet 17 g  17 g Oral Daily PRN Eva Mccrary MD        promethazine (PHENERGAN) tablet 12.5 mg  12.5 mg Oral Q6H PRN Eva Mccrary MD        Or    ondansetron (ZOFRAN) injection 4 mg  4 mg Intravenous Q6H PRN Eva Mccrary MD   4 mg at 20 0319    enoxaparin (LOVENOX) injection 40 mg  40 mg Subcutaneous Daily Eva Mccrary MD   40 mg at 20 0859       Allergies:     Allergies   Allergen Reactions    Pcn [Penicillins] Other (See Comments)     Unknown reaction per pt        Problem List:    Patient Active Problem List   Diagnosis Code    Pelvic abscess in female N73.9    Septicemia ([facility] [address] A41.9       Past Medical History:  History reviewed. No pertinent past medical history. Past Surgical History:        Procedure Laterality Date    EYE SURGERY      TONSILLECTOMY         Social History:    Social History     Tobacco Use    Smoking status: Current Every Day Smoker     Packs/day: 0.50     Years: 50.00     Pack years: 25.00     Types: Cigarettes   Substance Use Topics    Alcohol use: Never     Frequency: Never                                Ready to quit: Not Answered  Counseling given: Not Answered      Vital Signs (Current):   Vitals:    08/20/20 0420 08/20/20 0900 08/20/20 0928 08/20/20 0957   BP: 137/80 120/83  126/61   Pulse: 91 86  85   Resp: 16 16  16   Temp: 97.6 °F (36.4 °C) 97.4 °F (36.3 °C)  96.9 °F (36.1 °C)   TempSrc: Axillary Oral  Temporal   SpO2: 95% 97%  99%   Weight:   219 lb 11.2 oz (99.7 kg)    Height:                                                  BP Readings from Last 3 Encounters:   08/20/20 126/61       NPO Status: Time of last liquid consumption: 0630(mag citrate)                        Time of last solid consumption: 0000(unknown )                        Date of last liquid consumption: 08/20/20                        Date of last solid food consumption: 08/19/20    BMI:   Wt Readings from Last 3 Encounters:   08/20/20 219 lb 11.2 oz (99.7 kg)     Body mass index is 34.41 kg/m².     CBC:   Lab Results   Component Value Date    WBC 16.8 08/20/2020    RBC 3.61 08/20/2020    HGB 9.9 08/20/2020    HCT 32.1 08/20/2020    MCV 88.7 08/20/2020    RDW 16.6 08/20/2020     08/20/2020       CMP:   Lab Results   Component Value Date     08/20/2020    K 3.5 08/20/2020    K 4.7 08/16/2020     08/20/2020    CO2 18 08/20/2020    BUN 19 08/20/2020    CREATININE 0.8 08/20/2020    GFRAA >60 08/20/2020    AGRATIO 0.3 08/16/2020    LABGLOM >60 08/20/2020    GLUCOSE 107 08/20/2020    PROT 6.7 08/17/2020    CALCIUM 8.7 08/20/2020    BILITOT 0.9 08/17/2020    ALKPHOS 141 08/17/2020    AST 28 08/17/2020    ALT 8 08/17/2020       POC Tests:   Recent Labs     08/19/20  1324   POCGLU 99       Coags:   Lab Results   Component Value Date    PROTIME 17.0 08/17/2020    INR 1.46 08/17/2020       HCG (If Applicable): No results found for: PREGTESTUR, PREGSERUM, HCG, HCGQUANT     ABGs: No results found for: PHART, PO2ART, CJO5SDN, MNC8FZW, BEART, E5FVBPTR     Type & Screen (If Applicable):  No results found for: LABABO, LABRH    Drug/Infectious Status (If Applicable):  No results found for: HIV, HEPCAB    COVID-19 Screening (If Applicable): No results found for: COVID19      Anesthesia Evaluation  Patient summary reviewed and Nursing notes reviewed no history of anesthetic complications:   Airway: Mallampati: II  TM distance: >3 FB   Neck ROM: full  Mouth opening: > = 3 FB Dental:      Comment: Very poor dentition    Pulmonary:       (-) asthma and shortness of breath                           Cardiovascular:        (-) hypertension and  angina                Neuro/Psych:      (-) CVA            ROS comment: Multiple falls recently GI/Hepatic/Renal:   (+) renal disease: ARF,      (-) GERD and liver disease      ROS comment: 8/17 ct    1. Pelvic air/fluid collection almost certainly reflects an abscess or giant   colonic diverticula.  This may be the result of acute diverticulitis. 2. Mass lesion ascending colon almost certainly reflecting primary   adenocarcinoma of the colon. 3. Diffuse hepatic lesions with newly visualized (contrast enhancement) mass   at the hepatic dome.  Hepatic metastatic disease is a consideration as is   cirrhosis with regenerative nodules and hepatocellular carcinoma. 4. Bilateral lower lobe pulmonary nodules suspicious for metastatic disease. 5. Abdominal and pelvic ascites. 6. Sequela from portosystemic shunting with numerous collateral vessels about   the stomach, left upper quadrant and distal esophagus.  Relatively small   esophageal and gastric varices.    7.  Calcific atherosclerotic disease aorta. .   Endo/Other:        (-) diabetes mellitus, hypothyroidism               Abdominal:           Vascular:     - PVD. Anesthesia Plan      MAC     ASA 3       Induction: intravenous. Anesthetic plan and risks discussed with patient. Plan discussed with CRNA.                   Chichi Mcgarry MD   8/20/2020

## 2020-08-20 NOTE — BRIEF OP NOTE
Brief Postoperative Note - Full Note in Chart Review/Procedures tab       Patient: Jazmine Franco  YOB: 1955  MRN: [id number]    Date of Procedure: 8/20/2020    Pre-Op Diagnosis:  Colitis    Post-Op Diagnosis: Necrotic ascending colon mass      Left colon diverticulosis     Colon polyps       Procedure(s):  EGD BIOPSY  COLONOSCOPY WITH BIOPSY  COLONOSCOPY POLYPECTOMY SNARE/COLD BIOPSY    Surgeon(s):  Claire Ellington MD    Assistant:  * No surgical staff found *    Anesthesia: Monitor Anesthesia Care    Estimated Blood Loss (mL): Minimal    Complications: None    Specimens:   ID Type Source Tests Collected by Time Destination   A : Bx GEJ Tissue Tissue SURGICAL PATHOLOGY Claire Ellington MD 8/20/2020 1223    B : Bx Mass Ascending Colon Tissue Tissue SURGICAL PATHOLOGY Claire Ellington MD 8/20/2020 1239    C : Polyp Transverse colon Tissue Tissue SURGICAL PATHOLOGY Claire Ellington MD 8/20/2020 1250    D : Polyp Descending  colon Tissue Tissue SURGICAL PATHOLOGY Claire Ellington MD 8/20/2020 1251    E : Polyp Sigmoid  colon Tissue Tissue SURGICAL PATHOLOGY Claire Ellington MD 8/20/2020 1256        Implants:  * No implants in log *      Drains:   Closed/Suction Drain Left Back Bulb 10 [place] (Active)   Dressing Status Clean;Dry; Intact 08/20/20 0420   Drainage Appearance Yellow;Purulent 08/20/20 0420   Status Compressed 08/20/20 0420   Output (ml) 25 ml 08/20/20 0420       Urethral Catheter (Active)   $ Urethral catheter insertion $ Not inserted for procedure 08/19/20 0830   Catheter Indications Need for fluid management in critically ill patients in a critical care setting not able to be managed by other means such as BSC with hat, bedpan, urinal, condom catheter, or short term intermittent urethral catherization 08/20/20 0904   Site Assessment Pink 08/20/20 0904   Urine Color Roopa 08/20/20 0904   Urine Appearance Hazy 08/20/20 0904   Urine Odor Malodorous 08/20/20 0904   Output (mL) 350 mL 08/20/20 0452       Findings:  1) LA class C erosive esophagitis    2) Nodularity at GE junction - cold biopsied    3) Large necrotic ascending colon mass - biopsied => likely source of abscess. 4) 10 mm sessile polyp in transverse colon - cold snared    5) 5 mm descending colon polyp - cold snared    6) 8 mm pedunculated polyp and 6 mm polyp in sigmoid colon - hot snared. 7) Moderate sigmoid colon diverticulosis    Rec:  1) Check pathology. 2) Full liquid diet  3) Continue Antibiotics  4) Pantoprazole 40 mg daily  5) Follow up as inpatient.     Electronically signed by Renita Pate MD on 8/20/2020 at 1:01 PM

## 2020-08-20 NOTE — DISCHARGE INSTR - COC
Continuity of Care Form    Patient Name: North Perez   :  1955  MRN:  [id number]    Admit date:  2020  Discharge date:  20    Code Status Order: Full Code   Advance Directives:   [address] [facility] Documentation       Date/Time Healthcare Directive Type of Healthcare Directive Copy in [address] [facility] [address] Agent's Name Healthcare Agent's Phone Number    [phone]  No, patient does not have an advance directive for healthcare treatment -- -- -- -- --            Admitting Physician:  Parminder Garcia MD  PCP: No primary care provider on file. Discharging Nurse: [facility] Unit/Room#: 3LY-4686/6898-90  Discharging Unit Phone Number: [phone]    Emergency Contact:   Extended Emergency Contact Information  Primary Emergency Contact: Cordelia Phone: [phone]  Relation: Child  Secondary Emergency Contact: Nate Graham Phone: [phone]  Relation: Brother/Sister    Past Surgical History:  Past Surgical History:   Procedure Laterality Date    COLONOSCOPY N/A 2020    COLONOSCOPY WITH BIOPSY performed by Collin Stanton MD at [address] [facility]  2020    COLONOSCOPY POLYPECTOMY SNARE/COLD BIOPSY performed by Collin Stanton MD at [address] [facility]      UPPER GASTROINTESTINAL ENDOSCOPY N/A 2020    EGD BIOPSY performed by Collin Stanton MD at [address] [facility] ENDOSCOPY       Immunization History: There is no immunization history on file for this patient.     Active Problems:  Patient Active Problem List   Diagnosis Code    Pelvic abscess in female N73.9    Septicemia ([facility] [address] A41.9       Isolation/Infection:   Isolation            No Isolation          Patient Infection Status       None to display            Nurse Assessment:  Last Vital Signs: /66   Pulse 76   Temp 98.1 °F (36.7 °C) (Temporal)   Resp 16   Ht 5' 7\" (1.702 m)   Wt 219 lb 11.2 oz (99.7 kg)   SpO2 91%   BMI 34.41 kg/m²     Last documented pain score (0-10 scale): Pain Level: 0  Last Weight:   Wt Readings from Last 1 Encounters:   08/20/20 219 lb 11.2 oz (99.7 kg)     Mental Status:  oriented, alert and confusion at times    IV Access:  - PICC - site  R Basilic, insertion date: 9/3/20    Nursing Mobility/ADLs:  Walking   Assisted  Transfer  Assisted  Bathing  Assisted  Dressing  Assisted  Toileting  Assisted  Feeding  Assisted/independent  Med Admin  Assisted  Med Delivery   whole    Wound Care Documentation and Therapy:        Elimination:  Continence:   · Bowel: Yes  · Bladder: Yes  Urinary Catheter: None   Colostomy/Ileostomy/Ileal Conduit: No       Date of Last BM: 9/9/20    Intake/Output Summary (Last 24 hours) at 8/20/2020 1548  Last data filed at 8/20/2020 1258  Gross per 24 hour   Intake 2560 ml   Output 1425 ml   Net 1135 ml     I/O last 3 completed shifts: In: 46 [P.O.:2010; I.V.:550]  Out: 5 [Urine:800; Emesis/NG output:1050; Drains:25]    Safety Concerns: At risk for falls    Impairments/Disabilities:      Vision    Nutrition Therapy:  Current Nutrition Therapy:   - Oral Diet:  Carb Control 4 carbs/meal (1800kcals/day)    Routes of Feeding: Oral  Liquids: Thin Liquids  Daily Fluid Restriction: no  Last Modified Barium Swallow with Video (Video Swallowing Test): not done    Treatments at the Time of Hospital Discharge:   Respiratory Treatments:   Oxygen Therapy:  is not on home oxygen therapy.   Ventilator:    - No ventilator support    Rehab Therapies: Physical Therapy and Occupational Therapy  Weight Bearing Status/Restrictions: No weight bearing restirctions  Other Medical Equipment (for information only, NOT a DME order):  walker  Other Treatments:     Patient's personal belongings (please select all that are sent with patient):  Glasses    RN SIGNATURE:  Electronically signed by Alysa Morales RN on 9/9/20 at 2:19 PM EDT    CASE MANAGEMENT/SOCIAL Ian  65. Status Date: 08/16/2020    Readmission Risk Assessment Score:  Readmission Risk              Risk of Unplanned Readmission:        10         Discharging to Facility/ Agency   · Name:  [facility]  · Address:  · Phone:[phone]  · Fax:[phone]    / signature: Electronically signed by JAMILA Skinner on 9/9/2020 at 1:33 PM      PHYSICIAN SECTION    Prognosis: Fair    Condition at Discharge: Stable    Rehab Potential (if transferring to Rehab): Fair    Recommended Labs or Other Treatments After Discharge:                           Out-patient antibiotic therapy (OPAT)/ Antibiotic Infusion orders          Diagnosis: Intra-abdominal abscess       Organism/ culture: E. coli, lactobacillus, yeast     Name and dose of Antimicrobial: *IV ertapenem 1 g every 24 hour, IV fluconazole 20 mg every 24 hour     Antimicrobial start date: calculated from 9/2/2020     Antimicrobial completion date planned: 9/14/2020     Lab monitoring: CBC, Chem 12* once a week, to be       collected every Monday or Tuesday morning until the patient is off IV  antibiotics. Fax weekly lab results to Merlin Longoria MD's office at        [phone]. Please maintain PICC line until the patient is on IV antibiotics. ** Please notify Merlin Longoria MD's office with any change in patient's        status, transfer out of facility or to hospital by calling [phone]           Outpatient Follow up: No outpatient ID f/u needed if continues to do well. Due to COVID 19 pandemic, if needed, a f/u video visit can be arranged via my office at patient's request on as-needed basis.          Physician Signature:  Electronically signed by Merlin Longoria MD on                                                               9/2/20 at 2:28 PM EDT           Physician Certification: I certify the above information and transfer of Rox Dumont  is necessary for the continuing treatment of the diagnosis listed and that she requires [facility] for less 30 days.      Update Admission H&P: No change in H&P    PHYSICIAN SIGNATURE:  Electronically signed by Keesha Pedersen MD on 9/9/20 at 1:25 PM EDT

## 2020-08-21 NOTE — PROGRESS NOTES
Awake, resting quietly in bed, watching TV, pleasant. Denies having any pain. CAROLINE drain in place, dressing D/I.  VSS. Assessment completed, see flow charts. No distress noted. eula

## 2020-08-21 NOTE — PROGRESS NOTES
Comprehensive Nutrition Assessment    Type and Reason for Visit:  Reassess    Nutrition Recommendations/Plan:   1. Diet advancement per GI  2. Continue to offer Ensure Enlive BID  3. Encourage PO intake  4. Document all PO intake in flow sheets     Nutrition Assessment:  Pt continues to be nutritionally compromised. Pt was on clear liquid diet 8/19 for colonoscopy prep and then NPO most of the day 8/20 for colonoscopy. PO intake both of these days was minimal; pt consuming less than 50% of meals. However, today pt's PO intake seems to be improving. Pt consuming about 75% of meals on full liquid diet and has been requesting popsicles frequently. Unsure if pt is taking Ensure Enlive; will continue to offer BID and monitor for tolerance. Malnutrition Assessment:  Malnutrition Status:  Insufficient data      Estimated Daily Nutrient Needs:  Energy (kcal):  1052-9890; Weight Used for Energy Requirements:  (continue to use 99 kg)     Protein (g):  73-92 grams; Weight Used for Protein Requirements:  Ideal(61 kg; 1.2-1.5 grams per kg)        Fluid (ml/day):  1 mL per kcal; Weight Used for Fluid Requirements:  Current      Nutrition Related Findings:  No edema noted. K+ 3.0. Phos 2.2. 0.9% NaCl at 100 mL/hr. Poor historian. Wounds:  None       Current Nutrition Therapies:    Dietary Nutrition Supplements: Standard High Calorie Oral Supplement  DIET FULL LIQUID; Anthropometric Measures:  · Height: 5' 7\" (170.2 cm)  · Current Body Weight: 219 lb (99.3 kg)   · Ideal Body Weight: 135 lbs   · BMI: 34.3  · BMI Categories: Obese Class 1 (BMI 30.0-34. 9)       Nutrition Diagnosis:   · Inadequate protein-energy intake related to inadequate protein-energy intake as evidenced by other (comment)(PO intake has been mostly less than 50% of meals on liquid diet x 4 days during admission)      Nutrition Interventions:   Food and/or Nutrient Delivery:  Continue Current Diet, Continue Oral Nutrition Supplement  Nutrition Education/Counseling:  Education not indicated   Coordination of Nutrition Care:  Continued Inpatient Monitoring    Goals:  Pt will consume at least 50% of meals and supplements       Nutrition Monitoring and Evaluation:   Food/Nutrient Intake Outcomes:  Food and Nutrient Intake, Supplement Intake    Discharge Planning:    Continue current diet, Continue Oral Nutrition Supplement     Electronically signed by Spike Boyer RD, LD on 8/21/20 at 12:27 PM EDT    Contact: [phone]

## 2020-08-21 NOTE — PROGRESS NOTES
[facility] GI  Gastroenterology Progress Note    Joseph Shaikh is a 76 y.o. female patient. Active Problems:    Pelvic abscess in female    Septicemia [facility])  Resolved Problems:    * No resolved hospital problems. *      SUBJECTIVE:  Reports some LLQ pain. ROS:  No fever, chills  No chest pain, palpitations  No SOB, cough  Gastrointestinal ROS: no N/V/D     Physical    VITALS:  /65   Pulse 76   Temp 97.6 °F (36.4 °C) (Axillary)   Resp 18   Ht 5' 7\" (1.702 m)   Wt 219 lb 11.2 oz (99.7 kg)   SpO2 95%   BMI 34.41 kg/m²   TEMPERATURE:  Current - Temp: 97.6 °F (36.4 °C); Max - Temp  Av.8 °F (36.6 °C)  Min: 97.6 °F (36.4 °C)  Max: 98.1 °F (36.7 °C)    NAD  Regular rate   Lungs CTA Bilaterally  Abdomen soft, ND, nontender,  No guarding or rebound. Bowel sounds normal.  Alert and oriented to place and time but not person. No asterixis. Data    Data Review:    Recent Labs     20  0452 20  0501 20  0509   WBC 12.9* 16.8* 10.7   HGB 8.6* 9.9* 8.9*   HCT 27.1* 32.1* 28.1*   MCV 86.7 88.7 90.6    225 150     Recent Labs     20  0452 20  0501 20  0509   * 139 137   K 3.8 3.5 3.0*    109 110   CO2 21 18* 20*   PHOS 3.3 3.4 2.2*   BUN 20 19 12   CREATININE 0.9 0.8 0.6     No results for input(s): AST, ALT, ALB, BILIDIR, BILITOT, ALKPHOS in the last 72 hours. No results for input(s): LIPASE, AMYLASE in the last 72 hours. No results for input(s): PROTIME, INR in the last 72 hours. No results for input(s): PTT in the last 72 hours. CT abd/pelvis w IV and PO contrast 20  Impression    1. Pelvic air/fluid collection almost certainly reflects an abscess or giant    colonic diverticula.  This may be the result of acute diverticulitis. 2. Mass lesion ascending colon almost certainly reflecting primary    adenocarcinoma of the colon.     3. Diffuse hepatic lesions with newly visualized (contrast enhancement) mass    at the hepatic dome.  Hepatic above, feels better today with less pain. Still lethargic, but more alert. Mild elevated NH3 not surprising given infection and likely underlying cirrhosis from [place]. Will start lactulose and continue PPI. Agree with plans for surgical exploration early next week.     Matteo Diego MD  [address]  8/21/2020

## 2020-08-21 NOTE — PROGRESS NOTES
[facility] Laparoscopic [facility]        Progress Note    Patient Name: Greg Valerio  MRN: [id number]  YOB: 1955  Date of Evaluation: 2020    Chief Complaint: Abdominal pain    Subjective:  No acute events overnight  Reports mild left-sided pain, none on right today  Denies nausea or vomiting; tolerating full liquids  No CP or SOB  Resting in bed at this time      Vital Signs:  Patient Vitals for the past 24 hrs:   BP Temp Temp src Pulse Resp SpO2   20 0809 101/65 97.6 °F (36.4 °C) Axillary 76 18 95 %   20 0515 112/71 97.6 °F (36.4 °C) Oral 71 16 96 %   20 0100 109/72 97.8 °F (36.6 °C) Oral 74 16 95 %   20 2134 114/72 98 °F (36.7 °C) Oral 80 16 95 %   20 1330 126/66 -- -- 76 16 91 %      TEMPERATURE HISTORY 24H: Temp (24hrs), Av.8 °F (36.6 °C), Min:97.6 °F (36.4 °C), Max:98 °F (36.7 °C)    BLOOD PRESSURE HISTORY: Systolic (44ICV), NMQ:254 , Min:90 , STR:944    Diastolic (83FKQ), DEK:48, Min:47, Max:83      Intake/Output:  I/O last 3 completed shifts: In: 2968 [I.V.:2968]  Out: 535 [Urine:525; Drains:10]  No intake/output data recorded.   Drain/tube Output:  Closed/Suction Drain Left Back Bulb 10 [place] citizen-Output (ml): 10 ml    Physical Exam:  General: awake, alert, oriented to person, place, a little more focused today  Cardiovascular:  regular rate and rhythm and no murmur noted  Lungs: clear to auscultation  Abdomen: soft, non-distended, minimal left-sided tenderness, bowel sounds present   Drain: yellow, thin--less cloudy    Labs:  CBC:    Recent Labs     20  0452 20  0501 20  0509   WBC 12.9* 16.8* 10.7   HGB 8.6* 9.9* 8.9*   HCT 27.1* 32.1* 28.1*    225 150     BMP:    Recent Labs     20  0452 20  0501 20  0509   * 139 137   K 3.8 3.5 3.0*    109 110   CO2 21 18* 20*   BUN 20 19 12   CREATININE 0.9 0.8 0.6   GLUCOSE 90 107* 92     Hepatic:    No results for input(s): AST, ALT, ALB, BILITOT, ALKPHOS in the last 72 hours. Amylase:  No results found for: AMYLASE  Lipase:  No results found for: LIPASE   Mag:  No results found for: MG  Phos:     Lab Results   Component Value Date    PHOS 2.2 08/21/2020    PHOS 3.4 08/20/2020      Coags:   Lab Results   Component Value Date    PROTIME 17.0 08/17/2020    INR 1.46 08/17/2020       Cultures:  Anaerobic culture  Lab Results   Component Value Date    LABANAE  08/18/2020     Anaerobic culture further report to follow  No anaerobes isolated so far, Further report to follow       Fungus stain  No results found for requested labs within last 30 days. Gram stain  Results in Past 30 Days  Result Component Current Result Ref Range Previous Result Ref Range   Gram Stain Result 1+ WBC's (Mononuclear)  1+ WBC's (Polymorphonuclear)  2+ Gram negative rods   (8/18/2020)  Not in Time Range      Organism  Lab Results   Component Value Date/Time    ORG Escherichia coli (A) 08/18/2020 11:45 AM     Surgical culture  No results found for: CXSURG  Blood culture 1  Results in Past 30 Days  Result Component Current Result Ref Range Previous Result Ref Range   Blood Culture, Routine No Growth after 4 days of incubation. (8/16/2020)  Not in Time Range      Blood culture 2  Results in Past 30 Days  Result Component Current Result Ref Range Previous Result Ref Range   Culture, Blood 2 No Growth after 4 days of incubation. (8/16/2020)  Not in Time Range      Fecal occult  No results found for requested labs within last 30 days. GI bacterial pathogens by PCR  No results found for requested labs within last 30 days. C. difficile  No results found for requested labs within last 30 days. Urine culture  No results found for: LABURIN    Pathology:  Pathology results pending     Imaging:  I have personally reviewed the following films:    No results found.     Scheduled Meds:   potassium phosphate (monobasic)  250 mg Oral BID    lactulose  20 g Oral TID    ciprofloxacin  400 mg Intravenous Q12H    pantoprazole  40 mg Oral QAM AC    sodium chloride flush  10 mL Intravenous 2 times per day    enoxaparin  40 mg Subcutaneous Daily     Continuous Infusions:   sodium chloride 100 mL/hr at 08/21/20 0226     PRN Meds:.potassium chloride **OR** potassium alternative oral replacement **OR** potassium chloride, morphine, lip balm petroleum free, sodium chloride flush, acetaminophen **OR** acetaminophen, polyethylene glycol, promethazine **OR** ondansetron      Assessment:  76 y.o. female admitted with   1. Septicemia ([facility] [address]    2. PAPITO (acute kidney injury) ([facility] [address]    3. Intra-abdominal free air of unknown etiology    4. Fall, initial encounter        Intraabdominal/pelvic abscess, status-post CT-guided drain placement on 8/18/2020  Abnormal CT of colon/liver concerning for malignancy  Sepsis   Cirrhosis  Acute kidney injury  Hyponatremia      Plan:  1. Large necrotic ascending colon mass, pathology pending; planning for right hemicolectomy, possible subtotal colectomy on Monday with Dr. Ana Lilia Bean  2. Hold at full liquid diet as tolerated; monitor bowel function, planning for bowel prep on Sunday  3. IV hydration until PO intake is adequate; monitor and correct electrolytes  4. Antibiotics  5. Activity as tolerated, ambulate TID, up to chair for all meals--PT/OT evaluation and treatment  6. Pulmonary toilet, incentive spirometry  7. PRN analgesics and antiemetics--minimizing narcotics as tolerated  8. DVT prophylaxis with Lovenox  9. Management of medical comorbid etiologies per primary team and consulting services    EDUCATION:  Educated patient on plan of care and disease process--all questions answered. Plans discussed with patient and nursing. Reviewed and discussed with Dr. Ana Lilia Bean.       Signed:  VIN Ramirez - CNP  8/21/2020 2:08 PM     Surg Staff;   Pt seen and examined with NP  See full note above  Doing better today  Will cont drain and atbx over the weekend, prep Sunday and explore Monday for Right colectomy, possible subtotal colectomy    Alex Thapa

## 2020-08-21 NOTE — PROGRESS NOTES
[facility] PRO[facility]    8/21/2020 11:48 AM        Name: Lloyd Mejia . Admitted: 8/16/2020  Primary Care Provider: No primary care provider on file. (Tel: None)    Brief Course:    Patient is a 58-year-old  female with history of tobacco use who presented to ED after she was found on the floor by her daughter. Vianne Rejich per daughter, patient was probably on the floor all night.  Patient reports multiple falls over the last couple of months, decreased appetite and weight loss of over 60 obese in the last 1 year.  CT abdomen/pelvis in ED showed pelvic abscess versus viscus perforation.  Noted to have colonic wall thickening concerning for malignancy, liver cirrhosis and portal hypertension.  Patient has been smoking 2 PPD for over 50 years. Ann Marie Estevez not have a PCP. Huma Found had cancer screening. Pt CT-guided placement of drain for pelvic abscess on 8/18. Underwent colonoscopy with biopsy of colonic mass today, 8/20.     CC: History of multiple falls, found on floor at home  Subjective: Pt is more proactive today. Offers no complaints. Denies pain, or SOB.     Reviewed interval ancillary notes    Current Medications  potassium chloride (KLOR-CON M) extended release tablet 40 mEq, PRN    Or  potassium bicarb-citric acid (EFFER-K) effervescent tablet 40 mEq, PRN    Or  potassium chloride 10 mEq/100 mL IVPB (Peripheral Line), PRN  potassium phosphate (monobasic) (K-PHOS) tablet 250 mg, BID  ciprofloxacin (CIPRO) IVPB 400 mg, Q12H  pantoprazole (PROTONIX) tablet 40 mg, QAM AC  morphine (PF) injection 1 mg, Q6H PRN  lip balm petroleum free (PHYTOPLEX) stick, PRN  0.9 % sodium chloride infusion, Continuous  sodium chloride flush 0.9 % injection 10 mL, 2 times per day  sodium chloride flush 0.9 % injection 10 mL, PRN  acetaminophen (TYLENOL) tablet 650 mg, Q6H PRN    Or  acetaminophen (TYLENOL) suppository 650 mg, Q6H PRN  polyethylene glycol (GLYCOLAX) packet 17 g, Daily PRN  promethazine (PHENERGAN) tablet 12.5 mg, Q6H PRN    Or  ondansetron (ZOFRAN) injection 4 mg, Q6H PRN  enoxaparin (LOVENOX) injection 40 mg, Daily        Objective:  /65   Pulse 76   Temp 97.6 °F (36.4 °C) (Axillary)   Resp 18   Ht 5' 7\" (1.702 m)   Wt 219 lb 11.2 oz (99.7 kg)   SpO2 95%   BMI 34.41 kg/m²     Intake/Output Summary (Last 24 hours) at 8/21/2020 1148  Last data filed at 8/21/2020 7216  Gross per 24 hour   Intake 2968 ml   Output 535 ml   Net 2433 ml      Wt Readings from Last 3 Encounters:   08/20/20 219 lb 11.2 oz (99.7 kg)       General appearance: Obese white female, lethargic, more awake and talking today  Cardiovascular: Regular rhythm, normal S1, S2. No murmur. No edema in lower extremities  Respiratory: Not using accessory muscles. Good inspiratory effort. Clear to auscultation bilaterally, no wheeze or crackles. GI: Abdomen soft, diffuse mild tenderness, not distended, normal bowel sounds, pelvic abd drain in place with scant purulent drain  Neurology: CN 2-12 grossly intact. No speech or motor deficits  Extremity exam shows brisk capillary refill.  Peripheral pulses are palpable in lower extremities     Labs and Tests:  CBC:   Recent Labs     08/19/20  0452 08/20/20  0501 08/21/20  0509   WBC 12.9* 16.8* 10.7   HGB 8.6* 9.9* 8.9*    225 150     BMP:    Recent Labs     08/19/20  0452 08/20/20  0501 08/21/20  0509   * 139 137   K 3.8 3.5 3.0*    109 110   CO2 21 18* 20*   BUN 20 19 12   CREATININE 0.9 0.8 0.6   GLUCOSE 90 107* 92     Hepatic: No results for input(s): AST, ALT, ALB, BILITOT, ALKPHOS in the last 72 hours. CT ABSCESS DRAINAGE W CATH PLACEMENT S&I   Final Result   Successful CT guided placement of a drainage catheter in the pelvic abscess. CT ABDOMEN PELVIS W IV CONTRAST Additional Contrast? Oral   Final Result   1.  Pelvic air/fluid collection almost certainly reflects an abscess or giant   colonic diverticula. This may be the result of acute diverticulitis. 2. Mass lesion ascending colon almost certainly reflecting primary   adenocarcinoma of the colon. 3. Diffuse hepatic lesions with newly visualized (contrast enhancement) mass   at the hepatic dome. Hepatic metastatic disease is a consideration as is   cirrhosis with regenerative nodules and hepatocellular carcinoma. 4. Bilateral lower lobe pulmonary nodules suspicious for metastatic disease. 5. Abdominal and pelvic ascites. 6. Sequela from portosystemic shunting with numerous collateral vessels about   the stomach, left upper quadrant and distal esophagus. Relatively small   esophageal and gastric varices. 7.  Calcific atherosclerotic disease aorta. CT ABDOMEN PELVIS WO CONTRAST Additional Contrast? None   Final Result   1. Non loculated fluid and gas collection in the pelvis concerning for   perforated viscus/possible abscess formation. 2. Prominent thickening of the ascending colon which may be related to   infectious or inflammatory process or underlying neoplastic process. 3. Bowel-gas pattern typical of adynamic ileus. 4. Hepatic cirrhosis with numerous hepatic nodules suggestive of   regenerative/siderotic nodules. MRI abdomen without and with IV contrast   correlation recommended. Metastatic disease is a consideration. 5. Hepatosplenomegaly and multifocal collateral vessels about the stomach   esophagus concerning for varices, all suggestive of portal venous   hypertension. 6. Abdominal and pelvic ascites. 7.  Diverticulosis coli without CT evidence of acute diverticulitis. Critical results were called by Dr. Citlaly Turner to [address] [place] [address]   on 8/16/2020 at 18:09. CT Cervical Spine WO Contrast   Final Result   No acute abnormality of the cervical spine. CT Head WO Contrast   Final Result   No acute intracranial abnormality.          XR CHEST PORTABLE   Final Result   No acute process. XR RADIUS ULNA LEFT (2 VIEWS)   Final Result   No acute osseous abnormality. Problem List  Active Problems:    Pelvic abscess in female    Septicemia [facility])  Resolved Problems:    * No resolved hospital problems. *       Assessment & Plan:     Colonic mass with possible mets to liver and lungs:  Liver cirrhosis and portal hypertension:  S/p colonoscopy with biopsy of colonic mass yesterday. Awaiting biopsy report.      Pelvic abscess:   Underwent CT-guided drain placement on 8/18. Purulent drainage noted. On morphine 1 mg IV every 6hrs as needed for pain control.     Hyponatremia: currently resolved.       Renal insufficiency: resolved. Etiology likely pre-renal.       IV Access: peripheral  Amado: no  Diet: Dietary Nutrition Supplements: Standard High Calorie Oral Supplement  DIET CLEAR LIQUID;  Code:Full Code  DVT PPX heparin sq tid  Disposition Pt will need placement at skilled nursing facility as per PT/OT recommendations.  involved in finding her a place.      Kamari Hurst MD   8/21/2020 11:48 AM

## 2020-08-21 NOTE — PLAN OF CARE
Nutrition Problem #1: Inadequate protein-energy intake  Intervention: Food and/or Nutrient Delivery: Continue Current Diet, Continue Oral Nutrition Supplement  Nutritional Goals: Pt will consume at least 50% of meals and supplements

## 2020-08-21 NOTE — PROGRESS NOTES
MD Ramila Vasques MD Marinda Brookes, MD               Office: [phone]                      Fax: [phone]             [address] [place] [address]                   NEPHROLOGY INPATIENT PROGRESS NOTE:     PATIENT NAME: Rox Dumont  : 1955  MRN: [id number]      IMPRESSION:       PAPITO (on ? CKD): improving. ..   : Oligouric -> non-oliguric   - BL SCr - unknown ---> 1.6, eGFR 32  on admission  : Etiology of PAPITO - presumed pre-renal / early ATN / unlikely HRS     - other differentials: ruled out obstruction , unlikely AIN or GN,   - UA : bili, trace pro, LE - follow Ucx, ? AIN - unlikely    : large bl. + only 7 RBC = myoglobulinemia   - Renal imaging: w/ CT - kidney unremarkable   - CK - 310 on admission   - Pro-calcitonin 5.6   - urine lytes - all very lower = pre-renal ,    : likely hypovolemia - not concerning for HRS or CRS       Associated problems:   - Azotemia: moderate - ?pre-renal - now improving  - Electrolytes: Hypokalemia - needing repletion  : check Mag in am - add on   - Volume status: mild hypo-volemic, although w/ ascites - better   : Na: Hyponatremia - moderate - 127 on admission - likely hypovolemia / PAPITO / Cirrhosis - improving at goal   : BP: Ok to keep slightly higher   - Acid-Base: Acidosis - mild - non-AGMA - d/to LA / PAPITO / GI loss - improving   - Anemia: worsening - follow        RECOMMENDATIONS:   - no need for IVF from renal POV,   - allow PO hydration    - on NS per GI   - K repletion  - check Mag level     - avoid hypotonic fluids like LR as w/ recent hyponatremia  - cipro per 1' team   - Sx consult f/u      Other problems: Management per primary and other consulting teams.      # Cirrhosis , Portal HTN : new diagnosis, no h/o EtOH, w/u ordered by GI     # Sepsis, intra-abdominal abscess   - status-post CT-guided drain placement on 2020  [facility] Problems           Last Modified POA    Pelvic abscess in female 2020 Yes Septicemia ([facility] [address] 8/17/2020 Yes          : other supportive care :   - Check daily renal function panel with electrolytes-phosphorus  - Strict monitoring of I/Os, daily weight  - Renal feeds/diet  - Current medications reviewed. - Nephrotoxic medications have been discontinued. - Dose adjusted and appropriate. - Dose meds for eGFR <15 mL/min/1.73m2 during PAPITO    - Avoid heavy opioids due to renal failure - may use very low dose dilaudid / fentanyl with close monitoring of CNS and respiratory depression. Please refer to the orders. High Complexity. Multiple complex problems. Thank you for allowing me to participate in this patient's care. Please do not hesitate to contact me with any questions/concerns. We will follow along with you. Mitchell Guerrier MD       [facility] [address] [place] [address] [place]: [phone] or [address] [employer]  Fax: [phone]        ========================================================   ========================================================          SUBJECTIVE:    Patient was seen comfortably resting in the bed,   Reported no active complaints,   Past medical, Surgical, Social, Family medical history reviewed by me. MEDICATIONS: reviewed by me. Medications Prior to Admission:  No current facility-administered medications on file prior to encounter. No current outpatient medications on file prior to encounter. No medications prior to admission.      Scheduled Meds:   potassium phosphate (monobasic)  250 mg Oral BID    ciprofloxacin  400 mg Intravenous Q12H    pantoprazole  40 mg Oral QAM AC    sodium chloride flush  10 mL Intravenous 2 times per day    enoxaparin  40 mg Subcutaneous Daily     Continuous Infusions:   sodium chloride 100 mL/hr at 08/21/20 0226     PRN Meds:.potassium chloride **OR** potassium alternative oral replacement **OR** potassium chloride, morphine, lip balm petroleum free, sodium chloride flush, acetaminophen **OR** acetaminophen, polyethylene glycol, promethazine **OR** ondansetron       PHYSICAL EXAM:  Recent vital signs and recent I/Os reviewed by me. Wt Readings from Last 3 Encounters:   08/20/20 219 lb 11.2 oz (99.7 kg)     BP Readings from Last 3 Encounters:   08/21/20 101/65   08/20/20 (!) 91/47     Patient Vitals for the past 24 hrs:   BP Temp Temp src Pulse Resp SpO2   08/21/20 0809 101/65 97.6 °F (36.4 °C) Axillary 76 18 95 %   08/21/20 0515 112/71 97.6 °F (36.4 °C) Oral 71 16 96 %   08/21/20 0100 109/72 97.8 °F (36.6 °C) Oral 74 16 95 %   08/20/20 2134 114/72 98 °F (36.7 °C) Oral 80 16 95 %   08/20/20 1330 126/66 -- -- 76 16 91 %   08/20/20 1320 92/67 -- -- 80 16 97 %   08/20/20 1315 90/72 -- -- 78 17 --   08/20/20 1311 98/64 98.1 °F (36.7 °C) Temporal 75 18 98 %   08/20/20 0957 126/61 96.9 °F (36.1 °C) Temporal 85 16 99 %       Intake/Output Summary (Last 24 hours) at 8/21/2020 0947  Last data filed at 8/21/2020 0529  Gross per 24 hour   Intake 2968 ml   Output 535 ml   Net 2433 ml         General: Awake, Alert,   HENT: Atraumatic, normocephalic   Eyes: Normal conjunctiva, Non-incteral sclera. Neck: Supple, JVD not visible. CVS:  Heart sounds are normal. No murmurs. No pericardial rub. RS: Normal respiratory efforts,  Lung fields are diminished . Abd: Soft , bowel sounds are normal, no distension and no tenderness to palpation. Skin: No rash , some bruises,   CNS: Awake Oriented , No focal.   Extremities/MSK: 1+ Edema, no cyanosis.            DATA:  Diagnostic tests reviewed by me for today's visit:    Recent Labs     08/19/20  0452 08/20/20  0501 08/21/20  0509   WBC 12.9* 16.8* 10.7   HCT 27.1* 32.1* 28.1*    225 150     Iron Saturation:  No components found for: PERCENTFE  FERRITIN:    Lab Results   Component Value Date    FERRITIN 542.4 08/17/2020     IRON:    Lab Results   Component Value Date    IRON 26 08/17/2020     TIBC:    Lab Results   Component Value Date    TIBC 106 08/17/2020 Recent Labs     08/19/20  0452 08/20/20  0501 08/21/20  0509   * 139 137   K 3.8 3.5 3.0*    109 110   CO2 21 18* 20*   BUN 20 19 12   CREATININE 0.9 0.8 0.6     Recent Labs     08/19/20  0452 08/20/20  0501 08/21/20  0509   CALCIUM 8.4 8.7 8.1*   PHOS 3.3 3.4 2.2*     No results for input(s): PH, PCO2, PO2 in the last 72 hours.     Invalid input(s): [place]    ABG:  No results found for: PH, PCO2, PO2, HCO3, BE, THGB, TCO2, O2SAT  VBG:  No results found for: PHVEN, FNC4ZAN, BEVEN, O3WGZSHZ    LDH:  No results found for: LDH  Uric Acid:    Lab Results   Component Value Date    LABURIC 5.9 08/17/2020       PT/INR:    Lab Results   Component Value Date    PROTIME 17.0 08/17/2020    INR 1.46 08/17/2020     Warfarin PT/INR:  No components found for: PTPATWAR, PTINRWAR  PTT:  No results found for: APTT, PTT[APTT}  Last 3 Troponin:    Lab Results   Component Value Date    TROPONINI <0.01 08/16/2020       U/A:    Lab Results   Component Value Date    COLORU ORANGE 08/16/2020    PROTEINU TRACE 08/16/2020    PHUR 5.0 08/16/2020    WBCUA 3 08/16/2020    RBCUA 7 08/16/2020    BACTERIA RARE 08/16/2020    CLARITYU CLOUDY 08/16/2020    SPECGRAV 1.022 08/16/2020    LEUKOCYTESUR SMALL 08/16/2020    UROBILINOGEN 1.0 08/16/2020    BILIRUBINUR SMALL 08/16/2020    BLOODU LARGE 08/16/2020    GLUCOSEU Negative 08/16/2020     Microalbumen/Creatinine ratio:  No components found for: RUCREAT  24 Hour Urine for Protein:  No components found for: RAWUPRO, UHRS3, AFDG92XT, UTV3  24 Hour Urine for Creatinine Clearance:  No components found for: CREAT4, UHRS10, UTV10  Urine Toxicology:  No components found for: IAMMENTA, IBARBIT, IBENZO, ICOCAINE, IMARTHC, IOPIATES, IPHENCYC    HgBA1c:  No results found for: LABA1C  RPR:  No results found for: RPR  HIV:  No results found for: HIV  KULWANT:  No results found for: ANATITER, KULWANT  RF:  No results found for: RF  DSDNA:  No components found for: DNA  AMYLASE:  No results found for: AMYLASE  LIPASE:  No results found for: LIPASE  Fibrinogen Level:  No components found for: FIB       BELOW MENTIONED RADIOLOGY STUDY RESULTS BY ME:    Ct Abdomen Pelvis Wo Contrast Additional Contrast? None    Result Date: 8/16/2020  EXAMINATION: CT OF THE ABDOMEN AND PELVIS WITHOUT CONTRAST 8/16/2020 5:11 pm TECHNIQUE: CT of the abdomen and pelvis was performed without the administration of intravenous contrast. Multiplanar reformatted images are provided for review. Dose modulation, iterative reconstruction, and/or weight based adjustment of the mA/kV was utilized to reduce the radiation dose to as low as reasonably achievable. COMPARISON: None. HISTORY: Acute nausea FINDINGS: Lower Chest: FOUR solid soft tissue pulmonary nodules right lower lobe, 7 x 8 mm (axial image 19), 11 x 12 mm (axial 11), 9 x 9 mm (axial image 10) and 4 x 4 mm (axial image 10). 3 mm pulmonary nodules are seen posterior basal segment left lower lobe. Mild nodular fullness about the distal esophagus. Heart size is upper normal. Organs: Prominent caudate lobe hypertrophy and poly lobular configuration of the liver as well as diffuse tiny hypoattenuating lesions throughout the liver suggestive of hepatic cirrhosis and siderotic nodules. No discrete mass lesion evident. 19 cm craniocaudal liver length. The spleen is mildly enlarged. The kidneys, gallbladder, adrenal glands and pancreas appear unremarkable. Multifocal collateral vessels are seen about the gastrohepatic ligament extending to the posterior mediastinum. GI/Bowel: Prominent diffuse thickening of the wall of the ascending colon. Other portions of colon and small bowel appear unremarkable with exception of few gas distended small bowel loops typical of adynamic ileus. There are diverticula involving the descending and sigmoid colon.  Pelvis: Fluid collection within the pelvis without definite margins has air bubbles within it concerning for sequela from perforated bowel/abscess formation. Uterus and adnexal structures appear unremarkable. Peritoneum/Retroperitoneum: Abdominal ascites. Calcific atherosclerosis aorta. Bones/Soft Tissues: No acute superficial soft tissue or osseous structure abnormality evident. 1. Non loculated fluid and gas collection in the pelvis concerning for perforated viscus/possible abscess formation. 2. Prominent thickening of the ascending colon which may be related to infectious or inflammatory process or underlying neoplastic process. 3. Bowel-gas pattern typical of adynamic ileus. 4. Hepatic cirrhosis with numerous hepatic nodules suggestive of regenerative/siderotic nodules. MRI abdomen without and with IV contrast correlation recommended. Metastatic disease is a consideration. 5. Hepatosplenomegaly and multifocal collateral vessels about the stomach esophagus concerning for varices, all suggestive of portal venous hypertension. 6. Abdominal and pelvic ascites. 7.  Diverticulosis coli without CT evidence of acute diverticulitis. Critical results were called by Dr. Johnathan Metz to [address] [place] [address] on 8/16/2020 at 18:09. Xr Radius Ulna Left (2 Views)    Result Date: 8/16/2020  EXAMINATION: TWO XRAY VIEWS OF THE LEFT FOREARM 8/16/2020 2:55 pm COMPARISON: None. HISTORY: ORDERING SYSTEM PROVIDED HISTORY: Fall TECHNOLOGIST PROVIDED HISTORY: Reason for exam:->Fall Reason for Exam: Frequent falls at home. Bruising mid shaft L forearm Acuity: Acute Type of Exam: Initial FINDINGS: There is no evidence of acute fracture. There is normal alignment. No acute joint abnormality. No focal osseous lesion. No focal soft tissue abnormality. No acute osseous abnormality.      Ct Head Wo Contrast    Result Date: 8/16/2020  EXAMINATION: CT OF THE HEAD WITHOUT CONTRAST  8/16/2020 3:15 pm TECHNIQUE: CT of the head was performed without the administration of intravenous contrast. Dose modulation, iterative reconstruction, and/or weight based exam:->Fall Reason for Exam: Frequent falls at home. Bruising mid shaft L forearm Acuity: Acute Type of Exam: Initial FINDINGS: The lungs are without acute focal process. There is no effusion or pneumothorax. The cardiomediastinal silhouette is without acute process. The osseous structures are without acute process. No acute process.

## 2020-08-21 NOTE — CARE COORDINATION
Chart reviewed for discharge planning. Barrier(s) to discharge- Large necrotic ascending colon mass, pathology pending; planning for right hemicolectomy, possible subtotal colectomy on Monday with Dr. Blanca Nobles  Tentative [facility] plan- [facility]  Tentative discharge date- when medically ready    *Case management will continue to follow progress and update discharge plan as needed. Called and faxed a referral to [facility] as the daughter lives close to that campus. [facility] has beds. According to [place], Patient has been accepted to [facility], pending ONC recommendations. A bed is available.

## 2020-08-21 NOTE — PROGRESS NOTES
Physical Therapy  Facility/Department: [address]  Daily Treatment Note  NAME: Natacha Albert  : 1955  MRN: [id number]    Date of Service: 2020    Discharge Recommendations:  Natacha Albert scored a 16/24 on the AM-PAC short mobility form. Current research shows that an AM-PAC score of 17 or less is typically not associated with a discharge to the patient's home setting. Based on the patient's AM-PAC score and their current functional mobility deficits, it is recommended that the patient have 3-5 sessions per week of Physical Therapy at d/c to increase the patient's independence. Please see assessment section for further patient specific details. If patient discharges prior to next session this note will serve as a discharge summary. Please see below for the latest assessment towards goals. 3-5 sessions per week, Subacute/Skilled Nursing Facility   PT Equipment Recommendations  Equipment Needed: No    Assessment   Body structures, Functions, Activity limitations: Decreased functional mobility ; Decreased ADL status; Decreased ROM; Decreased strength;Decreased safe awareness;Decreased cognition;Decreased endurance;Decreased balance; Increased pain;Decreased posture  Assessment: Patient requiring assistance for all functional mobility and only able to ambulate 10' w/ rolling walker w/ SOB noted. Recommend 24 hour assist and continued therapy at d/c. Treatment Diagnosis: weakness, difficulty with gait. Prognosis: Good  PT Education: PT Role;Plan of Care;Transfer Training;General Safety; Family Education; Functional Mobility Training;Gait Training  Patient Education: Pt needing reinforcement with education, family verbalized good understanding. Barriers to Learning: cognition  REQUIRES PT FOLLOW UP: Yes  Activity Tolerance  Activity Tolerance: Patient limited by fatigue;Patient limited by endurance  Activity Tolerance: Patient noted to have SOB although O2 remained >95%.      Patient Diagnosis(es): The primary encounter diagnosis was Septicemia ([facility] [address] Diagnoses of PAPITO (acute kidney injury) ([facility] [address] Intra-abdominal free air of unknown etiology, and Fall, initial encounter were also pertinent to this visit. has no past medical history on file. has a past surgical history that includes Tonsillectomy; Eye surgery; Upper gastrointestinal endoscopy (N/A, 8/20/2020); Colonoscopy (N/A, 8/20/2020); and Colonoscopy (8/20/2020). Restrictions  Restrictions/Precautions  Restrictions/Precautions: Fall Risk, Modified Diet(high fall risk)  Required Braces or Orthoses?: No  Position Activity Restriction  Other position/activity restrictions: This is a 58-year-old female with apparent history of frequent falls who presents after being found on the ground by her family. When the patient arrived she was disheveled, incontinent. She denies any specific complaints. The patient's work-up today was extensive. Her work-up included a CT of the abdomen that shows findings worrisome for possible abscess versus perforation. Questionable colon mass with metastatic lesions to liver and lung. Diagnosed with sepsis. Subjective   General  Chart Reviewed: Yes  Response To Previous Treatment: Patient with no complaints from previous session. Family / Caregiver Present: Yes(initially sister, then sister left when daughter arrived)  Subjective  Subjective: Patient states she wants to get out of bed and sit in a chair. General Comment  Comments: Patient in bed upon arrival.  Pain Screening  Patient Currently in Pain: Denies  Vital Signs  Patient Currently in Pain: Denies       Orientation  Orientation  Overall Orientation Status: Within Functional Limits  Orientation Level: Oriented to person;Oriented to situation     Objective   Bed mobility  Supine to Sit: Moderate assistance  Scooting: Maximal assistance  Comment: LUE hand hold and use of karis pad to pull to sitting E\"OB.   Transfers  Sit to Stand: Contact guard assistance  Stand to sit: Contact guard assistance  Bed to Chair: Contact guard assistance  Stand Pivot Transfers: Contact guard assistance  Ambulation  Ambulation?: Yes  Ambulation 1  Surface: level tile  Device: Rolling Walker  Assistance: Contact guard assistance  Gait Deviations: Slow Emily;Decreased step length;Decreased step height;Shuffles  Distance: 10'  Comments: Emily extremely slow. It took patient approximately 10 minutes to walk 10' w/ single turn d/t frequent standing rest breaks. Heart rate 104 bpm, O2 95%, room air. Stairs/Curb  Stairs?: No     Balance  Posture: Fair  Sitting - Static: Good  Sitting - Dynamic: Fair;-  Standing - Static: Fair  Standing - Dynamic: Fair      AROM RLE (degrees)  RLE AROM: WNL  AROM LLE (degrees)  LLE AROM : WNL  Strength RLE  Comment: Functional weakness noted w/ difficulty during sit-stand transfer. Strength LLE  Comment: Functional weakness noted w/ difficulty during sit-stand transfer. AM-PAC Score  AM-PAC Inpatient Mobility Raw Score : 16 (08/21/20 1513)  AM-PAC Inpatient T-Scale Score : 40.78 (08/21/20 1513)  Mobility Inpatient CMS 0-100% Score: 54.16 (08/21/20 1513)  Mobility Inpatient CMS G-Code Modifier : CK (08/21/20 1513)     Goals  Short term goals  Time Frame for Short term goals: To be met by DC. Short term goal 1: Pt to perform bed mob with SBA. (Not met)  Short term goal 2: Pt to perform transfers with SBA. (Not met)  Short term goal 3: Pt to perform amb with AAD and SBA for 100 ft. (Not met)  Short term goal 4: Pt to amb up 2 steps with CGA.  (Not met)  Long term goals  Time Frame for Long term goals : LTGs=STGs  Patient Goals   Patient goals : Did not state.     Plan    Plan  Times per week: 3-5x/week  Times per day: Daily  Current Treatment Recommendations: Strengthening, Balance Training, Functional Mobility Training, Transfer Training, Cognitive/Perceptual Training, ADL/Self-care Training, Endurance Training, Gait Training, Stair training, Safety Education & Training, Home Exercise Program, Pain Management, Cognitive Reorientation, Patient/Caregiver Education & Training, Equipment Evaluation, Education, & procurement  Safety Devices  Type of devices:  All fall risk precautions in place, Call light within reach, Chair alarm in place, Gait belt, Left in chair, Nurse notified, Patient at risk for falls(daughter present)  Restraints  Initially in place: No     Therapy Time   Individual Concurrent Group Co-treatment   Time In 1421         Time Out 1515         Minutes 54         Timed Code Treatment Minutes: [facility], [address] [facility], DPT, ATC-R [id number]

## 2020-08-22 NOTE — PROGRESS NOTES
Pt has been trying to get out of bed ,found X2 with right foot out of bed, redirected to stay in bed, does not appear to understand. Will continue redirecting. Pulled IV out from Right forearm. Left forearm IV infusing with fluids.

## 2020-08-22 NOTE — PROGRESS NOTES
Pt sleepy at the start of shift, minimally following commands, oriented to self only. Temp 100.5, Pulse 109, on 1l via NC O2sat at 98%.  Incoherent talking

## 2020-08-22 NOTE — PROGRESS NOTES
[facility]  Gastroenterology Progress Note    Mishel Hanley is a 76 y.o. female patient. Active Problems:    Pelvic abscess in female    Septicemia [facility])  Resolved Problems:    * No resolved hospital problems. *      SUBJECTIVE:  Reports some LLQ pain. confused     ROS:  No fever, chills  No chest pain, palpitations  No SOB, cough  Gastrointestinal ROS: no N/V/D     Physical    VITALS:  BP (!) 151/83   Pulse 112   Temp 100.5 °F (38.1 °C) (Oral)   Resp 20   Ht 5' 7\" (1.702 m)   Wt 225 lb 6.4 oz (102.2 kg)   SpO2 96%   BMI 35.30 kg/m²   TEMPERATURE:  Current - Temp: 100.5 °F (38.1 °C); Max - Temp  Av.6 °F (37 °C)  Min: 97.6 °F (36.4 °C)  Max: 100.5 °F (38.1 °C)    NAD  Regular rate   Lungs CTA Bilaterally  Abdomen soft, ND, nontender,  No guarding or rebound. Bowel sounds normal.  Alert and oriented to place and time but not person. No asterixis. Data    Data Review:    Recent Labs     20  0501 20  0509 20  0424   WBC 16.8* 10.7 11.1*   HGB 9.9* 8.9* 9.3*   HCT 32.1* 28.1* 28.8*   MCV 88.7 90.6 88.0    150 144     Recent Labs     20  0501 20  0509 20  1935 20  0424    137  --  137   K 3.5 3.0* 3.9 3.6    110  --  109   CO2 18* 20*  --  21   PHOS 3.4 2.2*  --  2.3*   BUN 19 12  --  10   CREATININE 0.8 0.6  --  0.7     No results for input(s): AST, ALT, ALB, BILIDIR, BILITOT, ALKPHOS in the last 72 hours. No results for input(s): LIPASE, AMYLASE in the last 72 hours. No results for input(s): PROTIME, INR in the last 72 hours. No results for input(s): PTT in the last 72 hours. CT abd/pelvis w IV and PO contrast 20  Impression    1. Pelvic air/fluid collection almost certainly reflects an abscess or giant    colonic diverticula.  This may be the result of acute diverticulitis. 2. Mass lesion ascending colon almost certainly reflecting primary    adenocarcinoma of the colon.     3. Diffuse hepatic lesions with newly visualized (contrast enhancement) mass    at the hepatic dome.  Hepatic metastatic disease is a consideration as is    cirrhosis with regenerative nodules and hepatocellular carcinoma. 4. Bilateral lower lobe pulmonary nodules suspicious for metastatic disease. 5. Abdominal and pelvic ascites. 6. Sequela from portosystemic shunting with numerous collateral vessels about    the stomach, left upper quadrant and distal esophagus.  Relatively small    esophageal and gastric varices. 7.  Calcific atherosclerotic disease aorta.               ASSESSMENT :    Cirrhosis - new diagnosis per CT. No alcohol history. Likely from fatty liver. Some ascites on CT. Seemed mildly confused at admission (baseline per her daughter) but ammonia was normal. repeat ammonia is elevated at 66 so lactulose started. Pt oriented to year and place this am but not month  Negative: AMA, hepatitis panel, A1AT phenotype. f-actin weak positive at 25. AFP normal.      Abnormal CT of the colon and liver, colon mass - CT with thickening of the ascending colon concerning for malignancy. Also with liver and lung lesions concerning for metastatic disease. Colonoscopy 8/20 with large necrotic ascending colon mass, likely the source of abscess. Four polyps were removed.      Pelvic abscess - CT with nonloculated fluid and gas collection in the pelvis. S/p IR drainage 8/18.      Esophagitis - EGD 8/20 with LA class C erosive esophagitis and nodularity at [facility]. Bx. PLAN :  - f/u path  - lactulose TID - titrate frequency for goal of 3 BMs daily  - PPI  - full liquids  - antibiotics  - surgery planning possible resection next week   - f/u Hep B surface AB for vaccination purposes.  Will need Hep A vaccine.     Valerie Swann MD  [address]  8/22/2020

## 2020-08-22 NOTE — PROGRESS NOTES
MD Alex Hargrove MD Arlean Hinders, MD               Office: [phone]                      Fax: [phone]             [facility]                   NEPHROLOGY INPATIENT PROGRESS NOTE:     PATIENT NAME: Oli Chaidez  : 1955  MRN: [id number]. Nephrology treatment plan. Acute kidney injury-improving.-Nonoliguric. BUN 10 and creatinine 0.7. Hypotension-improved. Multiple electrolyte imbalance improved. Anemia. Stable. Labs in a.m. IMPRESSION:       PAPITO (on ? CKD): improving. ..   : Oligouric -> non-oliguric   - BL SCr - unknown ---> 1.6, eGFR 32  on admission  : Etiology of PAPITO - presumed pre-renal / early ATN / unlikely HRS     - other differentials: ruled out obstruction , unlikely AIN or GN,   - UA : bili, trace pro, LE - follow Ucx, ? AIN - unlikely    : large bl. + only 7 RBC = myoglobulinemia   - Renal imaging: w/ CT - kidney unremarkable   - CK - 310 on admission   - Pro-calcitonin 5.6   - urine lytes - all very lower = pre-renal ,    : likely hypovolemia - not concerning for HRS or CRS       Associated problems:   - Azotemia: moderate - ?pre-renal - now improving  - Electrolytes: Hypokalemia - needing repletion  : check Mag in am - add on   - Volume status: mild hypo-volemic, although w/ ascites - better   : Na: Hyponatremia - moderate - 127 on admission - likely hypovolemia / PAPITO / Cirrhosis - improving at goal   : BP: Ok to keep slightly higher   - Acid-Base: Acidosis - mild - non-AGMA - d/to LA / PAPITO / GI loss - improving   - Anemia: worsening - follow        RECOMMENDATIONS:   - no need for IVF from renal POV,   - allow PO hydration    - on NS per GI   - K repletion  - check Mag level     - avoid hypotonic fluids like LR as w/ recent hyponatremia  - cipro per 1' team   - Sx consult f/u      Other problems: Management per primary and other consulting teams.      # Cirrhosis , Portal HTN : new diagnosis, 08/22/20 0527     PRN Meds:.potassium chloride **OR** potassium alternative oral replacement **OR** potassium chloride, morphine, lip balm petroleum free, sodium chloride flush, acetaminophen **OR** acetaminophen, polyethylene glycol, promethazine **OR** ondansetron       PHYSICAL EXAM:  Recent vital signs and recent I/Os reviewed by me. Wt Readings from Last 3 Encounters:   08/22/20 225 lb 6.4 oz (102.2 kg)     BP Readings from Last 3 Encounters:   08/22/20 (!) 151/83   08/20/20 (!) 91/47     Patient Vitals for the past 24 hrs:   BP Temp Temp src Pulse Resp SpO2 Weight   08/22/20 0815 (!) 151/83 100.5 °F (38.1 °C) Oral 112 20 96 % --   08/22/20 0407 (!) 102/49 99.1 °F (37.3 °C) Oral 80 20 99 % 225 lb 6.4 oz (102.2 kg)   08/22/20 0022 (!) 157/81 98.2 °F (36.8 °C) Oral 90 20 96 % --   08/22/20 0008 (!) 157/81 98.2 °F (36.8 °C) Oral 90 20 96 % --   08/21/20 2119 135/75 98 °F (36.7 °C) Oral 87 20 -- --   08/21/20 1534 120/77 98.3 °F (36.8 °C) Oral 85 20 95 % --   08/21/20 1431 -- -- -- -- -- -- 223 lb 6.4 oz (101.3 kg)   08/21/20 1347 106/66 97.6 °F (36.4 °C) Oral 78 16 97 % --       Intake/Output Summary (Last 24 hours) at 8/22/2020 1155  Last data filed at 8/22/2020 0538  Gross per 24 hour   Intake 10 ml   Output 1215 ml   Net -1205 ml         General: Awake, Alert,   HENT: Atraumatic, normocephalic   Eyes: Normal conjunctiva, Non-incteral sclera. Neck: Supple, JVD not visible. CVS:  Heart sounds are normal. No murmurs. No pericardial rub. RS: Normal respiratory efforts,  Lung fields are diminished . Abd: Soft , bowel sounds are normal, no distension and no tenderness to palpation. Skin: No rash , some bruises,   CNS: Awake Oriented , No focal.   Extremities/MSK: 1+ Edema, no cyanosis.            DATA:  Diagnostic tests reviewed by me for today's visit:    Recent Labs     08/20/20  0501 08/21/20  0509 08/22/20  0424   WBC 16.8* 10.7 11.1*   HCT 32.1* 28.1* 28.8*    150 144     Iron Saturation:  No components found for: PERCENTFE  FERRITIN:    Lab Results   Component Value Date    FERRITIN 542.4 08/17/2020     IRON:    Lab Results   Component Value Date    IRON 26 08/17/2020     TIBC:    Lab Results   Component Value Date    TIBC 106 08/17/2020       Recent Labs     08/20/20  0501 08/21/20  0509 08/21/20  1935 08/22/20  0424    137  --  137   K 3.5 3.0* 3.9 3.6    110  --  109   CO2 18* 20*  --  21   BUN 19 12  --  10   CREATININE 0.8 0.6  --  0.7     Recent Labs     08/20/20  0501 08/21/20  0509 08/22/20  0424   CALCIUM 8.7 8.1* 8.2*   MG  --  2.60* 2.30   PHOS 3.4 2.2* 2.3*     No results for input(s): PH, PCO2, PO2 in the last 72 hours.     Invalid input(s): Wilson Deed    ABG:  No results found for: PH, PCO2, PO2, HCO3, BE, THGB, TCO2, O2SAT  VBG:  No results found for: PHVEN, YSL2UHR, BEVEN, E9BSQOMA    LDH:  No results found for: LDH  Uric Acid:    Lab Results   Component Value Date    LABURIC 5.9 08/17/2020       PT/INR:    Lab Results   Component Value Date    PROTIME 17.0 08/17/2020    INR 1.46 08/17/2020     Warfarin PT/INR:  No components found for: PTPATWAR, PTINRWAR  PTT:  No results found for: APTT, PTT[APTT}  Last 3 Troponin:    Lab Results   Component Value Date    TROPONINI <0.01 08/16/2020       U/A:    Lab Results   Component Value Date    COLORU ORANGE 08/16/2020    PROTEINU TRACE 08/16/2020    PHUR 5.0 08/16/2020    WBCUA 3 08/16/2020    RBCUA 7 08/16/2020    BACTERIA RARE 08/16/2020    CLARITYU CLOUDY 08/16/2020    SPECGRAV 1.022 08/16/2020    LEUKOCYTESUR SMALL 08/16/2020    UROBILINOGEN 1.0 08/16/2020    BILIRUBINUR SMALL 08/16/2020    BLOODU LARGE 08/16/2020    GLUCOSEU Negative 08/16/2020     Microalbumen/Creatinine ratio:  No components found for: RUCREAT  24 Hour Urine for Protein:  No components found for: RAWUPRO, UHRS3, SEUI36QY, UTV3  24 Hour Urine for Creatinine Clearance:  No components found for: CREAT4, UHRS10, UTV10  Urine Toxicology:  No components found for: IAMMENTA, IBARBIT, IBENZO, ICOCAINE, IMARTHC, IOPIATES, IPHENCYC    HgBA1c:  No results found for: LABA1C  RPR:  No results found for: RPR  HIV:  No results found for: HIV  KULWANT:  No results found for: ANATITER, KULWANT  RF:  No results found for: RF  DSDNA:  No components found for: DNA  AMYLASE:  No results found for: AMYLASE  LIPASE:  No results found for: LIPASE  Fibrinogen Level:  No components found for: FIB       BELOW MENTIONED RADIOLOGY STUDY RESULTS BY ME:    Ct Abdomen Pelvis Wo Contrast Additional Contrast? None    Result Date: 8/16/2020  EXAMINATION: CT OF THE ABDOMEN AND PELVIS WITHOUT CONTRAST 8/16/2020 5:11 pm TECHNIQUE: CT of the abdomen and pelvis was performed without the administration of intravenous contrast. Multiplanar reformatted images are provided for review. Dose modulation, iterative reconstruction, and/or weight based adjustment of the mA/kV was utilized to reduce the radiation dose to as low as reasonably achievable. COMPARISON: None. HISTORY: Acute nausea FINDINGS: Lower Chest: FOUR solid soft tissue pulmonary nodules right lower lobe, 7 x 8 mm (axial image 19), 11 x 12 mm (axial 11), 9 x 9 mm (axial image 10) and 4 x 4 mm (axial image 10). 3 mm pulmonary nodules are seen posterior basal segment left lower lobe. Mild nodular fullness about the distal esophagus. Heart size is upper normal. Organs: Prominent caudate lobe hypertrophy and poly lobular configuration of the liver as well as diffuse tiny hypoattenuating lesions throughout the liver suggestive of hepatic cirrhosis and siderotic nodules. No discrete mass lesion evident. 19 cm craniocaudal liver length. The spleen is mildly enlarged. The kidneys, gallbladder, adrenal glands and pancreas appear unremarkable. Multifocal collateral vessels are seen about the gastrohepatic ligament extending to the posterior mediastinum. GI/Bowel: Prominent diffuse thickening of the wall of the ascending colon.  Other portions of colon and small bowel appear unremarkable with exception of few gas distended small bowel loops typical of adynamic ileus. There are diverticula involving the descending and sigmoid colon. Pelvis: Fluid collection within the pelvis without definite margins has air bubbles within it concerning for sequela from perforated bowel/abscess formation. Uterus and adnexal structures appear unremarkable. Peritoneum/Retroperitoneum: Abdominal ascites. Calcific atherosclerosis aorta. Bones/Soft Tissues: No acute superficial soft tissue or osseous structure abnormality evident. 1. Non loculated fluid and gas collection in the pelvis concerning for perforated viscus/possible abscess formation. 2. Prominent thickening of the ascending colon which may be related to infectious or inflammatory process or underlying neoplastic process. 3. Bowel-gas pattern typical of adynamic ileus. 4. Hepatic cirrhosis with numerous hepatic nodules suggestive of regenerative/siderotic nodules. MRI abdomen without and with IV contrast correlation recommended. Metastatic disease is a consideration. 5. Hepatosplenomegaly and multifocal collateral vessels about the stomach esophagus concerning for varices, all suggestive of portal venous hypertension. 6. Abdominal and pelvic ascites. 7.  Diverticulosis coli without CT evidence of acute diverticulitis. Critical results were called by Dr. Betty Alvarez to [address] [place] [address] on 8/16/2020 at 18:09. Xr Radius Ulna Left (2 Views)    Result Date: 8/16/2020  EXAMINATION: TWO XRAY VIEWS OF THE LEFT FOREARM 8/16/2020 2:55 pm COMPARISON: None. HISTORY: ORDERING SYSTEM PROVIDED HISTORY: Fall TECHNOLOGIST PROVIDED HISTORY: Reason for exam:->Fall Reason for Exam: Frequent falls at home. Bruising mid shaft L forearm Acuity: Acute Type of Exam: Initial FINDINGS: There is no evidence of acute fracture. There is normal alignment. No acute joint abnormality. No focal osseous lesion.  No focal soft tissue abnormality. No acute osseous abnormality. Ct Head Wo Contrast    Result Date: 8/16/2020  EXAMINATION: CT OF THE HEAD WITHOUT CONTRAST  8/16/2020 3:15 pm TECHNIQUE: CT of the head was performed without the administration of intravenous contrast. Dose modulation, iterative reconstruction, and/or weight based adjustment of the mA/kV was utilized to reduce the radiation dose to as low as reasonably achievable. COMPARISON: None. HISTORY: ORDERING SYSTEM PROVIDED HISTORY: Fall TECHNOLOGIST PROVIDED HISTORY: Reason for exam:->Fall Has a \"code stroke\" or \"stroke alert\" been called? ->No Reason for Exam: Fall Acuity: Acute Type of Exam: Initial FINDINGS: BRAIN/VENTRICLES: The ventricles and sulci are diffusely enlarged. Low attenuation is seen in the periventricular and subcortical white matter. No acute intracranial hemorrhage or acute infarct is identified. ORBITS: The visualized portion of the orbits demonstrate no acute abnormality. SINUSES: The visualized paranasal sinuses and mastoid air cells demonstrate no acute abnormality. SOFT TISSUES/SKULL:  No acute abnormality of the visualized skull or soft tissues. No acute intracranial abnormality. Ct Cervical Spine Wo Contrast    Result Date: 8/16/2020  EXAMINATION: CT OF THE CERVICAL SPINE WITHOUT CONTRAST 8/16/2020 3:15 pm TECHNIQUE: CT of the cervical spine was performed without the administration of intravenous contrast. Multiplanar reformatted images are provided for review. Dose modulation, iterative reconstruction, and/or weight based adjustment of the mA/kV was utilized to reduce the radiation dose to as low as reasonably achievable. COMPARISON: None. HISTORY: ORDERING SYSTEM PROVIDED HISTORY: Fall TECHNOLOGIST PROVIDED HISTORY: Reason for exam:->Fall Reason for Exam: Fall Acuity: Acute Type of Exam: Initial FINDINGS: BONES/ALIGNMENT: There is no acute fracture or traumatic malalignment. DEGENERATIVE CHANGES: Multilevel degenerative changes.  SOFT TISSUES: There is no prevertebral soft tissue swelling. No acute abnormality of the cervical spine. Xr Chest Portable    Result Date: 8/16/2020  EXAMINATION: ONE XRAY VIEW OF THE CHEST 8/16/2020 2:55 pm COMPARISON: None. HISTORY: ORDERING SYSTEM PROVIDED HISTORY: Fall TECHNOLOGIST PROVIDED HISTORY: Reason for exam:->Fall Reason for Exam: Frequent falls at home. Bruising mid shaft L forearm Acuity: Acute Type of Exam: Initial FINDINGS: The lungs are without acute focal process. There is no effusion or pneumothorax. The cardiomediastinal silhouette is without acute process. The osseous structures are without acute process. No acute process.

## 2020-08-22 NOTE — PLAN OF CARE
Confused, pulling at PIV, covered in coban. Also has working PIV in R arm. Looks like she may have been pulling at perkins, urine blood tinged & small amount of blood in depends. Covid test sent    Problem: Falls - Risk of:  Goal: Will remain free from falls  Description: Will remain free from falls  Outcome: Ongoing  Goal: Absence of physical injury  Description: Absence of physical injury  Outcome: Ongoing     Problem: Skin Integrity:  Goal: Will show no infection signs and symptoms  Description: Will show no infection signs and symptoms  Outcome: Ongoing  Goal: Absence of new skin breakdown  Description: Absence of new skin breakdown  Outcome: Ongoing     Problem: Infection:  Goal: Will remain free from infection  Description: Will remain free from infection  Outcome: Ongoing     Problem: Safety:  Goal: Free from accidental physical injury  Description: Free from accidental physical injury  Outcome: Ongoing  Goal: Free from intentional harm  Description: Free from intentional harm  Outcome: Ongoing     Problem: Daily Care:  Goal: Daily care needs are met  Description: Daily care needs are met  Outcome: Ongoing     Problem: Pain:  Description: Pain management should include both nonpharmacologic and pharmacologic interventions.   Goal: Patient's pain/discomfort is manageable  Description: Patient's pain/discomfort is manageable  Outcome: Ongoing  Goal: Pain level will decrease  Description: Pain level will decrease  Outcome: Ongoing  Goal: Control of acute pain  Description: Control of acute pain  Outcome: Ongoing  Goal: Control of chronic pain  Description: Control of chronic pain  Outcome: Ongoing     Problem: Skin Integrity:  Goal: Skin integrity will stabilize  Description: Skin integrity will stabilize  Outcome: Ongoing     Problem: Discharge Planning:  Goal: Patients continuum of care needs are met  Description: Patients continuum of care needs are met  Outcome: Ongoing     Problem: Nutrition  Goal: Optimal nutrition therapy  Outcome: Ongoing

## 2020-08-22 NOTE — PROGRESS NOTES
[facility]        Progress Note    Patient Name: Corinne Galvan  MRN: [id number]  YOB: 1955  Date of Evaluation: 2020    Chief Complaint: Abdominal pain    Subjective:  No acute events overnight  Denies pain at present  Denies nausea or vomiting; tolerating full liquids  No CP or SOB  Resting in bed at this time      Vital Signs:  Patient Vitals for the past 24 hrs:   BP Temp Temp src Pulse Resp SpO2 Weight   20 0815 (!) 151/83 100.5 °F (38.1 °C) Oral 112 20 96 % --   20 0407 (!) 102/49 99.1 °F (37.3 °C) Oral 80 20 99 % 225 lb 6.4 oz (102.2 kg)   20 0022 (!) 157/81 98.2 °F (36.8 °C) Oral 90 20 96 % --   20 0008 (!) 157/81 98.2 °F (36.8 °C) Oral 90 20 96 % --   20 2119 135/75 98 °F (36.7 °C) Oral 87 20 -- --   20 1534 120/77 98.3 °F (36.8 °C) Oral 85 20 95 % --   20 1431 -- -- -- -- -- -- 223 lb 6.4 oz (101.3 kg)   20 1347 106/66 97.6 °F (36.4 °C) Oral 78 16 97 % --      TEMPERATURE HISTORY 24H: Temp (24hrs), Av.6 °F (37 °C), Min:97.6 °F (36.4 °C), Max:100.5 °F (38.1 °C)    BLOOD PRESSURE HISTORY: Systolic (15RPG), LB , Min:101 , TFH:167    Diastolic (88VVS), BBK:22, Min:49, Max:83      Intake/Output:  I/O last 3 completed shifts: In: 10 [I.V.:10]  Out: 8516 [Urine:1200; Drains:15]  No intake/output data recorded.   Drain/tube Output:  Closed/Suction Drain Left Back Bulb 10 Persian-Output (ml): 15 ml    Physical Exam:  General: awake, alert, oriented to person, place  Cardiovascular:  regular rate and rhythm and no murmur noted  Lungs: clear to auscultation  Abdomen: soft, non-distended, non-tender, bowel sounds present   Drain: serous, thin    Labs:  CBC:    Recent Labs     20  0501 20  0509 20  0424   WBC 16.8* 10.7 11.1*   HGB 9.9* 8.9* 9.3*   HCT 32.1* 28.1* 28.8*    150 144     BMP:    Recent Labs     20  0501 20  0509 20  1935 20  0424    137  --  137   K 3.5 3.0* 3.9 3.6    110  --  109   CO2 18* 20*  --  21   BUN 19 12  --  10   CREATININE 0.8 0.6  --  0.7   GLUCOSE 107* 92  --  113*     Hepatic:    No results for input(s): AST, ALT, ALB, BILITOT, ALKPHOS in the last 72 hours. Amylase:  No results found for: AMYLASE  Lipase:  No results found for: LIPASE   Mag:    Lab Results   Component Value Date    MG 2.30 08/22/2020    MG 2.60 08/21/2020     Phos:     Lab Results   Component Value Date    PHOS 2.3 08/22/2020    PHOS 2.2 08/21/2020      Coags:   Lab Results   Component Value Date    PROTIME 17.0 08/17/2020    INR 1.46 08/17/2020       Cultures:  Anaerobic culture  Lab Results   Component Value Date    LABANAE Mixed anaerobic growth present 08/18/2020     Fungus stain  No results found for requested labs within last 30 days. Gram stain  Results in Past 30 Days  Result Component Current Result Ref Range Previous Result Ref Range   Gram Stain Result 1+ WBC's (Mononuclear)  1+ WBC's (Polymorphonuclear)  2+ Gram negative rods   (8/18/2020)  Not in Time Range      Organism  Lab Results   Component Value Date/Time    ORG Escherichia coli (A) 08/18/2020 11:45 AM     Surgical culture  No results found for: CXSURG  Blood culture 1  Results in Past 30 Days  Result Component Current Result Ref Range Previous Result Ref Range   Blood Culture, Routine No Growth after 4 days of incubation. (8/16/2020)  Not in Time Range      Blood culture 2  Results in Past 30 Days  Result Component Current Result Ref Range Previous Result Ref Range   Culture, Blood 2 No Growth after 4 days of incubation. (8/16/2020)  Not in Time Range      Fecal occult  No results found for requested labs within last 30 days. GI bacterial pathogens by PCR  No results found for requested labs within last 30 days. C. difficile  No results found for requested labs within last 30 days.      Urine culture  No results found for: LABURIN    Pathology:  Pathology results pending Imaging:  I have personally reviewed the following films:    No results found. Scheduled Meds:   lactulose  20 g Oral TID    ciprofloxacin  400 mg Intravenous Q12H    pantoprazole  40 mg Oral QAM AC    sodium chloride flush  10 mL Intravenous 2 times per day    enoxaparin  40 mg Subcutaneous Daily     Continuous Infusions:   sodium chloride 100 mL/hr at 08/22/20 0527     PRN Meds:.potassium chloride **OR** potassium alternative oral replacement **OR** potassium chloride, morphine, lip balm petroleum free, sodium chloride flush, acetaminophen **OR** acetaminophen, polyethylene glycol, promethazine **OR** ondansetron      Assessment:  76 y.o. female admitted with   1. Septicemia ([facility] [address]    2. PAPITO (acute kidney injury) ([facility] [address]    3. Intra-abdominal free air of unknown etiology    4. Fall, initial encounter        Intraabdominal/pelvic abscess, status-post CT-guided drain placement on 8/18/2020  Abnormal CT of colon/liver concerning for malignancy  Sepsis   Cirrhosis  Acute kidney injury  Hyponatremia      Plan:  1. Large necrotic ascending colon mass, pathology pending; planning for right hemicolectomy, possible subtotal colectomy on Monday with Dr. Ananya Barba; continue drain care/monitoring  2. Hold at full liquid diet as tolerated; monitor bowel function, planning for bowel prep on Sunday  3. IV hydration; monitor and correct electrolytes  4. Antibiotics  5. Activity as tolerated, ambulate TID, up to chair for all meals--PT/OT following  6. Pulmonary toilet, incentive spirometry  7. PRN analgesics and antiemetics--minimizing narcotics as tolerated  8. DVT prophylaxis with Lovenox  9. Management of medical comorbid etiologies per primary team and consulting services    EDUCATION:  Educated patient on plan of care and disease process--all questions answered. Plans discussed with patient and nursing. Reviewed and discussed with Dr. Keaton Story.       Signed:  VIN Hewitt CNP  8/22/2020 11:35 AM     I have personally performed a face to face diagnostic evaluation on this patient. I have interviewed and examined the patient and I agree with the assessment above. In summary, my findings and plan are the following:   Ms. Ruth Valenzuela is a 76 y.o. female who presents with   Intraabdominal/pelvic abscess, status-post CT-guided drain placement on 8/18/2020  Abnormal CT of colon/liver concerning for malignancy  Sepsis   Cirrhosis  Acute kidney injury  Hyponatremia    Plan:  1. Tolerating liquid diet, hold at full liquids, clear liquids tomorrow with bowel prep  2. Monitor bowel function, bowel prep tomorrow in preparation for sigmoidectomy/subtotal colectomy Monday, ostomy marking today  3. Antibiotics, keep drain in place  4. Pain control  5. Activity as tolerated  6. Defer management of remainder of medical comorbidities to primary and consulting teams    Farhan Hammond MD, FACS  8/22/2020  11:42 AM

## 2020-08-22 NOTE — PLAN OF CARE
Problem: Daily Care:  Goal: Daily care needs are met  Description: Daily care needs are met  8/22/2020 1246 by Gita Ng RN  Outcome: Met This Shift  8/22/2020 0353 by Cyrus Cruz RN  Outcome: Ongoing     Problem: Falls - Risk of:  Goal: Will remain free from falls  Description: Will remain free from falls  8/22/2020 1246 by Gita Ng RN  Outcome: Ongoing  8/22/2020 0353 by Cyrus Cruz RN  Outcome: Ongoing  Goal: Absence of physical injury  Description: Absence of physical injury  8/22/2020 1246 by Gita Ng RN  Outcome: Ongoing  8/22/2020 0353 by Cyrus Cruz RN  Outcome: Ongoing     Problem: Skin Integrity:  Goal: Will show no infection signs and symptoms  Description: Will show no infection signs and symptoms  8/22/2020 1246 by Gita Ng RN  Outcome: Ongoing  8/22/2020 0353 by Cyrus Cruz RN  Outcome: Ongoing  Goal: Absence of new skin breakdown  Description: Absence of new skin breakdown  8/22/2020 1246 by Gita Ng RN  Outcome: Ongoing  8/22/2020 0353 by Cyrus Cruz RN  Outcome: Ongoing     Problem: Infection:  Goal: Will remain free from infection  Description: Will remain free from infection  8/22/2020 1246 by Gita Ng RN  Outcome: Ongoing  8/22/2020 0353 by Cyrus Cruz RN  Outcome: Ongoing     Problem: Safety:  Goal: Free from accidental physical injury  Description: Free from accidental physical injury  8/22/2020 1246 by Gita Ng RN  Outcome: Ongoing  8/22/2020 0353 by Cyrus Cruz RN  Outcome: Ongoing  Goal: Free from intentional harm  Description: Free from intentional harm  8/22/2020 1246 by Gita Ng RN  Outcome: Ongoing  8/22/2020 0353 by Cyrus Cruz RN  Outcome: Ongoing     Problem: Pain:  Goal: Patient's pain/discomfort is manageable  Description: Patient's pain/discomfort is manageable  8/22/2020 1246 by Gita Ng RN  Outcome: Ongoing  8/22/2020 0353 by Cyrus Cruz RN  Outcome: Ongoing  Goal: Pain level will

## 2020-08-22 NOTE — PROGRESS NOTES
[facility] PROGRESS NOTE    8/22/2020 1:57 PM        Name: Ema Gonzalez . Admitted: 8/16/2020  Primary Care Provider: No primary care provider on file. (Tel: None)    Brief Course:    Patient is a 77-year-old  female with history of tobacco use who presented to ED after she was found on the floor by her daughter. Patient reports multiple falls over the last couple of months, decreased appetite and weight loss of over 60 obese in the last 1 year.  CT abdomen/pelvis in ED showed pelvic abscess versus viscus perforation.  Noted to have colonic wall thickening concerning for malignancy, liver cirrhosis and portal hypertension. Pt does not have a PCP. Shazia Guerrero had cancer screening. Pt had CT-guided placement of drain for pelvic abscess on 8/18. Underwent colonoscopy with biopsy of colonic mass today, 8/20.     CC: History of multiple falls, found on floor at home  Subjective: Pt is more proactive today. Offers no complaints. Denies pain, or SOB. No acute events reported overnight.     Reviewed interval ancillary notes    Current Medications  potassium chloride (KLOR-CON M) extended release tablet 40 mEq, PRN    Or  potassium bicarb-citric acid (EFFER-K) effervescent tablet 40 mEq, PRN    Or  potassium chloride 10 mEq/100 mL IVPB (Peripheral Line), PRN  lactulose (CHRONULAC) 10 GM/15ML solution 20 g, TID  ciprofloxacin (CIPRO) IVPB 400 mg, Q12H  pantoprazole (PROTONIX) tablet 40 mg, QAM AC  morphine (PF) injection 1 mg, Q6H PRN  lip balm petroleum free (PHYTOPLEX) stick, PRN  0.9 % sodium chloride infusion, Continuous  sodium chloride flush 0.9 % injection 10 mL, 2 times per day  sodium chloride flush 0.9 % injection 10 mL, PRN  acetaminophen (TYLENOL) tablet 650 mg, Q6H PRN    Or  acetaminophen (TYLENOL) suppository 650 mg, Q6H PRN  polyethylene glycol (GLYCOLAX) packet 17 g, Daily PRN  promethazine (PHENERGAN) tablet 12.5 mg, Q6H PRN    Or  ondansetron (ZOFRAN) injection 4 mg, Q6H PRN  enoxaparin (LOVENOX) injection 40 mg, Daily        Objective:  /78   Pulse 109   Temp 98.1 °F (36.7 °C) (Oral)   Resp 18   Ht 5' 7\" (1.702 m)   Wt 225 lb 6.4 oz (102.2 kg)   SpO2 92%   BMI 35.30 kg/m²     Intake/Output Summary (Last 24 hours) at 8/22/2020 1357  Last data filed at 8/22/2020 9799  Gross per 24 hour   Intake 10 ml   Output 1215 ml   Net -1205 ml      Wt Readings from Last 3 Encounters:   08/22/20 225 lb 6.4 oz (102.2 kg)   General appearance: Obese white female, lethargic, more awake and talking today  Cardiovascular: Regular rhythm, normal S1, S2. No murmur. No edema in lower extremities  Respiratory: Not using accessory muscles. Good inspiratory effort. Clear to auscultation bilaterally, no wheeze or crackles. GI: Abdomen soft, diffuse mild tenderness, not distended, normal bowel sounds, pelvic abd drain in place with scant purulent drain  Neurology: CN 2-12 grossly intact. No speech or motor deficits  Extremity exam shows brisk capillary refill.  Peripheral pulses are palpable in lower extremities     Labs and Tests:  CBC:   Recent Labs     08/20/20  0501 08/21/20  0509 08/22/20  0424   WBC 16.8* 10.7 11.1*   HGB 9.9* 8.9* 9.3*    150 144     BMP:    Recent Labs     08/20/20  0501 08/21/20  0509 08/21/20  1935 08/22/20  0424    137  --  137   K 3.5 3.0* 3.9 3.6    110  --  109   CO2 18* 20*  --  21   BUN 19 12  --  10   CREATININE 0.8 0.6  --  0.7   GLUCOSE 107* 92  --  113*     Hepatic: No results for input(s): AST, ALT, ALB, BILITOT, ALKPHOS in the last 72 hours. CT ABSCESS DRAINAGE W CATH PLACEMENT S&I   Final Result   Successful CT guided placement of a drainage catheter in the pelvic abscess. CT ABDOMEN PELVIS W IV CONTRAST Additional Contrast? Oral   Final Result   1. Pelvic air/fluid collection almost certainly reflects an abscess or giant   colonic diverticula.   This may be (2 VIEWS)   Final Result   No acute osseous abnormality. Problem List  Active Problems:    Pelvic abscess in female    Septicemia [facility])  Resolved Problems:    * No resolved hospital problems. *       Assessment & Plan:     Colonic mass with possible mets to liver and lungs:  Liver cirrhosis and portal hypertension:  S/p colonoscopy with biopsy of colonic mass yesterday. Awaiting biopsy report. Oncology service will follow the biopsy report and pt as outpt.      Pelvic abscess:   Underwent CT-guided drain placement on 8/18. Purulent drainage noted. On morphine 1 mg IV every 6hrs as needed for pain control. Lethargy, confusion:  Likely hepatic encephalopathy as supported by elevated ammonia level. GI service started patient on lactulose for goal 3 bowel movements per day.     Hyponatremia: currently resolved.       Renal insufficiency: resolved. Etiology likely pre-renal.       IV Access: peripheral  Amado: no  Diet: Dietary Nutrition Supplements: Standard High Calorie Oral Supplement  DIET FULL LIQUID;  DIET CLEAR LIQUID;  Code:Full Code  DVT PPX heparin sq tid  Disposition Pt will need placement at skilled nursing facility as per PT/OT recommendations.  involved.       Roberto Porter MD   8/22/2020 1:57 PM

## 2020-08-23 NOTE — PLAN OF CARE
Problem: Falls - Risk of:  Goal: Will remain free from falls  Description: Will remain free from falls  Outcome: Ongoing  Goal: Absence of physical injury  Description: Absence of physical injury  Outcome: Ongoing     Problem: Skin Integrity:  Goal: Will show no infection signs and symptoms  Description: Will show no infection signs and symptoms  Outcome: Ongoing  Goal: Absence of new skin breakdown  Description: Absence of new skin breakdown  Outcome: Ongoing     Problem: Infection:  Goal: Will remain free from infection  Description: Will remain free from infection  Outcome: Ongoing     Problem: Safety:  Goal: Free from accidental physical injury  Description: Free from accidental physical injury  Outcome: Ongoing     Problem: Daily Care:  Goal: Daily care needs are met  Description: Daily care needs are met  Outcome: Ongoing     Problem: Pain:  Goal: Patient's pain/discomfort is manageable  Description: Patient's pain/discomfort is manageable  Outcome: Ongoing  Goal: Pain level will decrease  Description: Pain level will decrease  Outcome: Ongoing  Goal: Control of acute pain  Description: Control of acute pain  Outcome: Ongoing     Problem: Injury - Risk of, Physical Injury:  Goal: Will remain free from falls  Description: Will remain free from falls  Outcome: Ongoing  Goal: Absence of physical injury  Description: Absence of physical injury  Outcome: Ongoing

## 2020-08-23 NOTE — PROGRESS NOTES
[facility] PROGRESS NOTE    8/23/2020 4:29 PM        Name: Oli Chaidez . Admitted: 8/16/2020  Primary Care Provider: No primary care provider on file. (Tel: None)    Brief Course:    Patient is a 77-year-old  female with history of tobacco use who presented to ED after she was found on the floor by her daughter. Patient reports multiple falls over the last couple of months, decreased appetite and weight loss of over 60 obese in the last 1 year.  CT abdomen/pelvis in ED showed pelvic abscess versus viscus perforation.  Noted to have colonic wall thickening concerning for malignancy, liver cirrhosis and portal hypertension. Pt does not have a PCP. Christelle Mejia had cancer screening. Pt had CT-guided placement of drain for pelvic abscess on 8/18. Underwent colonoscopy with biopsy of colonic mass today, 8/20.     CC: History of multiple falls, found on floor at home  Subjective: Pt is reported to have 2 watery bowel movements overnight from lactulose. Patient reports pain abdomen. Denies nausea or vomiting. No acute events as per RN.       Current Medications  potassium chloride (KLOR-CON M) extended release tablet 40 mEq, PRN    Or  potassium bicarb-citric acid (EFFER-K) effervescent tablet 40 mEq, PRN    Or  potassium chloride 10 mEq/100 mL IVPB (Peripheral Line), PRN  lactulose (CHRONULAC) 10 GM/15ML solution 20 g, TID  ciprofloxacin (CIPRO) IVPB 400 mg, Q12H  pantoprazole (PROTONIX) tablet 40 mg, QAM AC  morphine (PF) injection 1 mg, Q6H PRN  lip balm petroleum free (PHYTOPLEX) stick, PRN  0.9 % sodium chloride infusion, Continuous  sodium chloride flush 0.9 % injection 10 mL, 2 times per day  sodium chloride flush 0.9 % injection 10 mL, PRN  acetaminophen (TYLENOL) tablet 650 mg, Q6H PRN    Or  acetaminophen (TYLENOL) suppository 650 mg, Q6H PRN  polyethylene glycol (GLYCOLAX) packet 17 g, Daily PRN  promethazine (PHENERGAN) tablet 12.5 mg, Q6H PRN    Or  ondansetron (ZOFRAN) injection 4 mg, Q6H PRN  enoxaparin (LOVENOX) injection 40 mg, Daily        Objective:  /62   Pulse 80   Temp 98.1 °F (36.7 °C) (Oral)   Resp 18   Ht 5' 7\" (1.702 m)   Wt 230 lb (104.3 kg)   SpO2 95%   BMI 36.02 kg/m²     Intake/Output Summary (Last 24 hours) at 8/23/2020 1629  Last data filed at 8/23/2020 1354  Gross per 24 hour   Intake 30 ml   Output 455 ml   Net -425 ml      Wt Readings from Last 3 Encounters:   08/23/20 230 lb (104.3 kg)     General appearance: Obese white female, lethargic, more awake and talking today  Cardiovascular: Regular rhythm, normal S1, S2. No murmur. No edema in lower extremities  Respiratory: Not using accessory muscles. Good inspiratory effort. Clear to auscultation bilaterally, no wheeze or crackles. GI: Abdomen soft, diffuse mild tenderness, not distended, normal bowel sounds, pelvic abd drain in place with scant purulent drain  Neurology: CN 2-12 grossly intact. No speech or motor deficits  Extremity exam shows brisk capillary refill.  Peripheral pulses are palpable in lower extremities      Labs and Tests:  CBC:   Recent Labs     08/21/20  0509 08/22/20  0424 08/23/20  0512   WBC 10.7 11.1* 13.1*   HGB 8.9* 9.3* 9.0*    144 112*     BMP:    Recent Labs     08/21/20  0509 08/21/20  1935 08/22/20  0424 08/23/20  0512     --  137 134*   K 3.0* 3.9 3.6 3.8     --  109 108   CO2 20*  --  21 17*   BUN 12  --  10 15   CREATININE 0.6  --  0.7 1.0   GLUCOSE 92  --  113* 100*     Hepatic: No results for input(s): AST, ALT, ALB, BILITOT, ALKPHOS in the last 72 hours. CT ABSCESS DRAINAGE W CATH PLACEMENT S&I   Final Result   Successful CT guided placement of a drainage catheter in the pelvic abscess. CT ABDOMEN PELVIS W IV CONTRAST Additional Contrast? Oral   Final Result   1.  Pelvic air/fluid collection almost certainly reflects an abscess or giant   colonic diverticula. This may be the result of acute diverticulitis. 2. Mass lesion ascending colon almost certainly reflecting primary   adenocarcinoma of the colon. 3. Diffuse hepatic lesions with newly visualized (contrast enhancement) mass   at the hepatic dome. Hepatic metastatic disease is a consideration as is   cirrhosis with regenerative nodules and hepatocellular carcinoma. 4. Bilateral lower lobe pulmonary nodules suspicious for metastatic disease. 5. Abdominal and pelvic ascites. 6. Sequela from portosystemic shunting with numerous collateral vessels about   the stomach, left upper quadrant and distal esophagus. Relatively small   esophageal and gastric varices. 7.  Calcific atherosclerotic disease aorta. CT ABDOMEN PELVIS WO CONTRAST Additional Contrast? None   Final Result   1. Non loculated fluid and gas collection in the pelvis concerning for   perforated viscus/possible abscess formation. 2. Prominent thickening of the ascending colon which may be related to   infectious or inflammatory process or underlying neoplastic process. 3. Bowel-gas pattern typical of adynamic ileus. 4. Hepatic cirrhosis with numerous hepatic nodules suggestive of   regenerative/siderotic nodules. MRI abdomen without and with IV contrast   correlation recommended. Metastatic disease is a consideration. 5. Hepatosplenomegaly and multifocal collateral vessels about the stomach   esophagus concerning for varices, all suggestive of portal venous   hypertension. 6. Abdominal and pelvic ascites. 7.  Diverticulosis coli without CT evidence of acute diverticulitis. Critical results were called by Dr. Jess Lynch to [address] [place] [address]   on 8/16/2020 at 18:09. CT Cervical Spine WO Contrast   Final Result   No acute abnormality of the cervical spine. CT Head WO Contrast   Final Result   No acute intracranial abnormality. XR CHEST PORTABLE   Final Result   No acute process.

## 2020-08-23 NOTE — PROGRESS NOTES
Assist X1 with a ww to sit up on chair for lunch. Continues to be coherent. Sat up for an hour. Has been utilizing the call light appropriately to make her needs known. No diarrhea, Fluids infusing at 50ml/hr.

## 2020-08-23 NOTE — PROGRESS NOTES
Internal medicine provider called RN, Concerned about frequent lactulose use and increased bicarb. Would like GI to consult in, will page GI. Concern of decreased output discussed with provider, currently receiving 100ml/hr IVF . Yesterday -120 mls, PM 200mls. She will talk to Nephrology.

## 2020-08-23 NOTE — PROGRESS NOTES
MD Kirby Kingston MD Jarvis Lambing, MD               Office: [phone]                      Fax: [phone]             [facility]                   NEPHROLOGY INPATIENT PROGRESS NOTE:     PATIENT NAME: Dylan Golden  : 1955  MRN: [id number]. Nephrology treatment plan. Renal functions are stable and improved to baseline. BUN is 15 and a creatinine of 1.0. Electrolytes improving. Mild hyponatremia. Stable 134. Reduce IV fluids to minimum. Normal saline 50 mL/h. Patient has chronic liver disease and hypovolemia is improved. Medications reviewed and appropriate. Renal stable for discharge. will sign off at this time. Please call if needed. IMPRESSION:       PAPITO (on ? CKD): improving. ..   : Oligouric -> non-oliguric   - BL SCr - unknown ---> 1.6, eGFR 32  on admission  : Etiology of PAPITO - presumed pre-renal / early ATN / unlikely HRS     - other differentials: ruled out obstruction , unlikely AIN or GN,   - UA : bili, trace pro, LE - follow Ucx, ? AIN - unlikely    : large bl. + only 7 RBC = myoglobulinemia   - Renal imaging: w/ CT - kidney unremarkable   - CK - 310 on admission   - Pro-calcitonin 5.6   - urine lytes - all very lower = pre-renal ,    : likely hypovolemia - not concerning for HRS or CRS       Associated problems:   - Azotemia: moderate - ?pre-renal - now improving  - Electrolytes: Hypokalemia - needing repletion  : check Mag in am - add on   - Volume status: mild hypo-volemic, although w/ ascites - better   : Na: Hyponatremia - moderate - 127 on admission - likely hypovolemia / PAPITO / Cirrhosis - improving at goal   : BP: Ok to keep slightly higher   - Acid-Base: Acidosis - mild - non-AGMA - d/to LA / PAPITO / GI loss - improving   - Anemia: worsening - follow        RECOMMENDATIONS:   - no need for IVF from renal POV,   - allow PO hydration    - on NS per GI   - K repletion  - check Mag level     - avoid hypotonic fluids like LR as w/ recent hyponatremia  - cipro per 1' team   - Sx consult f/u      Other problems: Management per primary and other consulting teams. # Cirrhosis , Portal HTN : new diagnosis, no h/o EtOH, w/u ordered by GI     # Sepsis, intra-abdominal abscess   - status-post CT-guided drain placement on 8/18/2020  -        Hospital Problems           Last Modified POA    Pelvic abscess in female 8/16/2020 Yes    Septicemia ([facility] [address] 8/17/2020 Yes          : other supportive care :   - Check daily renal function panel with electrolytes-phosphorus  - Strict monitoring of I/Os, daily weight  - Renal feeds/diet  - Current medications reviewed. - Nephrotoxic medications have been discontinued. - Dose adjusted and appropriate. - Dose meds for eGFR <15 mL/min/1.73m2 during PAPITO    - Avoid heavy opioids due to renal failure - may use very low dose dilaudid / fentanyl with close monitoring of CNS and respiratory depression. Please refer to the orders. High Complexity. Multiple complex problems. Thank you for allowing me to participate in this patient's care. Please do not hesitate to contact me with any questions/concerns. We will follow along with you. Vijay Monroe MD       [facility] [address] [place]: [phone] or [address]  Fax: [phone]        ========================================================   ========================================================          SUBJECTIVE:    Patient was seen comfortably resting in the bed,   Reported no active complaints,   Past medical, Surgical, Social, Family medical history reviewed by me. MEDICATIONS: reviewed by me. Medications Prior to Admission:  No current facility-administered medications on file prior to encounter. No current outpatient medications on file prior to encounter. No medications prior to admission.      Scheduled Meds:   polyethylene glycol  4,000 mL Oral DATA:  Diagnostic tests reviewed by me for today's visit:    Recent Labs     08/21/20  0509 08/22/20  0424 08/23/20  0512   WBC 10.7 11.1* 13.1*   HCT 28.1* 28.8* 29.6*    144 112*     Iron Saturation:  No components found for: PERCENTFE  FERRITIN:    Lab Results   Component Value Date    FERRITIN 542.4 08/17/2020     IRON:    Lab Results   Component Value Date    IRON 26 08/17/2020     TIBC:    Lab Results   Component Value Date    TIBC 106 08/17/2020       Recent Labs     08/21/20  0509 08/21/20  1935 08/22/20  0424 08/23/20  0512     --  137 134*   K 3.0* 3.9 3.6 3.8     --  109 108   CO2 20*  --  21 17*   BUN 12  --  10 15   CREATININE 0.6  --  0.7 1.0     Recent Labs     08/21/20  0509 08/22/20  0424 08/23/20  0512   CALCIUM 8.1* 8.2* 7.7*   MG 2.60* 2.30  --    PHOS 2.2* 2.3* 3.7     No results for input(s): PH, PCO2, PO2 in the last 72 hours.     Invalid input(s): Fredo Marry    ABG:  No results found for: PH, PCO2, PO2, HCO3, BE, THGB, TCO2, O2SAT  VBG:    Lab Results   Component Value Date    PHVEN 7.435 08/23/2020    YWY6DNY 26.3 08/23/2020    BEVEN -5.6 08/23/2020    W4UYMHJY 99 08/23/2020       LDH:  No results found for: LDH  Uric Acid:    Lab Results   Component Value Date    LABURIC 5.9 08/17/2020       PT/INR:    Lab Results   Component Value Date    PROTIME 17.0 08/17/2020    INR 1.46 08/17/2020     Warfarin PT/INR:  No components found for: PTPATWAR, PTINRWAR  PTT:  No results found for: APTT, PTT[APTT}  Last 3 Troponin:    Lab Results   Component Value Date    TROPONINI <0.01 08/16/2020       U/A:    Lab Results   Component Value Date    COLORU ORANGE 08/16/2020    PROTEINU TRACE 08/16/2020    PHUR 5.0 08/16/2020    WBCUA 3 08/16/2020    RBCUA 7 08/16/2020    BACTERIA RARE 08/16/2020    CLARITYU CLOUDY 08/16/2020    SPECGRAV 1.022 08/16/2020    LEUKOCYTESUR SMALL 08/16/2020    UROBILINOGEN 1.0 08/16/2020    BILIRUBINUR SMALL 08/16/2020    BLOODU LARGE TECHNOLOGIST PROVIDED HISTORY: Reason for exam:->Fall Reason for Exam: Frequent falls at home. Bruising mid shaft L forearm Acuity: Acute Type of Exam: Initial FINDINGS: There is no evidence of acute fracture. There is normal alignment. No acute joint abnormality. No focal osseous lesion. No focal soft tissue abnormality. No acute osseous abnormality. Ct Head Wo Contrast    Result Date: 8/16/2020  EXAMINATION: CT OF THE HEAD WITHOUT CONTRAST  8/16/2020 3:15 pm TECHNIQUE: CT of the head was performed without the administration of intravenous contrast. Dose modulation, iterative reconstruction, and/or weight based adjustment of the mA/kV was utilized to reduce the radiation dose to as low as reasonably achievable. COMPARISON: None. HISTORY: ORDERING SYSTEM PROVIDED HISTORY: Fall TECHNOLOGIST PROVIDED HISTORY: Reason for exam:->Fall Has a \"code stroke\" or \"stroke alert\" been called? ->No Reason for Exam: Fall Acuity: Acute Type of Exam: Initial FINDINGS: BRAIN/VENTRICLES: The ventricles and sulci are diffusely enlarged. Low attenuation is seen in the periventricular and subcortical white matter. No acute intracranial hemorrhage or acute infarct is identified. ORBITS: The visualized portion of the orbits demonstrate no acute abnormality. SINUSES: The visualized paranasal sinuses and mastoid air cells demonstrate no acute abnormality. SOFT TISSUES/SKULL:  No acute abnormality of the visualized skull or soft tissues. No acute intracranial abnormality. Ct Cervical Spine Wo Contrast    Result Date: 8/16/2020  EXAMINATION: CT OF THE CERVICAL SPINE WITHOUT CONTRAST 8/16/2020 3:15 pm TECHNIQUE: CT of the cervical spine was performed without the administration of intravenous contrast. Multiplanar reformatted images are provided for review. Dose modulation, iterative reconstruction, and/or weight based adjustment of the mA/kV was utilized to reduce the radiation dose to as low as reasonably achievable. COMPARISON: None. HISTORY: ORDERING SYSTEM PROVIDED HISTORY: Fall TECHNOLOGIST PROVIDED HISTORY: Reason for exam:->Fall Reason for Exam: Fall Acuity: Acute Type of Exam: Initial FINDINGS: BONES/ALIGNMENT: There is no acute fracture or traumatic malalignment. DEGENERATIVE CHANGES: Multilevel degenerative changes. SOFT TISSUES: There is no prevertebral soft tissue swelling. No acute abnormality of the cervical spine. Xr Chest Portable    Result Date: 8/16/2020  EXAMINATION: ONE XRAY VIEW OF THE CHEST 8/16/2020 2:55 pm COMPARISON: None. HISTORY: ORDERING SYSTEM PROVIDED HISTORY: Fall TECHNOLOGIST PROVIDED HISTORY: Reason for exam:->Fall Reason for Exam: Frequent falls at home. Bruising mid shaft L forearm Acuity: Acute Type of Exam: Initial FINDINGS: The lungs are without acute focal process. There is no effusion or pneumothorax. The cardiomediastinal silhouette is without acute process. The osseous structures are without acute process. No acute process.

## 2020-08-24 NOTE — PROGRESS NOTES
[facility]  Gastroenterology Progress Note    Flaca Adan is a 76 y.o. female patient. Active Problems:    Pelvic abscess in female    Septicemia [facility])  Resolved Problems:    * No resolved hospital problems. *      SUBJECTIVE:  Pt alert    ROS:  No fever, chills  No chest pain, palpitations  No SOB, cough  Gastrointestinal ROS: no N/V/D     Physical    VITALS:  /67   Pulse 87   Temp 97.8 °F (36.6 °C) (Oral)   Resp 18   Ht 5' 7\" (1.702 m)   Wt 230 lb (104.3 kg)   SpO2 95%   BMI 36.02 kg/m²   TEMPERATURE:  Current - Temp: 97.8 °F (36.6 °C); Max - Temp  Av.9 °F (36.6 °C)  Min: 97.7 °F (36.5 °C)  Max: 98.1 °F (36.7 °C)    NAD  Regular rate   Lungs CTA Bilaterally  Abdomen soft, ND, nontender,  No guarding or rebound. Bowel sounds normal.  Alert and oriented to place and time but not person. No asterixis. Data    Data Review:    Recent Labs     20  0424 20  0512 20  0434   WBC 11.1* 13.1* 18.2*   HGB 9.3* 9.0* 10.1*   HCT 28.8* 29.6* 31.8*   MCV 88.0 91.7 88.7    112* 128*     Recent Labs     20  0424 20  0512 20  0434    134* 134*   K 3.6 3.8 4.0    108 108   CO2 21 17* 18*   PHOS 2.3* 3.7 4.2   BUN 10 15 21*   CREATININE 0.7 1.0 1.0     No results for input(s): AST, ALT, ALB, BILIDIR, BILITOT, ALKPHOS in the last 72 hours. No results for input(s): LIPASE, AMYLASE in the last 72 hours. No results for input(s): PROTIME, INR in the last 72 hours. No results for input(s): PTT in the last 72 hours. CT abd/pelvis w IV and PO contrast 20  Impression    1. Pelvic air/fluid collection almost certainly reflects an abscess or giant    colonic diverticula.  This may be the result of acute diverticulitis. 2. Mass lesion ascending colon almost certainly reflecting primary    adenocarcinoma of the colon.     3. Diffuse hepatic lesions with newly visualized (contrast enhancement) mass    at the hepatic dome.  Hepatic metastatic disease is a consideration as is    cirrhosis with regenerative nodules and hepatocellular carcinoma. 4. Bilateral lower lobe pulmonary nodules suspicious for metastatic disease. 5. Abdominal and pelvic ascites. 6. Sequela from portosystemic shunting with numerous collateral vessels about    the stomach, left upper quadrant and distal esophagus.  Relatively small    esophageal and gastric varices. 7.  Calcific atherosclerotic disease aorta.               ASSESSMENT :    Cirrhosis - new diagnosis per CT. No alcohol history. Likely from fatty liver. Some ascites on CT. Seemed mildly confused at admission (baseline per her daughter) but ammonia was normal. repeat ammonia is elevated at 66 so lactulose started. Negative: AMA, hepatitis panel, A1AT phenotype. f-actin weak positive at 25. AFP normal. CEA 83     Abnormal CT of the colon and liver, colon mass - CT with thickening of the ascending colon concerning for malignancy. Also with liver and lung lesions concerning for metastatic disease. Colonoscopy 8/20 with large necrotic ascending colon mass, likely the source of abscess. Four polyps were removed.      Pelvic abscess - CT with nonloculated fluid and gas collection in the pelvis. S/p IR drainage 8/18.      Esophagitis - EGD 8/20 with LA class C erosive esophagitis and nodularity at [facility]. Bx. PLAN :  - f/u path  - lactulose TID - titrate frequency for goal of 3 BMs daily  - PPI  - NPO for possible surgery today  - antibiotics   - f/u Hep B surface AB for vaccination purposes.  Will need Hep A vaccine.     Fátima Prasad MD  [address] [facility]  8/24/2020

## 2020-08-24 NOTE — PLAN OF CARE
1040 received call back from surgeon's RN that surgery will be performed as scheduled despite the patient's inability to take more than 50% pre-op prep. Surgical eprmit reviewed with the patient & signed, co-signed by the patient's sister. 1200 to pre-op per bed. Bedside report given to pre-op RN. Sister in [address] [place] [address] waiting room. the patient & sister tolerated transfer well. Will continue to monitor. Paty Zelaya RN, BSN, PCCN.

## 2020-08-24 NOTE — PROGRESS NOTES
Physical Therapy  [facility]      Orders received for PT/OT evaluation. Chart reviewed. Patient currently being ruled out for COVID-19. Will hold therapy at this time per infection control and PPE conservation effort and will follow up with pt pending results of testing. Patient also with planned abdominal surgery this date.  Thanks, Jose Ellis, [address] [facility], DPT [id number]

## 2020-08-24 NOTE — PROGRESS NOTES
[facility] PROGRESS NOTE    8/24/2020 4:19 PM        Name: Ann Marie Soriano . Admitted: 8/16/2020  Primary Care Provider: No primary care provider on file. (Tel: None)      Subjective:  Patient is a 77 yo female with hx nicotine use. The patient lives alone, she presented to hospital after being found on floor by her daughter. She has had multiple falls in past couple of months. CT abdomen and pelvis in ER showed pelvic abscess vs viscus perf. Colonic wall thickening, concerning for malignancy, liver cirrhosis portal hypertension. She had Ct-guided drain placement 8/18 for pelvic abscess 8/18. Subsequently found to have large ascending colon mass on colonoscopy 8/20, likely the source of abscess. 8/18/2020: Successful CT guided placement of a drainage catheter in the pelvic abscess. Have attempted to see patient several times this afternoon. She has been down to OR since before 1 pm. Chart and results reviewed.  Will see patient in am.     Reviewed interval ancillary notes    Current Medications  HYDROmorphone (DILAUDID) injection 0.25 mg, Q5 Min PRN  fentaNYL (SUBLIMAZE) injection 50 mcg, Q5 Min PRN  HYDROmorphone (DILAUDID) injection 0.25 mg, Q5 Min PRN  HYDROmorphone (DILAUDID) injection 0.5 mg, Q5 Min PRN  oxyCODONE (ROXICODONE) immediate release tablet 5 mg, PRN    Or  oxyCODONE (ROXICODONE) immediate release tablet 10 mg, PRN  diphenhydrAMINE (BENADRYL) injection 12.5 mg, Once PRN  promethazine (PHENERGAN) injection 6.25 mg, PRN  labetalol (NORMODYNE;TRANDATE) injection 5 mg, Q10 Min PRN  meperidine (DEMEROL) injection 12.5 mg, Q5 Min PRN  bupivacaine liposome (EXPAREL) 1.3 % injection 66.5 mg, Once  sodium chloride 0.9 % irrigation, Continuous PRN  potassium chloride (KLOR-CON M) extended release tablet 40 mEq, PRN    Or  potassium bicarb-citric acid (EFFER-K) effervescent tablet 40 mEq, PRN Or  potassium chloride 10 mEq/100 mL IVPB (Peripheral Line), PRN  lactulose (CHRONULAC) 10 GM/15ML solution 20 g, TID  ciprofloxacin (CIPRO) IVPB 400 mg, Q12H  pantoprazole (PROTONIX) tablet 40 mg, QAM AC  morphine (PF) injection 1 mg, Q6H PRN  lip balm petroleum free (PHYTOPLEX) stick, PRN  0.9 % sodium chloride infusion, Continuous  sodium chloride flush 0.9 % injection 10 mL, 2 times per day  sodium chloride flush 0.9 % injection 10 mL, PRN  acetaminophen (TYLENOL) tablet 650 mg, Q6H PRN    Or  acetaminophen (TYLENOL) suppository 650 mg, Q6H PRN  polyethylene glycol (GLYCOLAX) packet 17 g, Daily PRN  promethazine (PHENERGAN) tablet 12.5 mg, Q6H PRN    Or  ondansetron (ZOFRAN) injection 4 mg, Q6H PRN  enoxaparin (LOVENOX) injection 40 mg, Daily    0.9 % sodium chloride infusion, Continuous PRN  0.9 % sodium chloride infusion, Continuous PRN  glycopyrrolate (ROBINUL) injection, PRN  phenylephrine (STEPHY-SYNEPHRINE) 1 MG/10ML prefilled syringe, PRN  phenylephrine (STEPHY-SYNEPHRINE) 10 mg in sodium chloride 0.9 % 100 mL infusion, Continuous PRN  rocuronium (ZEMURON) injection, PRN  propofol injection, PRN  succinylcholine (ANECTINE) injection, PRN  ondansetron (ZOFRAN) injection, PRN  Dexamethasone Sodium Phosphate injection, PRN  lidocaine PF 2 % injection, PRN  fentaNYL (SUBLIMAZE) injection, PRN  albumin human 5 % IV solution, PRN  sugammadex (BRIDION) injection, PRN        Objective:  /62   Pulse 86   Temp 97.6 °F (36.4 °C) (Temporal)   Resp 16   Ht 5' 7\" (1.702 m)   Wt 230 lb (104.3 kg)   SpO2 96%   BMI 36.02 kg/m²     Intake/Output Summary (Last 24 hours) at 8/24/2020 1619  Last data filed at 8/24/2020 1552  Gross per 24 hour   Intake --   Output 3061 ml   Net -3061 ml      Wt Readings from Last 3 Encounters:   08/23/20 230 lb (104.3 kg)       Labs and Tests:  CBC:   Recent Labs     08/22/20  0424 08/23/20  0512 08/24/20  0434   WBC 11.1* 13.1* 18.2*   HGB 9.3* 9.0* 10.1*    112* 128*     BMP: pelvic ascites. 6. Sequela from portosystemic shunting with numerous collateral vessels about    the stomach, left upper quadrant and distal esophagus.  Relatively small    esophageal and gastric varices. 7.  Calcific atherosclerotic disease aorta. Problem List  Active Problems:    Pelvic abscess in female    Septicemia [facility])  Resolved Problems:    * No resolved hospital problems. *       Assessment:   1. Pelvic abscess. Status post IR drainage 8/18, pelvic fluid cytology negative for malignant cells. Colonoscopy 8/20 with large necrotic ascending colon mass, likely source of abscess. Initially started on Zosyn, then transitioned to IV cipro. Antibiotics per surgery. 2. Liver cirrhosis / portal HTN . Likely secondary to fatty liver. Has been started on lactulose for ammonia level 66 and confusion. 3. Abnormal CT scan. CT thickening for ascending for malignancy, lesions liver and lung concerning for metastasis. CEA 83.3, AFP 2.4.  4. PAPITO. Creatinine 1.6 on admission, 1.0 today. No obstruction. Presumed pre-renal, hypovolemia. Resolved. Nephrology has signed off.           Diet: Dietary Nutrition Supplements: Standard High Calorie Oral Supplement  Diet NPO, After Midnight  Code:Full Code  DVT PPX: on enoxaparin      VIN Beal CNP   8/24/2020 4:19 PM

## 2020-08-24 NOTE — PROGRESS NOTES
Pt meets d/c criteria for phase 1 in PACU and has been seen by anesthesia. Ok to transfer to [employer]. Will alert anyone in waiting room for them and the nursing unit if applicable. Will continue to monitor for safety and comfort.     Gaetano BANKSN, RN, [facility]  Pre-Op/Recovery   Same Day Surgery

## 2020-08-24 NOTE — BRIEF OP NOTE
Brief Postoperative Note      Patient: Ji Oleary  YOB: 1955  MRN: [id number]    Date of Procedure: 8/24/2020    Pre-Op Diagnosis: Colon mass, necrotic cancer at hepatic flexure, cirrhosis, pelvic abscess    Post-Op Diagnosis: Same       Procedure(s):  EXPLORATORY LAPAROTOMY, RIGHT  HEMICOLECTOMY; DRAINAGE OF PELVIC ABSCESS  IRVIN cut liver bx    Surgeon(s):  Jose Harden MD    Assistant:  Surgical Assistant: Monique Up RN    Anesthesia: General    Estimated Blood Loss (mL):  891     Complications: None    Specimens:   ID Type Source Tests Collected by Time Destination   1 : 1) ASCITES Body Fluid Fluid CULTURE, FUNGUS, CULTURE WITH SMEAR, ACID FAST BACILLIUS, CULTURE, ANAEROBIC AND AEROBIC, CULTURE BACILLUS SPECIES W/ GRAM STAIN Jose Harden MD 8/24/2020 1410    A : A) RIGHT HEMICOLECTOMY Tissue Tissue SURGICAL PATHOLOGY Jose Harden MD 8/24/2020 1526    B : B) LIVER BIOPSY Tissue Tissue SURGICAL PATHOLOGY Jose Harden MD 8/24/2020 1527        Implants:  * No implants in log *      Drains:   Closed/Suction Drain Left Back Bulb 10 Kuwaiti (Active)   Dressing Status Clean;Dry; Intact 08/23/20 1808   Drainage Appearance Purulent 08/23/20 1808   Status Compressed 08/23/20 1808   Output (ml) 10 ml 08/23/20 1808       NG/OG/NJ/NE Tube Orogastric 16 fr Center mouth (Active)       Urethral Catheter (Active)   $ Urethral catheter insertion $ Not inserted for procedure 08/23/20 5053   Catheter Indications Need for fluid management in critically ill patients in a critical care setting not able to be managed by other means such as BSC with hat, bedpan, urinal, condom catheter, or short term intermittent urethral catherization 08/24/20 0410   Site Assessment [place] 08/23/20 1603   Urine Color Roopa 08/23/20 1603   Urine Appearance Hazy 08/23/20 1603   Urine Odor Malodorous 08/23/20 1603   Output (mL) 75 mL 08/23/20 1354       Findings: Massive necrotic perforated right colon cancer, resected, primary ileo to TV colostomy created. Cirrhosis present, bx done. Pelvic abscess drained, could not see a defect / leak in the sigmoid colon, so sigmoid not resected.  Approx 3 L of bilious ascites drained, cx and cytology sent    Electronically signed by Amaury Dooley MD on 8/24/2020 at 4:14 PM

## 2020-08-24 NOTE — ANESTHESIA PRE PROCEDURE
Department of Anesthesiology  Preprocedure Note       Name:  Christel Benoit   Age:  76 y.o.  :  1955                                          MRN:  [id number]         Date:  2020      Surgeon: Katiuska Villarreal):  Leonardo Green MD    Procedure: Procedure(s):  EXPLORATORY LAPAROTOMY, RIGHT  HEMICOLECTOMY; DRAINAGE OF PELVIC ABSCESS    Medications prior to admission:   Prior to Admission medications    Not on File       Current medications:    Current Facility-Administered Medications   Medication Dose Route Frequency Provider Last Rate Last Dose    metronidazole (FLAGYL) 500 mg in NaCl 100 mL IVPB premix  500 mg Intravenous Once Leonardo Green MD        HYDROmorphone (DILAUDID) injection 0.25 mg  0.25 mg Intravenous Q5 Min PRN Steph Underwood MD        fentaNYL (SUBLIMAZE) injection 50 mcg  50 mcg Intravenous Q5 Min PRN Steph Underwood MD        HYDROmorphone (DILAUDID) injection 0.25 mg  0.25 mg Intravenous Q5 Min PRN Steph Underwood MD        HYDROmorphone (DILAUDID) injection 0.5 mg  0.5 mg Intravenous Q5 Min PRN Steph Underwood MD        oxyCODONE (ROXICODONE) immediate release tablet 5 mg  5 mg Oral PRN Steph Underwood MD        Or    oxyCODONE (ROXICODONE) immediate release tablet 10 mg  10 mg Oral PRN Steph Underwood MD        diphenhydrAMINE (BENADRYL) injection 12.5 mg  12.5 mg Intravenous Once PRN Steph Underwood MD        promethazine (PHENERGAN) injection 6.25 mg  6.25 mg Intravenous PRN Steph Underwood MD        labetalol (NORMODYNE;TRANDATE) injection 5 mg  5 mg Intravenous Q10 Min PRN Steph Underwood MD        meperidine (DEMEROL) injection 12.5 mg  12.5 mg Intravenous Q5 Min PRN Steph Underwood MD        potassium chloride (KLOR-CON M) extended release tablet 40 mEq  40 mEq Oral PRN Abraham Calderón MD        Or    potassium bicarb-citric acid (EFFER-K) effervescent tablet 40 mEq  40 mEq Oral PRN Abraham Calderón MD        Or    potassium chloride 10 mEq/100 mL IVPB pertinent past medical history. Past Surgical History:        Procedure Laterality Date    COLONOSCOPY N/A 8/20/2020    COLONOSCOPY WITH BIOPSY performed by Corby Polk MD at [facility]  8/20/2020    COLONOSCOPY POLYPECTOMY SNARE/COLD BIOPSY performed by Corby Polk MD at [facility]      UPPER GASTROINTESTINAL ENDOSCOPY N/A 8/20/2020    EGD BIOPSY performed by Corby Polk MD at [address], [address] History:    Social History     Tobacco Use    Smoking status: Current Every Day Smoker     Packs/day: 0.50     Years: 50.00     Pack years: 25.00     Types: Cigarettes   Substance Use Topics    Alcohol use: Never     Frequency: Never                                Ready to quit: Not Answered  Counseling given: Not Answered      Vital Signs (Current):   Vitals:    08/24/20 0410 08/24/20 0825 08/24/20 0830 08/24/20 1220   BP: 131/85  112/67 111/62   Pulse: 81  87 86   Resp: 16 17 18 16   Temp: 97.7 °F (36.5 °C)  97.8 °F (36.6 °C) 97.6 °F (36.4 °C)   TempSrc: Oral  Oral Temporal   SpO2: 96% 94% 95% 96%   Weight:       Height:                                                  BP Readings from Last 3 Encounters:   08/24/20 111/62   08/20/20 (!) 91/47       NPO Status: Time of last liquid consumption: 0600                        Time of last solid consumption: 0600                        Date of last liquid consumption: 08/21/20                        Date of last solid food consumption: 08/21/20    BMI:   Wt Readings from Last 3 Encounters:   08/23/20 230 lb (104.3 kg)     Body mass index is 36.02 kg/m².     CBC:   Lab Results   Component Value Date    WBC 18.2 08/24/2020    RBC 3.58 08/24/2020    HGB 10.1 08/24/2020    HCT 31.8 08/24/2020    MCV 88.7 08/24/2020    RDW 18.0 08/24/2020     08/24/2020       CMP:   Lab Results   Component Value Date     08/24/2020    K 4.0 08/24/2020    K 4.7 08/16/2020

## 2020-08-24 NOTE — PROGRESS NOTES
Pt stated nausea relieved with zofran, this RN poured another cup of bowel prep and encouraged pt to keep drinking bowel prep for procedure in the am, pt took a sip of bowel prep and went to sleep. Educated the pt on the importance of drinking the bowel prep. Pt has had no bowel movements at this time. Will continue to monitor.

## 2020-08-24 NOTE — PROGRESS NOTES
Shift assessment complete. Telemetry on. Pt alert and oriented x3. Pt drinking bowel prep and had x1 episode of emesis. Pt given PRN zofran per [facility] for nausea. Pt encouraged to keep drinking bowel prep for procedure in the am. Pt denies any further complaints at this time. Call light within reach.

## 2020-08-24 NOTE — ANESTHESIA PROCEDURE NOTES
Central Venous Line:    A central venous line was placed using ultrasound guidance, in the OR for the following indication(s): central venous access. 8/24/2020 2:21 PM8/24/2020 2:49 PM    Sterility preparation included the following: hand hygiene performed prior to procedure, maximum sterile barriers used and sterile technique used to drape from head to toe. The patient was placed in Trendelenburg position. The right internal jugular vein was prepped. The site was prepped with Chloraprep. A 7 Fr (size), 16 (length), introducer triple lumen was placed. During the procedure, the following specific steps were taken: target vein identified, needle advanced into vein and blood aspirated and guidewire advanced into vein. Intravenous verification was obtained by ultrasound and venous blood return. Post insertion care included: all ports aspirated, all ports flushed easily, guidewire removed intact, Biopatch applied, line sutured in place and dressing applied. During the procedure the patient experienced: patient tolerated procedure well with no complications and EBL < 5mL.       Insertion site scrubbed per usage guidelines?: Yes  Skin prep agent dried for 3 minutes prior to procedure?:yes  Anesthesia type: general  Staffing  Anesthesiologist: Dhara Santiago MD  Resident/CRNA: VIN Fay CRNA  Performed: Resident/CRNA   Preanesthetic Checklist  Completed: patient identified, site marked, surgical consent, pre-op evaluation, timeout performed, IV checked, risks and benefits discussed, monitors and equipment checked, anesthesia consent given, oxygen available and patient being monitored

## 2020-08-24 NOTE — PROGRESS NOTES
[facility]        Progress Note    Patient Name: Rosalinda Rod  MRN: [id number]  YOB: 1955  Date of Evaluation: 2020    Chief Complaint: Abdominal pain    Subjective:  No acute events overnight, did not do well with prep  Has had bloating and right-sided pain today  Has had emesis  No CP or SOB  Passing non-bloody stools        Vital Signs:  Patient Vitals for the past 24 hrs:   BP Temp Temp src Pulse Resp SpO2   20 1220 111/62 97.6 °F (36.4 °C) Temporal 86 16 96 %   20 0830 112/67 97.8 °F (36.6 °C) Oral 87 18 95 %   20 0825 -- -- -- -- 17 94 %   20 0410 131/85 97.7 °F (36.5 °C) Oral 81 16 96 %   20 0010 132/79 98 °F (36.7 °C) Oral 90 20 95 %   20 1930 114/63 98.1 °F (36.7 °C) Oral 74 18 96 %   20 1545 108/62 -- -- 80 18 95 %      TEMPERATURE HISTORY 24H: Temp (24hrs), Av.8 °F (36.6 °C), Min:97.6 °F (36.4 °C), Max:98.1 °F (36.7 °C)    BLOOD PRESSURE HISTORY: Systolic (98SGT), TLH:097 , Min:102 , CP    Diastolic (02VMF), AYX:43, Min:52, Max:85      Intake/Output:  I/O last 3 completed shifts: In: 330 [P.O.:320; I.V.:10]  Out: 161 [Urine:150; Emesis/NG output:1; Drains:10]  No intake/output data recorded.   Drain/tube Output:  Closed/Suction Drain Left Back Bulb 10 Malay-Output (ml): 10 ml    Physical Exam:  General: awake, alert, oriented to person, place  Cardiovascular:  regular rate and rhythm and no murmur noted  Lungs: clear to auscultation  Abdomen: soft, moderately distended, RUQ and supraumbilical tenderness without peritonitis, bowel sounds present   Drain: serous, thin    Labs:  CBC:    Recent Labs     20  0424 20  0512 20  0434   WBC 11.1* 13.1* 18.2*   HGB 9.3* 9.0* 10.1*   HCT 28.8* 29.6* 31.8*    112* 128*     BMP:    Recent Labs     20  0424 20  0512 20  0434    134* 134*   K 3.6 3.8 4.0    108 108   CO2 21 17* 18*   BUN 10 15 21* CREATININE 0.7 1.0 1.0   GLUCOSE 113* 100* 99     Hepatic:    No results for input(s): AST, ALT, ALB, BILITOT, ALKPHOS in the last 72 hours. Amylase:  No results found for: AMYLASE  Lipase:  No results found for: LIPASE   Mag:    Lab Results   Component Value Date    MG 2.30 08/22/2020    MG 2.60 08/21/2020     Phos:     Lab Results   Component Value Date    PHOS 4.2 08/24/2020    PHOS 3.7 08/23/2020      Coags:   Lab Results   Component Value Date    PROTIME 17.0 08/17/2020    INR 1.46 08/17/2020       Cultures:  Anaerobic culture  Lab Results   Component Value Date    LABANAE Mixed anaerobic growth present 08/18/2020     Fungus stain  No results found for requested labs within last 30 days. Gram stain  Results in Past 30 Days  Result Component Current Result Ref Range Previous Result Ref Range   Gram Stain Result 1+ WBC's (Mononuclear)  1+ WBC's (Polymorphonuclear)  2+ Gram negative rods   (8/18/2020)  Not in Time Range      Organism  Lab Results   Component Value Date/Time    ORG Escherichia coli (A) 08/18/2020 11:45 AM     Surgical culture  No results found for: CXSURG  Blood culture 1  Results in Past 30 Days  Result Component Current Result Ref Range Previous Result Ref Range   Blood Culture, Routine No Growth after 4 days of incubation. (8/16/2020)  Not in Time Range      Blood culture 2  Results in Past 30 Days  Result Component Current Result Ref Range Previous Result Ref Range   Culture, Blood 2 No Growth after 4 days of incubation. (8/16/2020)  Not in Time Range      Fecal occult  No results found for requested labs within last 30 days. GI bacterial pathogens by PCR  No results found for requested labs within last 30 days. C. difficile  No results found for requested labs within last 30 days. Urine culture  No results found for: LABURIN    Pathology:  Pathology results pending     Imaging:  I have personally reviewed the following films:    No results found.     Scheduled Meds:   metroNIDAZOLE  500 mg Intravenous Once    lactulose  20 g Oral TID    ciprofloxacin  400 mg Intravenous Q12H    pantoprazole  40 mg Oral QAM AC    sodium chloride flush  10 mL Intravenous 2 times per day    enoxaparin  40 mg Subcutaneous Daily     Continuous Infusions:   sodium chloride 50 mL/hr at 08/23/20 1931     PRN Meds:. HYDROmorphone, fentanNYL, HYDROmorphone, HYDROmorphone, oxyCODONE **OR** oxyCODONE, diphenhydrAMINE, promethazine, labetalol, meperidine, potassium chloride **OR** potassium alternative oral replacement **OR** potassium chloride, morphine, lip balm petroleum free, sodium chloride flush, acetaminophen **OR** acetaminophen, polyethylene glycol, promethazine **OR** ondansetron      Assessment:  76 y.o. female admitted with   1. Septicemia ([facility]    2. PAPITO (acute kidney injury) ([facility] [address]    3. Intra-abdominal free air of unknown etiology    4. Fall, initial encounter        Intraabdominal/pelvic abscess, status-post CT-guided drain placement on 8/18/2020  Abnormal CT of colon/liver concerning for malignancy  Sepsis   Cirrhosis  Acute kidney injury  Hyponatremia  COVID screening, pending      Plan:  1. Right hemicolectomy, possible subtotal colectomy today  2. Did not denis prep well, no other preop prep to do. Continue Cipro / Flagyl  3. IV hydration; monitor and correct electrolytes  4. Antibiotics  5. Activity as tolerated, ambulate TID, up to chair for all meals--PT/OT following  6. Pulmonary toilet, incentive spirometry  7. PRN analgesics and antiemetics--minimizing narcotics as tolerated  8. DVT prophylaxis with Lovenox  9. Management of medical comorbid etiologies per primary team and consulting services    EDUCATION:  Educated patient on plan of care and disease process--all questions answered. Plans discussed with patient and nursing.       Signed:  Yarelis Perea

## 2020-08-24 NOTE — PLAN OF CARE
Assessment complete, see flow sheet. Denies pain, no complaints voiced. /67   Pulse 87   Temp 97.8 °F (36.6 °C) (Oral)   Resp 18   Ht 5' 7\" (1.702 m)   Wt 230 lb (104.3 kg)   SpO2 95%   BMI 36.02 kg/m²  room air, normal sinus rhythm, urmila auscultated. Bowel sounds hypoactive in all 4 quadrants. Unable to drink more than 50% prep last night, no bowel movement last night or this am. Discussed with Dr Zenia Ramires @ bedside, will send secure message to surgeon. Will continue to monitor. Chrystal Hernández RN, BSN, PCCN.

## 2020-08-24 NOTE — PROGRESS NOTES
Pt given PRN zofran per [facility] for nausea, encouraged pt to drink bowel prep as tolerated, this RN poured pt another glass of bowel prep, educated pt again on the importance of bowel prep before pt's procedure this afternoon; will continue to monitor.

## 2020-08-24 NOTE — ANESTHESIA POSTPROCEDURE EVALUATION
Department of Anesthesiology  Postprocedure Note    Patient: Greg Valerio  MRN: [id number]  YOB: 1955  Date of evaluation: 8/24/2020  Time:  4:50 PM     Procedure Summary     Date:  08/24/20 Room / Location:  [address]    Anesthesia Start:  2987 Anesthesia Stop:  1632    Procedures:       EXPLORATORY LAPAROTOMY, RIGHT  HEMICOLECTOMY; DRAINAGE OF PELVIC ABSCESS (Right Abdomen)      LIVER BIOPSY (Abdomen) Diagnosis:  (.)    Surgeon:  Lara Lao MD Responsible Provider:  Donn Bal MD    Anesthesia Type:  general ASA Status:  3          Anesthesia Type: general    Madisyn Phase I: Madisyn Score: 9    Madisyn Phase II:      Last vitals: Reviewed and per EMR flowsheets.        Anesthesia Post Evaluation    Level of consciousness: awake  Complications: no

## 2020-08-24 NOTE — PROGRESS NOTES
Pt arrived to PACU from OR in stable condition and is alert. Pt can move extremities to command. Respirations are even on 6L O2 per simple mask. Skin warm, dry, and with jaundiced and bruised color. Abd is soft. Pain is tolerable at this time. One midline abdominal surgical site(s) intact with dressing= betadine soaked packing, ABD, tape; no drainage noted on dressing at this time. Of note: pt had about 2500mL ascites drained from abd in OR. She has a preexisting CAROLINE drain with sanguinous fluid in compressed bulb. She has a preexisting F/C with dark, yellow urine to gravity drainage bag.   R IJ central line inserted (CXR resulted- okay to use)    S/P: exploratory laparotomy, right side hemicolectomy, drainage of pelvic abscess with Dr. Efren Gill at [address]  BSN, RN, V[facility]  PACU, Pre-op, SDS

## 2020-08-25 PROBLEM — E44.1 MILD MALNUTRITION (HCC): Chronic | Status: ACTIVE | Noted: 2020-01-01

## 2020-08-25 NOTE — OP NOTE
[facility]                     [address] [facility], [address] [facility]                                OPERATIVE REPORT    PATIENT NAME: Everardo Williamson                    :        1955  MED REC NO:   [id number]                          ROOM:       4469  ACCOUNT NO:   [de-identified]                           ADMIT DATE: 2020  PROVIDER:     Ilan Urban MD    DATE OF PROCEDURE:  2020    PREOPERATIVE DIAGNOSES:  Colon mass, right colon and hepatic flexure  with necrotic cancer at hepatic flexure region with perforation,  cirrhosis, pelvic abscess, ascites. POSTOPERATIVE DIAGNOSES:  Colon mass, right colon and hepatic flexure  with necrotic cancer at hepatic flexure region with perforation,  cirrhosis, pelvic abscess, ascites. PROCEDURE:  Exploratory laparotomy, evacuation of ascites, drainage of  pelvic abscess, right hemicolectomy with primary ileotransverse  colostomy anastomosis, and Abdiaziz-Cut liver biopsy. SURGEON:  Ilan Urban MD    ANESTHESIA:  General plus local at incision site as well. ESTIMATED BLOOD LOSS:  100 mL. COMPLICATIONS:  None. SPECIMENS AND FINDINGS AT SURGERY:  The patient had massive necrotic  perforated right colon cancer present, which was resected with its  attendant mesentery down to the root of the ileocolic and right colic  vessels. Primary ileotransverse colostomy was created. The patient's  tumor had tried to seal itself against the liver and using the omentum  in the liver as closure technique. This was somewhat effective but  there was approximately 3 liters of bilious stained ascites present in  the abdomen upon initial exploration. The resection was performed and  all gross visible tumor was removed with clear margins. The underlying  duodenum was not affected.   The patient did have cirrhosis noted grossly  at the time of surgery and Abdiaziz-Cut needle biopsy was done for further  analysis of this previously placed  by IR. The area of the abdomen was explored. The small bowel was run, the  right colon was hard and tethered in the right lateral abdominal cavity  and up into the liver. The distal ileum was staple transected with the  ROE-75 stapler with the green load setting. The mid transverse colon  was also staple transected after being cleaned off with the ROE-75  stapler with the green load setting. The gastrocolic ligament was   and the omentum was brought off of the area of the resection. The area of the mesentery was then scored and taken down in the medial  lateral fashion from the distal ileal area down through the ileocolic  mesentery. The area of the reflection of the lateral line of Toldt on  the right side was then dissected up snf to the hepatic flexure as  well. We then turned our dissection over towards the transverse colon. We took the rest of the gastrocolic omentum down, exposed the duodenum  and took the mesentery of this area right off the duodenum over towards  the hepatic flexure. There were dense adhesions noted towards the  patient's right portion of the liver out laterally to the gallbladder  into the gallbladder itself. This was carefully  and with this  area, the omentum was then  off the liver and finally at the  end of the dissection, the worst area of the tumor at the hepatic  flexure was mobilized and brought up into the field. At this point in  time, the large necrotic opening of the colon was evident in this area. There was some localized spillage; however, we controlled this as best  as possible with making this the last area and as the mesentery was  dissected in the final tissue planes in this region, we were able to  then remove the colon, remove the specimen and its attendant mesentery  completely. The surgical team irrigated the abdomen then with  approximately 3 liters of saline solution and changed gloves.   The area  of the mesenteric resection was double checked and the root of the  mesentery was oversewn at vessel sites with 2-0 silk figure-of-eight  sutures. The liver was then evaluated. There was cirrhotic liver appearance  throughout with multinodular change and a firm dense feel to the liver. In the left anterior lobe of the liver, the Abdiaziz-Cut needle was used to  make a single pass through the liver and a good quality liver biopsy  specimen was obtained. This was then coagulated and appeared hemostatic  through the remainder of the case. The specimen was sent permanent to  the lab. We rechecked the abdomen, we ran the small bowel and noticed some more  fibrinous debris and purulent appearing fluid on the left side of the  abdomen as well. The remainder of the colon was inspected again at this  time and I saw no problems with the area of the left colon or splenic  flexure. We irrigated this out thoroughly again at this time. The anastomosis was then created. A side-to-side functional end-to-end  anastomosis was created between the distal ileum and the transverse  colon with the ROE-75 stapler. The common opening was checked and all  appeared widely patent and hemostatic. The common opening was then  brought together and closed with a final firing of the ROE-75 stapler  with the green load. The common opening was then oversewn with  interrupted 3-0 silk Lembert sutures. The crotch anastomosis was  oversewn with 3-0 Lembert sutures and the area of the mesentery  resection was again checked and appeared hemostatic. The surgical team  then removed all used instruments and changed gloves again. The abdomen  was irrigated a final time clear. We then ran the small bowel  retrograde and set the small bowel in its proper position. The  remaining omentum was brought down over the bowel. The anastomosis was  sitting in the right upper quadrant appropriately.     The abdominal fascia was then closed with

## 2020-08-25 NOTE — PROGRESS NOTES
Occupational Therapy   Occupational Therapy Re-Evaluation  Date: 2020   Patient Name: Barbie Fisher  MRN: [id number]     : 1955    Date of Service: 2020    Discharge Recommendations: Barbie Fisher scored a 10/24 on the AM-PAC ADL Inpatient form. Current research shows that an AM-PAC score of 17 or less is typically not associated with a discharge to the patient's home setting. Based on the patient's AM-PAC score and their current ADL deficits, it is recommended that the patient have 3-5 sessions per week of Occupational Therapy at d/c to increase the patient's independence. Please see assessment section for further patient specific details. If patient discharges prior to next session this note will serve as a discharge summary. Please see below for the latest assessment towards goals. OT Equipment Recommendations  Other: TBD next level of care    Assessment   Performance deficits / Impairments: Decreased functional mobility ; Decreased ADL status; Decreased cognition;Decreased endurance;Decreased balance;Decreased safe awareness;Decreased high-level IADLs  Assessment: Pt is not at her baseline level of occupational function, based on the above deficits associated wtih pelvic abscess. Pt would benefit from continued skilled acute OT services to address these deficits. Treatment Diagnosis: Decreased ADL/IADL status, functional mobility, endurance, cognition, balance and safety awareness associated with pelvic abscess  Prognosis: Fair;Good  OT Education: OT Role;Plan of Care;Transfer Training;Family Education  Patient Education: OT michelle, d/c recommendation. Pt needs continued reinforcement d/t cognitive deficits. Barriers to Learning: Cognition  REQUIRES OT FOLLOW UP: Yes  Activity Tolerance  Activity Tolerance: Treatment limited secondary to decreased cognition;Patient Tolerated treatment well;Patient limited by fatigue  Safety Devices  Safety Devices in place: Yes  Type of devices:  All fall risk precautions in place; Left in chair;Nurse notified; Chair alarm in place; Patient at risk for falls;Call light within reach           Patient Diagnosis(es): The primary encounter diagnosis was Septicemia [facility]). Diagnoses of PAPITO (acute kidney injury) ([facility] [address] Intra-abdominal free air of unknown etiology, and Fall, initial encounter were also pertinent to this visit. has no past medical history on file. has a past surgical history that includes Tonsillectomy; Eye surgery; Upper gastrointestinal endoscopy (N/A, 8/20/2020); Colonoscopy (N/A, 8/20/2020); Colonoscopy (8/20/2020); hemicolectomy (Right, 8/24/2020); and liver biopsy (8/24/2020). Treatment Diagnosis: Decreased ADL/IADL status, functional mobility, endurance, cognition, balance and safety awareness associated with pelvic abscess      Restrictions  Restrictions/Precautions  Restrictions/Precautions: Fall Risk, Modified Diet(clear liquid diet)  Required Braces or Orthoses?: No  Position Activity Restriction  Other position/activity restrictions: This is a 68-year-old female with apparent history of frequent falls who presents after being found on the ground by her family. When the patient arrived she was disheveled, incontinent. She denies any specific complaints. The patient's work-up today was extensive. Her work-up included a CT of the abdomen that shows findings worrisome for possible abscess versus perforation. Questionable colon mass with metastatic lesions to liver and lung. Diagnosed with sepsis. Status-post exploratory laparotomy, evacuation of ascites, drainage of pelvic abscess, right hemicolectomy with primary ileotransverse colostomy anastomosis, and Abdiaziz-Cut liver biopsy  on 8/24/2020 for colon mass, right colon and hepatic flexure with necrotic cancer at hepatic flexure region with perforation, cirrhosis, pelvic abscess, ascites.     Subjective   General  Chart Reviewed: Yes  Response to previous treatment: Patient with no complaints from previous session  Family / Caregiver Present: No  Referring Practitioner: Kinza Carvalho MD , for d/c planning  Diagnosis: Pelvic abscess, s/p Exploratory laparotomy, evacuation of ascites, drainage of pelvic abscess, right hemicolectomy  Subjective  Subjective: Pt supine in bed, agreeable to re-eval. Pt goes by \"Chantelle. \"  Patient Currently in Pain: Denies  Social/Functional History  Social/Functional History  Lives With: Alone  Type of Home: House  Home Layout: One level(doesn't go to basement)  Home Access: Stairs to enter without rails  Entrance Stairs - Number of Steps: 1 step to landing, plus 1 step  Bathroom Shower/Tub: Tub/Shower unit, Shower chair with back(half bath & full bath in the home)  Bathroom Toilet: Standard  Bathroom Equipment: Grab bars in shower, Hand-held shower, Shower chair  Bathroom Accessibility: Walker accessible(1 bathroom, but both a tight fit.)  Home Equipment: Rolling walker  ADL Assistance: Needs assistance(occasional supervision for safety with shower; sits on EOB)  Homemaking Assistance: Needs assistance(Pt does some housework, dtr assists, son does yardwork.)  Homemaking Responsibilities: (family brings food)  Ambulation Assistance: Independent(furniture walks, not using the walker)  Transfer Assistance: Independent  Active : Yes(hasn't been driving recently, but does)  Leisure & Hobbies: TV, games on phone, reading  Additional Comments: Daughter able to stay w/pt 24/7. Frequent falls recently. Pt poor historian. Son & daughter assisted with PLOF. Family reports that pt has numerous people she can call on if she needs it. Objective   Vision: Impaired  Vision Exceptions: Wears glasses at all times  Hearing: Within functional limits    Orientation  Overall Orientation Status: Impaired  Orientation Level: Oriented to person;Oriented to place; Disoriented to time;Disoriented to situation  Observation/Palpation  Posture: Fair  Observation: pelvic drain post L buttock area. Balance  Sitting Balance: Stand by assistance  Standing Balance: Dependent/Total(min A of 2 with RW)  Standing Balance  Time: x~3 min  Activity: stand-step transfer from EOB to recliner  Comment: with RW, increased time, max verbal and tactile cues to advance RW  ADL  Feeding: Setup(to open packages on tray)  LE Bathing: Dependent/Total  Toileting: (perkins catheter in place)  Additional Comments: Dried blood noted around catheter site and in between LEs. Dep for bathing supine in bed and side-lying in bed. Pt completed stand-step transfer from EOB to recliner with increased time. Pt left in recliner at end of evaluation, set-up for lunch. Tone RUE  RUE Tone: Normotonic  Tone LUE  LUE Tone: Normotonic  Coordination  Movements Are Fluid And Coordinated: No  Coordination and Movement description: Fine motor impairments;Decreased speed;Right UE;Left UE     Bed mobility  Rolling to Left: 2 Person assistance(mod A of 2)  Rolling to Right: 2 Person assistance(mod A of 2)  Supine to Sit: 2 Person assistance(mod A of 2)  Scooting: Moderate assistance  Comment: Pt c/o dizziness once seated EOB, /64, HR 77; BP seated in chair 128/73, HR 82, O2 sats 93%  Transfers  Sit to stand: Dependent/Total(mod A of 2 from EOB)  Stand to sit: Moderate assistance(of 1 to recliner)  Transfer Comments: tactile and verbal cues for hand placement  Vision - Basic Assessment  Prior Vision: Wears glasses all the time  Patient Visual Report: No visual complaint reported. Cognition  Overall Cognitive Status: Exceptions  Arousal/Alertness: Inconsistent responses to stimuli;Delayed responses to stimuli  Following Commands: Follows one step commands with repetition; Follows one step commands with increased time  Attention Span: Difficulty dividing attention; Attends with cues to redirect  Memory: Decreased recall of biographical Information;Decreased recall of recent events;Decreased short term memory  Safety Judgement: Decreased awareness of need for safety  Problem Solving: Decreased awareness of errors;Assistance required to correct errors made;Assistance required to implement solutions;Assistance required to generate solutions  Insights: Decreased awareness of deficits  Initiation: Requires cues for all  Sequencing: Requires cues for all  Cognition Comment: Pt very slow and with difficulty processing commands. Sensation  Overall Sensation Status: WFL        LUE AROM (degrees)  LUE AROM : WFL  RUE AROM (degrees)  RUE AROM : WFL                      Plan   Plan  Times per week: 3-5  Times per day: Daily  Specific instructions for Next Treatment: cotx for safety  Current Treatment Recommendations: Patient/Caregiver Education & Training, Functional Mobility Training, Safety Education & Training, Self-Care / ADL, Endurance Training, Cognitive Reorientation      AM-PAC Score        AM-PAC Inpatient Daily Activity Raw Score: 10 (08/25/20 1215)  AM-PAC Inpatient ADL T-Scale Score : 27.31 (08/25/20 1215)  ADL Inpatient CMS 0-100% Score: 74.7 (08/25/20 1215)  ADL Inpatient CMS G-Code Modifier : CL (08/25/20 1215)    Goals  Short term goals  Time Frame for Short term goals: Discharge  Short term goal 1: SBA for bed mobility - discontinue goal 8/25  Short term goal 2: S/U for feeding/grooming - continue goal 8/25  Short term goal 3: Supervision for UB bathing/Min A for UB dressing -discontinue goal 8/25  Short term goal 4: Mod A for LB bathing/dressing - discontinue goal 8/25  Short term goal 5: CGA for functional transfers to ADL surfaces w/RW - discontinue goal 8/25  Long term goals  Time Frame for Long term goals : LTG=STG  Long term goal 1: CGA for functional  mobiltiy w/RW for ADL activity - continue goal 8/25  Long term goal 2: New goal: Min A for UB bathing and dressing. Long term goal 3: New goal: Max A for LB bathing/dressing. Long term goal 4: New goal: Min A for functional transfers to ADL surfaces with RW.        Therapy Time Individual Concurrent Group Co-treatment   Time In       1120   Time Out       1158   Minutes       38         Timed Code Treatment Minutes:  23 Minutes    Total Treatment Minutes:  [address], JAYLEEN Arciniega (Irina), [place], [place] [id number]

## 2020-08-25 NOTE — PROGRESS NOTES
[facility] PROGRESS NOTE    8/25/2020 7:55 AM        Name: Christel Benoit . Admitted: 8/16/2020  Primary Care Provider: No primary care provider on file. (Tel: None)      Subjective:  Patient is a 77 yo female with hx nicotine use. The patient lives alone, she presented to hospital after being found on floor by her daughter. She has had multiple falls in past couple of months. CT abdomen and pelvis in ER showed pelvic abscess vs viscus perf. Colonic wall thickening, concerning for malignancy, liver cirrhosis portal hypertension. She had Ct-guided drain placement 8/18 for pelvic abscess 8/18. Subsequently found to have large ascending colon mass on colonoscopy 8/20, likely the source of abscess. 8/18/2020: Successful CT guided placement of a drainage catheter in the pelvic abscess. 8/24/2020: Exploratory laparotomy, evacuation of ascites, drainage of  pelvic abscess, right hemicolectomy with primary ileotransverse  colostomy anastomosis, and Abdiaziz-Cut liver biopsy. Presently resting in bed. Offers no complaints. Denies shortness of breath, has slight congested cough this am. Denies nausea, operative pain well controlled. She is NPO.      Reviewed interval ancillary notes    Current Medications  bupivacaine liposome (EXPAREL) 1.3 % injection 66.5 mg, Once  ketorolac (TORADOL) injection 15 mg, Q6H PRN  acetaminophen (TYLENOL) tablet 500 mg, Q8H PRN  metoclopramide (REGLAN) injection 10 mg, Q6H  morphine (PF) injection 2 mg, Q2H PRN    Or  morphine injection 4 mg, Q2H PRN  dextrose 5 % and 0.45 % NaCl with KCl 20 mEq infusion, Continuous  pantoprazole (PROTONIX) injection 40 mg, Daily  potassium chloride (KLOR-CON M) extended release tablet 40 mEq, PRN    Or  potassium bicarb-citric acid (EFFER-K) effervescent tablet 40 mEq, PRN    Or  potassium chloride 10 mEq/100 mL IVPB (Peripheral Line), PRN  lactulose (CHRONULAC) 10 GM/15ML solution 20 g, TID  lip balm petroleum free (PHYTOPLEX) stick, PRN  0.9 % sodium chloride infusion, Continuous  sodium chloride flush 0.9 % injection 10 mL, 2 times per day  sodium chloride flush 0.9 % injection 10 mL, PRN  polyethylene glycol (GLYCOLAX) packet 17 g, Daily PRN  promethazine (PHENERGAN) tablet 12.5 mg, Q6H PRN    Or  ondansetron (ZOFRAN) injection 4 mg, Q6H PRN  enoxaparin (LOVENOX) injection 40 mg, Daily        Objective:  /61   Pulse 67   Temp 97.6 °F (36.4 °C) (Oral)   Resp 14   Ht 5' 7\" (1.702 m)   Wt 230 lb (104.3 kg)   SpO2 94%   BMI 36.02 kg/m²     Intake/Output Summary (Last 24 hours) at 8/25/2020 0755  Last data filed at 8/25/2020 0622  Gross per 24 hour   Intake 3294 ml   Output 3190 ml   Net 104 ml      Wt Readings from Last 3 Encounters:   08/23/20 230 lb (104.3 kg)     General:  Awake, alert, oriented in NAD  Skin:  Warm and dry, color pale. Neck:  Supple. No JVD appreciated, RIJ in place  Chest:  Normal effort. Bibasilar crackles, no wheezes  Cardiovascular:  RRR, normal S1/S2, no murmur/gallop/rub  Abdomen:  Soft, dressing C/D/I, no bowel sounds  Extremities:  + generalized edema  Neurological: No focal deficits  Psychological: Normal mood and affect        Labs and Tests:  CBC:   Recent Labs     08/23/20 0512 08/24/20 0434 08/25/20  0520   WBC 13.1* 18.2* 20.2*   HGB 9.0* 10.1* 9.1*   * 128* 118*     BMP:    Recent Labs     08/23/20 0512 08/24/20 0434 08/25/20  0520   * 134* 137   K 3.8 4.0 4.9    108 111*   CO2 17* 18* 18*   BUN 15 21* 27*   CREATININE 1.0 1.0 1.5*   GLUCOSE 100* 99 166*     Hepatic:   Recent Labs     08/25/20  0520   AST 16   ALT 6*   BILITOT 0.4   ALKPHOS 96       CXR 8/16/2020:  No acute process. CT Head 8/16/2020:  No acute intracranial abnormality.           CT Cervical Spine 8/16/2020:  No acute abnormality of the cervical spine. CT Abdomen/Pelvis Without Contrast8/16/2020:  .  Non loculated today, increased    since the prior study.         No pneumothorax is evident.  Lucency beneath the right hemidiaphragm is new    since prior studies suspicious for free intraperitoneal air.              Impression    Lucency beneath the right hemidiaphragm compatible with free intraperitoneal    air.  Apparently the patient has had recent abdominal surgery.  This finding    was discussed with Dr. Blossom Pizano 6:48 pm on 8/24/2020.         Status post placement of right jugular central venous catheter.  No    pneumothorax.         Patchy mid and lower lung ground-glass opacities suspicious for pneumonia,    increased since the prior CT.  Correlation for pneumonia is recommended. Problem List  Active Problems:    Pelvic abscess in female    Septicemia [facility])  Resolved Problems:    * No resolved hospital problems. *       Assessment:   1. Pelvic abscess. Status post IR drainage 8/18, pelvic fluid cytology negative for malignant cells. Colonoscopy 8/20 with large necrotic ascending colon mass, likely source of abscess. Initially started on Zosyn, then transitioned to IV cipro. Status post exp lap right transverse colectomy yesterday. Currently off antibiotics post surgery. WBC has been trending up. Will consult ID. 2. Liver cirrhosis / portal HTN. Likely secondary to fatty liver. Has been started on lactulose for ammonia level 66 and confusion. 3. Colon cancer. CT scan showed thickening of ascending colon suspicious for malignancy, lesions liver and lung concerning for metastasis. CEA 83.3, AFP 2.4. Colon biopsy (8/20) showed poorly differentiate carcinoma. Consult oncology. 4. PAPITO. Creatinine 1.6 on admission improved to 1.0, suspected pre-renal, hypovolemia. Bumped to 1.5 post surgery, possibly secondary to fluid shifts and BP fluctuations during surgery yesterday. Nephrology has signed off, will reconsult. 5. Abnormal CXR.  Patchy mid and lower lung ground glass opacities suspicious for pneumonia noted on CXR yesterday. COVID-19 negative 8/22. Afebrile. Suspect this could be more volume overload than pneumonia, weight is up 15 lbs this admission, She already received a dose of IV Lasix this morning. Add on BNP level. DC planning: Patient just underwent abdominal surgery yesterday, currently NPO. Suspect she will be here at least through the week.        Diet: Diet NPO Effective Now Exceptions are: Ice Chips, Sips with Meds  Code:Full Code  DVT PPX: on enoxaparin      VIN Law CNP   8/25/2020 7:55 AM

## 2020-08-25 NOTE — PROGRESS NOTES
MD Mine Byrne MD Gwyneth Barer, MD               Office: [phone]                      Fax: [phone]             [address] [facility]                   NEPHROLOGY INPATIENT PROGRESS NOTE:     PATIENT NAME: Cesia Roy  : 1955  MRN: [id number]. IMPRESSION:       PAPITO - crea up to 1.5 from 1.  ?venous congestion  : Oligouric -> non-oliguric   Appears hypervolemic. SBP decreasing to 90's.   : Etiology of current PAPITO - ATN vs venous congestion vs. Decreased renal perfusion. WBC count decreasing.     - Renal imaging: w/ CT - kidney unremarkable   - CK - 310 on admission then improved. - Avoid contrast exposure at this time. - decrease Reglan dose    Associated problems:   - Electrolytes: Hyponatremia-follow  - Volume status: hypervolemic. Weight up to 230 from 215. Also has hypoalbuminemia so may be 3rd spacing. Cirrhosis   : BP: Ok to keep slightly higher   - Acid-Base: Acidosis - - non-AGMA - follow for now. Check VBG.   - Anemia:  - Heme  following      RECOMMENDATIONS:   - D/C maintenance IVF  - Lasix IV x 3 doses  - decrease Reglan dose    Other problems: Management per primary and other consulting teams.      # Cirrhosis , Portal HTN : new diagnosis, no h/o EtOH    # Sepsis, intra-abdominal abscess   - status-post CT-guided drain placement on 2020  -  Discussed with [employer] [address] Problems           Last Modified POA    Pelvic abscess in female 2020 Yes    Septicemia ([address] 2020 Yes    Colonic mass 2020 Yes    Cirrhosis of liver with ascites ([address] 2020 Yes    Intra-abdominal free air of unknown etiology 2020 Yes    PAPITO (acute kidney injury) ([address] 2020 Yes    Lung nodules 2020 Yes    Bandemia 2020 Yes    Intra-abdominal abscess ([address] 2020 Yes    E coli infection 2020 Yes    Elevated CEA 2020 Yes    Ex-smoker 2020 Yes    Class 2 obesity due to excess calories with Oral 68 14 96 % --   08/25/20 0113 (!) 103/53 -- -- 71 -- 93 % --   08/25/20 0107 (!) 97/55 -- -- 70 -- 93 % --   08/25/20 0006 (!) 96/59 97.4 °F (36.3 °C) Axillary 72 12 93 % --   08/24/20 2246 107/66 97.4 °F (36.3 °C) Axillary 78 12 93 % --   08/24/20 2100 (!) 117/56 97.7 °F (36.5 °C) Axillary 77 11 93 % --   08/24/20 2015 127/64 97.2 °F (36.2 °C) Axillary 88 12 98 % --   08/24/20 1747 139/81 97.5 °F (36.4 °C) Axillary 88 16 98 % --       Intake/Output Summary (Last 24 hours) at 8/25/2020 1733  Last data filed at 8/25/2020 0622  Gross per 24 hour   Intake 1294 ml   Output 140 ml   Net 1154 ml         General: Awake, Alert,   HENT: Atraumatic, normocephalic   Eyes: Normal conjunctiva, Non-incteral sclera. Neck: Supple, JVD not visible. CVS:  Heart sounds are normal. No murmurs. No pericardial rub. RS: Normal respiratory efforts,  Lung fields are diminished . Abd: Soft , bowel sounds are normal, no distension and no tenderness to palpation. Skin: No rash , some bruises,   CNS: Awake Oriented , No focal.   Extremities/MSK: 3+ Edema, no cyanosis. DATA:  Diagnostic tests reviewed by me for today's visit:    Recent Labs     08/23/20  0512 08/24/20  0434 08/25/20  0520   WBC 13.1* 18.2* 20.2*   HCT 29.6* 31.8* 28.5*   * 128* 118*     Iron Saturation:  No components found for: PERCENTFE  FERRITIN:    Lab Results   Component Value Date    FERRITIN 542.4 08/17/2020     IRON:    Lab Results   Component Value Date    IRON 26 08/17/2020     TIBC:    Lab Results   Component Value Date    TIBC 106 08/17/2020       Recent Labs     08/23/20  0512 08/24/20  0434 08/25/20  0520   * 134* 137   K 3.8 4.0 4.9    108 111*   CO2 17* 18* 18*   BUN 15 21* 27*   CREATININE 1.0 1.0 1.5*     Recent Labs     08/23/20  0512 08/24/20  0434 08/25/20  0520   CALCIUM 7.7* 8.6 8.0*   PHOS 3.7 4.2  --      No results for input(s): PH, PCO2, PO2 in the last 72 hours.     Invalid input(s): [id number], INSPIREDO2    ABG:  No results found for: PH, PCO2, PO2, HCO3, BE, THGB, TCO2, O2SAT  VBG:    Lab Results   Component Value Date    PHVEN 7.435 08/23/2020    JUT5EMN 26.3 08/23/2020    BEVEN -5.6 08/23/2020    B8KCQWGG 99 08/23/2020       LDH:  No results found for: LDH  Uric Acid:    Lab Results   Component Value Date    LABURIC 5.9 08/17/2020       PT/INR:    Lab Results   Component Value Date    PROTIME 19.2 08/25/2020    INR 1.65 08/25/2020     Warfarin PT/INR:  No components found for: PTPATWAR, PTINRWAR  PTT:  No results found for: APTT, PTT[APTT}  Last 3 Troponin:    Lab Results   Component Value Date    TROPONINI <0.01 08/16/2020       U/A:    Lab Results   Component Value Date    COLORU ORANGE 08/16/2020    PROTEINU TRACE 08/16/2020    PHUR 5.0 08/16/2020    WBCUA 3 08/16/2020    RBCUA 7 08/16/2020    BACTERIA RARE 08/16/2020    CLARITYU CLOUDY 08/16/2020    SPECGRAV 1.022 08/16/2020    LEUKOCYTESUR SMALL 08/16/2020    UROBILINOGEN 1.0 08/16/2020    BILIRUBINUR SMALL 08/16/2020    BLOODU LARGE 08/16/2020    GLUCOSEU Negative 08/16/2020     Microalbumen/Creatinine ratio:  No components found for: RUCREAT  24 Hour Urine for Protein:  No components found for: RAWUPRO, UHRS3, TQYN43LS, UTV3  24 Hour Urine for Creatinine Clearance:  No components found for: CREAT4, UHRS10, UTV10  Urine Toxicology:  No components found for: IAMMENTA, IBARBIT, IBENZO, ICOCAINE, IMARTHC, IOPIATES, IPHENCYC    HgBA1c:  No results found for: LABA1C  RPR:  No results found for: RPR  HIV:  No results found for: HIV  KULWANT:  No results found for: ANATITER, KULWANT  RF:  No results found for: RF  DSDNA:  No components found for: DNA  AMYLASE:  No results found for: AMYLASE  LIPASE:  No results found for: LIPASE  Fibrinogen Level:  No components found for: FIB       BELOW MENTIONED RADIOLOGY STUDY RESULTS BY ME:    Ct Abdomen Pelvis Wo Contrast Additional Contrast? None    Result Date: 8/16/2020  EXAMINATION: CT OF THE ABDOMEN AND PELVIS WITHOUT CONTRAST 8/16/2020 5:11 pm TECHNIQUE: CT of the abdomen and pelvis was performed without the administration of intravenous contrast. Multiplanar reformatted images are provided for review. Dose modulation, iterative reconstruction, and/or weight based adjustment of the mA/kV was utilized to reduce the radiation dose to as low as reasonably achievable. COMPARISON: None. HISTORY: Acute nausea FINDINGS: Lower Chest: FOUR solid soft tissue pulmonary nodules right lower lobe, 7 x 8 mm (axial image 19), 11 x 12 mm (axial 11), 9 x 9 mm (axial image 10) and 4 x 4 mm (axial image 10). 3 mm pulmonary nodules are seen posterior basal segment left lower lobe. Mild nodular fullness about the distal esophagus. Heart size is upper normal. Organs: Prominent caudate lobe hypertrophy and poly lobular configuration of the liver as well as diffuse tiny hypoattenuating lesions throughout the liver suggestive of hepatic cirrhosis and siderotic nodules. No discrete mass lesion evident. 19 cm craniocaudal liver length. The spleen is mildly enlarged. The kidneys, gallbladder, adrenal glands and pancreas appear unremarkable. Multifocal collateral vessels are seen about the gastrohepatic ligament extending to the posterior mediastinum. GI/Bowel: Prominent diffuse thickening of the wall of the ascending colon. Other portions of colon and small bowel appear unremarkable with exception of few gas distended small bowel loops typical of adynamic ileus. There are diverticula involving the descending and sigmoid colon. Pelvis: Fluid collection within the pelvis without definite margins has air bubbles within it concerning for sequela from perforated bowel/abscess formation. Uterus and adnexal structures appear unremarkable. Peritoneum/Retroperitoneum: Abdominal ascites. Calcific atherosclerosis aorta. Bones/Soft Tissues: No acute superficial soft tissue or osseous structure abnormality evident.      1. Non loculated fluid and gas collection in the pelvis concerning for perforated viscus/possible abscess formation. 2. Prominent thickening of the ascending colon which may be related to infectious or inflammatory process or underlying neoplastic process. 3. Bowel-gas pattern typical of adynamic ileus. 4. Hepatic cirrhosis with numerous hepatic nodules suggestive of regenerative/siderotic nodules. MRI abdomen without and with IV contrast correlation recommended. Metastatic disease is a consideration. 5. Hepatosplenomegaly and multifocal collateral vessels about the stomach esophagus concerning for varices, all suggestive of portal venous hypertension. 6. Abdominal and pelvic ascites. 7.  Diverticulosis coli without CT evidence of acute diverticulitis. Critical results were called by Dr. Johnathan Metz to [address] [place] [address] on 8/16/2020 at 18:09. Xr Radius Ulna Left (2 Views)    Result Date: 8/16/2020  EXAMINATION: TWO XRAY VIEWS OF THE LEFT FOREARM 8/16/2020 2:55 pm COMPARISON: None. HISTORY: ORDERING SYSTEM PROVIDED HISTORY: Fall TECHNOLOGIST PROVIDED HISTORY: Reason for exam:->Fall Reason for Exam: Frequent falls at home. Bruising mid shaft L forearm Acuity: Acute Type of Exam: Initial FINDINGS: There is no evidence of acute fracture. There is normal alignment. No acute joint abnormality. No focal osseous lesion. No focal soft tissue abnormality. No acute osseous abnormality. Ct Head Wo Contrast    Result Date: 8/16/2020  EXAMINATION: CT OF THE HEAD WITHOUT CONTRAST  8/16/2020 3:15 pm TECHNIQUE: CT of the head was performed without the administration of intravenous contrast. Dose modulation, iterative reconstruction, and/or weight based adjustment of the mA/kV was utilized to reduce the radiation dose to as low as reasonably achievable. COMPARISON: None. HISTORY: ORDERING SYSTEM PROVIDED HISTORY: Fall TECHNOLOGIST PROVIDED HISTORY: Reason for exam:->Fall Has a \"code stroke\" or \"stroke alert\" been called? ->No Reason for structures are without acute process. No acute process.      Danny Brewer MD

## 2020-08-25 NOTE — CONSULTS
BIOPSY performed by Jacki Suarez MD at [address] Right 8/24/2020    EXPLORATORY LAPAROTOMY, RIGHT  HEMICOLECTOMY; DRAINAGE OF PELVIC ABSCESS performed by Merrill Kyle MD at [address]  8/24/2020    LIVER BIOPSY performed by Merrill Kyle MD at [address] [place] [address] N/A 8/20/2020    EGD BIOPSY performed by Jacki Suarez MD at [address] [facility] ENDOSCOPY        Current Medications:     ciprofloxacin  400 mg Intravenous Q12H    metroNIDAZOLE  500 mg Intravenous Q8H    furosemide  20 mg Intravenous Once    furosemide  20 mg Intravenous Once    bupivacaine liposome  5 mL Subcutaneous Once    metoclopramide  10 mg Intravenous Q6H    pantoprazole  40 mg Intravenous Daily    lactulose  20 g Oral TID    sodium chloride flush  10 mL Intravenous 2 times per day    enoxaparin  40 mg Subcutaneous Daily       Allergies:    No Active Allergies     Social History:    reports that she has been smoking cigarettes. She has a 25.00 pack-year smoking history. She does not have any smokeless tobacco history on file. She reports that she does not drink alcohol or use drugs. Family History:     family history is not on file. ROS:      Constitutional: No fever, No chills, No night sweats. + Weight loss. Eyes: No diplopia, No transient or permanent loss of vision, No scotomata. ENT / Mouth: No epistaxis, No dysphagia, No hoarseness, No oral ulcers, No gingival bleeding. No sore throat, No postnasal drip, No nasal drip, No mouth pain, No sinus pain, No tinnitus, Normal hearing. Cardiovascular: No chest pain, No palpitations, No syncope, No upper extremity edema, No lower extremity edema, No calf discomfort. Respiratory: No cough. No hemoptysis, No pleurisy, No wheezing, No dyspnea.    Gastrointestinal: No abdominal pain, No abdominal cramping, No nausea, No vomiting, No constipation, No diarrhea, No hemotochezia, No melena, No jaundice, No dyspepsia, No dysphagia. Poor appetite. Urinary: No dysuria, No hematuria, No urinary incontinence. Musculoskeletal: No muscle pain, No swollen joints, No joint redness, No bone pain, No spine tenderness. Skin: No rash, No nodules, No pruritus, No lesions. Neurologic: No seizures, No syncope, No tremor, No speech change, No headache, No hiccups, No abnormal gait, No sensory changes. Short term memory loss. Weakness. Psychiatric: No depression, No anxiety, Concentration normal.   Endocrine: No polyuria, No polydipsia, No hot flashes, No thyroid symptoms. Hematologic: No epistaxis, No gingival bleeding, No petechiae, No ecchymosis. Lymphatic: No lymphadenopathy, No lymphedema. Allergy / Immunologic: No eczema, No frequent mucous infections, No frequent respiratory infections, No recurrent urticarial, No frequent skin infections. PHYSICAL EXAM:    Vitals:  Vitals:    08/25/20 1213   BP: (!) 109/52   Pulse: 76   Resp: 16   Temp: 97.4 °F (36.3 °C)   SpO2:         Intake/Output Summary (Last 24 hours) at 8/25/2020 1331  Last data filed at 8/25/2020 0622  Gross per 24 hour   Intake 3294 ml   Output 3190 ml   Net 104 ml      Wt Readings from Last 3 Encounters:   08/23/20 230 lb (104.3 kg)        General appearance: Appears comfortable. Obese. Eyes: Sclera clear, pupils equal  ENT: Moist mucus membranes, no thrush  Neck: Trachea midline, symmetrical  Cardiovascular: Regular rhythm, normal S1, S2. No murmur, gallop, rub. No edema in  lower extremities  Respiratory: Clear to auscultation bilaterally. No wheeze. Good inspiratory effort  Gastrointestinal: Abdomen soft, not tender, not distended, normal bowel sounds. Midline abdominal incision, dressing clean, dry, intact. Musculoskeletal: No cyanosis in digits, warm extremities  Neurologic: Cranial nerves grossly intact, no motor or speech deficits. Psychiatric: Normal affect.  Alert and oriented to time, place SINUSES: The visualized paranasal sinuses and mastoid air cells demonstrate    no acute abnormality.         SOFT TISSUES/SKULL:  No acute abnormality of the visualized skull or soft    tissues.              Impression    No acute intracranial abnormality.           Narrative    EXAMINATION:    CT OF THE ABDOMEN AND PELVIS WITHOUT CONTRAST 8/16/2020 5:11 pm         TECHNIQUE:    CT of the abdomen and pelvis was performed without the administration of    intravenous contrast. Multiplanar reformatted images are provided for review. Dose modulation, iterative reconstruction, and/or weight based adjustment of    the mA/kV was utilized to reduce the radiation dose to as low as reasonably    achievable.         COMPARISON:    None.         HISTORY:    Acute nausea         FINDINGS:    Lower Chest: FOUR solid soft tissue pulmonary nodules right lower lobe, 7 x 8    mm (axial image 19), 11 x 12 mm (axial 11), 9 x 9 mm (axial image 10) and 4 x    4 mm (axial image 10).  3 mm pulmonary nodules are seen posterior basal    segment left lower lobe.  Mild nodular fullness about the distal esophagus. Heart size is upper normal.         Organs: Prominent caudate lobe hypertrophy and poly lobular configuration of    the liver as well as diffuse tiny hypoattenuating lesions throughout the    liver suggestive of hepatic cirrhosis and siderotic nodules.  No discrete    mass lesion evident.  19 cm craniocaudal liver length.  The spleen is mildly    enlarged.  The kidneys, gallbladder, adrenal glands and pancreas appear    unremarkable.  Multifocal collateral vessels are seen about the gastrohepatic    ligament extending to the posterior mediastinum.         GI/Bowel: Prominent diffuse thickening of the wall of the ascending colon.     Other portions of colon and small bowel appear unremarkable with exception of    few gas distended small bowel loops typical of adynamic ileus.  There are    diverticula involving the descending and sigmoid colon.         Pelvis: Fluid collection within the pelvis without definite margins has air    bubbles within it concerning for sequela from perforated bowel/abscess    formation.  Uterus and adnexal structures appear unremarkable.         Peritoneum/Retroperitoneum: Abdominal ascites.  Calcific atherosclerosis    aorta.         Bones/Soft Tissues: No acute superficial soft tissue or osseous structure    abnormality evident.              Impression    1. Non loculated fluid and gas collection in the pelvis concerning for    perforated viscus/possible abscess formation. 2. Prominent thickening of the ascending colon which may be related to    infectious or inflammatory process or underlying neoplastic process. 3. Bowel-gas pattern typical of adynamic ileus. 4. Hepatic cirrhosis with numerous hepatic nodules suggestive of    regenerative/siderotic nodules.  MRI abdomen without and with IV contrast    correlation recommended.  Metastatic disease is a consideration. 5. Hepatosplenomegaly and multifocal collateral vessels about the stomach    esophagus concerning for varices, all suggestive of portal venous    hypertension. 6. Abdominal and pelvic ascites. 7.  Diverticulosis coli without CT evidence of acute diverticulitis. Critical results were called by Dr. Angelica Gil to Rondamaurice Rolle    on 8/16/2020 at 18:09.           Narrative    EXAMINATION:    CT OF THE ABDOMEN AND PELVIS WITH CONTRAST 8/17/2020 5:35 pm         TECHNIQUE:    CT of the abdomen and pelvis was performed with the administration of    intravenous contrast. Multiplanar reformatted images are provided for review.     Dose modulation, iterative reconstruction, and/or weight based adjustment of    the mA/kV was utilized to reduce the radiation dose to as low as reasonably    achievable.         COMPARISON:    CT abdomen and pelvis 08/16/2020         HISTORY:    ORDERING SYSTEM PROVIDED HISTORY: Pelvic abscess on non contrast CT         Reason for Exam: follow up scan from yesterday    Acuity: Acute    Type of Exam: Subsequent/Follow-up         FINDINGS:    Lower Chest: Minimal subsegmental atelectasis posterior both lung bases. Pulmonary nodules right and left lower lobes.  Cardiac and posterior    mediastinal structures visualized are unremarkable.         Liver: Hepatic morphologic features of cirrhosis with diffuse tiny nodular    densities throughout the liver.  Not evident on the CT performed yesterday's    an area of masslike density posterior at the hepatic dome axial image 31    measuring 2.8 cm.         Other organs: The spleen, pancreas, adrenal glands, gallbladder and kidneys    appear unremarkable.         GI/Bowel: Multifocal diverticula involve the descending and sigmoid colon    without evidence of acute inflammatory change.  Inflammatory changes may be    present in the pelvis adjacent to apparent abscess, however evaluation the    bowel is significantly limited because of the presence of ascites as well.     There is prominent masslike thickening involving the ascending colon    concerning for malignancy, conglomerate member measurement 9 x 12 x 10 cm    axial image 114, coronal image 81.         Pelvis: As demonstrated on yesterday's study there is a prominent air-fluid    level collection, following contrast showing a thin rim of enhancement,    separate from the bowel, 5.5 x 10 cm axial series 2, image 164, craniocaudal    length 7 cm on coronal image 119.  Prominent hypoattenuation central uterus,    5 cm axial series 2, image 158.  Ascites is noted.  Urinary bladder is    decompressed by Amado catheter.         Peritoneum/Retroperitoneum: Ascites is present.  12 mm ann hepatis lymph    node axial image 70.  13 mm portacaval lymph node axial image 79.  Multifocal    collateral vessels are seen about the spleen, stomach and distal esophagus.         Bones/Soft Tissues: No acute superficial soft tissue or osseous structure    abnormality evident.              Impression    1. Pelvic air/fluid collection almost certainly reflects an abscess or giant    colonic diverticula.  This may be the result of acute diverticulitis. 2. Mass lesion ascending colon almost certainly reflecting primary    adenocarcinoma of the colon. 3. Diffuse hepatic lesions with newly visualized (contrast enhancement) mass    at the hepatic dome.  Hepatic metastatic disease is a consideration as is    cirrhosis with regenerative nodules and hepatocellular carcinoma. 4. Bilateral lower lobe pulmonary nodules suspicious for metastatic disease. 5. Abdominal and pelvic ascites. 6. Sequela from portosystemic shunting with numerous collateral vessels about    the stomach, left upper quadrant and distal esophagus.  Relatively small    esophageal and gastric varices.     7.  Calcific atherosclerotic disease aorta.           Narrative    PROCEDURE:    CT GUIDEDPELVICABSCESS DRAINAGE CATHETER PLACEMENT         MODERATE CONSCIOUS SEDATION         <Completed Date>         HISTORY:    ORDERING SYSTEM PROVIDED HISTORY: Pelvic abscess possibly diverticular in    origin    TECHNOLOGIST PROVIDED HISTORY:    Reason for exam:->Pelvic abscess possibly diverticular in origin    Reason for Exam: Pelvic Abscess    Acuity: Unknown    Type of Exam: Unknown         SEDATION:    1 mgversed and 50 mcg fentanyl were titrated intravenously for moderate    sedation monitored under my direction.  Total intraservice time of sedation    was 21 minutes.  The patient's vital signs were monitored throughout the    procedure and recorded in the patient's medical record by the nurse.         TECHNIQUE:    Informed consent was obtained after a detailed explanation of the procedure    including risks, benefits, and alternatives.  Universal protocol was    followed.  The patient was placed on the CT table in the prone position.  A    suitable skin site was prepped and draped in sterile fashion following CT    localization.  An 18 gauge needle was advanced under CT guidance into the    pelvic fluid collection and a 0.035 guidewire was used to place a 10 Haitian    abscess drainage catheter after the fashion tract was dilated.  The catheter    was sutured to the skin and the patient tolerated the procedure well.  The    catheter was attached to CAROLINE suction drainage.         Dose modulation, iterative reconstruction, and/or weight based adjustment of    the mA/kV was utilized to reduce the radiation dose to as low as reasonably    achievable.         DLP: 201.39 mGy-cm         Estimated blood loss: Less than 5 cc         FINDINGS:    A total of 80 mL of purulent fluid was removed and sent for diagnostic tests.              Impression    Successful CT guided placement of a drainage catheter in the pelvic abscess.           Narrative    EXAMINATION:    ONE XRAY VIEW OF THE CHEST         8/24/2020 4:46 pm         COMPARISON:    08/16/2020 chest x-ray, CT abdomen and pelvis 08/17/2020         HISTORY:    ORDERING SYSTEM PROVIDED HISTORY: right IJ TLC placed    TECHNOLOGIST PROVIDED HISTORY:    Reason for exam:->right IJ TLC placed         FINDINGS:    There is been placement of a right jugular central venous catheter with its    tip projecting over the superior vena cava.  The heart size and mediastinal    contours are stable.         Bilateral mid and lower lung patchy ground-glass opacities today, increased    since the prior study.         No pneumothorax is evident.  Lucency beneath the right hemidiaphragm is new    since prior studies suspicious for free intraperitoneal air.              Impression    Lucency beneath the right hemidiaphragm compatible with free intraperitoneal    air.  Apparently the patient has had recent abdominal surgery.  This finding    was discussed with Dr. Gianni Allen 6:48 pm on 8/24/2020.         Status post placement of right jugular central venous catheter.  No    pneumothorax.         Patchy mid and lower lung ground-glass opacities suspicious for pneumonia,    increased since the prior CT.  Correlation for pneumonia is recommended.             IMPRESSION/RECOMMENDATIONS:    Active Problems:    Pelvic abscess in female    Septicemia ([address]    Colonic mass    Cirrhosis of liver with ascites ([address]    Intra-abdominal free air of unknown etiology    PAPITO (acute kidney injury) ([address]    Lung nodules    Bandemia    Intra-abdominal abscess (HCC)    E coli infection    Elevated CEA    Ex-smoker    Class 2 obesity due to excess calories with body mass index (BMI) of 36.0 to 36.9 in adult    Portal hypertension ([address]  Resolved Problems:    * No resolved hospital problems. *       Colon cancer  - S/p exploratory laparotomy with right hemicolectomy on 8/24/2020. Anticipate secondary wound closure on Friday 8/28/2020  - CEA 83.3.  - Will obtain CT chest to complete staging.   - Plan for chemotherapy as outpatient after healed from surgery. Cirrhosis of the liver   - New diagnosis. - Management per GI. Intraabdominal/pelvic abscess  - CT-guided drain placement on 8/18/2020  - Antibiotics per ID. Anemia  - Iron studies consistent with anemia of chronic disease.   - Will check B12 and folate. This plan was discussed with the patient and he/she verbalized understanding. Thank you for allowing us to participate in the care of this patient. Chao Mckenna, ALEX  Oncology [facility]    Patient interviewed and examined perforated right colon cancer. Cirrhosis.  Will consider chemotherapy when healed from surgery

## 2020-08-25 NOTE — PROGRESS NOTES
Comprehensive Nutrition Assessment    Type and Reason for Visit:  Reassess    Nutrition Recommendations/Plan:   1. Advance diet beyond clears or start nutrition support  2. Add Ensure Clear TID    Nutrition Assessment:  Pt continues to be nutritionally compromised AEB diet NPO or clear liquids x most of 8 days of admission. Pt has increased nutrient needs to promote healing s/p GI surgery. Plans for wound closure on 8/28. May need to consider temporary nutrition support if pt to remain on clear liquids prior to surgery, as well as skin breakdown on sacrum & back. Will monitor progress. Malnutrition Assessment:  Malnutrition Status:  Mild malnutrition    Context:  Acute Illness     Findings of the 6 clinical characteristics of malnutrition:  Energy Intake:  7 - 50% or less of estimated energy requirements for 5 or more days  Weight Loss:  Unable to assess     Body Fat Loss:  Unable to assess(droplet plus precautions)     Muscle Mass Loss:  Unable to assess(droplet plus precautions)    Fluid Accumulation:  7 - Moderate to Severe Extremities   Strength:  Not Performed    Estimated Daily Nutrient Needs:  Energy (kcal):  0896-4720; Weight Used for Energy Requirements:  (con't to use 99 kg for est needs)     Protein (g):  73-92 grams; Weight Used for Protein Requirements:  Ideal(61 kg; 1.2-1.5 grams per kg)        Fluid (ml/day):  1 mL per kcal; Weight Used for Fluid Requirements:  Current      Nutrition Related Findings:  +2 pitting edema BLE; hypoactive BS; LBM per EMR 8/14      Wounds:  Surgical Wound, Stage II, Stage I       Current Nutrition Therapies:    DIET CLEAR LIQUID;     Anthropometric Measures:  · Height: 5' 7\" (170.2 cm)  · Current Body Weight: 230 lb (104.3 kg)   · Admission Body Weight: 215 lb (97.5 kg)    · Ideal Body Weight: 135 lbs; % Ideal Body Weight 170.4 %   · BMI: 36  · BMI Categories: Obese Class 2 (BMI 35.0 -39.9)       Nutrition Diagnosis:   · Inadequate oral intake related to altered GI function as evidenced by NPO or clear liquid status due to medical condition    · Increased nutrient needs related to increase demand for energy/nutrients as evidenced by wounds      Nutrition Interventions:   Food and/or Nutrient Delivery:  Continue Current Diet, Start Oral Nutrition Supplement  Nutrition Education/Counseling:  Education not indicated   Coordination of Nutrition Care:  Continued Inpatient Monitoring    Goals:  Pt will consume at least 50% of meals and supplements       Nutrition Monitoring and Evaluation:   Behavioral-Environmental Outcomes:      Food/Nutrient Intake Outcomes:  Diet Advancement/Tolerance, Food and Nutrient Intake, Supplement Intake  Physical Signs/Symptoms Outcomes:  Biochemical Data, GI Status, Skin, Weight     Discharge Planning:     Too soon to determine     Electronically signed by Malena Estrada RD, LD on 8/25/20 at 1:48 PM EDT    Contact: [phone]

## 2020-08-25 NOTE — CARE COORDINATION
Barrier to d/c: Surgery following for secticemia-pelvic abscess-anticipate secondary wound closure on Friday 8/28/2020-per surgery. D/C plan: referral made to [address] [place] [address] and [facility]-no precert needed.   Electronically signed by JAMILA Sharpe on 8/25/2020 at 10:04 AM

## 2020-08-25 NOTE — CONSULTS
Infectious Diseases   Consult Note        Admission Date: 8/16/2020  Hospital Day: Hospital Day: 10   Attending: Chiquis Cruz MD  Date of service: 8/25/20     Reason for admission: Pelvic abscess in female [N73.9]  Pelvic abscess in female [N73.9]    Chief complaint/ Reason for consult: Pelvic abscess, persistent leukocytosis    Microbiology:        I have reviewed allavailable micro lab data and cultures    · Blood culture (2/2) - collected on 8/16/2020: Negative so far  · Pelvic abscess fluid culture  - collected on 8/18/2020: Heavy growth of E. coli    Escherichia coli (1)     Antibiotic  Interpretation  ANABELLA  Status     ampicillin  Sensitive  4  mcg/mL      ceFAZolin  Sensitive  <=4  mcg/mL      cefepime  Sensitive  <=0.12  mcg/mL      cefTRIAXone  Sensitive  <=0.25  mcg/mL      ciprofloxacin  Sensitive  <=0.25  mcg/mL      ertapenem  Sensitive  <=0.12  mcg/mL      gentamicin  Sensitive  <=1  mcg/mL      levofloxacin  Sensitive  <=0.12  mcg/mL      piperacillin-tazobactam  Sensitive  <=4  mcg/mL      trimethoprim-sulfamethoxazole  Sensitive  <=20  mcg/mL          Antibiotics and immunizations:       Current antibiotics: All antibiotics and their doses were reviewed by me    Recent Abx Admin                   sodium chloride 0.9 % 1,000 mL with gentamicin (GARAMYCIN) 80 mg (mL) 1,000 mL Given 08/24/20 1604    meropenem (MERREM) 1 g in sodium chloride 0.9 % 100 mL IVPB (mini-bag) (g) 1 g New Bag 08/24/20 1508    metronidazole (FLAGYL) 500 mg in NaCl 100 mL IVPB premix (mg) 500 mg New Bag 08/24/20 1340                  Immunization History: All immunization history was reviewed by me today. There is no immunization history on file for this patient. Known drug allergies:      All allergies were reviewed and updated    Allergies   Allergen Reactions    Pcn [Penicillins] Other (See Comments)     Unknown reaction per pt        Social history:     Social History:  All social andepidemiologic history was reviewed and updated by me today as needed. · Tobacco use:   reports that she has been smoking cigarettes. She has a 25.00 pack-year smoking history. She does not have any smokeless tobacco history on file. · Alcohol use:   reports no history of alcohol use. · Currently lives in: [address]  ·  reports no history of drug use. Assessment:     The patient is a 76 y.o. old female who  has no past medical history on file. with following problems:    · Worsening bandemia secondary to intra-abdominal abscess  · Acute kidney injury  · Colon mass, right colon and hepatic flexure with necrotic cancer as well as perforation status post expiratory laparotomy and right hemicolectomy with primary ileotransverse colostomy and anastomosis on 8/24/2020  · Ascites, status post evacuation of ascites on 8/24/2020 , malignant ascites should be ruled out  · Intra-abdominal abscess status post abscess evacuation on 8/24/2020, CT-guided drainage of the abscess on 8/20/2020 had yielded 80 mL of purulent fluid with cultures positive for E. coli and mixed anaerobic growth  · Elevated CEA  · 25-pack-year smoking history  · CT scan with IV contrast of abdomen and pelvis was concerning for bilateral lower lobe lung nodules, concerning for metastatic disease  · Concern for portal hypertension on recent CT scans  · Obesity Class 1due to excess calorie intake : Body mass index is 36.02 kg/m². ·       Discussion:      The patient was found to white cell count 22,500 admission and CT scan of abdomen pelvis without contrast t done on day of admission was concerning for intra-abdominal abscess as well as possible colon mass     CT scan of abdomen and pelvis with IV contrast was done the next day which was colon mass concerning for concerning for primary adenocarcinoma of the colon as well as acute diverticulitis and concern for bilateral lower lobe metastatic disease.   A CT-guided drainage of the abscess was done on 8/18/2018 with a purulent fluid was removed which came back positive for junior susceptible E. Coli. The patient was on IV Zosyn since admission and white cell count was improving, however, her antibiotic was de-escalate to IV ciprofloxacin on 8/20/2020 per primary team without addition of any anaerobic coverage and white cell count has started to go up since then. Although anaerobes are hard to grow in the invitro abscess cultures, they should always be covered in such intra-abdominal abscesses as they are often involved as additional culprits in such infections    A COVID 19 PCR test were done on 8/22/2020 and was negative. Acute viral hepatitis screen done on admission was negative. Serum creatinine is gone up to 1.5 today    CEA was 83.3. Ordered the pelvic fluid from 8/18/2020 did not show any obvious malignant cells, the patient did undergo expiratory laparotomy yesterday with evacuation ascites and drainage of pelvic abscess as well as right hemicolectomy with primary ileal transverse colostomy and anastomosis and a liver biopsy. Pathology results are pending    Plan:     Diagnostic Workup:    · Will order 2 sets of blood cultures today  · Will order sed rate and CRP today  · Continue to follow fever curve, WBC count and blood cultures  · Follow up on liverand renal functions closely  · Follow-up on colon mass and liver biopsy results from surgery done on 8/24/2020, high suspicion for metastatic cancer with bilateral lung nodules    Antimicrobials:    · No need of IV meropenem. E. coli was junior susceptible.   Anaerobic coverage was lacking for past few days leading to further leukocytosis  · We will restart IV ciprofloxacin 400 mg every 12 hour  · We will add IV Flagyl 500 mg every 8 hour for anaerobic coverage  · We will take penicillin off her allergy list as patient has tolerated Zosyn well during this hospitalization  · Continue to monitor her vitals closely  · Will follow-up on the culture results and and presented to the ER for lightheadedness while trying to get up and multiple falls at home. The patient was reportedly found to be  in poor social conditions by EMS according to the ER note. In the ER, she was noted to have white cell count of 22,500. She was admitted. Blood cultures were sent. She had a noncontrast CT scan of abdomen and pelvis done which showed nonloculated fluid and gas collection in the pelvis concerning for a perforated viscus with abscess and thickening of the ascending colon concerning for possible underlying neoplastic process. There was also evidence of hepatosplenomegaly and multifocal collateral vessels from the stomach and esophagus concerning for esophageal varices and portal venous hypertension along with abdominal and pelvic ascites. The patient was admitted. Blood cultures were sent. She was started on IV Zosyn. Her antibiotic was de-escalate to IV ciprofloxacin on 8/20/2020 by primary team.  White cell count has started to go up after that    I have been asked for my opinion for management for this patient. Past Medical History: All past medical history reviewed today. History reviewed. No pertinent past medical history. Past Surgical History: All pastsurgical history was reviewed today.     Past Surgical History:   Procedure Laterality Date    COLONOSCOPY N/A 8/20/2020    COLONOSCOPY WITH BIOPSY performed by Nikki Sharma MD at [address]  8/20/2020    COLONOSCOPY POLYPECTOMY SNARE/COLD BIOPSY performed by Nikki Sharma MD at [address] Right 8/24/2020    EXPLORATORY LAPAROTOMY, RIGHT  HEMICOLECTOMY; DRAINAGE OF PELVIC ABSCESS performed by Miguel Lilly MD at [facility]  8/24/2020    LIVER BIOPSY performed by Miguel Lilly MD at [place] [address] N/A 8/20/2020    EGD BIOPSY performed by Andrew Lisa MD at [address] [facility]         Family History: All family history was reviewed today. History reviewed. No pertinent family history. Medications: All current and past medications were reviewed. No medications prior to admission.  ciprofloxacin  400 mg Intravenous Q12H    metroNIDAZOLE  500 mg Intravenous Q8H    bupivacaine liposome  5 mL Subcutaneous Once    metoclopramide  10 mg Intravenous Q6H    pantoprazole  40 mg Intravenous Daily    lactulose  20 g Oral TID    sodium chloride flush  10 mL Intravenous 2 times per day    enoxaparin  40 mg Subcutaneous Daily          REVIEW OF SYSTEMS:       Review of Systems   Constitutional: Positive for fatigue. Negative for chills, diaphoresis and unexpected weight change. HENT: Negative for congestion, ear discharge, ear pain, facial swelling, hearing loss, rhinorrhea and trouble swallowing. Eyes: Negative for photophobia, discharge, redness and visual disturbance. Respiratory: Negative for apnea, cough, choking, chest tightness, shortness of breath and stridor. Cardiovascular: Negative for chest pain and palpitations. Gastrointestinal: Negative for abdominal pain, blood in stool, diarrhea and nausea. Endocrine: Negative for polydipsia, polyphagia and polyuria. Genitourinary: Negative for difficulty urinating, dysuria, frequency, hematuria, menstrual problem and vaginal discharge. Musculoskeletal: Negative for arthralgias, joint swelling, myalgias and neck stiffness. Skin: Negative for color change and rash. Allergic/Immunologic: Negative for immunocompromised state. Neurological: Negative for dizziness, seizures, speech difficulty, light-headedness and headaches. Hematological: Negative for adenopathy. Psychiatric/Behavioral: Negative for agitation, hallucinations and suicidal ideas.        Objective:       PHYSICAL EXAM:      Vitals:   Vitals:    08/25/20 0411 08/25/20 0423 08/25/20 4494 08/25/20 0032 BP: 104/61  112/62    Pulse: 68 67 74    Resp: 14  16    Temp: 97.6 °F (36.4 °C)  97.4 °F (36.3 °C)    TempSrc: Oral  Oral    SpO2: 96% 94%  95%   Weight:       Height:           Physical Exam  Vitals signs and nursing note reviewed. Constitutional:       General: She is not in acute distress. Appearance: She is well-developed. She is not diaphoretic. Comments: Obese in built   HENT:      Head: Normocephalic. Right Ear: External ear normal.      Left Ear: External ear normal.      Nose: Nose normal.   Eyes:      General: No scleral icterus. Right eye: No discharge. Left eye: No discharge. Conjunctiva/sclera: Conjunctivae normal.      Pupils: Pupils are equal, round, and reactive to light. Neck:      Musculoskeletal: Normal range of motion and neck supple. Cardiovascular:      Rate and Rhythm: Normal rate and regular rhythm. Heart sounds: No murmur. No friction rub. Pulmonary:      Effort: Pulmonary effort is normal.      Breath sounds: No stridor. No wheezing or rales. Chest:      Chest wall: No tenderness. Abdominal:      Palpations: Abdomen is soft. There is no mass. Tenderness: There is no abdominal tenderness. There is no guarding or rebound. Comments: Surgical site okay, surgical dressing noted. Musculoskeletal:         General: No tenderness. Lymphadenopathy:      Cervical: No cervical adenopathy. Skin:     General: Skin is warm and dry. Findings: No erythema or rash. Neurological:      Mental Status: She is alert and oriented to person, place, and time. Motor: No abnormal muscle tone. Psychiatric:         Judgment: Judgment normal.           Lines: All vascular access sites are healthy with no local erythema, discharge or tenderness. Intake and output:     I/O last 3 completed shifts: In: 1820 [I.V.:3294]  Out: 3190 [Urine:520; Drains:20; Other:2500; Blood:150]    Lab Data:   All available labs were reviewed by me today. CBC:   Recent Labs     08/23/20  0512 08/24/20  0434 08/25/20  0520   WBC 13.1* 18.2* 20.2*   RBC 3.23* 3.58* 3.20*   HGB 9.0* 10.1* 9.1*   HCT 29.6* 31.8* 28.5*   * 128* 118*   MCV 91.7 88.7 89.1   MCH 28.0 28.3 28.4   MCHC 30.6* 31.9 31.8   RDW 17.6* 18.0* 18.3*        BMP:  Recent Labs     08/23/20  0512 08/24/20  0434 08/25/20  0520   * 134* 137   K 3.8 4.0 4.9    108 111*   CO2 17* 18* 18*   BUN 15 21* 27*   CREATININE 1.0 1.0 1.5*   CALCIUM 7.7* 8.6 8.0*   GLUCOSE 100* 99 166*        Hepatic FunctionPanel:   Lab Results   Component Value Date    ALKPHOS 96 08/25/2020    ALT 6 08/25/2020    AST 16 08/25/2020    PROT 5.2 08/25/2020    BILITOT 0.4 08/25/2020    BILIDIR 0.6 08/17/2020    IBILI 0.3 08/17/2020    LABALBU 1.7 08/25/2020       CPK:   Lab Results   Component Value Date    CKTOTAL 52 08/17/2020     ESR: No results found for: SEDRATE  CRP: No results found for: CRP      Imaging: All pertinent images and reports for the current visit were reviewed by meduring this visit. XR CHEST PORTABLE   Final Result   Lucency beneath the right hemidiaphragm compatible with free intraperitoneal   air. Apparently the patient has had recent abdominal surgery. This finding   was discussed with Dr. Shelley Miles  At 6:48 pm on 8/24/2020. Status post placement of right jugular central venous catheter. No   pneumothorax. Patchy mid and lower lung ground-glass opacities suspicious for pneumonia,   increased since the prior CT. Correlation for pneumonia is recommended. CT ABSCESS DRAINAGE W CATH PLACEMENT S&I   Final Result   Successful CT guided placement of a drainage catheter in the pelvic abscess. CT ABDOMEN PELVIS W IV CONTRAST Additional Contrast? Oral   Final Result   1. Pelvic air/fluid collection almost certainly reflects an abscess or giant   colonic diverticula. This may be the result of acute diverticulitis.    2. Mass lesion ascending colon almost certainly reflecting primary   adenocarcinoma of the colon. 3. Diffuse hepatic lesions with newly visualized (contrast enhancement) mass   at the hepatic dome. Hepatic metastatic disease is a consideration as is   cirrhosis with regenerative nodules and hepatocellular carcinoma. 4. Bilateral lower lobe pulmonary nodules suspicious for metastatic disease. 5. Abdominal and pelvic ascites. 6. Sequela from portosystemic shunting with numerous collateral vessels about   the stomach, left upper quadrant and distal esophagus. Relatively small   esophageal and gastric varices. 7.  Calcific atherosclerotic disease aorta. CT ABDOMEN PELVIS WO CONTRAST Additional Contrast? None   Final Result   1. Non loculated fluid and gas collection in the pelvis concerning for   perforated viscus/possible abscess formation. 2. Prominent thickening of the ascending colon which may be related to   infectious or inflammatory process or underlying neoplastic process. 3. Bowel-gas pattern typical of adynamic ileus. 4. Hepatic cirrhosis with numerous hepatic nodules suggestive of   regenerative/siderotic nodules. MRI abdomen without and with IV contrast   correlation recommended. Metastatic disease is a consideration. 5. Hepatosplenomegaly and multifocal collateral vessels about the stomach   esophagus concerning for varices, all suggestive of portal venous   hypertension. 6. Abdominal and pelvic ascites. 7.  Diverticulosis coli without CT evidence of acute diverticulitis. Critical results were called by Dr. Tien Gabriel to [address] [place] [address]   on 8/16/2020 at 18:09. CT Cervical Spine WO Contrast   Final Result   No acute abnormality of the cervical spine. CT Head WO Contrast   Final Result   No acute intracranial abnormality. XR CHEST PORTABLE   Final Result   No acute process. XR RADIUS ULNA LEFT (2 VIEWS)   Final Result   No acute osseous abnormality.              Outside records:    Labs, Microbiology, Radiology and pertinent results from Care everywhere, if available, were reviewed as a part ofthe consultation. Problem list:       Patient Active Problem List   Diagnosis Code    Pelvic abscess in female N73.9    Septicemia ([facility] A41.9    Colonic mass K63.89    Cirrhosis of liver with ascites ([facility] K74.60, R18.8    Intra-abdominal free air of unknown etiology K66.8    PAPITO (acute kidney injury) ([facility] N17.9    Lung nodules R91.8    Bandemia D72.825    Intra-abdominal abscess ([facility] K65.1    E coli infection A49.8    Elevated CEA R97.0    Ex-smoker Z87.891    Class 2 obesity due to excess calories with body mass index (BMI) of 36.0 to 36.9 in adult E66.09, Z68.36    Portal hypertension ([facility] K76.6         Please note that this chart was generated using Dragon dictation software. Although every effort was made to ensure the accuracy of this automated transcription, some errors in transcription may have occurred inadvertently. If you may need any clarification, please do not hesitate to contact me through EPIC or at the phone number provided below with my electronic signature. Any pictures or media included in this note were obtained after taking informed verbal consent from the patient and with their approval to include those in the patient's medical record.       Merlin Longoria MD, MPH  8/25/20, 11:43 AM EDT   [facility] Infectious Disease   Office: [phone]  Fax: [phone]  Tuesday AM clinic:   [address] [place] [address]  Thursday AM clinic: [address] [facility]

## 2020-08-25 NOTE — PLAN OF CARE
Problem: Falls - Risk of:  Goal: Will remain free from falls  Description: Will remain free from falls  8/25/2020 0047 by Em Gomez RN  Outcome: Ongoing  8/24/2020 1218 by Laquita Albert RN  Outcome: Ongoing  Goal: Absence of physical injury  Description: Absence of physical injury  8/25/2020 0047 by Em Gomez RN  Outcome: Ongoing  8/24/2020 1218 by Laquita Albert RN  Outcome: Ongoing     Problem: Skin Integrity:  Goal: Will show no infection signs and symptoms  Description: Will show no infection signs and symptoms  8/25/2020 0047 by Em Gomez RN  Outcome: Ongoing  8/24/2020 1218 by Laquita Albert RN  Outcome: Ongoing  Goal: Absence of new skin breakdown  Description: Absence of new skin breakdown  8/25/2020 0047 by Em Gomez RN  Outcome: Ongoing  8/24/2020 1218 by Laquita Albert RN  Outcome: Ongoing     Problem: Infection:  Goal: Will remain free from infection  Description: Will remain free from infection  8/25/2020 0047 by Em Gomez RN  Outcome: Ongoing  8/24/2020 1218 by Laquita Albert RN  Outcome: Ongoing     Problem: Safety:  Goal: Free from accidental physical injury  Description: Free from accidental physical injury  8/25/2020 0047 by Em Gomez RN  Outcome: Ongoing  8/24/2020 1218 by Laquita Albert RN  Outcome: Ongoing  Goal: Free from intentional harm  Description: Free from intentional harm  8/25/2020 0047 by Em Gomez RN  Outcome: Ongoing  8/24/2020 1218 by Laquita Albert RN  Outcome: Ongoing     Problem: Daily Care:  Goal: Daily care needs are met  Description: Daily care needs are met  8/25/2020 0047 by Em Gomez RN  Outcome: Ongoing  8/24/2020 1218 by Laquita Albert RN  Outcome: Ongoing     Problem: Pain:  Goal: Patient's pain/discomfort is manageable  Description: Patient's pain/discomfort is manageable  8/25/2020 0047 by Em Gomez RN  Outcome: Ongoing  8/24/2020 1218 by Laquita Albert RN  Outcome: Ongoing  Goal: Pain level will decrease  Description: Pain level will decrease  8/25/2020 0047 by Horacio Negron RN  Outcome: Ongoing  8/24/2020 1218 by Franklin Buckley RN  Outcome: Ongoing  Goal: Control of acute pain  Description: Control of acute pain  8/25/2020 0047 by Horacio Negron RN  Outcome: Ongoing  8/24/2020 1218 by Franklin Buckley RN  Outcome: Ongoing  Goal: Control of chronic pain  Description: Control of chronic pain  8/25/2020 0047 by Horacio Negron RN  Outcome: Ongoing  8/24/2020 1218 by Franklin Buckley RN  Outcome: Ongoing     Problem: Skin Integrity:  Goal: Skin integrity will stabilize  Description: Skin integrity will stabilize  8/25/2020 0047 by Horacio Negron RN  Outcome: Ongoing  8/24/2020 1218 by Franklin Buckley RN  Outcome: Ongoing     Problem: Discharge Planning:  Goal: Patients continuum of care needs are met  Description: Patients continuum of care needs are met  8/25/2020 0047 by Horacio Negron RN  Outcome: Ongoing  8/24/2020 1218 by Franklin Buckley RN  Outcome: Ongoing     Problem: Nutrition  Goal: Optimal nutrition therapy  8/25/2020 0047 by Horacio Negron RN  Outcome: Ongoing  8/24/2020 1218 by Franklin Buckley RN  Outcome: Ongoing     Problem: Confusion - Acute:  Goal: Absence of continued neurological deterioration signs and symptoms  Description: Absence of continued neurological deterioration signs and symptoms  8/25/2020 0047 by Horacio Negron RN  Outcome: Ongoing  8/24/2020 1218 by rFanklin Buckley RN  Outcome: Ongoing  Goal: Mental status will be restored to baseline  Description: Mental status will be restored to baseline  8/25/2020 0047 by Horacio Negron RN  Outcome: Ongoing  8/24/2020 1218 by Franklin Buckley RN  Outcome: Ongoing     Problem: Discharge Planning:  Goal: Ability to perform activities of daily living will improve  Description: Ability to perform activities of daily living will improve  8/25/2020 0047 by Horacio Negron RN  Outcome: Ongoing  8/24/2020 1218 by Decrease in sensory misperception frequency  Description: Decrease in sensory misperception frequency  8/25/2020 0047 by Karely Das RN  Outcome: Ongoing  8/24/2020 1218 by Zohreh Freire RN  Outcome: Ongoing  Goal: Able to refrain from responding to false sensory perceptions  Description: Able to refrain from responding to false sensory perceptions  8/25/2020 0047 by Karely Das RN  Outcome: Ongoing  8/24/2020 1218 by Zohreh Freire RN  Outcome: Ongoing  Goal: Demonstrates accurate environmental perceptions  Description: Demonstrates accurate environmental perceptions  8/25/2020 0047 by Karely Das RN  Outcome: Ongoing  8/24/2020 1218 by Zohreh Freire RN  Outcome: Ongoing  Goal: Able to distinguish between reality-based and nonreality-based thinking  Description: Able to distinguish between reality-based and nonreality-based thinking  8/25/2020 0047 by Karely Das RN  Outcome: Ongoing  8/24/2020 1218 by Zohreh Freire RN  Outcome: Ongoing  Goal: Able to interrupt nonreality-based thinking  Description: Able to interrupt nonreality-based thinking  8/25/2020 0047 by Karely Das RN  Outcome: Ongoing  8/24/2020 1218 by Zohreh Freire RN  Outcome: Ongoing     Problem: Sleep Pattern Disturbance:  Goal: Appears well-rested  Description: Appears well-rested  8/25/2020 0047 by Karely Das RN  Outcome: Ongoing  8/24/2020 1218 by Zohreh Freire RN  Outcome: Ongoing

## 2020-08-25 NOTE — PROGRESS NOTES
Dr Geraldo Blcakman at bedside, discussing with patient and family. Per her okay to hold CT with contrast for now.

## 2020-08-25 NOTE — PROGRESS NOTES
Physical Therapy    Facility/Department: [address] ORTHO/NEURO NURSING  Re-Assessment    NAME: Acacia Evans  : 1955  MRN: [id number]    Date of Service: 2020    Discharge Recommendations:  Acacia Evans scored a 10/24 on the AM-PAC short mobility form. Current research shows that an AM-PAC score of 17 or less is typically not associated with a discharge to the patient's home setting. Based on the patient's AM-PAC score and their current functional mobility deficits, it is recommended that the patient have 3-5 sessions per week of Physical Therapy at d/c to increase the patient's independence. Please see assessment section for further patient specific details. If patient discharges prior to next session this note will serve as a discharge summary. Please see below for the latest assessment towards goals. 3-5 sessions per week   PT Equipment Recommendations  Equipment Needed: No    Assessment   Body structures, Functions, Activity limitations: Decreased functional mobility ; Decreased ADL status; Decreased ROM; Decreased strength;Decreased safe awareness;Decreased cognition;Decreased endurance;Decreased balance; Increased pain;Decreased posture  Assessment: Patient presents below baseline function following surgery and would benefit from skilled PT to address above deficits and facilitate return to baseline function  Treatment Diagnosis: weakness, difficulty with gait. PT Education: PT Role;Plan of Care;Transfer Training;General Safety; Family Education; Functional Mobility Training;Gait Training;Goals  Patient Education: Pt needing reinforcement with education, family verbalized good understanding. Barriers to Learning: cognition  REQUIRES PT FOLLOW UP: Yes  Activity Tolerance  Activity Tolerance: Patient limited by fatigue;Patient limited by endurance       Patient Diagnosis(es): The primary encounter diagnosis was Septicemia ([facility] [address]  Diagnoses of PAPITO (acute kidney injury) ([facility] [address] Intra-abdominal free air of unknown etiology, and Fall, initial encounter were also pertinent to this visit. has no past medical history on file. has a past surgical history that includes Tonsillectomy; Eye surgery; Upper gastrointestinal endoscopy (N/A, 8/20/2020); Colonoscopy (N/A, 8/20/2020); Colonoscopy (8/20/2020); hemicolectomy (Right, 8/24/2020); and liver biopsy (8/24/2020). Restrictions  Restrictions/Precautions  Restrictions/Precautions: Fall Risk, Modified Diet(clear liquid diet)  Required Braces or Orthoses?: No  Position Activity Restriction  Other position/activity restrictions: This is a 59-year-old female with apparent history of frequent falls who presents after being found on the ground by her family. When the patient arrived she was disheveled, incontinent. She denies any specific complaints. The patient's work-up today was extensive. Her work-up included a CT of the abdomen that shows findings worrisome for possible abscess versus perforation. Questionable colon mass with metastatic lesions to liver and lung. Diagnosed with sepsis. Status-post exploratory laparotomy, evacuation of ascites, drainage of pelvic abscess, right hemicolectomy with primary ileotransverse colostomy anastomosis, and Abdiaziz-Cut liver biopsy  on 8/24/2020 for colon mass, right colon and hepatic flexure with necrotic cancer at hepatic flexure region with perforation, cirrhosis, pelvic abscess, ascites. Vision/Hearing  Vision: Impaired  Vision Exceptions: Wears glasses at all times  Hearing: Within functional limits     Subjective  General  Chart Reviewed: Yes  Family / Caregiver Present: No  Diagnosis: pelvic abcess, s/p ex lap, hemicolectomy  Other (Comment): slow to respond and needing many repeat instructions to follow commands. General Comment  Comments: Supine in bed upon arrival with alarm on. Scant bleeding noted from around catheter.  Jessy-care provided and nursing alerted  Subjective  Subjective: Denied pain at rest. Agreeable to therapy, fearful of mobility  Pain Screening  Patient Currently in Pain: Denies          Orientation  Orientation  Overall Orientation Status: Impaired  Orientation Level: Disoriented to situation;Disoriented to time;Oriented to person;Oriented to place  Social/Functional History  Social/Functional History  Lives With: Alone  Type of Home: [facility] [employer]: One level(doesn't go to basement)  Home Access: Stairs to enter without rails  Entrance Stairs - Number of Steps: 1 step to landing, plus 1 step  Bathroom Shower/Tub: Tub/Shower unit, Shower chair with back(half bath & full bath in the home)  Bathroom Toilet: Standard  Bathroom Equipment: Grab bars in shower, Hand-held shower, Shower chair  Bathroom Accessibility: Walker accessible(1 bathroom, but both a tight fit.)  Home Equipment: Rolling walker  ADL Assistance: Needs assistance(occasional supervision for safety with shower; sits on EOB)  Homemaking Assistance: Needs assistance(Pt does some housework, dtr assists, son does yardwork.)  Homemaking Responsibilities: (family brings food)  Ambulation Assistance: Independent(furniture walks, not using the walker)  Transfer Assistance: Independent  Active : Yes(hasn't been driving recently, but does)  Leisure & Hobbies: TV, games on phone, reading  Additional Comments: Daughter able to stay w/pt 24/7. Frequent falls recently. Pt poor historian. Son & daughter assisted with PLOF. Family reports that pt has numerous people she can call on if she needs it. Objective          AROM RLE (degrees)  RLE AROM: WFL  AROM LLE (degrees)  LLE AROM : WFL  Strength Other  Other: difficult to formally assess due to decreased command following and abdominal pain with mobility        Bed mobility  Rolling to Left: 2 Person assistance(mod A of 2)  Rolling to Right: 2 Person assistance(mod A of 2)  Supine to Sit: 2 Person assistance(mod A of 2)  Scooting:  Moderate assistance  Transfers  Sit to Stand: 2 Person Assistance(mod A of 2, cues for hand placement)  Stand to sit: Moderate Assistance  Ambulation 1  Device: [address]: 2 Person assistance(min A of 2)  Quality of Gait: short, shuffling steps, assistance for walker management  Distance: 3-4' bed to chair  Comments: MAX increased time to complete ambulation. Dizziness reported and BP obtained - WNL for patient. SOB noted following ambulation, SpO2 93% on 1 L O2     Balance  Sitting - Static: Good(SBA for static sitting EOB)  Sitting - Dynamic: Fair  Standing - Static: Fair  Standing - Dynamic: [address]  Times per week: 3-5x/week  Times per day: Daily  Current Treatment Recommendations: Strengthening, Balance Training, Functional Mobility Training, Transfer Training, ADL/Self-care Training, Endurance Training, Gait Training, Stair training, Safety Education & Training, Home Exercise Program, Patient/Caregiver Education & Training, Equipment Evaluation, Education, & procurement  Safety Devices  Type of devices: All fall risk precautions in place, Call light within reach, Chair alarm in place, Gait belt, Left in chair, Nurse notified, Patient at risk for falls(recommend STEDY for OOB activity)  Restraints  Initially in place: No             AM-PAC Score  AM-PAC Inpatient Mobility Raw Score : 10 (08/25/20 1218)  AM-PAC Inpatient T-Scale Score : 32.29 (08/25/20 1218)  Mobility Inpatient CMS 0-100% Score: 76.75 (08/25/20 1218)  Mobility Inpatient CMS G-Code Modifier : CL (08/25/20 1218)          Goals  Short term goals  Time Frame for Short term goals: To be met by DC. Short term goal 1: Pt to perform bed mob with min A  Short term goal 2: Pt to perform transfers with min A  Short term goal 3: Ambulate 22' with RW and CGA  Short term goal 4: Bed to/from chair with min A  Long term goals  Time Frame for Long term goals : LTGs=STGs  Patient Goals   Patient goals : Did not state.        Therapy Time   Individual Concurrent Group

## 2020-08-25 NOTE — PROGRESS NOTES
Assessment complete, meds given. Patient appears drowsy at this time, oriented to self, disoriented x3. Redness and open area noted on patient's sacrum, mepilex placed, see LDA. Prophylactic interdry placed in skin folds of breast and abdominal pannus. Amado care provided with soap and water, blood noted from eulalio area. Abdomen soft, non-distended, appropiately tender. Dressing to abdomen CDI with scant amount of serous drainage noted. CAROLINE drain in place to bulb suction with serosanguinous output. The care plan and education has been reviewed and mutually agreed upon with the patient, reinforcement necessary. Patient in bed, bed alarm on, bed in lowest position, call light and bedside table within reach. No further needs expressed at this time.

## 2020-08-25 NOTE — PROGRESS NOTES
[facility] and Laparoscopic Surgery        Progress Note    Patient Name: Joseph Shaikh  MRN: [id number]  [place]: 1955  Date of Evaluation: 2020    Subjective:  No acute events overnight; did have some SOB and received dose of Lasix  Pain controlled  Denies nausea or vomiting; feels thirsty  No flatus or stool  Resting in bed at this time    Postoperative Day #1      Vital Signs:  Patient Vitals for the past 24 hrs:   BP Temp Temp src Pulse Resp SpO2   20 0815 112/62 97.4 °F (36.3 °C) Oral 74 16 --   20 0423 -- -- -- 67 -- 94 %   20 0411 104/61 97.6 °F (36.4 °C) Oral 68 14 96 %   20 0113 (!) 103/53 -- -- 71 -- 93 %   20 0107 (!) 97/55 -- -- 70 -- 93 %   20 0006 (!) 96/59 97.4 °F (36.3 °C) Axillary 72 12 93 %   20 2246 107/66 97.4 °F (36.3 °C) Axillary 78 12 93 %   20 (!) 117/56 97.7 °F (36.5 °C) Axillary 77 11 93 %   20 127/64 97.2 °F (36.2 °C) Axillary 88 12 98 %   20 1747 139/81 97.5 °F (36.4 °C) Axillary 88 16 98 %   20 1723 (!) 147/64 -- -- 84 16 97 %   20 1715 (!) 150/68 -- -- 83 18 94 %   20 1710 (!) 158/62 -- -- 86 20 92 %   20 1705 (!) 162/72 -- -- 86 20 94 %   20 1700 -- -- -- 86 20 94 %   20 1650 (!) 157/62 -- -- 86 20 94 %   20 1645 (!) 157/70 -- -- 86 20 95 %   20 1640 127/74 -- -- 86 20 90 %   20 1635 (!) 176/75 97 °F (36.1 °C) Temporal 84 16 95 %   20 1630 (!) 191/74 -- -- 85 16 95 %   20 1625 (!) 165/70 -- -- 86 16 100 %   20 1620 (!) 171/79 -- -- 86 16 100 %   20 1618 (!) 171/79 97 °F (36.1 °C) Temporal 86 16 100 %   20 1220 111/62 97.6 °F (36.4 °C) Temporal 86 16 96 %      TEMPERATURE HISTORY 24H: Temp (24hrs), Av.4 °F (35.8 °C), Min:96.1 °F (35.6 °C), Max:97.7 °F (36.5 °C)    BLOOD PRESSURE HISTORY: Systolic (68UXK), JWN:535 , Min:88 , VYI:882    Diastolic (12ZUC), YZI:20, Min:47, Max:85      Intake/Output:  I/O last 3 completed shifts: In: 8628 [I.V.:3294]  Out: 3190 [Urine:520; Drains:20; Other:2500; Blood:150]  No intake/output data recorded. Drain/tube Output:  Closed/Suction Drain Left Back Bulb 10 [place]-Output (ml): 5 ml    Physical Exam:  General: awake, alert, oriented to person, place  Lungs: unlabored respirations  Abdomen: soft, non-distended, appropriate incisional tenderness only, bowel sounds present   Drain: sanguinous  Skin/Wound: wound bed open and clean without drainage--dressing changed    Labs:  CBC:    Recent Labs     08/23/20 0512 08/24/20 0434 08/25/20 0520   WBC 13.1* 18.2* 20.2*   HGB 9.0* 10.1* 9.1*   HCT 29.6* 31.8* 28.5*   * 128* 118*     BMP:    Recent Labs     08/23/20 0512 08/24/20 0434 08/25/20 0520   * 134* 137   K 3.8 4.0 4.9    108 111*   CO2 17* 18* 18*   BUN 15 21* 27*   CREATININE 1.0 1.0 1.5*   GLUCOSE 100* 99 166*     Hepatic:    Recent Labs     08/25/20 0520   AST 16   ALT 6*   BILITOT 0.4   ALKPHOS 96     Amylase:  No results found for: AMYLASE  Lipase:  No results found for: LIPASE   Mag:    Lab Results   Component Value Date    MG 2.30 08/22/2020    MG 2.60 08/21/2020     Phos:     Lab Results   Component Value Date    PHOS 4.2 08/24/2020    PHOS 3.7 08/23/2020      Coags:   Lab Results   Component Value Date    PROTIME 19.2 08/25/2020    INR 1.65 08/25/2020       Cultures:  Anaerobic culture  Lab Results   Component Value Date    LABANAE Mixed anaerobic growth present 08/18/2020     Fungus stain  No results found for requested labs within last 30 days.      Gram stain  Results in Past 30 Days  Result Component Current Result Ref Range Previous Result Ref Range   Gram Stain Result 1+ WBC's (Polymorphonuclear)  No organisms seen   (8/24/2020)  1+ WBC's (Mononuclear)  1+ WBC's (Polymorphonuclear)  2+ Gram negative rods   (8/18/2020)      Organism  Lab Results   Component Value Date/Time    ORG Escherichia coli (A) 08/18/2020 11:45 AM     Surgical culture  No results found for: CXSURG  Blood culture 1  Results in Past 30 Days  Result Component Current Result Ref Range Previous Result Ref Range   Blood Culture, Routine No Growth after 4 days of incubation. (8/16/2020)  Not in Time Range      Blood culture 2  Results in Past 30 Days  Result Component Current Result Ref Range Previous Result Ref Range   Culture, Blood 2 No Growth after 4 days of incubation. (8/16/2020)  Not in Time Range      Fecal occult  No results found for requested labs within last 30 days. GI bacterial pathogens by PCR  No results found for requested labs within last 30 days. C. difficile  No results found for requested labs within last 30 days. Urine culture  No results found for: Oswald Counter    Pathology:   OR Pathology results pending     Endoscopy Pathology 8/20/2020--FINAL DIAGNOSIS:     A. Esophagus, biopsy:   - Fragments of squamous and gastric type columnar mucosa with mild   chronic inflammation.   - Negative for intestinal metaplasia. B. Ascending colon mass, biopsy:   - Poorly differentiated carcinoma, most consistent with adenocarcinoma. See comment. C. Colon polyp, transverse:   - Tubular adenoma. - Negative for high-grade dysplasia or malignancy. D. Colon polyp, descending:   - Tubular adenoma. - Negative for high-grade dysplasia or malignancy. E. Colon polyp, sigmoid:   - Tubular adenoma (multiple fragments). - Negative for high-grade dysplasia or malignancy. COMMENT:  The morphologic findings of ascending colon mass raises a   differential diagnosis of neuroendocrine carcinoma.  However, the   malignant cells are negative for neuroendocrine markers (synaptophysin   and chromogranin) .  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  Clinical correlation is recommended.      Imaging:  I have personally reviewed the following films:    Xr Chest Portable    Result Date: 8/24/2020  EXAMINATION: ONE XRAY VIEW OF THE CHEST 8/24/2020 4:46 pm COMPARISON: 08/16/2020 chest x-ray, CT abdomen and pelvis 08/17/2020 HISTORY: ORDERING SYSTEM PROVIDED HISTORY: right IJ TLC placed TECHNOLOGIST PROVIDED HISTORY: Reason for exam:->right IJ TLC placed FINDINGS: There is been placement of a right jugular central venous catheter with its tip projecting over the superior vena cava. The heart size and mediastinal contours are stable. Bilateral mid and lower lung patchy ground-glass opacities today, increased since the prior study. No pneumothorax is evident. Lucency beneath the right hemidiaphragm is new since prior studies suspicious for free intraperitoneal air. Lucency beneath the right hemidiaphragm compatible with free intraperitoneal air. Apparently the patient has had recent abdominal surgery. This finding was discussed with Dr. Blanca Nobles  At 6:48 pm on 8/24/2020. Status post placement of right jugular central venous catheter. No pneumothorax. Patchy mid and lower lung ground-glass opacities suspicious for pneumonia, increased since the prior CT. Correlation for pneumonia is recommended. Scheduled Meds:   bupivacaine liposome  5 mL Subcutaneous Once    metoclopramide  10 mg Intravenous Q6H    pantoprazole  40 mg Intravenous Daily    lactulose  20 g Oral TID    sodium chloride flush  10 mL Intravenous 2 times per day    enoxaparin  40 mg Subcutaneous Daily     Continuous Infusions:   dextrose 5% and 0.45% NaCl with KCl 20 mEq 125 mL/hr at 08/25/20 0121    sodium chloride 50 mL/hr at 08/23/20 1931     PRN Meds:.ketorolac, acetaminophen, morphine **OR** morphine, potassium chloride **OR** potassium alternative oral replacement **OR** potassium chloride, lip balm petroleum free, sodium chloride flush, polyethylene glycol, promethazine **OR** ondansetron      Assessment:  76 y.o. female admitted with   1. Septicemia ([facility] [address]    2. PAPITO (acute kidney injury) ([facility] [address]    3.

## 2020-08-25 NOTE — PROGRESS NOTES
[facility] GI  Gastroenterology Progress Note    Cesia Roy is a 76 y.o. female patient. Active Problems:    Pelvic abscess in female    Septicemia [facility])  Resolved Problems:    * No resolved hospital problems. *      SUBJECTIVE:  Pt alert. States pain is controlled    ROS:  No fever, chills  No chest pain, palpitations  No SOB, cough  Gastrointestinal ROS: no N/V/D     Physical    VITALS:  /62   Pulse 74   Temp 97.4 °F (36.3 °C) (Oral)   Resp 16   Ht 5' 7\" (1.702 m)   Wt 230 lb (104.3 kg)   SpO2 94%   BMI 36.02 kg/m²   TEMPERATURE:  Current - Temp: 97.4 °F (36.3 °C); Max - Temp  Av.4 °F (35.8 °C)  Min: 96.1 °F (35.6 °C)  Max: 97.7 °F (36.5 °C)    NAD  Regular rate   Lungs CTA Bilaterally  Abdomen soft, ND, post op tender,  No guarding or rebound. Bowel sounds normal.  Alert and oriented to place and time but not person. No asterixis. Data    Data Review:    Recent Labs     20  0520  0434 20  0520   WBC 13.1* 18.2* 20.2*   HGB 9.0* 10.1* 9.1*   HCT 29.6* 31.8* 28.5*   MCV 91.7 88.7 89.1   * 128* 118*     Recent Labs     20  0512 20  0434 20  0520   * 134* 137   K 3.8 4.0 4.9    108 111*   CO2 17* 18* 18*   PHOS 3.7 4.2  --    BUN 15 21* 27*   CREATININE 1.0 1.0 1.5*     Recent Labs     20  0520   AST 16   ALT 6*   BILITOT 0.4   ALKPHOS 96     No results for input(s): LIPASE, AMYLASE in the last 72 hours. Recent Labs     20  0520   PROTIME 19.2*   INR 1.65*     No results for input(s): PTT in the last 72 hours. CT abd/pelvis w IV and PO contrast 20  Impression    1. Pelvic air/fluid collection almost certainly reflects an abscess or giant    colonic diverticula.  This may be the result of acute diverticulitis. 2. Mass lesion ascending colon almost certainly reflecting primary    adenocarcinoma of the colon. 3. Diffuse hepatic lesions with newly visualized (contrast enhancement) mass    at the hepatic dome.  Hepatic metastatic disease is a consideration as is    cirrhosis with regenerative nodules and hepatocellular carcinoma. 4. Bilateral lower lobe pulmonary nodules suspicious for metastatic disease. 5. Abdominal and pelvic ascites. 6. Sequela from portosystemic shunting with numerous collateral vessels about    the stomach, left upper quadrant and distal esophagus.  Relatively small    esophageal and gastric varices. 7.  Calcific atherosclerotic disease aorta.               ASSESSMENT :    Cirrhosis - new diagnosis per CT. No alcohol history. Likely from fatty liver. Some ascites on CT. Seemed mildly confused at admission (baseline per her daughter) but ammonia was normal. repeat ammonia is elevated at 66 so lactulose started. Negative: AMA, hepatitis panel, A1AT phenotype. f-actin weak positive at 25. AFP normal. CEA 83. Labs stable. Pt oriented to place and year - not month     Abnormal CT of the colon and liver, colon mass - CT with thickening of the ascending colon concerning for malignancy - path c/w AdenoCa. Also with liver and lung lesions concerning for metastatic disease. Colonoscopy 8/20 with large necrotic ascending colon mass, likely the source of abscess. Pt is POD #1.      Pelvic abscess - CT with nonloculated fluid and gas collection in the pelvis. S/p IR drainage 8/18.      Esophagitis - EGD 8/20 with LA class C erosive esophagitis and nodularity at [facility]. Bx. PLAN :  - lactulose TID - titrate frequency for goal of 3 BMs daily  - PPI  - antibiotics   - f/u Hep B surface AB for vaccination purposes.  Will need Hep A vaccine.     Conchis Dailey MD  [address]  8/25/2020

## 2020-08-26 NOTE — PROGRESS NOTES
calories with body mass index (BMI) of 36.0 to 36.9 in adult 8/25/2020 Yes    Portal hypertension ([facility] [address] 8/25/2020 Yes    Mild malnutrition ([facility] [address] (Chronic) 8/25/2020 Yes        Javier Mazariegos MD       [facility] [address] [place]: [phone] or [address]  Fax: [phone]       SUBJECTIVE:    No SOB  S/P right hemicolectomy 8/24  Has CAROLINE drain and Amado     MEDICATIONS: reviewed by me. Medications Prior to Admission:  No current facility-administered medications on file prior to encounter. No current outpatient medications on file prior to encounter. No medications prior to admission. Scheduled Meds:   folic acid  1 mg Oral Daily    meropenem  1 g Intravenous Q12H    albumin human  12.5 g Intravenous Q6H    metoclopramide  5 mg Intravenous Q6H    bupivacaine liposome  5 mL Subcutaneous Once    pantoprazole  40 mg Intravenous Daily    lactulose  20 g Oral TID    sodium chloride flush  10 mL Intravenous 2 times per day    enoxaparin  40 mg Subcutaneous Daily     Continuous Infusions:    PRN Meds:.[DISCONTINUED] promethazine **OR** ondansetron, promethazine, acetaminophen, morphine **OR** morphine, potassium chloride **OR** potassium alternative oral replacement **OR** potassium chloride, lip balm petroleum free, sodium chloride flush, polyethylene glycol       PHYSICAL EXAM:  Recent vital signs and recent I/Os reviewed by me.      Wt Readings from Last 3 Encounters:   08/23/20 230 lb (104.3 kg)     BP Readings from Last 3 Encounters:   08/26/20 101/69   08/24/20 (!) 155/68   08/20/20 (!) 91/47     Patient Vitals for the past 24 hrs:   BP Temp Temp src Pulse Resp SpO2 Height   08/26/20 1104 101/69 97.7 °F (36.5 °C) Oral 84 16 94 % --   08/26/20 0843 117/74 97.6 °F (36.4 °C) Oral 76 18 94 % --   08/26/20 0530 (!) 128/56 97.4 °F (36.3 °C) Oral 80 16 93 % --   08/26/20 0130 113/76 -- -- 78 -- 96 % --   08/25/20 2345 119/74 97.7 °F (36.5 °C) Oral 79 14 95 % -- 08/25/20 1930 (!) 115/55 98.2 °F (36.8 °C) Oral 71 14 94 % --   08/25/20 1335 -- -- -- -- -- -- 5' 7\" (1.702 m)       Intake/Output Summary (Last 24 hours) at 8/26/2020 1242  Last data filed at 8/26/2020 0530  Gross per 24 hour   Intake 710 ml   Output 260 ml   Net 450 ml         General: Awake, Alert,   HENT: Atraumatic, normocephalic   Eyes: Normal conjunctiva, Non-incteral sclera. Neck: Supple, JVD not visible. CVS:  Heart sounds are normal. No murmurs. No pericardial rub. RS: Normal respiratory efforts,  Lung fields are diminished . Abd: Soft , bowel sounds are normal, no distension and no tenderness to palpation. Skin: No rash , some bruises,   CNS: Awake Oriented , No focal.   Extremities/MSK: 2+ Edema, no cyanosis. DATA:  Diagnostic tests reviewed by me for today's visit:    Recent Labs     08/24/20 0434 08/25/20 0520 08/26/20  0630   WBC 18.2* 20.2* 20.9*   HCT 31.8* 28.5* 28.4*   * 118* 136     Iron Saturation:  No components found for: PERCENTFE  FERRITIN:    Lab Results   Component Value Date    FERRITIN 542.4 08/17/2020     IRON:    Lab Results   Component Value Date    IRON 26 08/17/2020     TIBC:    Lab Results   Component Value Date    TIBC 106 08/17/2020       Recent Labs     08/24/20 0434 08/25/20 0520 08/26/20  0630   * 137 131*   K 4.0 4.9 4.2    111* 105   CO2 18* 18* 18*   BUN 21* 27* 36*   CREATININE 1.0 1.5* 2.3*     Recent Labs     08/24/20 0434 08/25/20  0520 08/26/20  0630   CALCIUM 8.6 8.0* 8.5   PHOS 4.2  --  5.0*     No results for input(s): PH, PCO2, PO2 in the last 72 hours.     Invalid input(s): [place] Draft    ABG:  No results found for: PH, PCO2, PO2, HCO3, BE, THGB, TCO2, O2SAT  VBG:    Lab Results   Component Value Date    PHVEN 7.435 08/23/2020    YWX6NHU 26.3 08/23/2020    BEVEN -5.6 08/23/2020    P3DHWZGF 99 08/23/2020       LDH:  No results found for: LDH  Uric Acid:    Lab Results   Component Value Date    LABURIC 5.9 08/17/2020       PT/INR:    Lab Results   Component Value Date    PROTIME 19.2 08/25/2020    INR 1.65 08/25/2020     Warfarin PT/INR:  No components found for: Forjayytine Esparza  PTT:  No results found for: APTT, PTT[APTT}  Last 3 Troponin:    Lab Results   Component Value Date    TROPONINI <0.01 08/16/2020       U/A:    Lab Results   Component Value Date    COLORU ORANGE 08/16/2020    PROTEINU TRACE 08/16/2020    PHUR 5.0 08/16/2020    WBCUA 3 08/16/2020    RBCUA 7 08/16/2020    BACTERIA RARE 08/16/2020    CLARITYU CLOUDY 08/16/2020    SPECGRAV 1.022 08/16/2020    LEUKOCYTESUR SMALL 08/16/2020    UROBILINOGEN 1.0 08/16/2020    BILIRUBINUR SMALL 08/16/2020    BLOODU LARGE 08/16/2020    GLUCOSEU Negative 08/16/2020     Microalbumen/Creatinine ratio:  No components found for: RUCREAT  24 Hour Urine for Protein:  No components found for: RAWUPRO, UHRS3, PWSV56ZC, UTV3  24 Hour Urine for Creatinine Clearance:  No components found for: CREAT4, UHRS10, UTV10  Urine Toxicology:  No components found for: IAMMENTA, IBARBIT, IBENZO, ICOCAINE, IMARTHC, IOPIATES, IPHENCYC    HgBA1c:  No results found for: LABA1C  RPR:  No results found for: RPR  HIV:  No results found for: HIV  KULWANT:  No results found for: ANATITER, KULWANT  RF:  No results found for: RF  DSDNA:  No components found for: DNA  AMYLASE:  No results found for: AMYLASE  LIPASE:  No results found for: LIPASE  Fibrinogen Level:  No components found for: FIB       BELOW MENTIONED RADIOLOGY STUDY RESULTS BY ME:    Ct Abdomen Pelvis Wo Contrast Additional Contrast? None    Result Date: 8/16/2020  EXAMINATION: CT OF THE ABDOMEN AND PELVIS WITHOUT CONTRAST 8/16/2020 5:11 pm TECHNIQUE: CT of the abdomen and pelvis was performed without the administration of intravenous contrast. Multiplanar reformatted images are provided for review.  Dose modulation, iterative reconstruction, and/or weight based adjustment of the mA/kV was utilized to reduce the radiation dose to as low as reasonably achievable. COMPARISON: None. HISTORY: Acute nausea FINDINGS: Lower Chest: FOUR solid soft tissue pulmonary nodules right lower lobe, 7 x 8 mm (axial image 19), 11 x 12 mm (axial 11), 9 x 9 mm (axial image 10) and 4 x 4 mm (axial image 10). 3 mm pulmonary nodules are seen posterior basal segment left lower lobe. Mild nodular fullness about the distal esophagus. Heart size is upper normal. Organs: Prominent caudate lobe hypertrophy and poly lobular configuration of the liver as well as diffuse tiny hypoattenuating lesions throughout the liver suggestive of hepatic cirrhosis and siderotic nodules. No discrete mass lesion evident. 19 cm craniocaudal liver length. The spleen is mildly enlarged. The kidneys, gallbladder, adrenal glands and pancreas appear unremarkable. Multifocal collateral vessels are seen about the gastrohepatic ligament extending to the posterior mediastinum. GI/Bowel: Prominent diffuse thickening of the wall of the ascending colon. Other portions of colon and small bowel appear unremarkable with exception of few gas distended small bowel loops typical of adynamic ileus. There are diverticula involving the descending and sigmoid colon. Pelvis: Fluid collection within the pelvis without definite margins has air bubbles within it concerning for sequela from perforated bowel/abscess formation. Uterus and adnexal structures appear unremarkable. Peritoneum/Retroperitoneum: Abdominal ascites. Calcific atherosclerosis aorta. Bones/Soft Tissues: No acute superficial soft tissue or osseous structure abnormality evident. 1. Non loculated fluid and gas collection in the pelvis concerning for perforated viscus/possible abscess formation. 2. Prominent thickening of the ascending colon which may be related to infectious or inflammatory process or underlying neoplastic process. 3. Bowel-gas pattern typical of adynamic ileus.  4. Hepatic cirrhosis with numerous hepatic nodules suggestive of regenerative/siderotic nodules. MRI abdomen without and with IV contrast correlation recommended. Metastatic disease is a consideration. 5. Hepatosplenomegaly and multifocal collateral vessels about the stomach esophagus concerning for varices, all suggestive of portal venous hypertension. 6. Abdominal and pelvic ascites. 7.  Diverticulosis coli without CT evidence of acute diverticulitis. Critical results were called by Dr. Marcel Young to [employer] on 8/16/2020 at 18:09. Xr Radius Ulna Left (2 Views)    Result Date: 8/16/2020  EXAMINATION: TWO XRAY VIEWS OF THE LEFT FOREARM 8/16/2020 2:55 pm COMPARISON: None. HISTORY: ORDERING SYSTEM PROVIDED HISTORY: Fall TECHNOLOGIST PROVIDED HISTORY: Reason for exam:->Fall Reason for Exam: Frequent falls at home. Bruising mid shaft L forearm Acuity: Acute Type of Exam: Initial FINDINGS: There is no evidence of acute fracture. There is normal alignment. No acute joint abnormality. No focal osseous lesion. No focal soft tissue abnormality. No acute osseous abnormality. Ct Head Wo Contrast    Result Date: 8/16/2020  EXAMINATION: CT OF THE HEAD WITHOUT CONTRAST  8/16/2020 3:15 pm TECHNIQUE: CT of the head was performed without the administration of intravenous contrast. Dose modulation, iterative reconstruction, and/or weight based adjustment of the mA/kV was utilized to reduce the radiation dose to as low as reasonably achievable. COMPARISON: None. HISTORY: ORDERING SYSTEM PROVIDED HISTORY: Fall TECHNOLOGIST PROVIDED HISTORY: Reason for exam:->Fall Has a \"code stroke\" or \"stroke alert\" been called? ->No Reason for Exam: Fall Acuity: Acute Type of Exam: Initial FINDINGS: BRAIN/VENTRICLES: The ventricles and sulci are diffusely enlarged. Low attenuation is seen in the periventricular and subcortical white matter. No acute intracranial hemorrhage or acute infarct is identified.  ORBITS: The visualized portion of the orbits demonstrate no acute abnormality. SINUSES: The visualized paranasal sinuses and mastoid air cells demonstrate no acute abnormality. SOFT TISSUES/SKULL:  No acute abnormality of the visualized skull or soft tissues. No acute intracranial abnormality. Ct Cervical Spine Wo Contrast    Result Date: 8/16/2020  EXAMINATION: CT OF THE CERVICAL SPINE WITHOUT CONTRAST 8/16/2020 3:15 pm TECHNIQUE: CT of the cervical spine was performed without the administration of intravenous contrast. Multiplanar reformatted images are provided for review. Dose modulation, iterative reconstruction, and/or weight based adjustment of the mA/kV was utilized to reduce the radiation dose to as low as reasonably achievable. COMPARISON: None. HISTORY: ORDERING SYSTEM PROVIDED HISTORY: Fall TECHNOLOGIST PROVIDED HISTORY: Reason for exam:->Fall Reason for Exam: Fall Acuity: Acute Type of Exam: Initial FINDINGS: BONES/ALIGNMENT: There is no acute fracture or traumatic malalignment. DEGENERATIVE CHANGES: Multilevel degenerative changes. SOFT TISSUES: There is no prevertebral soft tissue swelling. No acute abnormality of the cervical spine. Xr Chest Portable    Result Date: 8/16/2020  EXAMINATION: ONE XRAY VIEW OF THE CHEST 8/16/2020 2:55 pm COMPARISON: None. HISTORY: ORDERING SYSTEM PROVIDED HISTORY: Fall TECHNOLOGIST PROVIDED HISTORY: Reason for exam:->Fall Reason for Exam: Frequent falls at home. Bruising mid shaft L forearm Acuity: Acute Type of Exam: Initial FINDINGS: The lungs are without acute focal process. There is no effusion or pneumothorax. The cardiomediastinal silhouette is without acute process. The osseous structures are without acute process. No acute process.      Mely Soriano MD

## 2020-08-26 NOTE — PROGRESS NOTES
[facility] PROGRESS NOTE    8/26/2020 12:10 PM        Name: Davidson Mayen . Admitted: 8/16/2020  Primary Care Provider: No primary care provider on file. (Tel: None)      Subjective:  Patient is a 75 yo female with hx nicotine use. The patient lives alone, she presented to hospital after being found on floor by her daughter. She has had multiple falls in past couple of months. CT abdomen and pelvis in ER showed pelvic abscess vs viscus perf. Colonic wall thickening, concerning for malignancy, liver cirrhosis portal hypertension. She had Ct-guided drain placement 8/18 for pelvic abscess 8/18. Subsequently found to have large ascending colon mass on colonoscopy 8/20, likely the source of abscess. 8/18/2020: Successful CT guided placement of a drainage catheter in the pelvic abscess. 8/24/2020: Exploratory laparotomy, evacuation of ascites, drainage of  pelvic abscess, right hemicolectomy with primary ileotransverse  colostomy anastomosis, and Abdiaziz-Cut liver biopsy. Presently resting in bed. Reports she is having \"rough day. \" Has been nauseated and vomiting, did not tolerate clear liquids. Abdominal xray suggests ileus or early partial small bowel obstruction. Presently NPO. Also with shortness of breath, cough. She was diuresed yesterday, creatinine bumped to 2.3 today. Now on IV fluids and albumin for suspected third spacing. Operative pain adequately controlled.       Reviewed interval ancillary notes    Current Medications  folic acid (FOLVITE) tablet 1 mg, Daily  ondansetron (ZOFRAN) injection 4 mg, Q4H PRN  promethazine (PHENERGAN) injection 12.5 mg, Q6H PRN  meropenem (MERREM) 1 g in sodium chloride 0.9 % 100 mL IVPB (mini-bag), Q12H  albumin human 25 % IV solution 12.5 g, Q6H  metoclopramide (REGLAN) injection 5 mg, Q6H  bupivacaine liposome (EXPAREL) 1.3 % injection 66.5 mg, Once  acetaminophen BILITOT 0.4   ALKPHOS 96       CXR 8/16/2020:  No acute process. CT Head 8/16/2020:  No acute intracranial abnormality.           CT Cervical Spine 8/16/2020:  No acute abnormality of the cervical spine. CT Abdomen/Pelvis Without Contrast8/16/2020:  . Non loculated fluid and gas collection in the pelvis concerning for    perforated viscus/possible abscess formation. 2. Prominent thickening of the ascending colon which may be related to    infectious or inflammatory process or underlying neoplastic process. 3. Bowel-gas pattern typical of adynamic ileus. 4. Hepatic cirrhosis with numerous hepatic nodules suggestive of    regenerative/siderotic nodules.  MRI abdomen without and with IV contrast    correlation recommended.  Metastatic disease is a consideration. 5. Hepatosplenomegaly and multifocal collateral vessels about the stomach    esophagus concerning for varices, all suggestive of portal venous    hypertension. 6. Abdominal and pelvic ascites. 7.  Diverticulosis coli without CT evidence of acute diverticulitis. CT Abdomen/Pelvis With Contrast 8/17/2020:  1. Pelvic air/fluid collection almost certainly reflects an abscess or giant    colonic diverticula.  This may be the result of acute diverticulitis. 2. Mass lesion ascending colon almost certainly reflecting primary    adenocarcinoma of the colon. 3. Diffuse hepatic lesions with newly visualized (contrast enhancement) mass    at the hepatic dome.  Hepatic metastatic disease is a consideration as is    cirrhosis with regenerative nodules and hepatocellular carcinoma. 4. Bilateral lower lobe pulmonary nodules suspicious for metastatic disease. 5. Abdominal and pelvic ascites. 6. Sequela from portosystemic shunting with numerous collateral vessels about    the stomach, left upper quadrant and distal esophagus.  Relatively small    esophageal and gastric varices. 7.  Calcific atherosclerotic disease aorta. CXR 8/24/2020:  FINDINGS:    There is been placement of a right jugular central venous catheter with its    tip projecting over the superior vena cava.  The heart size and mediastinal    contours are stable.         Bilateral mid and lower lung patchy ground-glass opacities today, increased    since the prior study.         No pneumothorax is evident.  Lucency beneath the right hemidiaphragm is new    since prior studies suspicious for free intraperitoneal air.              Impression    Lucency beneath the right hemidiaphragm compatible with free intraperitoneal    air.  Apparently the patient has had recent abdominal surgery.  This finding    was discussed with Dr. Cecy Ron 6:48 pm on 8/24/2020.         Status post placement of right jugular central venous catheter.  No    pneumothorax.         Patchy mid and lower lung ground-glass opacities suspicious for pneumonia,    increased since the prior CT.  Correlation for pneumonia is recommended. Abdominal Xray 8/26/2020:  Scattered dilated small bowel loops throughout the abdomen, increased    compared to prior CT scan, either due to ileus or early partial small bowel    obstruction        Problem List  Active Problems:    Pelvic abscess in female    Septicemia ([address]    Colonic mass    Cirrhosis of liver with ascites ([address]    Intra-abdominal free air of unknown etiology    PAPITO (acute kidney injury) ([address]    Lung nodules    Bandemia    Intra-abdominal abscess (HCC)    E coli infection    Elevated CEA    Ex-smoker    Class 2 obesity due to excess calories with body mass index (BMI) of 36.0 to 36.9 in adult    Portal hypertension (HCC)    Mild malnutrition ([address]  Resolved Problems:    * No resolved hospital problems. *       Assessment:   1. Pelvic abscess. Status post IR drainage 8/18, pelvic fluid cytology negative for malignant cells. Colonoscopy 8/20 with large necrotic ascending colon mass, likely source of abscess.  Status post exp lap right transverse

## 2020-08-26 NOTE — PROGRESS NOTES
[facility]   Progress Note      8/26/2020 8:38 AM        Name: Alondra Rahman . Admitted: 8/16/2020    SUBJECTIVE:  Patient experiencing nausea and vomiting today. Pain controlled. Reviewed interval ancillary notes    Current Medications  ciprofloxacin (CIPRO) IVPB 400 mg, Q12H  metronidazole (FLAGYL) 500 mg in NaCl 100 mL IVPB premix, Q8H  metoclopramide (REGLAN) injection 5 mg, Q6H  bupivacaine liposome (EXPAREL) 1.3 % injection 66.5 mg, Once  ketorolac (TORADOL) injection 15 mg, Q6H PRN  acetaminophen (TYLENOL) tablet 500 mg, Q8H PRN  morphine (PF) injection 2 mg, Q2H PRN    Or  morphine injection 4 mg, Q2H PRN  pantoprazole (PROTONIX) injection 40 mg, Daily  potassium chloride (KLOR-CON M) extended release tablet 40 mEq, PRN    Or  potassium bicarb-citric acid (EFFER-K) effervescent tablet 40 mEq, PRN    Or  potassium chloride 10 mEq/100 mL IVPB (Peripheral Line), PRN  lactulose (CHRONULAC) 10 GM/15ML solution 20 g, TID  lip balm petroleum free (PHYTOPLEX) stick, PRN  sodium chloride flush 0.9 % injection 10 mL, 2 times per day  sodium chloride flush 0.9 % injection 10 mL, PRN  polyethylene glycol (GLYCOLAX) packet 17 g, Daily PRN  promethazine (PHENERGAN) tablet 12.5 mg, Q6H PRN    Or  ondansetron (ZOFRAN) injection 4 mg, Q6H PRN  enoxaparin (LOVENOX) injection 40 mg, Daily        Objective:  BP (!) 128/56   Pulse 80   Temp 97.4 °F (36.3 °C) (Oral)   Resp 16   Ht 5' 7\" (1.702 m)   Wt 230 lb (104.3 kg)   SpO2 93%   BMI 36.02 kg/m²     Intake/Output Summary (Last 24 hours) at 8/26/2020 0838  Last data filed at 8/26/2020 0530  Gross per 24 hour   Intake 710 ml   Output 260 ml   Net 450 ml      Wt Readings from Last 3 Encounters:   08/23/20 230 lb (104.3 kg)       General appearance:  Appears comfortable  Eyes: Sclera clear. Pupils equal.  ENT: Moist oral mucosa. Trachea midline, no adenopathy. Cardiovascular: Regular rhythm, normal S1, S2. No murmur.  No edema in lower extremities  Respiratory: Not using accessory muscles. Good inspiratory effort. Clear to auscultation bilaterally, no wheeze or crackles. GI: Abdomen soft, no tenderness, not distended  Musculoskeletal: No cyanosis in digits, neck supple  Neurology: CN 2-12 grossly intact. No speech or motor deficits  Psych: Normal affect. Alert and oriented in time, place and person  Skin: Warm, dry, normal turgor    Labs and Tests:  CBC:   Recent Labs     08/24/20  0434 08/25/20  0520 08/26/20  0630   WBC 18.2* 20.2* 20.9*   HGB 10.1* 9.1* 9.0*   * 118* 136     BMP:    Recent Labs     08/24/20  0434 08/25/20  0520 08/26/20  0630   * 137 131*   K 4.0 4.9 4.2    111* 105   CO2 18* 18* 18*   BUN 21* 27* 36*   CREATININE 1.0 1.5* 2.3*   GLUCOSE 99 166* 82     Hepatic:   Recent Labs     08/25/20  0520   AST 16   ALT 6*   BILITOT 0.4   ALKPHOS 96       ASSESSMENT AND PLAN    Active Problems:    Pelvic abscess in female    Septicemia ([facility] [address]    Colonic mass    Cirrhosis of liver with ascites (HCC)    Intra-abdominal free air of unknown etiology    PAPITO (acute kidney injury) ([facility] [address]    Lung nodules    Bandemia    Intra-abdominal abscess (HCC)    E coli infection    Elevated CEA    Ex-smoker    Class 2 obesity due to excess calories with body mass index (BMI) of 36.0 to 36.9 in adult    Portal hypertension (HCC)    Mild malnutrition (HCC)  Resolved Problems:    * No resolved hospital problems. *      Colon cancer  - S/p exploratory laparotomy with right hemicolectomy on 8/24/2020. Anticipate secondary wound closure on Friday 8/28/2020.  - CEA is 83.3.  -  150.5, will ask gyn onc to evaluate. - Will obtain CT chest to complete staging in the next couple of days.   - Plan for chemotherapy as outpatient after healed from surgery.        Cirrhosis of the liver   - New diagnosis.   - Management per GI.        Intraabdominal/pelvic abscess  - CT-guided drain placement on 8/18/2020  - Antibiotics per ID.         Anemia  -

## 2020-08-26 NOTE — PROGRESS NOTES
[facility] GI  Gastroenterology Progress Note    Corinne Galvan is a 76 y.o. female patient. Active Problems:    Pelvic abscess in female    Septicemia ([facility]    Colonic mass    Cirrhosis of liver with ascites (HCC)    Intra-abdominal free air of unknown etiology    PAPITO (acute kidney injury) ([facility]    Lung nodules    Bandemia    Intra-abdominal abscess (HCC)    E coli infection    Elevated CEA    Ex-smoker    Class 2 obesity due to excess calories with body mass index (BMI) of 36.0 to 36.9 in adult    Portal hypertension (HCC)    Mild malnutrition ([facility]  Resolved Problems:    * No resolved hospital problems. *      SUBJECTIVE: has abdominal soreness and N/V. No BM. Not passing flatus. ROS:  No fever, chills  No chest pain, palpitations  No SOB, cough  Gastrointestinal ROS: no N/V/D     Physical    VITALS:  /69   Pulse 84   Temp 97.7 °F (36.5 °C) (Oral)   Resp 16   Ht 5' 7\" (1.702 m)   Wt 230 lb (104.3 kg)   SpO2 94%   BMI 36.02 kg/m²   TEMPERATURE:  Current - Temp: 97.7 °F (36.5 °C); Max - Temp  Av.7 °F (36.5 °C)  Min: 97.4 °F (36.3 °C)  Max: 98.2 °F (36.8 °C)    NAD  Regular rate   Lungs CTA Bilaterally  Abdomen soft, ND, post op tender,  No guarding or rebound. Bowel sounds normal.  Alert and oriented x3. No asterixis. Data    Data Review:    Recent Labs     20  0520  0630   WBC 18.2* 20.2* 20.9*   HGB 10.1* 9.1* 9.0*   HCT 31.8* 28.5* 28.4*   MCV 88.7 89.1 89.3   * 118* 136     Recent Labs     20  04320  0520 20  0630   * 137 131*   K 4.0 4.9 4.2    111* 105   CO2 18* 18* 18*   PHOS 4.2  --  5.0*   BUN 21* 27* 36*   CREATININE 1.0 1.5* 2.3*     Recent Labs     20  0520   AST 16   ALT 6*   BILITOT 0.4   ALKPHOS 96     No results for input(s): LIPASE, AMYLASE in the last 72 hours. Recent Labs     20  0520   PROTIME 19.2*   INR 1.65*     No results for input(s): PTT in the last 72 hours.     CT abd/pelvis w IV and PO contrast 8/17/20  Impression    1. Pelvic air/fluid collection almost certainly reflects an abscess or giant    colonic diverticula.  This may be the result of acute diverticulitis. 2. Mass lesion ascending colon almost certainly reflecting primary    adenocarcinoma of the colon. 3. Diffuse hepatic lesions with newly visualized (contrast enhancement) mass    at the hepatic dome.  Hepatic metastatic disease is a consideration as is    cirrhosis with regenerative nodules and hepatocellular carcinoma. 4. Bilateral lower lobe pulmonary nodules suspicious for metastatic disease. 5. Abdominal and pelvic ascites. 6. Sequela from portosystemic shunting with numerous collateral vessels about    the stomach, left upper quadrant and distal esophagus.  Relatively small    esophageal and gastric varices. 7.  Calcific atherosclerotic disease aorta.           AXR 8/26/20  Impression:      Scattered dilated small bowel loops throughout the abdomen, increased   compared to prior CT scan, either due to ileus or early partial small bowel   obstruction          ASSESSMENT :    Cirrhosis - new diagnosis per CT. No alcohol history. Likely from fatty liver. Some ascites on CT. Seemed mildly confused at admission (baseline per her daughter) but ammonia was normal. repeat ammonia is elevated at 66 so lactulose started. Negative: AMA, hepatitis panel, A1AT phenotype. f-actin weak positive at 25. AFP normal. CEA 83. Labs stable. Pt oriented to place and year - not month     Abnormal CT of the colon and liver, colon mass - CT with thickening of the ascending colon concerning for malignancy - path c/w AdenoCa. Also with liver and lung lesions concerning for metastatic disease. Colonoscopy 8/20 with large necrotic ascending colon mass (carcinoma), likely the source of abscess. Pt is POD #2. AXR with SB dilation.      Pelvic abscess - CT with nonloculated fluid and gas collection in the pelvis. S/p IR drainage 8/18.    Esophagitis - EGD 8/20 with LA class C erosive esophagitis and nodularity at [facility]. Bx. PLAN :  - lactulose TID - titrate frequency for goal of 3 BMs daily  - PPI  - antibiotics   - Will need Hep A and B vaccines outpatient.     Discussed with Dr. Tan Jaeger, [address]    I have personally performed a face to face diagnostic evaluation on this patient. I have interviewed and examined the patient and I agree with the findings and recommended plan of care. In summary, my findings and plan are the following: no new complaints. Renal function worse. Pt to get alb.        Adair Babinski, MD  [address]

## 2020-08-26 NOTE — CONSULTS
GYNECOLOGIC ONCOLOGY CONSULTATION:     8/26/2020 5:58 PM    REASON FOR CONSULT: postmenopausal bleeding    REFERRING PROVIDER:  No care team member to display  PCP: No primary care provider on file. CHIEF COMPLAINT:     Chief Complaint   Patient presents with    Fall     pt brought in [facility], pt has had frequent falls at home. EMS reports that the home is very unclean and stuff everywhere. pt has no complaints, states she has been having vaginal bleeding for years, but not sure if she has it today        HISTORY OF PRESENT ILLNESS:     HPI: This is a 76 y.o. female that presents with adenocarcinoma in ascending colon with postmenopausal bleeding and enlarged, fluid filled uterus on imaging. Elevated cea and ca125. Reports vaginal bleeding off an on for 10 years. Denies any prior work up. Oncology History    No history exists. PAST MEDICAL HISTORY:     History reviewed. No pertinent past medical history. ROS:   Review of Systems   Constitutional: Positive for activity change, appetite change and fatigue. Eyes: Negative. Respiratory: Negative. Cardiovascular: Negative. Gastrointestinal: Positive for abdominal pain. Genitourinary: Positive for vaginal bleeding. Negative for difficulty urinating and vaginal pain. Neurological: Negative. Hematological: Negative. Psychiatric/Behavioral: Negative.         PAST SURGICAL HISTORY:        Past Surgical History:   Procedure Laterality Date    COLONOSCOPY N/A 8/20/2020    COLONOSCOPY WITH BIOPSY performed by Álvaro Barrow MD at [address]  8/20/2020    COLONOSCOPY POLYPECTOMY SNARE/COLD BIOPSY performed by Álvaro Barrow MD at [address] Right 8/24/2020    EXPLORATORY LAPAROTOMY, RIGHT  HEMICOLECTOMY; DRAINAGE OF PELVIC ABSCESS performed by Bronwyn Salazar MD at [place] [address]  8/24/2020    LIVER BIOPSY performed by Robby Rosado Efren Gill MD at [address] ENDOSCOPY N/A 2020    EGD BIOPSY performed by Renita Pate MD at [address] ENDOSCOPY        OB/GYN HISTORY:   Menstrual History:  OB History        0    Para   0    Term   0       0    AB   0    Living   0       SAB   0    TAB   0    Ectopic   0    Molar   0    Multiple   0    Live Births   0              No LMP recorded. Patient is postmenopausal.         SOCIAL HISTORY:     Social History     Socioeconomic History    Marital status: Single     Spouse name: Not on file    Number of children: Not on file    Years of education: Not on file    Highest education level: Not on file   Occupational History    Not on file   Social Needs    Financial resource strain: Not on file    Food insecurity     Worry: Not on file     Inability: Not on file    Transportation needs     Medical: Not on file     Non-medical: Not on file   Tobacco Use    Smoking status: Current Every Day Smoker     Packs/day: 0.50     Years: 50.00     Pack years: 25.00     Types: Cigarettes   Substance and Sexual Activity    Alcohol use: Never     Frequency: Never    Drug use: Never    Sexual activity: Not on file   Lifestyle    Physical activity     Days per week: Not on file     Minutes per session: Not on file    Stress: Not on file   Relationships    Social connections     Talks on phone: Not on file     Gets together: Not on file     Attends Sikhism service: Not on file     Active member of club or organization: Not on file     Attends meetings of clubs or organizations: Not on file     Relationship status: Not on file    Intimate partner violence     Fear of current or ex partner: Not on file     Emotionally abused: Not on file     Physically abused: Not on file     Forced sexual activity: Not on file   Other Topics Concern    Not on file   Social History Narrative    Not on file         FAMILY HISTORY:     History reviewed.  No pertinent family history.   HEALTH MAINTENANCE:   Last Pap: Patient has never had a Pap test.   HPV: n/a  MMGHistory: Patient has never had a mammogram.  Colonoscopy: never  DEXA scan: never    ALLERGIES:     Allergies as of 08/16/2020    (No Known Allergies)       MEDICATIONS:     Current Facility-Administered Medications:     folic acid (FOLVITE) tablet 1 mg, 1 mg, Oral, Daily, Chao Res, APRN - CNP    [DISCONTINUED] promethazine (PHENERGAN) tablet 12.5 mg, 12.5 mg, Oral, Q6H PRN, 12.5 mg at 08/26/20 0702 **OR** ondansetron (ZOFRAN) injection 4 mg, 4 mg, Intravenous, Q4H PRN, Phil Madison MD    promethazine (PHENERGAN) injection 12.5 mg, 12.5 mg, Intravenous, Q6H PRN, Phil Madison MD    albumin human 25 % IV solution 12.5 g, 12.5 g, Intravenous, Q6H, Sandy Julian MD, Last Rate: 50 mL/hr at 08/26/20 1653, 12.5 g at 08/26/20 1653    fluconazole (DIFLUCAN) in 0.9 % sodium chloride IVPB 200 mg, 200 mg, Intravenous, Q24H, Gopal Guidry MD, Last Rate: 100 mL/hr at 08/26/20 1533, 200 mg at 08/26/20 1533    [START ON 8/27/2020] meropenem (MERREM) 1 g in sodium chloride 0.9 % 100 mL IVPB (mini-bag), 1 g, Intravenous, Q12H, hPil Madison MD  [facility]  Armando Fortino ON 8/27/2020] enoxaparin (LOVENOX) injection 30 mg, 30 mg, Subcutaneous, Daily, Jennifer Conklin APRN - CNP    metoclopramide (REGLAN) injection 5 mg, 5 mg, Intravenous, Q6H, Phil Madison MD, 5 mg at 08/26/20 1338    bupivacaine liposome (EXPAREL) 1.3 % injection 66.5 mg, 5 mL, Subcutaneous, Once, Phil Madison MD    acetaminophen (TYLENOL) tablet 500 mg, 500 mg, Oral, Q8H PRN, Phil Madison MD    morphine (PF) injection 2 mg, 2 mg, Intravenous, Q2H PRN, 2 mg at 08/26/20 0918 **OR** morphine injection 4 mg, 4 mg, Intravenous, Q2H PRN, Phil Madison MD, 4 mg at 08/24/20 1846    pantoprazole (PROTONIX) injection 40 mg, 40 mg, Intravenous, Daily, Phil Madison MD, 40 mg at 08/26/20 0845    potassium chloride (KLOR-CON M) extended release tablet 40 mEq, 40 mEq, Oral, PRN **OR** potassium bicarb-citric acid (EFFER-K) effervescent tablet 40 mEq, 40 mEq, Oral, PRN **OR** potassium chloride 10 mEq/100 mL IVPB (Peripheral Line), 10 mEq, Intravenous, PRN, Soham Pagan MD, Last Rate: 100 mL/hr at 08/21/20 1716, 10 mEq at 08/21/20 1716    lactulose (CHRONULAC) 10 GM/15ML solution 20 g, 20 g, Oral, TID, Soham Pagan MD, 20 g at 08/26/20 1338    lip balm petroleum free (PHYTOPLEX) stick, , Topical, PRN, Soham Pagan MD    sodium chloride flush 0.9 % injection 10 mL, 10 mL, Intravenous, 2 times per day, Soham Pagan MD, 10 mL at 08/26/20 0845    sodium chloride flush 0.9 % injection 10 mL, 10 mL, Intravenous, PRN, Soham Pagan MD, 10 mL at 08/25/20 2359    polyethylene glycol (GLYCOLAX) packet 17 g, 17 g, Oral, Daily PRN, Soahm Pagan MD    PHYSICAL EXAM:       /82   Pulse 86   Temp 97.7 °F (36.5 °C) (Oral)   Resp 12   Ht 5' 7\" (1.702 m)   Wt 230 lb (104.3 kg)   SpO2 96%   BMI 36.02 kg/m²   Physical Exam  Constitutional:       General: She is not in acute distress. Eyes:      General: No scleral icterus. Neck:      Musculoskeletal: Normal range of motion. Cardiovascular:      Rate and Rhythm: Normal rate. Pulmonary:      Effort: Pulmonary effort is normal.   Abdominal:      Tenderness: There is no guarding. Comments: Packed   Musculoskeletal: Normal range of motion. Skin:     Coloration: Skin is not jaundiced. Neurological:      Mental Status: She is alert and oriented to person, place, and time. Psychiatric:         Mood and Affect: Mood normal.         LABS:         Date Value Ref Range Status   08/25/2020 150.5 (H) 0.0 - 35.0 U/mL Final     Comment:     The CA-125 assay is an electrochemiluminescent immunoassay  performed on the [employer] e immunoassay analyzer.        CEA   Date Value Ref Range Peritoneum/Retroperitoneum: Abdominal ascites. Calcific atherosclerosis aorta. Bones/Soft Tissues: No acute superficial soft tissue or osseous structure abnormality evident. 1. Non loculated fluid and gas collection in the pelvis concerning for perforated viscus/possible abscess formation. 2. Prominent thickening of the ascending colon which may be related to infectious or inflammatory process or underlying neoplastic process. 3. Bowel-gas pattern typical of adynamic ileus. 4. Hepatic cirrhosis with numerous hepatic nodules suggestive of regenerative/siderotic nodules. MRI abdomen without and with IV contrast correlation recommended. Metastatic disease is a consideration. 5. Hepatosplenomegaly and multifocal collateral vessels about the stomach esophagus concerning for varices, all suggestive of portal venous hypertension. 6. Abdominal and pelvic ascites. 7.  Diverticulosis coli without CT evidence of acute diverticulitis. Critical results were called by Dr. Yanick Carrillo to [address] [place] [address] on 8/16/2020 at 18:09. Xr Radius Ulna Left (2 Views)    Result Date: 8/16/2020  EXAMINATION: TWO XRAY VIEWS OF THE LEFT FOREARM 8/16/2020 2:55 pm COMPARISON: None. HISTORY: ORDERING SYSTEM PROVIDED HISTORY: Fall TECHNOLOGIST PROVIDED HISTORY: Reason for exam:->Fall Reason for Exam: Frequent falls at home. Bruising mid shaft L forearm Acuity: Acute Type of Exam: Initial FINDINGS: There is no evidence of acute fracture. There is normal alignment. No acute joint abnormality. No focal osseous lesion. No focal soft tissue abnormality. No acute osseous abnormality.      Ct Head Wo Contrast    Result Date: 8/16/2020  EXAMINATION: CT OF THE HEAD WITHOUT CONTRAST  8/16/2020 3:15 pm TECHNIQUE: CT of the head was performed without the administration of intravenous contrast. Dose modulation, iterative reconstruction, and/or weight based adjustment of the mA/kV was utilized to reduce the radiation dose to as low as HISTORY: Reason for exam:->Pelvic abscess possibly diverticular in origin Reason for Exam: Pelvic Abscess Acuity: Unknown Type of Exam: Unknown SEDATION: 1 mgversed and 50 mcg fentanyl were titrated intravenously for moderate sedation monitored under my direction. Total intraservice time of sedation was 21 minutes. The patient's vital signs were monitored throughout the procedure and recorded in the patient's medical record by the nurse. TECHNIQUE: Informed consent was obtained after a detailed explanation of the procedure including risks, benefits, and alternatives. Universal protocol was followed. The patient was placed on the CT table in the prone position. A suitable skin site was prepped and draped in sterile fashion following CT localization. An 18 gauge needle was advanced under CT guidance into the pelvic fluid collection and a 0.035 guidewire was used to place a 10 Upper sorbian abscess drainage catheter after the fashion tract was dilated. The catheter was sutured to the skin and the patient tolerated the procedure well. The catheter was attached to CAROLINE suction drainage. Dose modulation, iterative reconstruction, and/or weight based adjustment of the mA/kV was utilized to reduce the radiation dose to as low as reasonably achievable. DLP: 201.39 mGy-cm Estimated blood loss: Less than 5 cc FINDINGS: A total of 80 mL of purulent fluid was removed and sent for diagnostic tests. Successful CT guided placement of a drainage catheter in the pelvic abscess. Ct Abdomen Pelvis W Iv Contrast Additional Contrast? Oral    Result Date: 8/17/2020  EXAMINATION: CT OF THE ABDOMEN AND PELVIS WITH CONTRAST 8/17/2020 5:35 pm TECHNIQUE: CT of the abdomen and pelvis was performed with the administration of intravenous contrast. Multiplanar reformatted images are provided for review.  Dose modulation, iterative reconstruction, and/or weight based adjustment of the mA/kV was utilized to reduce the radiation dose to as low as reasonably achievable. COMPARISON: CT abdomen and pelvis 08/16/2020 HISTORY: ORDERING SYSTEM PROVIDED HISTORY: Pelvic abscess on non contrast CT Reason for Exam: follow up scan from yesterday Acuity: Acute Type of Exam: Subsequent/Follow-up FINDINGS: Lower Chest: Minimal subsegmental atelectasis posterior both lung bases. Pulmonary nodules right and left lower lobes. Cardiac and posterior mediastinal structures visualized are unremarkable. Liver: Hepatic morphologic features of cirrhosis with diffuse tiny nodular densities throughout the liver. Not evident on the CT performed yesterday's an area of masslike density posterior at the hepatic dome axial image 31 measuring 2.8 cm. Other organs: The spleen, pancreas, adrenal glands, gallbladder and kidneys appear unremarkable. GI/Bowel: Multifocal diverticula involve the descending and sigmoid colon without evidence of acute inflammatory change. Inflammatory changes may be present in the pelvis adjacent to apparent abscess, however evaluation the bowel is significantly limited because of the presence of ascites as well. There is prominent masslike thickening involving the ascending colon concerning for malignancy, conglomerate member measurement 9 x 12 x 10 cm axial image 114, coronal image 81. Pelvis: As demonstrated on yesterday's study there is a prominent air-fluid level collection, following contrast showing a thin rim of enhancement, separate from the bowel, 5.5 x 10 cm axial series 2, image 164, craniocaudal length 7 cm on coronal image 119. Prominent hypoattenuation central uterus, 5 cm axial series 2, image 158. Ascites is noted. Urinary bladder is decompressed by Amado catheter. Peritoneum/Retroperitoneum: Ascites is present. 12 mm ann hepatis lymph node axial image 70.  13 mm portacaval lymph node axial image 79. Multifocal collateral vessels are seen about the spleen, stomach and distal esophagus.  Bones/Soft Tissues: No acute superficial soft tissue or osseous structure abnormality evident. 1. Pelvic air/fluid collection almost certainly reflects an abscess or giant colonic diverticula. This may be the result of acute diverticulitis. 2. Mass lesion ascending colon almost certainly reflecting primary adenocarcinoma of the colon. 3. Diffuse hepatic lesions with newly visualized (contrast enhancement) mass at the hepatic dome. Hepatic metastatic disease is a consideration as is cirrhosis with regenerative nodules and hepatocellular carcinoma. 4. Bilateral lower lobe pulmonary nodules suspicious for metastatic disease. 5. Abdominal and pelvic ascites. 6. Sequela from portosystemic shunting with numerous collateral vessels about the stomach, left upper quadrant and distal esophagus. Relatively small esophageal and gastric varices. 7.  Calcific atherosclerotic disease aorta. Xr Chest Portable    Result Date: 8/24/2020  EXAMINATION: ONE XRAY VIEW OF THE CHEST 8/24/2020 4:46 pm COMPARISON: 08/16/2020 chest x-ray, CT abdomen and pelvis 08/17/2020 HISTORY: ORDERING SYSTEM PROVIDED HISTORY: right IJ TLC placed TECHNOLOGIST PROVIDED HISTORY: Reason for exam:->right IJ TLC placed FINDINGS: There is been placement of a right jugular central venous catheter with its tip projecting over the superior vena cava. The heart size and mediastinal contours are stable. Bilateral mid and lower lung patchy ground-glass opacities today, increased since the prior study. No pneumothorax is evident. Lucency beneath the right hemidiaphragm is new since prior studies suspicious for free intraperitoneal air. Lucency beneath the right hemidiaphragm compatible with free intraperitoneal air. Apparently the patient has had recent abdominal surgery. This finding was discussed with Dr. Lonnie Graff  At 6:48 pm on 8/24/2020. Status post placement of right jugular central venous catheter. No pneumothorax.  Patchy mid and lower lung ground-glass opacities suspicious for pneumonia, increased since the prior CT. Correlation for pneumonia is recommended. Xr Chest Portable    Result Date: 8/16/2020  EXAMINATION: ONE XRAY VIEW OF THE CHEST 8/16/2020 2:55 pm COMPARISON: None. HISTORY: ORDERING SYSTEM PROVIDED HISTORY: Fall TECHNOLOGIST PROVIDED HISTORY: Reason for exam:->Fall Reason for Exam: Frequent falls at home. Bruising mid shaft L forearm Acuity: Acute Type of Exam: Initial FINDINGS: The lungs are without acute focal process. There is no effusion or pneumothorax. The cardiomediastinal silhouette is without acute process. The osseous structures are without acute process. No acute process. Xr Abdomen (2 Views)    Result Date: 8/26/2020  EXAMINATION: TWO XRAY VIEWS OF THE ABDOMEN 8/26/2020 9:38 am COMPARISON: CT scan 08/17/2020 HISTORY: ORDERING SYSTEM PROVIDED HISTORY: Abdominal Pain TECHNOLOGIST PROVIDED HISTORY: Reason for exam:->Abdominal Pain Reason for Exam: abdominal pain Acuity: Acute Type of Exam: Initial FINDINGS: Scattered dilated loops small bowel are seen throughout the abdomen. No significant colonic gas is seen Drainage catheter projects in region the pelvis. Amado catheter is seen. Scattered dilated small bowel loops throughout the abdomen, increased compared to prior CT scan, either due to ileus or early partial small bowel obstruction         ASSESSMENT:    bleeding  Problem List Items Addressed This Visit     PAPITO (acute kidney injury) ([address]    Intra-abdominal free air of unknown etiology    Septicemia ([address] - Primary      Other Visit Diagnoses     Fall, initial encounter                    PLAN:   Pt has had  bleeding and no prior w/u. Not feasible to get a transvaginal u/s with her abdomen open. Does need a D&C. If possible would like to do next time pt is under anesthesia (if can arrange). Will d/w surgery team tmw. Will also d/w path IHC of her tumor.         Jose Horner MD  [facility]  [phone]

## 2020-08-26 NOTE — PROGRESS NOTES
[facility]        Progress Note    Patient Name: Azalia Chandra  MRN: [id number]  [place]: 1955  Date of Evaluation: 2020    Subjective:  No acute events overnight  Pain fairly controlled  Having nausea and vomiting, unable to tolerate liquids   No flatus or stool  Resting in bed at this time    Postoperative Day #2      Vital Signs:  Patient Vitals for the past 24 hrs:   BP Temp Temp src Pulse Resp SpO2 Height   20 0843 117/74 97.6 °F (36.4 °C) Oral 76 18 94 % --   20 0530 (!) 128/56 97.4 °F (36.3 °C) Oral 80 16 93 % --   20 0130 113/76 -- -- 78 -- 96 % --   20 2345 119/74 97.7 °F (36.5 °C) Oral 79 14 95 % --   20 1930 (!) 115/55 98.2 °F (36.8 °C) Oral 71 14 94 % --   20 1335 -- -- -- -- -- -- 5' 7\" (1.702 m)   20 1213 (!) 109/52 97.4 °F (36.3 °C) Temporal 76 16 -- --   20 0952 -- -- -- -- -- 95 % --      TEMPERATURE HISTORY 24H: Temp (24hrs), Av.7 °F (36.5 °C), Min:97.4 °F (36.3 °C), Max:98.2 °F (36.8 °C)    BLOOD PRESSURE HISTORY: Systolic (75NMD), TGE:307 , Min:96 , QRH:105    Diastolic (26TUA), GQU:10, Min:52, Max:76      Intake/Output:  I/O last 3 completed shifts: In: 710 [P.O.:200; I.V.:510]  Out: 260 [Urine:250; Drains:10]  No intake/output data recorded.   Drain/tube Output:  Closed/Suction Drain Left Back Bulb 10 Greek-Output (ml): 10 ml    Physical Exam:  General: awake, alert, oriented to person, place  Lungs: unlabored respirations  Abdomen: soft, non-distended, appropriate incisional tenderness, hypoactive bowel sounds present   Drain: serosanguinous   Skin/Wound: wound bed open and clean without drainage--dressing changed    Labs:  CBC:    Recent Labs     20  0434 20  0520 20  0630   WBC 18.2* 20.2* 20.9*   HGB 10.1* 9.1* 9.0*   HCT 31.8* 28.5* 28.4*   * 118* 136     BMP:    Recent Labs     20  0434 20  0520 20  0630   * 137 131*   K 4.0 4.9 4.2    111* 105   CO2 18* 18* 18*   BUN 21* 27* 36*   CREATININE 1.0 1.5* 2.3*   GLUCOSE 99 166* 82     Hepatic:    Recent Labs     08/25/20  0520   AST 16   ALT 6*   BILITOT 0.4   ALKPHOS 96     Amylase:  No results found for: AMYLASE  Lipase:  No results found for: LIPASE   Mag:    Lab Results   Component Value Date    MG 2.30 08/22/2020    MG 2.60 08/21/2020     Phos:     Lab Results   Component Value Date    PHOS 5.0 08/26/2020    PHOS 4.2 08/24/2020      Coags:   Lab Results   Component Value Date    PROTIME 19.2 08/25/2020    INR 1.65 08/25/2020       Cultures:  Anaerobic culture  Lab Results   Component Value Date    LABANAE Anaerobic culture further report to follow 08/24/2020     Fungus stain  Results in Past 30 Days  Result Component Current Result Ref Range Previous Result Ref Range   Fungus Stain No Fungal elements seen (8/24/2020)  Not in Time Range      Gram stain  Results in Past 30 Days  Result Component Current Result Ref Range Previous Result Ref Range   Gram Stain Result 1+ WBC's (Polymorphonuclear)  No organisms seen   (8/24/2020)  1+ WBC's (Mononuclear)  1+ WBC's (Polymorphonuclear)  2+ Gram negative rods   (8/18/2020)      Organism  Lab Results   Component Value Date/Time    ORG Escherichia coli (A) 08/18/2020 11:45 AM     Surgical culture  Lab Results   Component Value Date/Time    CXSURG No growth to date  Further report to follow   08/24/2020 02:10 PM     Blood culture 1  Results in Past 30 Days  Result Component Current Result Ref Range Previous Result Ref Range   Blood Culture, Routine No Growth after 4 days of incubation. (8/16/2020)  Not in Time Range      Blood culture 2  Results in Past 30 Days  Result Component Current Result Ref Range Previous Result Ref Range   Culture, Blood 2 No Growth after 4 days of incubation. (8/16/2020)  Not in Time Range      Fecal occult  No results found for requested labs within last 30 days.      GI bacterial pathogens by PCR  No results found for requested labs within last 30 days. C. difficile  No results found for requested labs within last 30 days. Urine culture  No results found for: Elvira Prasad    Pathology:   OR Pathology results pending     Endoscopy Pathology 8/20/2020--FINAL DIAGNOSIS:     A. Esophagus, biopsy:   - Fragments of squamous and gastric type columnar mucosa with mild   chronic inflammation.   - Negative for intestinal metaplasia. B. Ascending colon mass, biopsy:   - Poorly differentiated carcinoma, most consistent with adenocarcinoma. See comment. C. Colon polyp, transverse:   - Tubular adenoma. - Negative for high-grade dysplasia or malignancy. D. Colon polyp, descending:   - Tubular adenoma. - Negative for high-grade dysplasia or malignancy. E. Colon polyp, sigmoid:   - Tubular adenoma (multiple fragments). - Negative for high-grade dysplasia or malignancy. COMMENT:  The morphologic findings of ascending colon mass raises a   differential diagnosis of neuroendocrine carcinoma.  However, the   malignant cells are negative for neuroendocrine markers (synaptophysin   and chromogranin) .  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  Clinical correlation is recommended. Imaging:  I have personally reviewed the following films:    Xr Chest Portable    Result Date: 8/24/2020  EXAMINATION: ONE XRAY VIEW OF THE CHEST 8/24/2020 4:46 pm COMPARISON: 08/16/2020 chest x-ray, CT abdomen and pelvis 08/17/2020 HISTORY: ORDERING SYSTEM PROVIDED HISTORY: right IJ TLC placed TECHNOLOGIST PROVIDED HISTORY: Reason for exam:->right IJ TLC placed FINDINGS: There is been placement of a right jugular central venous catheter with its tip projecting over the superior vena cava. The heart size and mediastinal contours are stable. Bilateral mid and lower lung patchy ground-glass opacities today, increased since the prior study. No pneumothorax is evident.   Lucency beneath the right hemidiaphragm is new since prior studies suspicious for free intraperitoneal air. Lucency beneath the right hemidiaphragm compatible with free intraperitoneal air. Apparently the patient has had recent abdominal surgery. This finding was discussed with Dr. Kitty Cabrera  At 6:48 pm on 8/24/2020. Status post placement of right jugular central venous catheter. No pneumothorax. Patchy mid and lower lung ground-glass opacities suspicious for pneumonia, increased since the prior CT. Correlation for pneumonia is recommended. Scheduled Meds:   folic acid  1 mg Oral Daily    sodium chloride  1,000 mL Intravenous Once    meropenem  1 g Intravenous Q12H    albumin human  12.5 g Intravenous Q6H    metoclopramide  5 mg Intravenous Q6H    bupivacaine liposome  5 mL Subcutaneous Once    pantoprazole  40 mg Intravenous Daily    lactulose  20 g Oral TID    sodium chloride flush  10 mL Intravenous 2 times per day    enoxaparin  40 mg Subcutaneous Daily     Continuous Infusions:    PRN Meds:.[DISCONTINUED] promethazine **OR** ondansetron, promethazine, acetaminophen, morphine **OR** morphine, potassium chloride **OR** potassium alternative oral replacement **OR** potassium chloride, lip balm petroleum free, sodium chloride flush, polyethylene glycol      Assessment:  76 y.o. female admitted with   1. Septicemia ([facility] [address]    2. PAPITO (acute kidney injury) ([facility] [address]    3. Intra-abdominal free air of unknown etiology    4.  Fall, initial encounter        Status-post exploratory laparotomy, evacuation of ascites, drainage of pelvic abscess, right hemicolectomy with primary ileotransverse colostomy anastomosis, and Abdiaziz-Cut liver biopsy  on 8/24/2020 for colon mass, right colon and hepatic flexure with necrotic cancer at hepatic flexure region with perforation, cirrhosis, pelvic abscess, ascites  Intraabdominal/pelvic abscess, status-post CT-guided drain placement on 8/18/2020  Sepsis   Cirrhosis  Acute kidney injury  Hyponatremia  COVID screening, negative      Plan:  1. Local wound care with wet-to-dry dressings BID--anticipate secondary wound closure on Friday 8/28/2020; continue drain care/monitoring  2. Nausea and vomiting overnight, decrease to NPO; check AXR, monitor for bowel function--nothing yet  3. Resume IV hydration and give NS bolus; monitor and correct electrolytes--Creatinine increased today, do not recommend further diuresis unless respiratory status decompensates, keep Amado, follow up labs tomorrow--Nephrology following  4. Antibiotics--switched to Merrem; follow cultures  5. Activity as tolerated, ambulate TID, up to chair for all meals--PT/OT following  6. Pulmonary toilet, incentive spirometry  7. PRN analgesics and antiemetics--minimizing narcotics as tolerated  8. DVT prophylaxis with Lovenox and SCD's  9. Management of medical comorbid etiologies per primary team and consulting services    EDUCATION:  Educated patient on plan of care and disease process--all questions answered. Plans discussed with patient and nursing. Reviewed and discussed with Dr. Jevon Denney.       Signed:  Molinda Olszewski, APRN - CNP  8/26/2020 9:19 AM     Surg Staff:   Pt seen and examined with Np   See full note above  Dressing changed, atbx ordered  Give some IVF and albumin today  Plan OOB and IS, increase activity  Path pending  Secondary abd wound closure Friday    Prudence Denney

## 2020-08-26 NOTE — PLAN OF CARE
Problem: Falls - Risk of:  Goal: Will remain free from falls  Description: Will remain free from falls  Outcome: Ongoing  Goal: Absence of physical injury  Description: Absence of physical injury  Outcome: Ongoing     Problem: Skin Integrity:  Goal: Will show no infection signs and symptoms  Description: Will show no infection signs and symptoms  Outcome: Ongoing  Goal: Absence of new skin breakdown  Description: Absence of new skin breakdown  Outcome: Ongoing     Problem: Infection:  Goal: Will remain free from infection  Description: Will remain free from infection  Outcome: Ongoing     Problem: Safety:  Goal: Free from accidental physical injury  Description: Free from accidental physical injury  Outcome: Ongoing  Goal: Free from intentional harm  Description: Free from intentional harm  Outcome: Ongoing

## 2020-08-27 NOTE — PROGRESS NOTES
Tried to get pt to get NG back in, but she refuses it and still says she is feeling better. Pt does not want it in and wants to rest for the rest of the day.

## 2020-08-27 NOTE — PROGRESS NOTES
Infectious Diseases   Progress Note      Admission Date: 8/16/2020  Hospital Day: Hospital Day: 12   Attending: Madai Albarran MD  Date of service: 8/27/2020     Chief complaint/ Reason for consult:     · Worsening bandemia secondary to intra-abdominal abscess  · Acute kidney injury  · Colon mass, right colon and hepatic flexure with necrotic cancer as well as perforation status post expiratory laparotomy and right hemicolectomy with primary ileotransverse colostomy and anastomosis on 8/24/2020  · Ascites, status post evacuation of ascites on 8/24/2020 , malignant ascites should be ruled out  · Intra-abdominal abscess status post abscess evacuation on 8/24/2020, CT-guided drainage of the abscess on 8/20/2020 had yielded 80 mL of purulent fluid with cultures positive for E. coli and mixed anaerobic growth    Microbiology:        I have reviewed allavailable micro lab data and cultures    · Blood culture (2/2) - collected on 8/16/2020: Negative  · Abdominal abscess fluid culture - collectedon 8/18/2020: E. Coli    Escherichia coli (1)     Antibiotic  Interpretation  ANABELLA  Status     ampicillin  Sensitive  4  mcg/mL      ceFAZolin  Sensitive  <=4  mcg/mL      cefepime  Sensitive  <=0.12  mcg/mL      cefTRIAXone  Sensitive  <=0.25  mcg/mL      ciprofloxacin  Sensitive  <=0.25  mcg/mL      ertapenem  Sensitive  <=0.12  mcg/mL      gentamicin  Sensitive  <=1  mcg/mL      levofloxacin  Sensitive  <=0.12  mcg/mL      piperacillin-tazobactam  Sensitive  <=4  mcg/mL      trimethoprim-sulfamethoxazole  Sensitive  <=20  mcg/mL          · Abdominal surgical culture  - collected on 8/24/2020:  In process      Antibiotics and immunizations:       Current antibiotics: All antibiotics and their doses were reviewed by me    Recent Abx Admin                   meropenem (MERREM) 1 g in sodium chloride 0.9 % 100 mL IVPB (mini-bag) (g) 1 g New Bag 08/27/20 1259     1 g New Bag 08/26/20 2446    fluconazole (DIFLUCAN) in 0.9 % sodium chloride IVPB 200 mg (mg) 200 mg New Bag 08/26/20 1533                  Immunization History: All immunization history was reviewed by me today. There is no immunization history on file for this patient. Known drug allergies: All allergies were reviewed and updated    No Active Allergies    Social history:     Social History:  All social andepidemiologic history was reviewed and updated by me today as needed. · Tobacco use:   reports that she has been smoking cigarettes. She has a 25.00 pack-year smoking history. She does not have any smokeless tobacco history on file. · Alcohol use:   reports no history of alcohol use. · Currently lives in: [address] [place] [address] Road  ·  reports no history of drug use. Assessment:     The patient is a 76 y.o. old female who  has no past medical history on file. with following problems:    · Worsening bandemia secondary to intra-abdominal abscess-white cell count is improving and is 13,500  · Acute kidney injury-improving, serum creatinine is 2.0  · Colon mass, right colon and hepatic flexure with necrotic cancer as well as perforation status post expiratory laparotomy and right hemicolectomy with primary ileotransverse colostomy and anastomosis on 8/24/2020-metastatic colon cancer confirmed by path  · Ascites, status post evacuation of ascites on 8/24/2020  -liver biopsy confirmed cirrhosis  · Intra-abdominal abscess status post abscess evacuation on 8/24/2020, CT-guided drainage of the abscess on 8/20/2020 had yielded 80 mL of purulent fluid with cultures positive for E. coli and mixed anaerobic growth  · Elevated CEA  · 25-pack-year smoking history  · CT scan with IV contrast of abdomen and pelvis was concerning for bilateral lower lobe lung nodules, concerning for metastatic disease  · Concern for portal hypertension on recent CT scans  · Obesity Class 1due to excess calorie intake : Body mass index is 36.02 kg/m².       Discussion:      The patient is severe reaction, worsening diarrhea, break through fever etc.  · Discussed patient's condition and what to expect. All of the patient's questions were addressed in a satisfactory manner and patient verbalized understanding all instructions. Level of complexity of visit: High     Risk of Complications/Morbidity: High     · Illness(es)/ Infection present that pose threat to life/bodily function. · There is potential for severe exacerbation of infection/side effects of treatment. · Therapy requires intensive monitoring for antimicrobial agent toxicity. Smoking cessation/ Tobacco use disorder counseling:    I have counseled the patient extensively about risks of smoking and have encouraged the patient to maintain a healthy smoke-free lifestyle. Information was given about various smoking cessation programs and their websites like www.smokefree.gov, [place]. quitlogix. org as well as help lines like 1-206LocalMedQUIT-NOW and 1800LocalMedLUNG-[place]. TIME SPENT TODAY:     - Spent over  36  minutes on visit (including interval history, physical exam, review of data including labs, cultures, imaging, development and implementation of treatment plan and coordination of complex care). Thank you for involving me in the care of your patient. I will continue to follow. If you have anyadditional questions, please do not hesitate to contact me. Subjective: Interval history: Interval history was obtained from chart review and RN. The patient is tolerating IV antibiotics okay. No diarrhea. No fever. REVIEW OF SYSTEMS:     Review of Systems   Constitutional: Positive for fatigue. Negative for chills, diaphoresis and unexpected weight change. HENT: Negative for congestion, ear discharge, ear pain, facial swelling, hearing loss, rhinorrhea and trouble swallowing. Eyes: Negative for photophobia, discharge, redness and visual disturbance.    Respiratory: Negative for apnea, cough, choking, chest tightness, shortness of breath and stridor. Cardiovascular: Negative for chest pain and palpitations. Gastrointestinal: Negative for abdominal pain, blood in stool, diarrhea and nausea. Endocrine: Negative for polydipsia, polyphagia and polyuria. Genitourinary: Negative for difficulty urinating, dysuria, frequency, hematuria, menstrual problem and vaginal discharge. Musculoskeletal: Negative for arthralgias, joint swelling, myalgias and neck stiffness. Skin: Negative for color change and rash. Allergic/Immunologic: Negative for immunocompromised state. Neurological: Negative for dizziness, seizures, speech difficulty, light-headedness and headaches. Hematological: Negative for adenopathy. Psychiatric/Behavioral: Negative for agitation, hallucinations and suicidal ideas. Past Medical History: All past medical history reviewed today. History reviewed. No pertinent past medical history. Past Surgical History: All past surgical history was reviewed today. Past Surgical History:   Procedure Laterality Date    COLONOSCOPY N/A 8/20/2020    COLONOSCOPY WITH BIOPSY performed by Lashaun Farris MD at [facility]  8/20/2020    COLONOSCOPY POLYPECTOMY SNARE/COLD BIOPSY performed by Lashaun Farris MD at [address] Right 8/24/2020    EXPLORATORY LAPAROTOMY, RIGHT  HEMICOLECTOMY; DRAINAGE OF PELVIC ABSCESS performed by Marie Lopez MD at [facility] LIVER BIOPSY  8/24/2020    LIVER BIOPSY performed by Marie Lopez MD at [address] N/A 8/20/2020    EGD BIOPSY performed by Lashaun Farris MD at [facility] ENDOSCOPY       Family History: All family history was reviewed today. History reviewed. No pertinent family history.     Objective:       PHYSICAL EXAM:      Vitals:   Vitals:    08/27/20 0906 08/27/20 1214 08/27/20 1226 08/27/20 1241   BP:  101/68 111/72 110/70   Pulse:  90 89 87   Resp: 16 16 16 16   Temp:  97.3 °F (36.3 °C) 97.4 °F (36.3 °C) 97.3 °F (36.3 °C)   TempSrc:  Oral Oral Oral   SpO2: 95% 97% 94%    Weight:       Height:           Physical Exam  Vitals signs and nursing note reviewed. Constitutional:       General: She is not in acute distress. Appearance: She is well-developed. She is not diaphoretic. HENT:      Head: Normocephalic. Right Ear: External ear normal.      Left Ear: External ear normal.      Nose: Nose normal.   Eyes:      General: No scleral icterus. Right eye: No discharge. Left eye: No discharge. Conjunctiva/sclera: Conjunctivae normal.      Pupils: Pupils are equal, round, and reactive to light. Neck:      Musculoskeletal: Normal range of motion and neck supple. Cardiovascular:      Rate and Rhythm: Normal rate and regular rhythm. Heart sounds: No murmur. No friction rub. Pulmonary:      Effort: Pulmonary effort is normal.      Breath sounds: No stridor. No wheezing or rales. Chest:      Chest wall: No tenderness. Abdominal:      Palpations: Abdomen is soft. There is no mass. Tenderness: There is no abdominal tenderness. There is no guarding or rebound. Comments: Surgical site okay. Musculoskeletal:         General: No tenderness. Lymphadenopathy:      Cervical: No cervical adenopathy. Skin:     General: Skin is warm and dry. Findings: No erythema or rash. Neurological:      Mental Status: She is alert and oriented to person, place, and time. Motor: No abnormal muscle tone. Psychiatric:         Judgment: Judgment normal.           Lines: All vascular access sites are healthy with no local erythema, discharge or tenderness. Intake and output:    I/O last 3 completed shifts: In: 3455.6 [I.V.:3455.6]  Out: 1350 [Urine:500; Emesis/NG output:825; Drains:25]    Lab Data:   All available labs and old records have been reviewed by me.     CBC:  Recent Labs Patchy mid and lower lung ground-glass opacities suspicious for pneumonia,   increased since the prior CT. Correlation for pneumonia is recommended. CT ABSCESS DRAINAGE W CATH PLACEMENT S&I   Final Result   Successful CT guided placement of a drainage catheter in the pelvic abscess. CT ABDOMEN PELVIS W IV CONTRAST Additional Contrast? Oral   Final Result   1. Pelvic air/fluid collection almost certainly reflects an abscess or giant   colonic diverticula. This may be the result of acute diverticulitis. 2. Mass lesion ascending colon almost certainly reflecting primary   adenocarcinoma of the colon. 3. Diffuse hepatic lesions with newly visualized (contrast enhancement) mass   at the hepatic dome. Hepatic metastatic disease is a consideration as is   cirrhosis with regenerative nodules and hepatocellular carcinoma. 4. Bilateral lower lobe pulmonary nodules suspicious for metastatic disease. 5. Abdominal and pelvic ascites. 6. Sequela from portosystemic shunting with numerous collateral vessels about   the stomach, left upper quadrant and distal esophagus. Relatively small   esophageal and gastric varices. 7.  Calcific atherosclerotic disease aorta. CT ABDOMEN PELVIS WO CONTRAST Additional Contrast? None   Final Result   1. Non loculated fluid and gas collection in the pelvis concerning for   perforated viscus/possible abscess formation. 2. Prominent thickening of the ascending colon which may be related to   infectious or inflammatory process or underlying neoplastic process. 3. Bowel-gas pattern typical of adynamic ileus. 4. Hepatic cirrhosis with numerous hepatic nodules suggestive of   regenerative/siderotic nodules. MRI abdomen without and with IV contrast   correlation recommended. Metastatic disease is a consideration.    5. Hepatosplenomegaly and multifocal collateral vessels about the stomach   esophagus concerning for varices, all suggestive of portal venous hypertension. 6. Abdominal and pelvic ascites. 7.  Diverticulosis coli without CT evidence of acute diverticulitis. Critical results were called by Dr. Checo Goldman to [address] [place] [address]   on 8/16/2020 at 18:09. CT Cervical Spine WO Contrast   Final Result   No acute abnormality of the cervical spine. CT Head WO Contrast   Final Result   No acute intracranial abnormality. XR CHEST PORTABLE   Final Result   No acute process. XR RADIUS ULNA LEFT (2 VIEWS)   Final Result   No acute osseous abnormality. CT CHEST W CONTRAST    (Results Pending)       Medications: All current and past medications were reviewed.      oxymetazoline  2 spray Each Nostril Once    lidocaine   Topical Once    folic acid  1 mg Oral Daily    albumin human  12.5 g Intravenous Q6H    fluconazole  200 mg Intravenous Q24H    meropenem  1 g Intravenous Q12H    enoxaparin  30 mg Subcutaneous Daily    metoclopramide  5 mg Intravenous Q6H    bupivacaine liposome  5 mL Subcutaneous Once    pantoprazole  40 mg Intravenous Daily    lactulose  20 g Oral TID    sodium chloride flush  10 mL Intravenous 2 times per day        dextrose 100 mL/hr (08/27/20 1300)       phenol, glucose, dextrose, glucagon (rDNA), dextrose, [DISCONTINUED] promethazine **OR** ondansetron, promethazine, acetaminophen, morphine **OR** morphine, potassium chloride **OR** potassium alternative oral replacement **OR** potassium chloride, lip balm petroleum free, sodium chloride flush, polyethylene glycol      Problem list:       Patient Active Problem List   Diagnosis Code    Pelvic abscess in female N73.9    Septicemia ([facility] [address] A41.9    Colonic mass K63.89    Cirrhosis of liver with ascites ([facility] [facility] K74.60, R18.8    Intra-abdominal free air of unknown etiology K66.8    PAPITO (acute kidney injury) ([address] N17.9    Lung nodules R91.8    Bandemia D72.825    Intra-abdominal abscess ([facility] [address] K65.1    E coli infection A49.8    Elevated CEA R97.0    Ex-smoker Z87.891    Class 2 obesity due to excess calories with body mass index (BMI) of 36.0 to 36.9 in adult E66.09, Z68.36    Portal hypertension (HCC) K76.6    Mild malnutrition (HCC) E44.1    Tobacco abuse counseling Z71.6       Please note that this chart was generated using Dragon dictation software. Although every effort was made to ensure the accuracy of this automated transcription, some errors in transcription may have occurred inadvertently. If you may need any clarification, please do not hesitate to contact me through EPIC or at the phone number provided below with my electronic signature. Any pictures or media included in this note were obtained after taking informed verbal consent from the patient and with their approval to include those in the patient's medical record.     Brionna Cohen MD, MPH  8/27/2020 , 2:58 PM   [facility] Infectious Disease   Office: [phone]  Fax: [phone]  Tuesday AM clinic:   [address] [place] [address]  Thursday AM POAVPO:[id number] [address] [facility]

## 2020-08-27 NOTE — PROGRESS NOTES
Handoff received from [facility]. Abdominal dressing clean, dry, and intact. Patient denies nausea, pain at this time. Fine crackles noted in upper lungs, diminished at base. Bilateral feet with +3 pitting edema. Bilateral pneumatic compression devices in place. Denies pain in BLE. The care plan and education has been reviewed and mutually agreed upon with the patient. Patient in bed, bed alarm on, bed in lowest position, call light and bedside table within reach. No further needs expressed at this time. 1818 EDT  Patient vomited 825 mL of brownish-green liquid with mucous. PRN Zofran administered for nausea.

## 2020-08-27 NOTE — PROGRESS NOTES
Physician Progress Note      PATIENT:               Vidal Mohamud  CSN #:                  [id number]  :                       1955  ADMIT DATE:       2020 2:18 PM  [address] [place] DATE:  RESPONDING  PROVIDER #:        Simon Monsivais CNP          QUERY TEXT:    Dear Lisa BERRIOS,    Pt admitted with pelvic abscess and has anemia documented. If possible, please   document in progress notes and discharge summary further specificity   regarding the acuity and type of anemia:    The medical record reflects the following:  Risk Factors: CKD, Surgery on  with EBL of 100ml. Surgical drains  Clinical Indicators: H&H on  was 10.1/31.8  with drop to 7.6/23.9 on . Treatment: PRBC, monitoring of lab work, IV?s, Hematology, Nephrology    Thank you. [email or web address]  Options provided:  -- Anemia due to acute blood loss  -- Anemia due to chronic blood loss  -- Anemia due to iron deficiency  -- Anemia due to postoperative blood loss  -- Anemia due to chronic disease  -- Dilutional anemia  -- Other - I will add my own diagnosis  -- Disagree - Not applicable / Not valid  -- Disagree - Clinically unable to determine / Unknown  -- Refer to Clinical Documentation Reviewer    PROVIDER RESPONSE TEXT:    Suspect she likely had chronic iron deficiency anemia secondary to colon   cancer and post menopausal bleeding. Hgb down to 7.6 today secondary to acute   blood loss from surgery.     Query created by: Facundo Zendejas on 2020 11:20 AM      Electronically signed by:  Carol Mosnivais CNP 2020 12:14 PM

## 2020-08-27 NOTE — PROGRESS NOTES
MD Noe Mosquera MD Arville Holmes, MD               Office: [phone]                      Fax: [phone]             [facility]                   NEPHROLOGY INPATIENT PROGRESS NOTE:     PATIENT NAME: Christel Benoit  : 1955  MRN: [id number]. IMPRESSION:       PAPITO - crea down to 2 from  2.3 . On albumin. NSS stopped due to crackles.   : Oligouric - now non-oliguric. Appears hypervolemic. Probably 3rd spacing  : Etiology of current PAPITO - ATN vs venous congestion vs. Decreased renal perfusion. WBC count higher  - Renal imaging: w/ CT - kidney unremarkable   - CK - 310 on admission then improved. - Avoid contrast exposure at this time. - decrease Reglan dose    Associated problems:   - Electrolytes: Hyponatremia-better  - Weight up to 230 from 215. Also has hypoalbuminemia so may be 3rd spacing. Check daily weights. Cirrhosis   : BP: Ok to keep slightly higher   - Acid-Base: Acidosis - - non-AGMA - follow for now. Check VBG.   - Anemia:  - Heme  Following    Saturating well on room air    Discussed with Medicine and Surgery. RECOMMENDATIONS:   - continue albumin today for intravascular expansion  - decreased Reglan dose    Other problems: Management per primary and other consulting teams.      # Cirrhosis , Portal HTN : new diagnosis, no h/o EtOH    # Sepsis, intra-abdominal abscess   - status-post CT-guided drain placement on 2020        Hospital Problems           Last Modified POA    Pelvic abscess in female 2020 Yes    Septicemia ([address] 2020 Yes    Colonic mass 2020 Yes    Cirrhosis of liver with ascites ([address] 2020 Yes    Intra-abdominal free air of unknown etiology 2020 Yes    PAPITO (acute kidney injury) ([address] 2020 Yes    Lung nodules 2020 Yes    Bandemia 2020 Yes    Intra-abdominal abscess ([address] 2020 Yes    E coli infection 2020 Yes    Elevated CEA 2020 Yes Ex-smoker 8/25/2020 Yes    Class 2 obesity due to excess calories with body mass index (BMI) of 36.0 to 36.9 in adult 8/25/2020 Yes    Portal hypertension ([facility] 8/25/2020 Yes    Mild malnutrition ([facility] (Chronic) 8/25/2020 Yes        Eleanor Gambino MD       [facility] [address] [place]: [phone] or [employer]  Fax: [phone]       SUBJECTIVE:    No SOB  S/P right hemicolectomy 8/24    MEDICATIONS: reviewed by me. Medications Prior to Admission:  No current facility-administered medications on file prior to encounter. No current outpatient medications on file prior to encounter. No medications prior to admission. Scheduled Meds:   oxymetazoline  2 spray Each Nostril Once    lidocaine   Topical Once    folic acid  1 mg Oral Daily    albumin human  12.5 g Intravenous Q6H    fluconazole  200 mg Intravenous Q24H    meropenem  1 g Intravenous Q12H    enoxaparin  30 mg Subcutaneous Daily    metoclopramide  5 mg Intravenous Q6H    bupivacaine liposome  5 mL Subcutaneous Once    pantoprazole  40 mg Intravenous Daily    lactulose  20 g Oral TID    sodium chloride flush  10 mL Intravenous 2 times per day     Continuous Infusions:   dextrose 100 mL/hr (08/27/20 1300)     PRN Meds:.phenol, glucose, dextrose, glucagon (rDNA), dextrose, [DISCONTINUED] promethazine **OR** ondansetron, promethazine, acetaminophen, morphine **OR** morphine, potassium chloride **OR** potassium alternative oral replacement **OR** potassium chloride, lip balm petroleum free, sodium chloride flush, polyethylene glycol       PHYSICAL EXAM:  Recent vital signs and recent I/Os reviewed by me.      Wt Readings from Last 3 Encounters:   08/23/20 230 lb (104.3 kg)     BP Readings from Last 3 Encounters:   08/27/20 110/70   08/24/20 (!) 155/68   08/20/20 (!) 91/47     Patient Vitals for the past 24 hrs:   BP Temp Temp src Pulse Resp SpO2   08/27/20 1241 110/70 97.3 °F (36.3 °C) Oral 87 16 -- 08/27/20 1226 111/72 97.4 °F (36.3 °C) Oral 89 16 94 %   08/27/20 1214 101/68 97.3 °F (36.3 °C) Oral 90 16 97 %   08/27/20 0800 (!) 105/59 97.6 °F (36.4 °C) Oral 74 16 94 %   08/27/20 0530 (!) 107/56 97.4 °F (36.3 °C) Oral 77 14 94 %   08/26/20 2312 (!) 101/51 97.5 °F (36.4 °C) Oral 78 14 95 %   08/26/20 1914 104/68 97.2 °F (36.2 °C) Oral 80 14 92 %   08/26/20 1611 125/82 97.7 °F (36.5 °C) Oral 86 12 96 %       Intake/Output Summary (Last 24 hours) at 8/27/2020 1301  Last data filed at 8/27/2020 1247  Gross per 24 hour   Intake 3755.58 ml   Output 2900 ml   Net 855.58 ml         General: Awake, Alert,   HENT: Atraumatic, normocephalic   Eyes: Normal conjunctiva, Non-incteral sclera. Neck: Supple, JVD not visible. CVS:  Heart sounds are normal. No murmurs. No pericardial rub. RS: Normal respiratory efforts,  Lung fields are diminished . Abd: Soft , bowel sounds are normal, no distension and no tenderness to palpation. Skin: No rash , some bruises,   CNS: Awake Oriented , No focal.   Extremities/MSK: 2+ Edema, no cyanosis. DATA:  Diagnostic tests reviewed by me for today's visit:    Recent Labs     08/25/20 0520 08/26/20  0630 08/27/20  0654   WBC 20.2* 20.9* 13.5*   HCT 28.5* 28.4* 23.9*   * 136 111*     Iron Saturation:  No components found for: PERCENTFE  FERRITIN:    Lab Results   Component Value Date    FERRITIN 542.4 08/17/2020     IRON:    Lab Results   Component Value Date    IRON 26 08/17/2020     TIBC:    Lab Results   Component Value Date    TIBC 106 08/17/2020       Recent Labs     08/25/20 0520 08/26/20  0630 08/27/20  0654    131* 135*   K 4.9 4.2 4.1   * 105 105   CO2 18* 18* 18*   BUN 27* 36* 35*   CREATININE 1.5* 2.3* 2.0*     Recent Labs     08/25/20  0520 08/26/20  0630 08/27/20  0654   CALCIUM 8.0* 8.5 8.4   PHOS  --  5.0* 5.2*     No results for input(s): PH, PCO2, PO2 in the last 72 hours.     Invalid input(s): [id number], INSPIREDO2    ABG:  No results found for: PH, PCO2, PO2, HCO3, BE, THGB, TCO2, O2SAT  VBG:    Lab Results   Component Value Date    PHVEN 7.435 08/23/2020    MIK5NSN 26.3 08/23/2020    BEVEN -5.6 08/23/2020    W2YHKWFR 99 08/23/2020       LDH:  No results found for: LDH  Uric Acid:    Lab Results   Component Value Date    LABURIC 5.9 08/17/2020       PT/INR:    Lab Results   Component Value Date    PROTIME 16.8 08/27/2020    INR 1.44 08/27/2020     Warfarin PT/INR:  No components found for: PTPATWAR, PTINRWAR  PTT:  No results found for: APTT, PTT[APTT}  Last 3 Troponin:    Lab Results   Component Value Date    TROPONINI <0.01 08/16/2020       U/A:    Lab Results   Component Value Date    COLORU ORANGE 08/16/2020    PROTEINU TRACE 08/16/2020    PHUR 5.0 08/16/2020    WBCUA 3 08/16/2020    RBCUA 7 08/16/2020    BACTERIA RARE 08/16/2020    CLARITYU CLOUDY 08/16/2020    SPECGRAV 1.022 08/16/2020    LEUKOCYTESUR SMALL 08/16/2020    UROBILINOGEN 1.0 08/16/2020    BILIRUBINUR SMALL 08/16/2020    BLOODU LARGE 08/16/2020    GLUCOSEU Negative 08/16/2020     Microalbumen/Creatinine ratio:  No components found for: RUCREAT  24 Hour Urine for Protein:  No components found for: RAWUPRO, UHRS3, GBSP34XI, UTV3  24 Hour Urine for Creatinine Clearance:  No components found for: CREAT4, UHRS10, UTV10  Urine Toxicology:  No components found for: IAMMENTA, IBARBIT, IBENZO, ICOCAINE, IMARTHC, IOPIATES, IPHENCYC    HgBA1c:  No results found for: LABA1C  RPR:  No results found for: RPR  HIV:  No results found for: HIV  KULAWNT:  No results found for: ANATITER, KULWNAT  RF:  No results found for: RF  DSDNA:  No components found for: DNA  AMYLASE:  No results found for: AMYLASE  LIPASE:  No results found for: LIPASE  Fibrinogen Level:  No components found for: FIB       BELOW MENTIONED RADIOLOGY STUDY RESULTS BY ME:    Ct Abdomen Pelvis Wo Contrast Additional Contrast? None    Result Date: 8/16/2020  EXAMINATION: CT OF THE ABDOMEN AND PELVIS WITHOUT CONTRAST 8/16/2020 5:11 pm TECHNIQUE: CT of the abdomen and pelvis was performed without the administration of intravenous contrast. Multiplanar reformatted images are provided for review. Dose modulation, iterative reconstruction, and/or weight based adjustment of the mA/kV was utilized to reduce the radiation dose to as low as reasonably achievable. COMPARISON: None. HISTORY: Acute nausea FINDINGS: Lower Chest: FOUR solid soft tissue pulmonary nodules right lower lobe, 7 x 8 mm (axial image 19), 11 x 12 mm (axial 11), 9 x 9 mm (axial image 10) and 4 x 4 mm (axial image 10). 3 mm pulmonary nodules are seen posterior basal segment left lower lobe. Mild nodular fullness about the distal esophagus. Heart size is upper normal. Organs: Prominent caudate lobe hypertrophy and poly lobular configuration of the liver as well as diffuse tiny hypoattenuating lesions throughout the liver suggestive of hepatic cirrhosis and siderotic nodules. No discrete mass lesion evident. 19 cm craniocaudal liver length. The spleen is mildly enlarged. The kidneys, gallbladder, adrenal glands and pancreas appear unremarkable. Multifocal collateral vessels are seen about the gastrohepatic ligament extending to the posterior mediastinum. GI/Bowel: Prominent diffuse thickening of the wall of the ascending colon. Other portions of colon and small bowel appear unremarkable with exception of few gas distended small bowel loops typical of adynamic ileus. There are diverticula involving the descending and sigmoid colon. Pelvis: Fluid collection within the pelvis without definite margins has air bubbles within it concerning for sequela from perforated bowel/abscess formation. Uterus and adnexal structures appear unremarkable. Peritoneum/Retroperitoneum: Abdominal ascites. Calcific atherosclerosis aorta. Bones/Soft Tissues: No acute superficial soft tissue or osseous structure abnormality evident.      1. Non loculated fluid and gas collection in the pelvis concerning for perforated viscus/possible abscess formation. 2. Prominent thickening of the ascending colon which may be related to infectious or inflammatory process or underlying neoplastic process. 3. Bowel-gas pattern typical of adynamic ileus. 4. Hepatic cirrhosis with numerous hepatic nodules suggestive of regenerative/siderotic nodules. MRI abdomen without and with IV contrast correlation recommended. Metastatic disease is a consideration. 5. Hepatosplenomegaly and multifocal collateral vessels about the stomach esophagus concerning for varices, all suggestive of portal venous hypertension. 6. Abdominal and pelvic ascites. 7.  Diverticulosis coli without CT evidence of acute diverticulitis. Critical results were called by Dr. Kiko Springer to [address] [place] [address] on 8/16/2020 at 18:09. Xr Radius Ulna Left (2 Views)    Result Date: 8/16/2020  EXAMINATION: TWO XRAY VIEWS OF THE LEFT FOREARM 8/16/2020 2:55 pm COMPARISON: None. HISTORY: ORDERING SYSTEM PROVIDED HISTORY: Fall TECHNOLOGIST PROVIDED HISTORY: Reason for exam:->Fall Reason for Exam: Frequent falls at home. Bruising mid shaft L forearm Acuity: Acute Type of Exam: Initial FINDINGS: There is no evidence of acute fracture. There is normal alignment. No acute joint abnormality. No focal osseous lesion. No focal soft tissue abnormality. No acute osseous abnormality. Ct Head Wo Contrast    Result Date: 8/16/2020  EXAMINATION: CT OF THE HEAD WITHOUT CONTRAST  8/16/2020 3:15 pm TECHNIQUE: CT of the head was performed without the administration of intravenous contrast. Dose modulation, iterative reconstruction, and/or weight based adjustment of the mA/kV was utilized to reduce the radiation dose to as low as reasonably achievable. COMPARISON: None. HISTORY: ORDERING SYSTEM PROVIDED HISTORY: Fall TECHNOLOGIST PROVIDED HISTORY: Reason for exam:->Fall Has a \"code stroke\" or \"stroke alert\" been called? ->No Reason for Exam: Fall Acuity: Acute Type of Exam: Initial FINDINGS: BRAIN/VENTRICLES: The ventricles and sulci are diffusely enlarged. Low attenuation is seen in the periventricular and subcortical white matter. No acute intracranial hemorrhage or acute infarct is identified. ORBITS: The visualized portion of the orbits demonstrate no acute abnormality. SINUSES: The visualized paranasal sinuses and mastoid air cells demonstrate no acute abnormality. SOFT TISSUES/SKULL:  No acute abnormality of the visualized skull or soft tissues. No acute intracranial abnormality. Ct Cervical Spine Wo Contrast    Result Date: 8/16/2020  EXAMINATION: CT OF THE CERVICAL SPINE WITHOUT CONTRAST 8/16/2020 3:15 pm TECHNIQUE: CT of the cervical spine was performed without the administration of intravenous contrast. Multiplanar reformatted images are provided for review. Dose modulation, iterative reconstruction, and/or weight based adjustment of the mA/kV was utilized to reduce the radiation dose to as low as reasonably achievable. COMPARISON: None. HISTORY: ORDERING SYSTEM PROVIDED HISTORY: Fall TECHNOLOGIST PROVIDED HISTORY: Reason for exam:->Fall Reason for Exam: Fall Acuity: Acute Type of Exam: Initial FINDINGS: BONES/ALIGNMENT: There is no acute fracture or traumatic malalignment. DEGENERATIVE CHANGES: Multilevel degenerative changes. SOFT TISSUES: There is no prevertebral soft tissue swelling. No acute abnormality of the cervical spine. Xr Chest Portable    Result Date: 8/16/2020  EXAMINATION: ONE XRAY VIEW OF THE CHEST 8/16/2020 2:55 pm COMPARISON: None. HISTORY: ORDERING SYSTEM PROVIDED HISTORY: Fall TECHNOLOGIST PROVIDED HISTORY: Reason for exam:->Fall Reason for Exam: Frequent falls at home. Bruising mid shaft L forearm Acuity: Acute Type of Exam: Initial FINDINGS: The lungs are without acute focal process. There is no effusion or pneumothorax. The cardiomediastinal silhouette is without acute process. The osseous structures are without acute process.      No acute process.      Jackie Huynh MD

## 2020-08-27 NOTE — PROGRESS NOTES
[facility] PROGRESS NOTE    8/27/2020 11:39 AM        Name: Rosalinda Rod . Admitted: 8/16/2020  Primary Care Provider: No primary care provider on file. (Tel: None)      Subjective:  Patient is a 75 yo female with hx nicotine use. The patient lives alone, she presented to hospital after being found on floor by her daughter. She has had multiple falls in past couple of months. CT abdomen and pelvis in ER showed pelvic abscess vs viscus perf. Colonic wall thickening, concerning for malignancy, liver cirrhosis portal hypertension. She had Ct-guided drain placement 8/18 for pelvic abscess 8/18. Subsequently found to have large ascending colon mass on colonoscopy 8/20, likely the source of abscess. 8/18/2020: Successful CT guided placement of a drainage catheter in the pelvic abscess. 8/24/2020: Exploratory laparotomy, evacuation of ascites, drainage of  pelvic abscess, right hemicolectomy with primary ileotransverse  colostomy anastomosis, and Abdiaziz-Cut liver biopsy. Presently resting in bed. NG tube has been placed with over 800 cc drained, had emesis overnight. Reports stomach feels better with NGT. Receiving IV fluids, denies shortness of breath. Creatinine trending down. Reports operative pain adequately controlled. To receive 1 unit PRBCs today.      Reviewed interval ancillary notes    Current Medications  oxymetazoline (AFRIN) 0.05 % nasal spray 2 spray, Once  lidocaine (XYLOCAINE) 2 % jelly, Once  phenol 1.4 % mouth spray 1 spray, Q2H PRN  0.9 % sodium chloride infusion 250 mL, Once  glucose (GLUTOSE) 40 % oral gel 15 g, PRN  dextrose 50 % IV solution, PRN  glucagon (rDNA) injection 1 mg, PRN  dextrose 5 % solution, PRN  folic acid (FOLVITE) tablet 1 mg, Daily  ondansetron (ZOFRAN) injection 4 mg, Q4H PRN  promethazine (PHENERGAN) injection 12.5 mg, Q6H PRN  albumin human 25 % IV solution 12.5 g, Q6H  fluconazole (DIFLUCAN) in 0.9 % sodium chloride IVPB 200 mg, Q24H  meropenem (MERREM) 1 g in sodium chloride 0.9 % 100 mL IVPB (mini-bag), Q12H  enoxaparin (LOVENOX) injection 30 mg, Daily  metoclopramide (REGLAN) injection 5 mg, Q6H  bupivacaine liposome (EXPAREL) 1.3 % injection 66.5 mg, Once  acetaminophen (TYLENOL) tablet 500 mg, Q8H PRN  morphine (PF) injection 2 mg, Q2H PRN    Or  morphine injection 4 mg, Q2H PRN  pantoprazole (PROTONIX) injection 40 mg, Daily  potassium chloride (KLOR-CON M) extended release tablet 40 mEq, PRN    Or  potassium bicarb-citric acid (EFFER-K) effervescent tablet 40 mEq, PRN    Or  potassium chloride 10 mEq/100 mL IVPB (Peripheral Line), PRN  lactulose (CHRONULAC) 10 GM/15ML solution 20 g, TID  lip balm petroleum free (PHYTOPLEX) stick, PRN  sodium chloride flush 0.9 % injection 10 mL, 2 times per day  sodium chloride flush 0.9 % injection 10 mL, PRN  polyethylene glycol (GLYCOLAX) packet 17 g, Daily PRN      Objective:  BP (!) 105/59   Pulse 74   Temp 97.6 °F (36.4 °C) (Oral)   Resp 16   Ht 5' 7\" (1.702 m)   Wt 230 lb (104.3 kg)   SpO2 94%   BMI 36.02 kg/m²     Intake/Output Summary (Last 24 hours) at 8/27/2020 1139  Last data filed at 8/27/2020 0732  Gross per 24 hour   Intake 3455.58 ml   Output 1500 ml   Net 1955.58 ml      Wt Readings from Last 3 Encounters:   08/23/20 230 lb (104.3 kg)     General:  Awake, alert, oriented in NAD  Skin:  Warm and dry, color pale  Neck:  Supple. No JVD appreciated  Chest:  Normal effort.   Diminished, no wheezes/rhonchi/rales  Cardiovascular:  RRR, normal S1/S2, no murmur/gallop/rub  Abdomen:  Soft, nontender, no bowel sounds, NGT draining green fluid  Extremities:  Generalized swelling  Neurological: No focal deficits  Psychological: Normal mood and affect      Labs and Tests:  CBC:   Recent Labs     08/25/20  0520 08/26/20  0630 08/27/20  0654   WBC 20.2* 20.9* 13.5*   HGB 9.1* 9.0* 7.6*   * 136 111*     BMP:    Recent Problems:    * No resolved hospital problems. *       Assessment:   1. Pelvic abscess. Status post IR drainage 8/18, pelvic fluid cytology negative for malignant cells. Colonoscopy 8/20 with large necrotic ascending colon mass, likely source of abscess. Status post exp lap right transverse colectomy 8/24, secondary closure planned for Friday. Initially on Zosyn, transitioned to IV cipro (8/20). IV Flagyl added 8/25. Now on meropenem and flucanazole. Afebrile, WBC 20.9->13.5. Blood culture results (8/25) with no growth to date. 2. Liver cirrhosis / portal HTN. Likely secondary to fatty liver. Started on lactulose for ammonia level 66 and confusion. GI on board. 3. Colon cancer. CT scan showed thickening of ascending colon suspicious for malignancy, lesions liver and lung concerning for metastasis. CEA 83.3, AFP 2.4. Colon biopsy (8/20) showed poorly differentiate carcinoma. Has been evaluated by oncology, patient will need CT chest for staging once renal function improves. Plan is for chemotherapy once she recovers from surgery. 4. PAPITO. Creatinine 1.6 on admission suspected pre-renal, hypovolemia. Bumped post surgery now trending down. Receiving albumin. 5. Abnormal CXR (8/24). Patchy mid and lower lung ground glass opacities suspicious for pneumonia noted on CXR (8/25). COVID-19 negative 8/22. Remains afebrile. Encouraged IS use and out of bed. Repeat CXR pending. 6. Acute anemia. Hgb 10.9 on admission, no previous studies for comparison. Suspect she likely had chronic iron deficiency anemia secondary to colon cancer and post menopausal bleeding. Hgb down to 7.6 today secondary to acute blood loss from surgery. To receive 1 unit PRBCs per surgery. DC planning: Patient is extremely weak. Anticipate she will likely need SNF on DC. PT/OT evaluated and patient scored 10/24 on both short mobility and ADL. Patient agreeable to SNF on DC. Diet: Diet NPO Effective Now Exceptions are:  Ice Cruz Jasmine with Meds  Code:Full Code  DVT PPX: on enoxaparin      VIN Camacho CNP   8/27/2020 11:39 AM

## 2020-08-27 NOTE — ANESTHESIA PRE PROCEDURE
Department of Anesthesiology  Preprocedure Note       Name:  Sharmon Soulier   Age:  76 y.o.  :  1955                                          MRN:  [id number]         Date:  2020      Surgeon: Mady Sullivan):  Tonja Joshi MD    Procedure: Procedure(s):  EGD DIAGNOSTIC ONLY  COLONOSCOPY DIAGNOSTIC    Medications prior to admission:   Prior to Admission medications    Not on File       Current medications:    No current facility-administered medications for this visit. No current outpatient medications on file.      Facility-Administered Medications Ordered in Other Visits   Medication Dose Route Frequency Provider Last Rate Last Dose    oxymetazoline (AFRIN) 0.05 % nasal spray 2 spray  2 spray Each Nostril Once Ryder Mems, APRN - CNP        lidocaine (XYLOCAINE) 2 % jelly   Topical Once Ryder Mems, APRN - CNP        phenol 1.4 % mouth spray 1 spray  1 spray Mouth/Throat Q2H PRN Ryedr Mems, APRN - CNP        0.9 % sodium chloride infusion 250 mL  250 mL Intravenous Once Ryder Mems, APRN - CNP        glucose (GLUTOSE) 40 % oral gel 15 g  15 g Oral PRN Zulma Ni, APRN - CNP        dextrose 50 % IV solution  12.5 g Intravenous PRN Zulma Ni, APRN - CNP        glucagon (rDNA) injection 1 mg  1 mg Intramuscular PRN Zulma Ni, APRN - CNP        dextrose 5 % solution  100 mL/hr Intravenous PRN Zulma Ni, APRN - CNP        folic acid (FOLVITE) tablet 1 mg  1 mg Oral Daily Zia Coavrrubias APRN - CNP        ondansetron [facility]) injection 4 mg  4 mg Intravenous Q4H PRN Tonja Joshi MD   4 mg at 20 0239    promethazine (PHENERGAN) injection 12.5 mg  12.5 mg Intravenous Q6H PRN Tonja Joshi MD        albumin human 25 % IV solution 12.5 g  12.5 g Intravenous Q6H Sandy Smith MD   Stopped at 20 0935    fluconazole (DIFLUCAN) in 0.9 % sodium chloride IVPB 200 mg  200 mg Intravenous Q24H Talita Arteaga  mL/hr at 08/26/20 1533 200 mg at 08/26/20 1533    meropenem (MERREM) 1 g in sodium chloride 0.9 % 100 mL IVPB (mini-bag)  1 g Intravenous Q12H Zoe Huerta MD   Stopped at 08/27/20 0023    enoxaparin (LOVENOX) injection 30 mg  30 mg Subcutaneous Daily Ritesh Galdamez, APRN - CNP   30 mg at 08/27/20 0818    metoclopramide (REGLAN) injection 5 mg  5 mg Intravenous Q6H Zoe Huerta MD   5 mg at 08/27/20 0538    bupivacaine liposome (EXPAREL) 1.3 % injection 66.5 mg  5 mL Subcutaneous Once Zoe Huerta MD        acetaminophen (TYLENOL) tablet 500 mg  500 mg Oral Q8H PRN Zoe Huerta MD        morphine (PF) injection 2 mg  2 mg Intravenous Q2H PRN Zoe Huerta MD   2 mg at 08/26/20 1992    Or    morphine injection 4 mg  4 mg Intravenous Q2H PRN Zoe Huerta MD   4 mg at 08/24/20 1846    pantoprazole (PROTONIX) injection 40 mg  40 mg Intravenous Daily Zoe Huerta MD   40 mg at 08/27/20 2042    potassium chloride (KLOR-CON M) extended release tablet 40 mEq  40 mEq Oral PRN Zoe Huerta MD        Or    potassium bicarb-citric acid (EFFER-K) effervescent tablet 40 mEq  40 mEq Oral PRN Zoe Huerta MD        Or    potassium chloride 10 mEq/100 mL IVPB (Peripheral Line)  10 mEq Intravenous PRN Zoe Huerta  mL/hr at 08/21/20 1716 10 mEq at 08/21/20 1716    lactulose (CHRONULAC) 10 GM/15ML solution 20 g  20 g Oral TID Zoe Huerta MD   20 g at 08/26/20 1946    lip balm petroleum free (PHYTOPLEX) stick   Topical PRN Zoe Huerta MD        sodium chloride flush 0.9 % injection 10 mL  10 mL Intravenous 2 times per day Zoe Huerta MD   10 mL at 08/27/20 0935    sodium chloride flush 0.9 % injection 10 mL  10 mL Intravenous PRN Zoe Huerta MD   10 mL at 08/27/20 0538    polyethylene glycol (GLYCOLAX) packet 17 g  17 g Oral Daily PRN Prudence Yusuf Jose Polo MD           Allergies:    No Active Allergies    Problem List:    Patient Active Problem List   Diagnosis Code    Pelvic abscess in female N73.9    Septicemia ([facility] A41.9    Colonic mass K63.89    Cirrhosis of liver with ascites ([facility] K74.60, R18.8    Intra-abdominal free air of unknown etiology K66.8    PAPITO (acute kidney injury) ([facility] N17.9    Lung nodules R91.8    Bandemia D72.825    Intra-abdominal abscess ([facility] K65.1    E coli infection A49.8    Elevated CEA R97.0    Ex-smoker Z87.891    Class 2 obesity due to excess calories with body mass index (BMI) of 36.0 to 36.9 in adult E66.09, Z68.36    Portal hypertension ([facility] K76.6    Mild malnutrition ([facility] E44.1       Past Medical History:  No past medical history on file. Past Surgical History:        Procedure Laterality Date    COLONOSCOPY N/A 8/20/2020    COLONOSCOPY WITH BIOPSY performed by Debi Rm MD at [address]  8/20/2020    COLONOSCOPY POLYPECTOMY SNARE/COLD BIOPSY performed by Debi Rm MD at [address] Right 8/24/2020    EXPLORATORY LAPAROTOMY, RIGHT  HEMICOLECTOMY; DRAINAGE OF PELVIC ABSCESS performed by Leonardo Green MD at [facility] LIVER BIOPSY  8/24/2020    LIVER BIOPSY performed by Leonardo Green MD at [address] ENDOSCOPY N/A 8/20/2020    EGD BIOPSY performed by Debi Rm MD at [address] [facility] History:    Social History     Tobacco Use    Smoking status: Current Every Day Smoker     Packs/day: 0.50     Years: 50.00     Pack years: 25.00     Types: Cigarettes   Substance Use Topics    Alcohol use: Never     Frequency: Never                                Ready to quit: Not Answered  Counseling given: Not Answered      Vital Signs (Current): There were no vitals filed for this visit.                                            BP Readings from Last 3 Encounters:   08/27/20 (!) 105/59   08/24/20 (!) 155/68   08/20/20 (!) 91/47       NPO Status:                                                                                 BMI:   Wt Readings from Last 3 Encounters:   08/23/20 230 lb (104.3 kg)     There is no height or weight on file to calculate BMI.    CBC:   Lab Results   Component Value Date    WBC 13.5 08/27/2020    RBC 2.74 08/27/2020    HGB 7.6 08/27/2020    HCT 23.9 08/27/2020    MCV 87.1 08/27/2020    RDW 17.9 08/27/2020     08/27/2020       CMP:   Lab Results   Component Value Date     08/27/2020    K 4.1 08/27/2020    K 4.7 08/16/2020     08/27/2020    CO2 18 08/27/2020    BUN 35 08/27/2020    CREATININE 2.0 08/27/2020    GFRAA 30 08/27/2020    AGRATIO 0.8 08/27/2020    LABGLOM 25 08/27/2020    GLUCOSE 64 08/27/2020    PROT 5.5 08/27/2020    CALCIUM 8.4 08/27/2020    BILITOT 0.7 08/27/2020    ALKPHOS 80 08/27/2020    AST 22 08/27/2020    ALT 6 08/27/2020       POC Tests:   No results for input(s): POCGLU, POCNA, POCK, POCCL, POCBUN, POCHEMO, POCHCT in the last 72 hours.     Coags:   Lab Results   Component Value Date    PROTIME 16.8 08/27/2020    INR 1.44 08/27/2020       HCG (If Applicable): No results found for: PREGTESTUR, PREGSERUM, HCG, HCGQUANT     ABGs: No results found for: PHART, PO2ART, GNS0XSA, GTJ5VIL, BEART, U5YYGYTR     Type & Screen (If Applicable):  No results found for: LABABO, LABRH    Drug/Infectious Status (If Applicable):  No results found for: HIV, HEPCAB    COVID-19 Screening (If Applicable):   Lab Results   Component Value Date    COVID19 Not Detected 08/22/2020         Anesthesia Evaluation  Patient summary reviewed and Nursing notes reviewed no history of anesthetic complications:   Airway: Mallampati: II  TM distance: >3 FB   Neck ROM: full  Mouth opening: > = 3 FB Dental:      Comment: Very poor dentition    Pulmonary:   (+) COPD:  shortness of breath:  asthma: current smoker                           Cardiovascular:    (+) angina:,     (-) hypertension    ECG reviewed                        Neuro/Psych:                ROS comment: Multiple falls recently GI/Hepatic/Renal:   (+) liver disease: coagulopathy from hepatic dysfunction, renal disease (BUN/Creatinine 35/2.0 (trending down)): ARF,      (-) GERD      ROS comment:  CT with thickening of the ascending colon concerning for malignancy - path c/w AdenoCa. Also with liver and lung lesions concerning for metastatic disease. Colonoscopy 8/20 with large necrotic ascending colon mass (carcinoma), likely the source of abscess. Hypoalbuminenia - Alb currently 2.4  . Endo/Other:    (+) blood dyscrasia (H/H 7.6/23. 9): anemia:., electrolyte abnormalities, malignancy/cancer (colonic adenocarcinoma). (-) diabetes mellitus, hypothyroidism               Abdominal:           Vascular:                                          Anesthesia Plan      MAC     ASA 3       Induction: intravenous. Anesthetic plan and risks discussed with patient. Plan discussed with attending.                   VIN Busch - CRNA   8/27/2020

## 2020-08-27 NOTE — PROGRESS NOTES
[facility] and Laparoscopic Surgery        Progress Note    Patient Name: Ema Gonzalez  MRN: [id number]  YOB: 1955  Date of Evaluation: 2020    Subjective:  Large bilious emesis overnight, continued nausea  Pain controlled  No flatus or stool; mild bloating  Resting in bed at this time    Postoperative Day #3      Vital Signs:  Patient Vitals for the past 24 hrs:   BP Temp Temp src Pulse Resp SpO2   20 0800 (!) 105/59 97.6 °F (36.4 °C) Oral 74 16 94 %   20 0530 (!) 107/56 97.4 °F (36.3 °C) Oral 77 14 94 %   20 2312 (!) 101/51 97.5 °F (36.4 °C) Oral 78 14 95 %   20 1914 104/68 97.2 °F (36.2 °C) Oral 80 14 92 %   20 1611 125/82 97.7 °F (36.5 °C) Oral 86 12 96 %   20 1104 101/69 97.7 °F (36.5 °C) Oral 84 16 94 %      TEMPERATURE HISTORY 24H: Temp (24hrs), Av.5 °F (36.4 °C), Min:97.2 °F (36.2 °C), Max:97.7 °F (36.5 °C)    BLOOD PRESSURE HISTORY: Systolic (91XJA), IHK:317 , Min:101 , ZQK:712    Diastolic (26LTH), GRB:22, Min:51, Max:82      Intake/Output:  I/O last 3 completed shifts:   In: 3455.6 [I.V.:3455.6]  Out: 1350 [Urine:500; Emesis/NG output:825; Drains:25]  I/O this shift:  In: -   Out: 150 [Urine:150]  Drain/tube Output:  Closed/Suction Drain Left Back Bulb 10 Angolan-Output (ml): 10 ml    Physical Exam:  General: awake, alert, oriented to person, place  Lungs: unlabored respirations  Abdomen: soft, non-distended, appropriate incisional tenderness, hypoactive/tinkling bowel sounds present   Drain: serosanguinous   Skin/Wound: wound bed open and clean without drainage--dressing changed    Labs:  CBC:    Recent Labs     20  0520  0630 20  0654   WBC 20.2* 20.9* 13.5*   HGB 9.1* 9.0* 7.6*   HCT 28.5* 28.4* 23.9*   * 136 111*     BMP:    Recent Labs     20  0630 20  0654    131* 135*   K 4.9 4.2 4.1   * 105 105   CO2 18* 18* 18*   BUN 27* 36* 35*   CREATININE 1.5* 2.3* 2.0*   GLUCOSE 166* 82 64*     Hepatic:    Recent Labs     08/25/20  0520 08/27/20  0654   AST 16 22   ALT 6* 6*   BILITOT 0.4 0.7   ALKPHOS 96 80     Amylase:  No results found for: AMYLASE  Lipase:  No results found for: LIPASE   Mag:    Lab Results   Component Value Date    MG 2.30 08/22/2020    MG 2.60 08/21/2020     Phos:     Lab Results   Component Value Date    PHOS 5.2 08/27/2020    PHOS 5.0 08/26/2020      Coags:   Lab Results   Component Value Date    PROTIME 16.8 08/27/2020    INR 1.44 08/27/2020       Cultures:  Anaerobic culture  Lab Results   Component Value Date    LABANAE  08/24/2020     Anaerobic culture further report to follow  No anaerobes isolated so far, Further report to follow       Fungus stain  Results in Past 30 Days  Result Component Current Result Ref Range Previous Result Ref Range   Fungus Stain No Fungal elements seen (8/24/2020)  Not in Time Range      Gram stain  Results in Past 30 Days  Result Component Current Result Ref Range Previous Result Ref Range   Gram Stain Result 1+ WBC's (Polymorphonuclear)  No organisms seen   (8/24/2020)  1+ WBC's (Mononuclear)  1+ WBC's (Polymorphonuclear)  2+ Gram negative rods   (8/18/2020)      Organism  Lab Results   Component Value Date/Time    ORG Escherichia coli (A) 08/18/2020 11:45 AM     Surgical culture  Lab Results   Component Value Date/Time    CXSURG No growth to date  Further report to follow   08/24/2020 02:10 PM     Blood culture 1  Results in Past 30 Days  Result Component Current Result Ref Range Previous Result Ref Range   Blood Culture, Routine No Growth to date. Any change in status will be called. (8/25/2020)  No Growth after 4 days of incubation. (8/16/2020)      Blood culture 2  Results in Past 30 Days  Result Component Current Result Ref Range Previous Result Ref Range   Culture, Blood 2 No Growth to date. Any change in status will be called.  (8/25/2020)  No Growth after 4 days of incubation. (8/16/2020)      Fecal dilated small bowel loops throughout the abdomen, increased compared to prior CT scan, either due to ileus or early partial small bowel obstruction     Scheduled Meds:   oxymetazoline  2 spray Each Nostril Once    lidocaine   Topical Once    0.9 % sodium chloride  250 mL Intravenous Once    folic acid  1 mg Oral Daily    albumin human  12.5 g Intravenous Q6H    fluconazole  200 mg Intravenous Q24H    meropenem  1 g Intravenous Q12H    enoxaparin  30 mg Subcutaneous Daily    metoclopramide  5 mg Intravenous Q6H    bupivacaine liposome  5 mL Subcutaneous Once    pantoprazole  40 mg Intravenous Daily    lactulose  20 g Oral TID    sodium chloride flush  10 mL Intravenous 2 times per day     Continuous Infusions:    PRN Meds:.phenol, [DISCONTINUED] promethazine **OR** ondansetron, promethazine, acetaminophen, morphine **OR** morphine, potassium chloride **OR** potassium alternative oral replacement **OR** potassium chloride, lip balm petroleum free, sodium chloride flush, polyethylene glycol      Assessment:  76 y.o. female admitted with   1. Septicemia ([facility]    2. PAPITO (acute kidney injury) ([facility]    3. Intra-abdominal free air of unknown etiology    4. Fall, initial encounter        Status-post exploratory laparotomy, evacuation of ascites, drainage of pelvic abscess, right hemicolectomy with primary ileotransverse colostomy anastomosis, and Abdiaziz-Cut liver biopsy  on 8/24/2020 for colon mass, right colon and hepatic flexure with necrotic cancer at hepatic flexure region with perforation, cirrhosis, pelvic abscess, ascites  Intraabdominal/pelvic abscess, status-post CT-guided drain placement on 8/18/2020  Sepsis   Cirrhosis  Acute kidney injury  Hyponatremia  COVID screening, negative  Anemia      Plan:  1. Local wound care with wet-to-dry dressings BID--anticipate secondary wound closure tomorrow; continue drain care/monitoring  2.  Continued nausea and vomiting overnight, increased distention per AXR yesterday, monitor for bowel function--nothing yet; insert NGT for decompression, continue NPO  3. Keep IV hydration; monitor and correct electrolytes--Creatinine decreased some today, keep Amado, follow up labs tomorrow--Nephrology following  4. Antibiotics, decreased leukocytosis, follow cultures  5. Decrease in hemoglobin, transfusion 1 unit PRBC, monitor  6. Activity as tolerated, ambulate TID--PT/OT following  7. Pulmonary toilet, incentive spirometry  8. PRN analgesics and antiemetics--minimizing narcotics as tolerated  9. DVT prophylaxis with Lovenox and SCD's  10. Management of medical comorbid etiologies per primary team and consulting services    EDUCATION:  Educated patient on plan of care and disease process--all questions answered. Plans discussed with patient and nursing. Reviewed and discussed with Dr. Stephen Perez.       Signed:  VIN Green - CNP  8/27/2020 8:50 AM     Surg Staff:   Pt seen and examined with NP  See full note above      Has ileus, place NG  Do cont IV and p RBC  WBC is decreased, and pelvic abscess drain is minimal  Follow exam  Planning wound closure tomorrow at this point, hopefully labs will continue to improve    Shu Quintero

## 2020-08-27 NOTE — PROGRESS NOTES
Shift assessment and AM vitals complete, VSS. AM meds given. Pt having a lot of emesis, NG tube place per surgery and pt has had about 1400 ml out. Pt has some fine crackles in bases will encourged her to use IS. Amado output is not much will monitor. CAROLINE drain is not putting out much output as well. Dressing site is CDI. Pt hgb on low side, 1 unit of blood to be transfused. Spoke with family this afternoon. Will continue to monitor. The care plan and education has been reviewed and mutually agreed upon with the patient.

## 2020-08-27 NOTE — PROGRESS NOTES
[facility]   Progress Note      8/27/2020 10:17 AM        Name: Mya Saunders . Admitted: 8/16/2020    SUBJECTIVE:  She denies nausea presently, no pain, no BM, she remains NPO.       Reviewed interval ancillary notes    Current Medications  oxymetazoline (AFRIN) 0.05 % nasal spray 2 spray, Once  lidocaine (XYLOCAINE) 2 % jelly, Once  phenol 1.4 % mouth spray 1 spray, Q2H PRN  0.9 % sodium chloride infusion 250 mL, Once  glucose (GLUTOSE) 40 % oral gel 15 g, PRN  dextrose 50 % IV solution, PRN  glucagon (rDNA) injection 1 mg, PRN  dextrose 5 % solution, PRN  folic acid (FOLVITE) tablet 1 mg, Daily  ondansetron (ZOFRAN) injection 4 mg, Q4H PRN  promethazine (PHENERGAN) injection 12.5 mg, Q6H PRN  albumin human 25 % IV solution 12.5 g, Q6H  fluconazole (DIFLUCAN) in 0.9 % sodium chloride IVPB 200 mg, Q24H  meropenem (MERREM) 1 g in sodium chloride 0.9 % 100 mL IVPB (mini-bag), Q12H  enoxaparin (LOVENOX) injection 30 mg, Daily  metoclopramide (REGLAN) injection 5 mg, Q6H  bupivacaine liposome (EXPAREL) 1.3 % injection 66.5 mg, Once  acetaminophen (TYLENOL) tablet 500 mg, Q8H PRN  morphine (PF) injection 2 mg, Q2H PRN    Or  morphine injection 4 mg, Q2H PRN  pantoprazole (PROTONIX) injection 40 mg, Daily  potassium chloride (KLOR-CON M) extended release tablet 40 mEq, PRN    Or  potassium bicarb-citric acid (EFFER-K) effervescent tablet 40 mEq, PRN    Or  potassium chloride 10 mEq/100 mL IVPB (Peripheral Line), PRN  lactulose (CHRONULAC) 10 GM/15ML solution 20 g, TID  lip balm petroleum free (PHYTOPLEX) stick, PRN  sodium chloride flush 0.9 % injection 10 mL, 2 times per day  sodium chloride flush 0.9 % injection 10 mL, PRN  polyethylene glycol (GLYCOLAX) packet 17 g, Daily PRN        Objective:  BP (!) 105/59   Pulse 74   Temp 97.6 °F (36.4 °C) (Oral)   Resp 16   Ht 5' 7\" (1.702 m)   Wt 230 lb (104.3 kg)   SpO2 94%   BMI 36.02 kg/m²     Intake/Output Summary (Last 24 hours) at 8/27/2020 1017  Last data filed at 8/27/2020 0732  Gross per 24 hour   Intake 3455.58 ml   Output 1500 ml   Net 1955.58 ml      Wt Readings from Last 3 Encounters:   08/23/20 230 lb (104.3 kg)       General appearance:  Appears comfortable  Eyes: Sclera clear. Pupils equal.  ENT: Moist oral mucosa. Trachea midline, no adenopathy. Cardiovascular: Regular rhythm, normal S1, S2. No murmur. No edema in lower extremities  Respiratory: Not using accessory muscles. Good inspiratory effort. Clear to auscultation bilaterally, no wheeze or crackles. GI: Abdomen soft, no tenderness, not distended  Musculoskeletal: No cyanosis in digits, neck supple  Neurology: CN 2-12 grossly intact. No speech or motor deficits  Psych: Normal affect. Alert and oriented in time, place and person  Skin: Warm, dry, normal turgor    Labs and Tests:  CBC:   Recent Labs     08/25/20  0520 08/26/20  0630 08/27/20  0654   WBC 20.2* 20.9* 13.5*   HGB 9.1* 9.0* 7.6*   * 136 111*     BMP:    Recent Labs     08/25/20  0520 08/26/20  0630 08/27/20  0654    131* 135*   K 4.9 4.2 4.1   * 105 105   CO2 18* 18* 18*   BUN 27* 36* 35*   CREATININE 1.5* 2.3* 2.0*   GLUCOSE 166* 82 64*     Hepatic:   Recent Labs     08/25/20  0520 08/27/20  0654   AST 16 22   ALT 6* 6*   BILITOT 0.4 0.7   ALKPHOS 96 80       ASSESSMENT AND PLAN    Active Problems:    Pelvic abscess in female    Septicemia ([facility]    Colonic mass    Cirrhosis of liver with ascites (HCC)    Intra-abdominal free air of unknown etiology    PAPITO (acute kidney injury) ([facility]    Lung nodules    Bandemia    Intra-abdominal abscess (HCC)    E coli infection    Elevated CEA    Ex-smoker    Class 2 obesity due to excess calories with body mass index (BMI) of 36.0 to 36.9 in adult    Portal hypertension (HCC)    Mild malnutrition (HCC)  Resolved Problems:    * No resolved hospital problems. *      Colon cancer  - S/p exploratory laparotomy with right hemicolectomy on 8/24/2020.  Anticipate secondary wound closure on Friday 8/28/2020.  - CEA is 83.3.  -  150.5, will ask gyn onc to evaluate, Dr. Corinne Copas D&C next time patient is under anesthesia. - Will obtain CT chest to complete staging in the next couple of days.   - Plan for chemotherapy as outpatient after healed from surgery.        Cirrhosis of the liver   - New diagnosis.   - Management per GI.        Intraabdominal/pelvic abscess  - CT-guided drain placement on 8/18/2020  - Antibiotics per ID.         Anemia  - Iron studies consistent with anemia of chronic disease.   - soluble transferrin receptor pending  - Start folic acid 1 mg daily for folate deficiency.         Post-op ileus       Jackson Amaya CNP  [facility]    Patient has perforated right colon cancer Will have secondary closure Abnormal uterus on imaging studies

## 2020-08-27 NOTE — PLAN OF CARE
Problem: Falls - Risk of:  Goal: Will remain free from falls  Description: Will remain free from falls  Outcome: Ongoing  Goal: Absence of physical injury  Description: Absence of physical injury  Outcome: Ongoing     Problem: Skin Integrity:  Goal: Will show no infection signs and symptoms  Description: Will show no infection signs and symptoms  Outcome: Ongoing  Goal: Absence of new skin breakdown  Description: Absence of new skin breakdown  Outcome: Ongoing     Problem: Infection:  Goal: Will remain free from infection  Description: Will remain free from infection  Outcome: Ongoing     Problem: Safety:  Goal: Free from accidental physical injury  Description: Free from accidental physical injury  Outcome: Ongoing  Goal: Free from intentional harm  Description: Free from intentional harm  Outcome: Ongoing     Problem: Daily Care:  Goal: Daily care needs are met  Description: Daily care needs are met  Outcome: Ongoing     Problem: Pain:  Goal: Patient's pain/discomfort is manageable  Description: Patient's pain/discomfort is manageable  Outcome: Ongoing  Goal: Pain level will decrease  Description: Pain level will decrease  Outcome: Ongoing  Goal: Control of acute pain  Description: Control of acute pain  Outcome: Ongoing  Goal: Control of chronic pain  Description: Control of chronic pain  Outcome: Ongoing     Problem: Skin Integrity:  Goal: Skin integrity will stabilize  Description: Skin integrity will stabilize  Outcome: Ongoing     Problem: Discharge Planning:  Goal: Patients continuum of care needs are met  Description: Patients continuum of care needs are met  Outcome: Ongoing     Problem: Nutrition  Goal: Optimal nutrition therapy  Outcome: Ongoing     Problem: Confusion - Acute:  Goal: Absence of continued neurological deterioration signs and symptoms  Description: Absence of continued neurological deterioration signs and symptoms  Outcome: Ongoing  Goal: Mental status will be restored to reality-based and nonreality-based thinking  Outcome: Ongoing  Goal: Able to interrupt nonreality-based thinking  Description: Able to interrupt nonreality-based thinking  Outcome: Ongoing     Problem: Sleep Pattern Disturbance:  Goal: Appears well-rested  Description: Appears well-rested  Outcome: Ongoing

## 2020-08-27 NOTE — PROGRESS NOTES
[facility] GI  Gastroenterology Progress Note    Pedro Zamarripa is a 76 y.o. female patient. Active Problems:    Pelvic abscess in female    Septicemia ([facility]    Colonic mass    Cirrhosis of liver with ascites (HCC)    Intra-abdominal free air of unknown etiology    PAPITO (acute kidney injury) ([facility]    Lung nodules    Bandemia    Intra-abdominal abscess (HCC)    E coli infection    Elevated CEA    Ex-smoker    Class 2 obesity due to excess calories with body mass index (BMI) of 36.0 to 36.9 in adult    Portal hypertension (HCC)    Mild malnutrition ([facility]  Resolved Problems:    * No resolved hospital problems. *      SUBJECTIVE: had large emesis this morning so NG tube was placed with about 1 liter gastric aspirate. No BM. Not passing flatus. ROS:  No fever, chills  No chest pain, palpitations  No SOB, cough  Gastrointestinal ROS: no N/V/D     Physical    VITALS:  /70   Pulse 87   Temp 97.3 °F (36.3 °C) (Oral)   Resp 16   Ht 5' 7\" (1.702 m)   Wt 230 lb (104.3 kg)   SpO2 94%   BMI 36.02 kg/m²   TEMPERATURE:  Current - Temp: 97.3 °F (36.3 °C); Max - Temp  Av.4 °F (36.3 °C)  Min: 97.2 °F (36.2 °C)  Max: 97.7 °F (36.5 °C)    NAD  Regular rate   Lungs CTA Bilaterally  Abdomen soft, ND, mild tenderness, dressing over midline. No guarding or rebound. Bowel sounds normal.  Alert and oriented x3. No asterixis. Data    Data Review:    Recent Labs     20  0520  0630 20  0654   WBC 20.2* 20.9* 13.5*   HGB 9.1* 9.0* 7.6*   HCT 28.5* 28.4* 23.9*   MCV 89.1 89.3 87.1   * 136 111*     Recent Labs     20  0520  0630 20  0654    131* 135*   K 4.9 4.2 4.1   * 105 105   CO2 18* 18* 18*   PHOS  --  5.0* 5.2*   BUN 27* 36* 35*   CREATININE 1.5* 2.3* 2.0*     Recent Labs     20  0520 20  0654   AST 16 22   ALT 6* 6*   BILITOT 0.4 0.7   ALKPHOS 96 80     No results for input(s): LIPASE, AMYLASE in the last 72 hours.   Recent Labs     20  9907 08/27/20  0654   PROTIME 19.2* 16.8*   INR 1.65* 1.44*     No results for input(s): PTT in the last 72 hours. FINAL DIAGNOSIS:        A. Right colon, hemicolectomy:        - Invasive poorly-differentiated adenocarcinoma with extensive          necrosis and full-thickness defect - SEE SYNOPTIC REPORT.        - Surgical resection margins negative for involvement.        - Metastatic adenocarcinoma in three of twenty-two lymph nodes          (3/22).      - One pericolonic tumor deposit.        B. Liver, biopsy:        - Cirrhotic liver with moderate steatosis and associated features          suggestive for steatohepatitis; negative for adenocarcinoma          involvement - see comment. ASSESSMENT :    Cirrhosis - new diagnosis per CT. bx c/w WINKLER cirrhosis. No alcohol history. Likely from fatty liver. Some ascites on CT. Seemed mildly confused at admission (baseline per her daughter) but ammonia was normal. repeat ammonia is elevated at 66 so lactulose started. Negative: AMA, hepatitis panel, A1AT phenotype. f-actin weak positive at 25. AFP normal. CEA 83. Labs stable. Abnormal CT of the colon and liver, colon mass - CT with thickening of the ascending colon concerning for malignancy - path c/w AdenoCa. Also with liver and lung lesions concerning for metastatic disease. Colonoscopy 8/20 with large necrotic ascending colon mass (carcinoma), likely the source of abscess. Pt is POD #3. AXR with SB dilation. Likely ileus. S/p NG tube placement.      Pelvic abscess - CT with nonloculated fluid and gas collection in the pelvis. S/p IR drainage 8/18.      Esophagitis - EGD 8/20 with LA class C erosive esophagitis and nodularity at [facility].  Bx.     PAPITO      PLAN :  - lactulose TID  - PPI  - antibiotics   - Will need Hep A and B vaccines outpatient.     Discussed with Dr. Richard Eagle, [address]    I have personally performed a face to face diagnostic evaluation on this patient. I have interviewed and examined the patient and I agree with the findings and recommended plan of care. In summary, my findings and plan are the following: Pt with non-bloody emesis this am. NG place with significant output. Pt pulled tube  - \"I didn't like it\". Denies any N/V now.        Jose Matthews MD  [address]

## 2020-08-28 NOTE — H&P
Date of Surgery Update:  Jazmine Franco was seen, history and physical examination reviewed, and patient examined by me today. There have been no significant clinical changes since the completion of the previous history and physical.    The risk, benefits, and alternatives of the proposed procedure have been explained to the patient (or appropriate guardian) and understanding verbalized. All questions answered. Patient wishes to proceed.     Electronically signed by: Jung De La O MD,8/28/2020,10:07 AM
nausea, vomiting, diarrhea  Genitourinary: Negative for polyuria, dysuria   Hematologic/Lymphatic: Negative for bleeding tendency, easy bruising  Musculoskeletal: Negative for myalgias and arthralgias  Neurologic: Negative for confusion,dysarthria. Skin: Negative for itching,rash  Psychiatric: Negative for depression,anxiety, agitation. Endocrine: Negative for polydipsia,polyuria,heat /cold intolerance. Past Medical History:   has no past medical history on file. Past Surgical History:   has a past surgical history that includes Tonsillectomy and Eye surgery. Medications:  No current facility-administered medications on file prior to encounter. No current outpatient medications on file prior to encounter. Allergies:  No Known Allergies     Social History:   reports that she has been smoking cigarettes. She has a 25.00 pack-year smoking history. She does not have any smokeless tobacco history on file. She reports that she does not drink alcohol or use drugs. Family History:  family history is not on file. , Lung cancer in father and pancreatic cancer in mother, colon cancer in niece    Physical Exam:  /75   Pulse 93   Temp 97.7 °F (36.5 °C) (Oral)   Resp 20   Ht 5' 7\" (1.702 m)   Wt 220 lb (99.8 kg)   SpO2 94%   BMI 34.46 kg/m²     General appearance:  Appears comfortable. Well nourished  Eyes: Sclera clear, pupils equal  ENT: Moist mucus membranes, no thrush. Trachea midline. Cardiovascular: Regular rhythm, normal S1, S2. No murmur, gallop, rub. No edema in lower extremities  Respiratory: Clear to auscultation bilaterally, no wheeze, good inspiratory effort  Gastrointestinal: Abdomen soft, non-tender, not distended, normal bowel sounds  Musculoskeletal: No cyanosis in digits, neck supple  Neurology: Cranial nerves grossly intact. Alert and oriented in time, place and person. No speech or motor deficits  Psychiatry: Appropriate affect.  Not agitated  Skin: Warm, dry, normal

## 2020-08-28 NOTE — PROGRESS NOTES
[facility] PROGRESS NOTE    8/28/2020 2:48 PM        Name: Barbie Fisher . Admitted: 8/16/2020  Primary Care Provider: No primary care provider on file. (Tel: None)      Subjective:  Patient is a 75 yo female with hx nicotine use. The patient lives alone, she presented to hospital after being found on floor by her daughter. She has had multiple falls in past couple of months. CT abdomen and pelvis in ER showed pelvic abscess vs viscus perf. Colonic wall thickening, concerning for malignancy, liver cirrhosis portal hypertension. She had Ct-guided drain placement 8/18 for pelvic abscess 8/18. Subsequently found to have large ascending colon mass on colonoscopy 8/20, likely the source of abscess. 8/18/2020: Successful CT guided placement of a drainage catheter in the pelvic abscess. 8/24/2020: Exploratory laparotomy, evacuation of ascites, drainage of  pelvic abscess, right hemicolectomy with primary ileotransverse  colostomy anastomosis, and Abdiaziz-Cut liver biopsy. 8/28/2020: SECONDARY ABDOMINAL WOUND CLOSURE  DILATATION AND CURETTAGE    Presently resting in bed, sister is at bedside. Patient recently returned from surgery, she underwent secondary closure of abdominal wound and D&C today. Reports she is feeling better today with NG out. Denies pain or nausea. Remains afebrile.      Reviewed interval ancillary notes    Current Medications  albumin human 25 % IV solution 12.5 g, Q6H  oxymetazoline (AFRIN) 0.05 % nasal spray 2 spray, Once  lidocaine (XYLOCAINE) 2 % jelly, Once  phenol 1.4 % mouth spray 1 spray, Q2H PRN  glucose (GLUTOSE) 40 % oral gel 15 g, PRN  dextrose 50 % IV solution, PRN  glucagon (rDNA) injection 1 mg, PRN  dextrose 5 % solution, PRN  folic acid (FOLVITE) tablet 1 mg, Daily  ondansetron (ZOFRAN) injection 4 mg, Q4H PRN  promethazine (PHENERGAN) injection 12.5 mg, Q6H PRN  fluconazole (DIFLUCAN) in 0.9 % sodium chloride IVPB 200 mg, Q24H  meropenem (MERREM) 1 g in sodium chloride 0.9 % 100 mL IVPB (mini-bag), Q12H  enoxaparin (LOVENOX) injection 30 mg, Daily  bupivacaine liposome (EXPAREL) 1.3 % injection 66.5 mg, Once  morphine (PF) injection 2 mg, Q2H PRN    Or  morphine injection 4 mg, Q2H PRN  pantoprazole (PROTONIX) injection 40 mg, Daily  potassium chloride (KLOR-CON M) extended release tablet 40 mEq, PRN    Or  potassium bicarb-citric acid (EFFER-K) effervescent tablet 40 mEq, PRN    Or  potassium chloride 10 mEq/100 mL IVPB (Peripheral Line), PRN  lactulose (CHRONULAC) 10 GM/15ML solution 20 g, TID  lip balm petroleum free (PHYTOPLEX) stick, PRN  sodium chloride flush 0.9 % injection 10 mL, 2 times per day  sodium chloride flush 0.9 % injection 10 mL, PRN  polyethylene glycol (GLYCOLAX) packet 17 g, Daily PRN      Objective:  BP (!) 158/77   Pulse 98   Temp 97.3 °F (36.3 °C) (Oral)   Resp 14   Ht 5' 7\" (1.702 m)   Wt 230 lb (104.3 kg)   SpO2 97%   BMI 36.02 kg/m²     Intake/Output Summary (Last 24 hours) at 8/28/2020 1448  Last data filed at 8/28/2020 1248  Gross per 24 hour   Intake 2118.5 ml   Output 670 ml   Net 1448.5 ml      Wt Readings from Last 3 Encounters:   08/23/20 230 lb (104.3 kg)     General:  Awake, alert, oriented in NAD  Skin:  Warm and dry. No unusual bruising or rash  Neck:  Supple. No JVD appreciated  Chest:  Normal effort.   Diminished, no wheezes/rhonchi/rales  Cardiovascular:  RRR, normal S1/S2, no murmur/gallop/rub  Abdomen:  Soft, nontender, midline abdominal dressing C/D/I  : Amado draining yellow urine  Extremities:  No edema  Neurological: No focal deficits  Psychological: Normal mood and affect      Labs and Tests:  CBC:   Recent Labs     08/26/20  0630 08/27/20  0654 08/28/20  0630   WBC 20.9* 13.5* 11.3*   HGB 9.0* 7.6* 9.0*    111* 105*     BMP:    Recent Labs     08/26/20  0630 08/27/20  0654 08/28/20  0630   * 135* 136   K 4.2 4.1 3.6    105 108   CO2 18* 18* 19*   BUN 36* 35* 27*   CREATININE 2.3* 2.0* 1.4*   GLUCOSE 82 64* 76     Hepatic:   Recent Labs     08/27/20  0654 08/28/20  0630   AST 22 20   ALT 6* 6*   BILITOT 0.7 0.9   ALKPHOS 80 92       CXR 8/16/2020:  No acute process. CT Head 8/16/2020:  No acute intracranial abnormality.           CT Cervical Spine 8/16/2020:  No acute abnormality of the cervical spine. CT Abdomen/Pelvis Without Contrast 8/16/2020:  . Non loculated fluid and gas collection in the pelvis concerning for    perforated viscus/possible abscess formation. 2. Prominent thickening of the ascending colon which may be related to    infectious or inflammatory process or underlying neoplastic process. 3. Bowel-gas pattern typical of adynamic ileus. 4. Hepatic cirrhosis with numerous hepatic nodules suggestive of    regenerative/siderotic nodules.  MRI abdomen without and with IV contrast    correlation recommended.  Metastatic disease is a consideration. 5. Hepatosplenomegaly and multifocal collateral vessels about the stomach    esophagus concerning for varices, all suggestive of portal venous    hypertension. 6. Abdominal and pelvic ascites. 7.  Diverticulosis coli without CT evidence of acute diverticulitis. CT Abdomen/Pelvis With Contrast 8/17/2020:  1. Pelvic air/fluid collection almost certainly reflects an abscess or giant    colonic diverticula.  This may be the result of acute diverticulitis. 2. Mass lesion ascending colon almost certainly reflecting primary    adenocarcinoma of the colon. 3. Diffuse hepatic lesions with newly visualized (contrast enhancement) mass    at the hepatic dome.  Hepatic metastatic disease is a consideration as is    cirrhosis with regenerative nodules and hepatocellular carcinoma. 4. Bilateral lower lobe pulmonary nodules suspicious for metastatic disease. 5. Abdominal and pelvic ascites.     6. Sequela from portosystemic shunting with numerous collateral vessels about    the stomach, left upper quadrant and distal esophagus.  Relatively small    esophageal and gastric varices. 7.  Calcific atherosclerotic disease aorta. CXR 8/24/2020:  FINDINGS:    There is been placement of a right jugular central venous catheter with its    tip projecting over the superior vena cava.  The heart size and mediastinal    contours are stable.         Bilateral mid and lower lung patchy ground-glass opacities today, increased    since the prior study.         No pneumothorax is evident.  Lucency beneath the right hemidiaphragm is new    since prior studies suspicious for free intraperitoneal air.              Impression    Lucency beneath the right hemidiaphragm compatible with free intraperitoneal    air.  Apparently the patient has had recent abdominal surgery.  This finding    was discussed with Dr. Concepción Clifford 6:48 pm on 8/24/2020.         Status post placement of right jugular central venous catheter.  No    pneumothorax.         Patchy mid and lower lung ground-glass opacities suspicious for pneumonia,    increased since the prior CT.  Correlation for pneumonia is recommended. Abdominal Xray 8/26/2020:  Scattered dilated small bowel loops throughout the abdomen, increased    compared to prior CT scan, either due to ileus or early partial small bowel    obstruction        Surgical Pathology 8/24/2020:  FINAL DIAGNOSIS:        A. Right colon, hemicolectomy:        - Invasive poorly-differentiated adenocarcinoma with extensive          necrosis and full-thickness defect - SEE SYNOPTIC REPORT.        - Surgical resection margins negative for involvement.        - Metastatic adenocarcinoma in three of twenty-two lymph nodes          (3/22).      - One pericolonic tumor deposit.        B.  Liver, biopsy:        - Cirrhotic liver with moderate steatosis and associated features          suggestive for steatohepatitis; negative for adenocarcinoma          involvement - see comment. Problem List  Active Problems:    Pelvic abscess in female    Septicemia ([address]    Colonic mass    Cirrhosis of liver with ascites ([address]    Intra-abdominal free air of unknown etiology    PAPITO (acute kidney injury) ([address]    Lung nodules    Bandemia    Intra-abdominal abscess (HCC)    E coli infection    Elevated CEA    Ex-smoker    Class 2 obesity due to excess calories with body mass index (BMI) of 36.0 to 36.9 in adult    Portal hypertension (HCC)    Mild malnutrition (HCC)    Tobacco abuse counseling    Open abdominal wall wound  Resolved Problems:    * No resolved hospital problems. *       Assessment:   1. Pelvic abscess. Status post IR drainage 8/18, pelvic fluid cytology negative for malignant cells. Colonoscopy 8/20 with large necrotic ascending colon mass, likely source of abscess. Status post exp lap right transverse colectomy 8/24. Status post secondary wound closure 8/28. Initially on Zosyn, transitioned to IV cipro (8/20). IV Flagyl added 8/25. Now on meropenem and flucanazole. Afebrile, WBC trending down. Blood culture results (8/25) with no growth to date. Surgery and ID on case. 2. Liver cirrhosis / portal HTN. Biopsy consistent with WINKLER cirrhosis, negative for adenocarcinoma involvement. Started on lactulose for ammonia level 66 and confusion, ammonia level 23 today. GI on case. 3. Colon cancer. CT scan showed thickening of ascending colon suspicious for malignancy, lesions liver and lung concerning for metastasis. CEA 83.3, AFP 2.4. Colon biopsy (8/24) showed poorly differentiate adenocarcinoma, metastatic adenocarcinoma in 3 of 232 lymph nodes. Patient will need CT chest for staging once renal function improves. Plan is for chemotherapy once she recovers from surgery. Oncology on board. 4. PAPITO. Creatinine 1.6 on admission suspected pre-renal, hypovolemia. Bumped post surgery now trending down.  Weight is up 15 lbs compared to admission, suspected 3rd spacing, receiving albumin, 1.8->2.4->2.5. Nephrology on board. 5. Abnormal CXR (8/27). Bilateral airspace disease, increased on right as compared to prior, pneumonia or edema are considerations. Possible 3rd spacing, receiving albumin. COVID-19 (8/22) negative. Remains afebrile, no cough. Encouraged IS use. 6. Acute anemia. Hgb 10.9 on admission, no previous studies for comparison. Suspect she likely had chronic iron deficiency anemia secondary to colon cancer and post menopausal bleeding. She received 1 unit PRBCs 8/27, Hgb 9.0 today. DC planning: Patient is extremely weak. Plan is for SNF on DC. Case management is assisting, family preference is [facility] [id number]         Diet: Diet NPO Effective Now Exceptions are: Ice Chips, Sips with Meds  Code:Full Code  DVT PPX: on enoxaparin      VIN Jorge - CNP   8/28/2020 2:48 PM

## 2020-08-28 NOTE — PROGRESS NOTES
Infectious Diseases   Progress Note      Admission Date: 8/16/2020  Hospital Day: Hospital Day: 13   Attending: Ernestine Gómez MD  Date of service: 8/28/2020     Chief complaint/ Reason for consult:     · Worsening bandemia secondary to intra-abdominal abscess  · Acute kidney injury  · Colon mass, right colon and hepatic flexure with necrotic cancer as well as perforation status post expiratory laparotomy and right hemicolectomy with primary ileotransverse colostomy and anastomosis on 8/24/2020  · Ascites, status post evacuation of ascites on 8/24/2020 , malignant ascites should be ruled out  · Intra-abdominal abscess status post abscess evacuation on 8/24/2020, CT-guided drainage of the abscess on 8/20/2020 had yielded 80 mL of purulent fluid with cultures positive for E. coli and mixed anaerobic growth    Microbiology:        I have reviewed allavailable micro lab data and cultures    · Blood culture (2/2) - collected on 8/16/2020: Negative  · Abdominal abscess fluid culture - collectedon 8/18/2020: E. Coli    Escherichia coli (1)     Antibiotic  Interpretation  ANABELLA  Status     ampicillin  Sensitive  4  mcg/mL      ceFAZolin  Sensitive  <=4  mcg/mL      cefepime  Sensitive  <=0.12  mcg/mL      cefTRIAXone  Sensitive  <=0.25  mcg/mL      ciprofloxacin  Sensitive  <=0.25  mcg/mL      ertapenem  Sensitive  <=0.12  mcg/mL      gentamicin  Sensitive  <=1  mcg/mL      levofloxacin  Sensitive  <=0.12  mcg/mL      piperacillin-tazobactam  Sensitive  <=4  mcg/mL      trimethoprim-sulfamethoxazole  Sensitive  <=20  mcg/mL          · Abdominal surgical culture  - collected on 8/24/2020:  In process      Antibiotics and immunizations:       Current antibiotics: All antibiotics and their doses were reviewed by me    Recent Abx Admin                   meropenem (MERREM) 1 g in sodium chloride 0.9 % 100 mL IVPB (mini-bag) (g) 1 g New Bag 08/28/20 0012    fluconazole (DIFLUCAN) in 0.9 % sodium chloride IVPB 200 mg (mg) 200 mg New Bag 08/27/20 1604                  Immunization History: All immunization history was reviewed by me today. There is no immunization history on file for this patient. Known drug allergies: All allergies were reviewed and updated    Allergies   Allergen Reactions    Penicillins Other (See Comments)     Unknown reaction per pt        Social history:     Social History:  All social andepidemiologic history was reviewed and updated by me today as needed. · Tobacco use:   reports that she has been smoking cigarettes. She has a 25.00 pack-year smoking history. She does not have any smokeless tobacco history on file. · Alcohol use:   reports no history of alcohol use. · Currently lives in: [address] [place] [address] Road  ·  reports no history of drug use. Assessment:     The patient is a 76 y.o. old female who  has no past medical history on file. with following problems:    · Worsening bandemia secondary to intra-abdominal abscess-improving, white cell count is 11,300  · Acute kidney injury-improved, serum creatinine is 1.4  · Colon mass, right colon and hepatic flexure with necrotic cancer as well as perforation status post expiratory laparotomy and right hemicolectomy with primary ileotransverse colostomy and anastomosis on 8/24/2020-metastatic colon cancer confirmed by path  · Ascites, status post evacuation of ascites on 8/24/2020  -liver biopsy confirmed cirrhosis  · Intra-abdominal abscess status post abscess evacuation on 8/24/2020, CT-guided drainage of the abscess on 8/20/2020 had yielded 80 mL of purulent fluid with cultures positive for E. coli and mixed anaerobic growth  · Elevated CEA  · 25-pack-year smoking history-counseling done yesterday  · CT scan with IV contrast of abdomen and pelvis was concerning for bilateral lower lobe lung nodules, concerning for metastatic disease  · Concern for portal hypertension on recent CT scans  · Obesity Class 1due to excess calorie intake :  Body mass index is 36.02 kg/m². Discussion:      She is on IV meropenem and IV fluconazole. She is tolerating the antibiotics okay. White cell count is 11,300. Serum creatinine is 1.4. Liver enzymes are okay. Surgical cultures reviewed. No new organisms isolated so far. The patient underwent secondary closure of the abdominal wound earlier today along with dilatation curettage procedure for uterine bleeding    Plan:     Diagnostic Workup:      · Continue to follow  fever curve, WBC count and blood cultures  · Follow up on liver and renal function    Antimicrobials:    · Will leave her on IV meropenem 1 g every 8 hour for now  · Continue fluconazole 200 mg every 24 hour  · Continue to monitor her vitals closely  · We will follow-up on the surgical cultures and clinical progress and will make further recommendations accordingly  · If she continues to do well, will plan to de-escalate antibiotics Monday  · Discussed all above with patient and RN      Drug Monitoring:    · Continue monitoring for antibiotic toxicity as follows: CBC, CMP, QTc interval  · Continue to watch for following: new or worsening fever, new hypotension, hives, lip swelling and redness or purulence at vascular access sites. I/v access Management:    · Continue to monitor i.v access sites for erythema, induration, discharge or tenderness. · As always, continue efforts to minimize tubes/lines/drains as clinically appropriate to reduce chances of line associated infections. Patient education and counseling:       · The patient was educated in detail about the side-effects of various antibiotics and things to watch for like new rashes, lip swelling, severe reaction, worsening diarrhea, break through fever etc.  · Discussed patient's condition and what to expect. All of the patient's questions were addressed in a satisfactory manner and patient verbalized understanding all instructions.         Thank you for involving me in the COLONOSCOPY POLYPECTOMY SNARE/COLD BIOPSY performed by Nathalie Gee MD at [address] Right 8/24/2020    EXPLORATORY LAPAROTOMY, RIGHT  HEMICOLECTOMY; DRAINAGE OF PELVIC ABSCESS performed by Tien Bay MD at [facility] LIVER BIOPSY  8/24/2020    LIVER BIOPSY performed by Tien Bay MD at [address] [facility] Kristen Guadalupe N/A 8/20/2020    EGD BIOPSY performed by Nathalie Gee MD at [address] [facility] ENDOSCOPY       Family History: All family history was reviewed today. History reviewed. No pertinent family history. Objective:       PHYSICAL EXAM:      Vitals:   Vitals:    08/28/20 1330 08/28/20 1345 08/28/20 1430 08/28/20 1500   BP:   (!) 158/77 129/85   Pulse: 99 96 98 96   Resp: 13 14 14    Temp:   97.3 °F (36.3 °C)    TempSrc:   Oral    SpO2: 97% 97% 97% 97%   Weight:       Height:           Physical Exam  Vitals signs and nursing note reviewed. Constitutional:       General: She is not in acute distress. Appearance: She is well-developed. She is not diaphoretic. HENT:      Head: Normocephalic. Right Ear: External ear normal.      Left Ear: External ear normal.      Nose: Nose normal.   Eyes:      General: No scleral icterus. Right eye: No discharge. Left eye: No discharge. Conjunctiva/sclera: Conjunctivae normal.      Pupils: Pupils are equal, round, and reactive to light. Neck:      Musculoskeletal: Normal range of motion and neck supple. Cardiovascular:      Rate and Rhythm: Normal rate and regular rhythm. Heart sounds: No murmur. No friction rub. Pulmonary:      Effort: Pulmonary effort is normal.      Breath sounds: No stridor. No wheezing or rales. Chest:      Chest wall: No tenderness. Abdominal:      Palpations: Abdomen is soft. There is no mass. Tenderness: There is no abdominal tenderness. There is no guarding or rebound.       Comments: Abdominal surgical dressing noted   Musculoskeletal:         General: No tenderness. Lymphadenopathy:      Cervical: No cervical adenopathy. Skin:     General: Skin is warm and dry. Findings: No erythema or rash. Neurological:      Mental Status: She is alert and oriented to person, place, and time. Motor: No abnormal muscle tone. Psychiatric:         Judgment: Judgment normal.           Lines: All vascular access sites are healthy with no local erythema, discharge or tenderness. Intake and output:    I/O last 3 completed shifts: In: 7562 [I.V.:1781]  Out: 670 [Urine:450; Emesis/NG output:200; Blood:20]    Lab Data:   All available labs and old records have been reviewed by me. CBC:  Recent Labs     08/26/20  0630 08/27/20  0654 08/28/20  0630   WBC 20.9* 13.5* 11.3*   RBC 3.18* 2.74* 3.12*   HGB 9.0* 7.6* 9.0*   HCT 28.4* 23.9* 27.6*    111* 105*   MCV 89.3 87.1 88.5   MCH 28.4 27.7 28.7   MCHC 31.8 31.8 32.5   RDW 18.1* 17.9* 18.0*        BMP:  Recent Labs     08/26/20  0630 08/27/20  0654 08/28/20  0630   * 135* 136   K 4.2 4.1 3.6    105 108   CO2 18* 18* 19*   BUN 36* 35* 27*   CREATININE 2.3* 2.0* 1.4*   CALCIUM 8.5 8.4 8.7   GLUCOSE 82 64* 76        Hepatic Function Panel:   Lab Results   Component Value Date    ALKPHOS 92 08/28/2020    ALT 6 08/28/2020    AST 20 08/28/2020    PROT 5.9 08/28/2020    BILITOT 0.9 08/28/2020    BILIDIR 0.4 08/28/2020    IBILI 0.5 08/28/2020    LABALBU 2.5 08/28/2020       CPK:   Lab Results   Component Value Date    CKTOTAL 52 08/17/2020     ESR:   Lab Results   Component Value Date    SEDRATE 36 (H) 08/25/2020     CRP:   Lab Results   Component Value Date    .1 (H) 08/25/2020           Imaging: All pertinent images and reports for the current visit were reviewed by me during this visit. XR CHEST PORTABLE   Final Result   Nasogastric tube is seen extending below the diaphragm.   Dedicated abdominal   radiograph could be performed for better visualization as warranted. Bilateral airspace disease, increased on the right as compared to prior, for   which pneumonia or edema are considerations. XR ABDOMEN (2 VIEWS)   Final Result   Scattered dilated small bowel loops throughout the abdomen, increased   compared to prior CT scan, either due to ileus or early partial small bowel   obstruction         XR CHEST PORTABLE   Final Result   Lucency beneath the right hemidiaphragm compatible with free intraperitoneal   air. Apparently the patient has had recent abdominal surgery. This finding   was discussed with Dr. Cameron Gleason  At 6:48 pm on 8/24/2020. Status post placement of right jugular central venous catheter. No   pneumothorax. Patchy mid and lower lung ground-glass opacities suspicious for pneumonia,   increased since the prior CT. Correlation for pneumonia is recommended. CT ABSCESS DRAINAGE W CATH PLACEMENT S&I   Final Result   Successful CT guided placement of a drainage catheter in the pelvic abscess. CT ABDOMEN PELVIS W IV CONTRAST Additional Contrast? Oral   Final Result   1. Pelvic air/fluid collection almost certainly reflects an abscess or giant   colonic diverticula. This may be the result of acute diverticulitis. 2. Mass lesion ascending colon almost certainly reflecting primary   adenocarcinoma of the colon. 3. Diffuse hepatic lesions with newly visualized (contrast enhancement) mass   at the hepatic dome. Hepatic metastatic disease is a consideration as is   cirrhosis with regenerative nodules and hepatocellular carcinoma. 4. Bilateral lower lobe pulmonary nodules suspicious for metastatic disease. 5. Abdominal and pelvic ascites. 6. Sequela from portosystemic shunting with numerous collateral vessels about   the stomach, left upper quadrant and distal esophagus. Relatively small   esophageal and gastric varices. 7.  Calcific atherosclerotic disease aorta.          CT ABDOMEN PELVIS [DISCONTINUED] promethazine **OR** ondansetron, promethazine, morphine **OR** morphine, potassium chloride **OR** potassium alternative oral replacement **OR** potassium chloride, lip balm petroleum free, sodium chloride flush, polyethylene glycol      Problem list:       Patient Active Problem List   Diagnosis Code    Pelvic abscess in female N73.9    Septicemia ([facility] A41.9    Colonic mass K63.89    Cirrhosis of liver with ascites ([facility] K74.60, R18.8    Intra-abdominal free air of unknown etiology K66.8    PAPITO (acute kidney injury) ([facility] [facility] N17.9    Lung nodules R91.8    Bandemia D72.825    Intra-abdominal abscess ([facility] K65.1    E coli infection A49.8    Elevated CEA R97.0    Ex-smoker Z87.891    Class 2 obesity due to excess calories with body mass index (BMI) of 36.0 to 36.9 in adult E66.09, Z68.36    Portal hypertension (HCC) K76.6    Mild malnutrition (HCC) E44.1    Tobacco abuse counseling Z71.6    Open abdominal wall wound S31.109A       Please note that this chart was generated using Dragon dictation software. Although every effort was made to ensure the accuracy of this automated transcription, some errors in transcription may have occurred inadvertently. If you may need any clarification, please do not hesitate to contact me through EPIC or at the phone number provided below with my electronic signature. Any pictures or media included in this note were obtained after taking informed verbal consent from the patient and with their approval to include those in the patient's medical record.     Yesi Reynoso MD, MPH  8/28/2020 , 3:14 PM   [facility] Infectious Disease   Office: [phone]  Fax: [phone]  Tuesday AM clinic:   [address] [place] [address], [facility] [address]  Thursday AM [id number] [facility]

## 2020-08-28 NOTE — PROGRESS NOTES
MD Yi Cardoza MD Gorden Brooking, MD               Office: [phone]                      Fax: [phone]             [facility]                   NEPHROLOGY INPATIENT PROGRESS NOTE:     PATIENT NAME: Jazmine Franco  : 1955  MRN: [id number]. IMPRESSION:       PAPITO - crea down to 1.4 from 2 from  2.3 . On albumin. NSS stopped due to crackles.   : Oligouric - now non-oliguric. Continue albumin today. Appears hypervolemic. Probably 3rd spacing  : Etiology of current PAPITO - ATN vs venous congestion vs. Decreased renal perfusion. WBC count higher  - Renal imaging: w/ CT - kidney unremarkable   - CK - 310 on admission then improved. - Avoid contrast exposure at this time. Associated problems:   - Electrolytes: Hyponatremia-better  - Weight up to 230 from 215. Also has hypoalbuminemia so may be 3rd spacing. Check daily weights. Cirrhosis   : BP: Ok to keep slightly higher   - Acid-Base: Acidosis - - non-AGMA - follow for now. Check VBG.   - Anemia:  - Heme  Following    Saturating well on room air          RECOMMENDATIONS:   - continue albumin today for intravascular expansion    Other problems: Management per primary and other consulting teams.      # Cirrhosis , Portal HTN : new diagnosis, no h/o EtOH    # Sepsis, intra-abdominal abscess   - status-post CT-guided drain placement on 2020        Hospital Problems           Last Modified POA    Pelvic abscess in female 2020 Yes    Septicemia ([address] 2020 Yes    Colonic mass 2020 Yes    Cirrhosis of liver with ascites ([address] 2020 Yes    Intra-abdominal free air of unknown etiology 2020 Yes    PAPITO (acute kidney injury) ([address] 2020 Yes    Lung nodules 2020 Yes    Bandemia 2020 Yes    Intra-abdominal abscess ([address] 2020 Yes    E coli infection 2020 Yes    Elevated CEA 2020 Yes    Ex-smoker 2020 Yes    Class 2 obesity due to excess °C) Temporal 77 18 93 %   08/28/20 0815 113/65 97.3 °F (36.3 °C) Oral 73 16 92 %   08/28/20 0007 119/63 97.1 °F (36.2 °C) Oral 75 16 95 %   08/27/20 2215 109/71 97.4 °F (36.3 °C) Oral 73 16 97 %   08/27/20 1500 113/72 97.9 °F (36.6 °C) Oral 75 16 97 %       Intake/Output Summary (Last 24 hours) at 8/28/2020 1259  Last data filed at 8/28/2020 1248  Gross per 24 hour   Intake 2118.5 ml   Output 670 ml   Net 1448.5 ml         General: Awake, Alert,   HENT: Atraumatic, normocephalic   Eyes: Normal conjunctiva, Non-incteral sclera. Neck: Supple, JVD not visible. CVS:  Heart sounds are normal. No murmurs. No pericardial rub. RS: Normal respiratory efforts,  Lung fields are diminished . Abd: Soft , bowel sounds are normal, no distension and no tenderness to palpation. Skin: No rash , some bruises,   CNS: Awake Oriented , No focal.   Extremities/MSK: 2+ Edema, no cyanosis. DATA:  Diagnostic tests reviewed by me for today's visit:    Recent Labs     08/26/20  0630 08/27/20  0654 08/28/20  0630   WBC 20.9* 13.5* 11.3*   HCT 28.4* 23.9* 27.6*    111* 105*     Iron Saturation:  No components found for: PERCENTFE  FERRITIN:    Lab Results   Component Value Date    FERRITIN 542.4 08/17/2020     IRON:    Lab Results   Component Value Date    IRON 26 08/17/2020     TIBC:    Lab Results   Component Value Date    TIBC 106 08/17/2020       Recent Labs     08/26/20  0630 08/27/20  0654 08/28/20  0630   * 135* 136   K 4.2 4.1 3.6    105 108   CO2 18* 18* 19*   BUN 36* 35* 27*   CREATININE 2.3* 2.0* 1.4*     Recent Labs     08/26/20  0630 08/27/20  0654 08/28/20  0630   CALCIUM 8.5 8.4 8.7   PHOS 5.0* 5.2* 4.2     No results for input(s): PH, PCO2, PO2 in the last 72 hours.     Invalid input(s): Esther Swann    ABG:  No results found for: PH, PCO2, PO2, HCO3, BE, THGB, TCO2, O2SAT  VBG:    Lab Results   Component Value Date    PHVEN 7.435 08/23/2020    YFP8SUQ 26.3 08/23/2020    BEVEN -5.6 08/23/2020    C3EHSYGF 99 08/23/2020       LDH:  No results found for: LDH  Uric Acid:    Lab Results   Component Value Date    LABURIC 5.9 08/17/2020       PT/INR:    Lab Results   Component Value Date    PROTIME 15.6 08/28/2020    INR 1.34 08/28/2020     Warfarin PT/INR:  No components found for: PTPATWAR, PTINRWAR  PTT:  No results found for: APTT, PTT[APTT}  Last 3 Troponin:    Lab Results   Component Value Date    TROPONINI <0.01 08/16/2020       U/A:    Lab Results   Component Value Date    COLORU ORANGE 08/16/2020    PROTEINU TRACE 08/16/2020    PHUR 5.0 08/16/2020    WBCUA 3 08/16/2020    RBCUA 7 08/16/2020    BACTERIA RARE 08/16/2020    CLARITYU CLOUDY 08/16/2020    SPECGRAV 1.022 08/16/2020    LEUKOCYTESUR SMALL 08/16/2020    UROBILINOGEN 1.0 08/16/2020    BILIRUBINUR SMALL 08/16/2020    BLOODU LARGE 08/16/2020    GLUCOSEU Negative 08/16/2020     Microalbumen/Creatinine ratio:  No components found for: RUCREAT  24 Hour Urine for Protein:  No components found for: RAWUPRO, UHRS3, WZLB96AL, UTV3  24 Hour Urine for Creatinine Clearance:  No components found for: CREAT4, UHRS10, UTV10  Urine Toxicology:  No components found for: IAMMENTA, IBARBIT, IBENZO, ICOCAINE, IMARTHC, IOPIATES, IPHENCYC    HgBA1c:  No results found for: LABA1C  RPR:  No results found for: RPR  HIV:  No results found for: HIV  KULWANT:  No results found for: ANATITER, KULWANT  RF:  No results found for: RF  DSDNA:  No components found for: DNA  AMYLASE:  No results found for: AMYLASE  LIPASE:  No results found for: LIPASE  Fibrinogen Level:  No components found for: FIB       BELOW MENTIONED RADIOLOGY STUDY RESULTS BY ME:    Ct Abdomen Pelvis Wo Contrast Additional Contrast? None    Result Date: 8/16/2020  EXAMINATION: CT OF THE ABDOMEN AND PELVIS WITHOUT CONTRAST 8/16/2020 5:11 pm TECHNIQUE: CT of the abdomen and pelvis was performed without the administration of intravenous contrast. Multiplanar reformatted images are provided for review.  Dose modulation, iterative reconstruction, and/or weight based adjustment of the mA/kV was utilized to reduce the radiation dose to as low as reasonably achievable. COMPARISON: None. HISTORY: Acute nausea FINDINGS: Lower Chest: FOUR solid soft tissue pulmonary nodules right lower lobe, 7 x 8 mm (axial image 19), 11 x 12 mm (axial 11), 9 x 9 mm (axial image 10) and 4 x 4 mm (axial image 10). 3 mm pulmonary nodules are seen posterior basal segment left lower lobe. Mild nodular fullness about the distal esophagus. Heart size is upper normal. Organs: Prominent caudate lobe hypertrophy and poly lobular configuration of the liver as well as diffuse tiny hypoattenuating lesions throughout the liver suggestive of hepatic cirrhosis and siderotic nodules. No discrete mass lesion evident. 19 cm craniocaudal liver length. The spleen is mildly enlarged. The kidneys, gallbladder, adrenal glands and pancreas appear unremarkable. Multifocal collateral vessels are seen about the gastrohepatic ligament extending to the posterior mediastinum. GI/Bowel: Prominent diffuse thickening of the wall of the ascending colon. Other portions of colon and small bowel appear unremarkable with exception of few gas distended small bowel loops typical of adynamic ileus. There are diverticula involving the descending and sigmoid colon. Pelvis: Fluid collection within the pelvis without definite margins has air bubbles within it concerning for sequela from perforated bowel/abscess formation. Uterus and adnexal structures appear unremarkable. Peritoneum/Retroperitoneum: Abdominal ascites. Calcific atherosclerosis aorta. Bones/Soft Tissues: No acute superficial soft tissue or osseous structure abnormality evident. 1. Non loculated fluid and gas collection in the pelvis concerning for perforated viscus/possible abscess formation.  2. Prominent thickening of the ascending colon which may be related to infectious or inflammatory process or underlying neoplastic process. 3. Bowel-gas pattern typical of adynamic ileus. 4. Hepatic cirrhosis with numerous hepatic nodules suggestive of regenerative/siderotic nodules. MRI abdomen without and with IV contrast correlation recommended. Metastatic disease is a consideration. 5. Hepatosplenomegaly and multifocal collateral vessels about the stomach esophagus concerning for varices, all suggestive of portal venous hypertension. 6. Abdominal and pelvic ascites. 7.  Diverticulosis coli without CT evidence of acute diverticulitis. Critical results were called by Dr. Supa Xie to [address] [place] [address] on 8/16/2020 at 18:09. Xr Radius Ulna Left (2 Views)    Result Date: 8/16/2020  EXAMINATION: TWO XRAY VIEWS OF THE LEFT FOREARM 8/16/2020 2:55 pm COMPARISON: None. HISTORY: ORDERING SYSTEM PROVIDED HISTORY: Fall TECHNOLOGIST PROVIDED HISTORY: Reason for exam:->Fall Reason for Exam: Frequent falls at home. Bruising mid shaft L forearm Acuity: Acute Type of Exam: Initial FINDINGS: There is no evidence of acute fracture. There is normal alignment. No acute joint abnormality. No focal osseous lesion. No focal soft tissue abnormality. No acute osseous abnormality. Ct Head Wo Contrast    Result Date: 8/16/2020  EXAMINATION: CT OF THE HEAD WITHOUT CONTRAST  8/16/2020 3:15 pm TECHNIQUE: CT of the head was performed without the administration of intravenous contrast. Dose modulation, iterative reconstruction, and/or weight based adjustment of the mA/kV was utilized to reduce the radiation dose to as low as reasonably achievable. COMPARISON: None. HISTORY: ORDERING SYSTEM PROVIDED HISTORY: Fall TECHNOLOGIST PROVIDED HISTORY: Reason for exam:->Fall Has a \"code stroke\" or \"stroke alert\" been called? ->No Reason for Exam: Fall Acuity: Acute Type of Exam: Initial FINDINGS: BRAIN/VENTRICLES: The ventricles and sulci are diffusely enlarged.   Low attenuation is seen in the periventricular and subcortical white matter. No acute intracranial hemorrhage or acute infarct is identified. ORBITS: The visualized portion of the orbits demonstrate no acute abnormality. SINUSES: The visualized paranasal sinuses and mastoid air cells demonstrate no acute abnormality. SOFT TISSUES/SKULL:  No acute abnormality of the visualized skull or soft tissues. No acute intracranial abnormality. Ct Cervical Spine Wo Contrast    Result Date: 8/16/2020  EXAMINATION: CT OF THE CERVICAL SPINE WITHOUT CONTRAST 8/16/2020 3:15 pm TECHNIQUE: CT of the cervical spine was performed without the administration of intravenous contrast. Multiplanar reformatted images are provided for review. Dose modulation, iterative reconstruction, and/or weight based adjustment of the mA/kV was utilized to reduce the radiation dose to as low as reasonably achievable. COMPARISON: None. HISTORY: ORDERING SYSTEM PROVIDED HISTORY: Fall TECHNOLOGIST PROVIDED HISTORY: Reason for exam:->Fall Reason for Exam: Fall Acuity: Acute Type of Exam: Initial FINDINGS: BONES/ALIGNMENT: There is no acute fracture or traumatic malalignment. DEGENERATIVE CHANGES: Multilevel degenerative changes. SOFT TISSUES: There is no prevertebral soft tissue swelling. No acute abnormality of the cervical spine. Xr Chest Portable    Result Date: 8/16/2020  EXAMINATION: ONE XRAY VIEW OF THE CHEST 8/16/2020 2:55 pm COMPARISON: None. HISTORY: ORDERING SYSTEM PROVIDED HISTORY: Fall TECHNOLOGIST PROVIDED HISTORY: Reason for exam:->Fall Reason for Exam: Frequent falls at home. Bruising mid shaft L forearm Acuity: Acute Type of Exam: Initial FINDINGS: The lungs are without acute focal process. There is no effusion or pneumothorax. The cardiomediastinal silhouette is without acute process. The osseous structures are without acute process. No acute process.      Leonidas Jay MD

## 2020-08-28 NOTE — PLAN OF CARE
Nutrition Problem #1: Inadequate energy intake  Intervention: Food and/or Nutrient Delivery: Continue NPO  Nutritional Goals: Start of nutrition within next 24 hr

## 2020-08-28 NOTE — PROGRESS NOTES
[facility] GI  Gastroenterology Progress Note    Rosalinda Rod is a 76 y.o. female patient. Active Problems:    Pelvic abscess in female    Septicemia ([facility]    Colonic mass    Cirrhosis of liver with ascites (HCC)    Intra-abdominal free air of unknown etiology    PAPITO (acute kidney injury) ([facility]    Lung nodules    Bandemia    Intra-abdominal abscess (HCC)    E coli infection    Elevated CEA    Ex-smoker    Class 2 obesity due to excess calories with body mass index (BMI) of 36.0 to 36.9 in adult    Portal hypertension (HCC)    Mild malnutrition (HCC)    Tobacco abuse counseling    Open abdominal wall wound  Resolved Problems:    * No resolved hospital problems. *      SUBJECTIVE:  Denies abdominal pain, N/V.     ROS:  No fever, chills  No chest pain, palpitations  No SOB, cough  Gastrointestinal ROS: no N/V/D     Physical    VITALS:  /85   Pulse 96   Temp 97.3 °F (36.3 °C) (Oral)   Resp 14   Ht 5' 7\" (1.702 m)   Wt 230 lb (104.3 kg)   SpO2 97%   BMI 36.02 kg/m²   TEMPERATURE:  Current - Temp: 97.3 °F (36.3 °C); Max - Temp  Av.5 °F (35.8 °C)  Min: 96.1 °F (35.6 °C)  Max: 97.6 °F (36.4 °C)    NAD  Regular rate   Lungs CTA Bilaterally  Abdomen soft, ND, mild tenderness, dressing over midline. No guarding or rebound. Bowel sounds normal.  Alert and oriented x3. No asterixis. Data    Data Review:    Recent Labs     20  0620  0654 20  06   WBC 20.9* 13.5* 11.3*   HGB 9.0* 7.6* 9.0*   HCT 28.4* 23.9* 27.6*   MCV 89.3 87.1 88.5    111* 105*     Recent Labs     20  0620  0654 20  06   * 135* 136   K 4.2 4.1 3.6    105 108   CO2 18* 18* 19*   PHOS 5.0* 5.2* 4.2   BUN 36* 35* 27*   CREATININE 2.3* 2.0* 1.4*     Recent Labs     20  0654 20  0630   AST 22 20   ALT 6* 6*   BILIDIR  --  0.4*   BILITOT 0.7 0.9   ALKPHOS 80 92     No results for input(s): LIPASE, AMYLASE in the last 72 hours.   Recent Labs     20  0654 20  0630 PROTIME 16.8* 15.6*   INR 1.44* 1.34*     No results for input(s): PTT in the last 72 hours. FINAL DIAGNOSIS:        A. Right colon, hemicolectomy:        - Invasive poorly-differentiated adenocarcinoma with extensive          necrosis and full-thickness defect - SEE SYNOPTIC REPORT.        - Surgical resection margins negative for involvement.        - Metastatic adenocarcinoma in three of twenty-two lymph nodes          (3/22).      - One pericolonic tumor deposit.        B. Liver, biopsy:        - Cirrhotic liver with moderate steatosis and associated features          suggestive for steatohepatitis; negative for adenocarcinoma          involvement - see comment. ASSESSMENT :    Cirrhosis - new diagnosis per CT. bx c/w WINKLER cirrhosis. No alcohol history. Likely from fatty liver. Some ascites on CT. Seemed mildly confused at admission (baseline per her daughter) but ammonia was normal. repeat ammonia is elevated at 66 so lactulose started. Negative: AMA, hepatitis panel, A1AT phenotype. f-actin weak positive at 25. AFP normal. CEA 83. Labs stable. Abnormal CT of the colon and liver, colon mass - CT with thickening of the ascending colon concerning for malignancy - path c/w AdenoCa. Also with liver and lung lesions concerning for metastatic disease. Colonoscopy 8/20 with large necrotic ascending colon mass (carcinoma), likely the source of abscess.     Pelvic abscess - CT with nonloculated fluid and gas collection in the pelvis. S/p IR drainage 8/18.      Esophagitis - EGD 8/20 with LA class C erosive esophagitis and nodularity at GE junction (path mild chronic inflammation). PAPITO      PLAN :  - lactulose TID  - PPI  - Will need Hep A and B vaccines outpatient.     Discussed with Dr. Trinidad Quarles, [address]    I have personally performed a face to face diagnostic evaluation on this patient.   I have interviewed and examined the patient and I agree with the findings and recommended plan of care. In summary, my findings and plan are the following: pt post op. GYN eval noted - ? Uterine cancer.        Amanda Chi MD  [address]

## 2020-08-28 NOTE — BRIEF OP NOTE
Brief Postoperative Note      Patient: Mya Saunders  YOB: 1955  MRN: [id number]    Date of Procedure: 8/28/2020    Pre-Op Diagnosis: OPEN ABDOMINAL WOUND    Post-Op Diagnosis: Same       Procedure(s):  SECONDARY ABDOMINAL WOUND CLOSURE  DILATATION AND CURETTAGE    Surgeon(s):  MD Tawnya Barajas MD    Assistant:  Surgical Assistant: Kurt Marks  First Assistant: Lakhwinder Stahl RN    Anesthesia: General    Estimated Blood Loss (mL): Minimal    Complications: None    Specimens:   * No specimens in log *    Implants:  * No implants in log *      Drains:   Closed/Suction Drain Left Back Bulb 10 [place] (Active)   Site Description Unable to view 08/27/20 0231   Dressing Status Clean;Dry; Intact 08/27/20 0231   Drainage Appearance Serosanguinous; Clots 08/27/20 0231   Status To bulb suction 08/27/20 0231   Output (ml) 10 ml 08/27/20 0231       NG/OG/NJ/NE Tube Orogastric 16 fr Center mouth (Active)       [REMOVED] NG/OG/NJ/NE Tube Nasogastric 60 fr Right nostril (Removed)   Surrounding Skin Dry; Intact 08/27/20 1156   Securement device Yes 08/27/20 1156   Status Suction-low continuous 08/27/20 1156   Placement Verified by Gastric Contents;by X-Ray (Initial) 08/27/20 1156   Drainage Appearance Green 08/27/20 1156   Output (mL) 200 ml 08/27/20 1502       [REMOVED] Urethral Catheter (Removed)   $ Urethral catheter insertion $ Not inserted for procedure 08/23/20 9751   Catheter Indications Perioperative use in selected surgeries including but not limited to urologic, pelvic or need for intraoperative monitoring of urinary output due to prolonged surgery, large volume infusion or need for diuretic therapy in surgery 08/26/20 1915   Site Assessment Other (Comment) 08/26/20 1915   Urine Color [place] 08/27/20 1901   Urine Appearance Clear 08/27/20 1901   Urine Odor Other (Comment) 08/27/20 0231   Output (mL) 450 mL 08/27/20 1901       Findings: Open wound cleaned and closed    Electronically signed by Brenden Land MD on 8/28/2020 at 12:23 PM

## 2020-08-28 NOTE — CARE COORDINATION
Barrier to d/c: ID, surgery, GI, nephrology and oncology following pt-pt w/NG, on IV meds-to have wound closure today. [address] and [facility] following for SNF-per dtr report, [facility] is close to her home. Will need HENS.   Electronically signed by JAMILA Chris on 8/28/2020 at 9:21 AM

## 2020-08-28 NOTE — ANESTHESIA POSTPROCEDURE EVALUATION
Department of Anesthesiology  Postprocedure Note    Patient: Azalia Chandra  MRN: [id number]  YOB: 1955  Date of evaluation: 8/28/2020  Time:  1:06 PM     Procedure Summary     Date:  08/28/20 Room / Location:  [address]    Anesthesia Start:  5182 Anesthesia Stop:  1252    Procedures:       SECONDARY ABDOMINAL WOUND CLOSURE (N/A )      DILATATION AND CURETTAGE (N/A Vagina ) Diagnosis:  (OPEN ABDOMINAL WOUND)    Surgeon:  Miguel Lilly MD; Margarita Devries MD Responsible Provider:  Jameson Owens MD    Anesthesia Type:  general ASA Status:  4          Anesthesia Type: general    Madisyn Phase I: Madisyn Score: 8    Madisyn Phase II:      Last vitals: Reviewed and per EMR flowsheets.        Anesthesia Post Evaluation    Patient location during evaluation: PACU  Level of consciousness: awake  Pain score: 3  Airway patency: patent  Nausea & Vomiting: no vomiting and no nausea  Complications: yes  Cardiovascular status: hemodynamically stable  Respiratory status: acceptable  Hydration status: euvolemic

## 2020-08-28 NOTE — OP NOTE
[facility]                     [facility], [address] [place] [address]                                OPERATIVE REPORT    PATIENT NAME: Everardo Williamson                    :        1955  MED REC NO:   [id number]                          ROOM:       4469  ACCOUNT NO:   [de-identified]                           ADMIT DATE: 2020  PROVIDER:     Ilan Urban MD    DATE OF PROCEDURE:  2020    PREOPERATIVE DIAGNOSIS:  Open abdominal wound. POSTOPERATIVE DIAGNOSIS:  Open abdominal wound and status post  exploratory laparotomy with right hemicolectomy and drainage of pelvic  abscess. PROCEDURE: Secondary closure of abdominal wound. SURGEON:  Ilan Urban MD    ANESTHESIA:  General plus local at surgical site. ESTIMATED BLOOD LOSS:  Minimal.    COMPLICATIONS:  None. SPECIMENS:  None. INDICATION FOR SURGERY:  The patient is a 79-year-old female with a plan  to return to the OR today prospectively for secondary wound closure. The patient had an exploratory lap for extensive colon cancer with  necrotic perforated cancer. She had an intentionally opened abdominal  wound which was closed today. The intended procedure was reviewed with  the patient. All questions were answered and she presents to the OR. ADDITIONAL DETAILS OF SURGERY:  The patient was brought to the operating  room, placed on the operating table in supine position. Compression  boots were placed. General anesthetic was initiated and the abdomen was  prepped and draped sterilely. Time-out was done. The open abdominal  wound was irrigated thoroughly. Bovie was used for fine hemostasis. We  used Betadine scrub and brush to clean out the wound deeply. The  fascial sutures were all intact. Closure was performed with interrupted 3-0 nylon vertical mattress  sutures. Polysporin ointment and sterile dressings were placed.     The patient was then returned to the care of Dr. Jorgito Schaeffer for D and C  procedure.         Madan Avendaño MD    D: 08/28/2020 12:40:10       T: 08/28/2020 12:52:30     [email or web address]  Job#: [id number]     Doc#: [id number]    CC:

## 2020-08-28 NOTE — PROGRESS NOTES
Continue NPO  Nutrition Education/Counseling:  Education not indicated   Coordination of Nutrition Care:  Continued Inpatient Monitoring    Goals:  Start of nutrition within next 24 hr       Nutrition Monitoring and Evaluation:   Food/Nutrient Intake Outcomes:  Diet Advancement/Tolerance  Physical Signs/Symptoms Outcomes:  Biochemical Data, Weight, GI Status     Discharge Planning:     Too soon to determine     Electronically signed by Cynthia Miranda RD, LD on 8/28/20 at 3:24 PM EDT  Weekend Contact: [phone]

## 2020-08-28 NOTE — ANESTHESIA PRE PROCEDURE
Department of Anesthesiology  Preprocedure Note       Name:  Rox Dumont   Age:  76 y.o.  :  1955                                          MRN:  [id number]         Date:  2020      Surgeon: Mattie Daniel):  MD Shantal Kothari MD    Procedure: Procedure(s):  EXPLORATORY LAPAROTOMY, RIGHT  HEMICOLECTOMY; DRAINAGE OF PELVIC ABSCESS    Medications prior to admission:   Prior to Admission medications    Not on File       Current medications:    No current facility-administered medications for this visit. No current outpatient medications on file.      Facility-Administered Medications Ordered in Other Visits   Medication Dose Route Frequency Provider Last Rate Last Dose    albumin human 25 % IV solution 12.5 g  12.5 g Intravenous Q6H Sandy Cabrera MD   Stopped at 20 0945    oxymetazoline (AFRIN) 0.05 % nasal spray 2 spray  2 spray Each Nostril Once Mahesh VIN Lira CNP        lidocaine (XYLOCAINE) 2 % jelly   Topical Once Mahesh VIN Lria CNP        phenol 1.4 % mouth spray 1 spray  1 spray Mouth/Throat Q2H PRN VIN Montes De Oca - CNP   1 spray at 20 1229    glucose (GLUTOSE) 40 % oral gel 15 g  15 g Oral PRN Dominique Telles APRN - CNP        dextrose 50 % IV solution  12.5 g Intravenous PRN Dominique Telles APRN - CNP        glucagon (rDNA) injection 1 mg  1 mg Intramuscular PRN VIN Linder - CNP        dextrose 5 % solution  100 mL/hr Intravenous PRN VIN Linder -  mL/hr at 20 1300 100 mL/hr at 93 2941    folic acid (FOLVITE) tablet 1 mg  1 mg Oral Daily VIN Lafleur CNP        ondansetron [facility]) injection 4 mg  4 mg Intravenous Q4H PRN Dagoberto Martinez MD   4 mg at 20 0239    promethazine (PHENERGAN) injection 12.5 mg  12.5 mg Intravenous Q6H PRN Dagoberto Martinez MD        fluconazole (DIFLUCAN) in 0.9 % sodium chloride IVPB 200 mg  200 mg Intravenous Q24H List   Diagnosis Code    Pelvic abscess in female N73.9    Septicemia ([facility] A41.9    Colonic mass K63.89    Cirrhosis of liver with ascites ([facility] K74.60, R18.8    Intra-abdominal free air of unknown etiology K66.8    PAPITO (acute kidney injury) ([facility] N17.9    Lung nodules R91.8    Bandemia D72.825    [facility] K65.1    E coli infection A49.8    Elevated CEA R97.0    Ex-smoker Z87.891    Class 2 obesity due to excess calories with body mass index (BMI) of 36.0 to 36.9 in adult E66.09, Z68.36    Portal hypertension (HCC) K76.6    Mild malnutrition (HCC) E44.1    Tobacco abuse counseling Z71.6       Past Medical History:  No past medical history on file. Past Surgical History:        Procedure Laterality Date    COLONOSCOPY N/A 8/20/2020    COLONOSCOPY WITH BIOPSY performed by Meghana Dumont MD at [address] [facility]  8/20/2020    COLONOSCOPY POLYPECTOMY SNARE/COLD BIOPSY performed by Meghana Dumont MD at [address] Right 8/24/2020    EXPLORATORY LAPAROTOMY, RIGHT  HEMICOLECTOMY; DRAINAGE OF PELVIC ABSCESS performed by Teresa Rodriguez MD at [address] [facility] [address] LIVER BIOPSY  8/24/2020    LIVER BIOPSY performed by Teresa Rodriguez MD at [address] ENDOSCOPY N/A 8/20/2020    EGD BIOPSY performed by Meghana Dumont MD at [address] History:    Social History     Tobacco Use    Smoking status: Current Every Day Smoker     Packs/day: 0.50     Years: 50.00     Pack years: 25.00     Types: Cigarettes   Substance Use Topics    Alcohol use: Never     Frequency: Never                                Ready to quit: Not Answered  Counseling given: Not Answered      Vital Signs (Current): There were no vitals filed for this visit.                                            BP Readings from Last 3 Encounters:   08/28/20 136/79   08/24/20 (!) 155/68 08/20/20 (!) 91/47       NPO Status:                                                                                 BMI:   Wt Readings from Last 3 Encounters:   08/23/20 230 lb (104.3 kg)     There is no height or weight on file to calculate BMI.    CBC:   Lab Results   Component Value Date    WBC 11.3 08/28/2020    RBC 3.12 08/28/2020    HGB 9.0 08/28/2020    HCT 27.6 08/28/2020    MCV 88.5 08/28/2020    RDW 18.0 08/28/2020     08/28/2020       CMP:   Lab Results   Component Value Date     08/28/2020    K 3.6 08/28/2020    K 4.7 08/16/2020     08/28/2020    CO2 19 08/28/2020    BUN 27 08/28/2020    CREATININE 1.4 08/28/2020    GFRAA 45 08/28/2020    AGRATIO 0.8 08/27/2020    LABGLOM 38 08/28/2020    GLUCOSE 76 08/28/2020    PROT 5.9 08/28/2020    CALCIUM 8.7 08/28/2020    BILITOT 0.9 08/28/2020    ALKPHOS 92 08/28/2020    AST 20 08/28/2020    ALT 6 08/28/2020       POC Tests:   Recent Labs     08/28/20  0952   POCGLU 68*       Coags:   Lab Results   Component Value Date    PROTIME 15.6 08/28/2020    INR 1.34 08/28/2020       HCG (If Applicable): No results found for: PREGTESTUR, PREGSERUM, HCG, HCGQUANT     ABGs: No results found for: PHART, PO2ART, XEF0AUP, VCQ8QQX, BEART, T7KUIEVM     Type & Screen (If Applicable):  No results found for: LABABO, LABRH    Drug/Infectious Status (If Applicable):  No results found for: HIV, HEPCAB    COVID-19 Screening (If Applicable):   Lab Results   Component Value Date    COVID19 Not Detected 08/22/2020         Anesthesia Evaluation  Patient summary reviewed and Nursing notes reviewed  Airway: Mallampati: III  TM distance: >3 FB   Neck ROM: full  Mouth opening: > = 3 FB Dental:          Pulmonary:normal exam    (+) COPD:                             Cardiovascular:  Exercise tolerance: poor (<4 METS),         ECG reviewed  Rhythm: regular  Rate: normal                    Neuro/Psych:               GI/Hepatic/Renal:   (+) liver disease: portal hypertension, renal disease: ARF and CRI, morbid obesity          Endo/Other:    (+) blood dyscrasia: anticoagulation therapy, thrombocytopenia and anemia:., electrolyte abnormalities, . Abdominal:   (+) obese,         Vascular:                                      Anesthesia Plan      general     ASA 4     (Patient was told of risk of possible dental injury to already damaged teeth by CRNA.)  Induction: intravenous. MIPS: Postoperative opioids intended, Prophylactic antiemetics administered and Postoperative trial extubation. Anesthetic plan and risks discussed with patient. Plan discussed with CRNA.           MEDICATIONS:  Current Facility-Administered Medications   Medication Dose Route Frequency Provider Last Rate Last Dose    albumin human 25 % IV solution 12.5 g  12.5 g Intravenous Q6H Sandy Cabrera MD   Stopped at 08/28/20 0945    oxymetazoline (AFRIN) 0.05 % nasal spray 2 spray  2 spray Each Nostril Once VIN Montes De Oca - CNP        lidocaine (XYLOCAINE) 2 % jelly   Topical Once Mahesh VIN Lira - CNP        phenol 1.4 % mouth spray 1 spray  1 spray Mouth/Throat Q2H PRN VIN Montes De Oca - CNP   1 spray at 08/27/20 1229    glucose (GLUTOSE) 40 % oral gel 15 g  15 g Oral PRN Dominique Telles APRN - CNP        dextrose 50 % IV solution  12.5 g Intravenous PRN Dominique Telles APRN - CNP        glucagon (rDNA) injection 1 mg  1 mg Intramuscular PRN Dominique Telles APRN - CNP        dextrose 5 % solution  100 mL/hr Intravenous PRN Dominique Telles APRN -  mL/hr at 08/27/20 1300 100 mL/hr at 48/41/93 2774    folic acid (FOLVITE) tablet 1 mg  1 mg Oral Daily VIN Lafleur CNP        ondansetron [facility]) injection 4 mg  4 mg Intravenous Q4H PRN Dagoberto Martinez MD   4 mg at 08/27/20 0239    promethazine (PHENERGAN) injection 12.5 mg  12.5 mg Intravenous Q6H PRN Dagoberto Martinez MD        fluconazole (DIFLUCAN) in 0.9 % sodium chloride IVPB 200 mg  200 mg Intravenous Q24H Gopal Guidry  mL/hr at 08/27/20 1604 200 mg at 08/27/20 1604    meropenem (MERREM) 1 g in sodium chloride 0.9 % 100 mL IVPB (mini-bag)  1 g Intravenous Q12H Teresa Rodriguez MD   Stopped at 08/28/20 0228    enoxaparin (LOVENOX) injection 30 mg  30 mg Subcutaneous Daily Ane Dienes, APRN - CNP   30 mg at 08/27/20 0818    bupivacaine liposome (EXPAREL) 1.3 % injection 66.5 mg  5 mL Subcutaneous Once Teresa Rodriguez MD        acetaminophen (TYLENOL) tablet 500 mg  500 mg Oral Q8H PRN Teresa Rodriguez MD        morphine (PF) injection 2 mg  2 mg Intravenous Q2H PRN Teresa Rodriguez MD   2 mg at 08/26/20 8299    Or    morphine injection 4 mg  4 mg Intravenous Q2H PRN Teresa Rodriguez MD   4 mg at 08/24/20 1846    pantoprazole (PROTONIX) injection 40 mg  40 mg Intravenous Daily Teresa Rodriguez MD   40 mg at 08/28/20 9448    potassium chloride (KLOR-CON M) extended release tablet 40 mEq  40 mEq Oral PRN Teresa Rodriguez MD        Or    potassium bicarb-citric acid (EFFER-K) effervescent tablet 40 mEq  40 mEq Oral PRN Teresa Rodriguez MD        Or    potassium chloride 10 mEq/100 mL IVPB (Peripheral Line)  10 mEq Intravenous PRN Teresa Rodriguez  mL/hr at 08/21/20 1716 10 mEq at 08/21/20 1716    lactulose (CHRONULAC) 10 GM/15ML solution 20 g  20 g Oral TID Teresa Rodriguez MD   20 g at 08/27/20 2219    lip balm petroleum free (PHYTOPLEX) stick   Topical PRN Teresa Rodriguez MD        sodium chloride flush 0.9 % injection 10 mL  10 mL Intravenous 2 times per day Teresa Rodriguez MD   10 mL at 08/28/20 0838    sodium chloride flush 0.9 % injection 10 mL  10 mL Intravenous PRN Teresa Rodriguez MD   10 mL at 08/27/20 0538    polyethylene glycol (GLYCOLAX) packet 17 g  17 g Oral Daily PRN Teresa Rodriguez MD            LABS:  Lab Results   Component Value Date

## 2020-08-28 NOTE — PROGRESS NOTES
Patient having trouble stating , states she is in a building. Anesthesia to bedside. Order for STAT ammonia. Will continue to monitor.

## 2020-08-28 NOTE — OP NOTE
Operative Note      Patient: Yanet Dutton  YOB: 1955  MRN: [id number]    Date of Procedure: 8/28/2020    Pre-Op Diagnosis: postmenopausal bleeding, colon cancer    Post-Op Diagnosis: Same       Procedure(s):  SECONDARY ABDOMINAL WOUND CLOSURE  DILATATION AND CURETTAGE    Surgeon(s):  MD Kaley Kelly MD    Assistant:   Surgical Assistant: Lanre Del Valle  First Assistant: Oliva Priest RN    Anesthesia: General    Estimated Blood Loss (mL): less than 50     Complications: None    Specimens:   ID Type Source Tests Collected by Time Destination   A : A) ENDOCERVICAL CURETTINGS Tissue Tissue SURGICAL PATHOLOGY Jose Olmos MD 8/28/2020 1230    B : B) ENDOMETRIAL CURETTINGS Tissue Tissue SURGICAL PATHOLOGY Jose Olmos MD 8/28/2020 1231        Implants:  * No implants in log *      Drains:   Closed/Suction Drain Left Back Bulb 10 [place] (Active)   Site Description Unable to view 08/27/20 0231   Dressing Status Clean;Dry; Intact 08/27/20 0231   Drainage Appearance Serosanguinous; Clots 08/27/20 0231   Status To bulb suction 08/27/20 0231   Output (ml) 10 ml 08/27/20 0231       NG/OG/NJ/NE Tube Orogastric 16 fr Center mouth (Active)       Urethral Catheter Non-latex;Straight-tip 16 fr (Active)       [REMOVED] NG/OG/NJ/NE Tube Nasogastric 60 fr Right nostril (Removed)   Surrounding Skin Dry; Intact 08/27/20 1156   Securement device Yes 08/27/20 1156   Status Suction-low continuous 08/27/20 1156   Placement Verified by Gastric Contents;by X-Ray (Initial) 08/27/20 1156   Drainage Appearance Green 08/27/20 1156   Output (mL) 200 ml 08/27/20 1502       [REMOVED] Urethral Catheter (Removed)   $ Urethral catheter insertion $ Not inserted for procedure 08/23/20 7083   Catheter Indications Perioperative use in selected surgeries including but not limited to urologic, pelvic or need for intraoperative monitoring of urinary output due to prolonged surgery, large

## 2020-08-28 NOTE — PROGRESS NOTES
Patient resting quietly in bed. Ammonia level within normal limits. Patient meets discharge criteria for phase 1. seen by anesthesia. OK to transfer to floor.

## 2020-08-29 NOTE — PROGRESS NOTES
5217 Patient assessment complete. Denies any needs at this time. Care plan and education reviewed and agreed upon with patient. Bed in low position, call light within reach and bed alarm on. Will continue to monitor.

## 2020-08-29 NOTE — PLAN OF CARE
Problem: Falls - Risk of:  Goal: Will remain free from falls  Description: Will remain free from falls  Outcome: Ongoing  Goal: Absence of physical injury  Description: Absence of physical injury  Outcome: Ongoing     Problem: Skin Integrity:  Goal: Will show no infection signs and symptoms  Description: Will show no infection signs and symptoms  Outcome: Ongoing  Goal: Absence of new skin breakdown  Description: Absence of new skin breakdown  Outcome: Ongoing     Problem: Infection:  Goal: Will remain free from infection  Description: Will remain free from infection  Outcome: Ongoing     Problem: Safety:  Goal: Free from accidental physical injury  Description: Free from accidental physical injury  Outcome: Ongoing  Goal: Free from intentional harm  Description: Free from intentional harm  Outcome: Ongoing     Problem: Daily Care:  Goal: Daily care needs are met  Description: Daily care needs are met  Outcome: Ongoing     Problem: Pain:  Description: Pain management should include both nonpharmacologic and pharmacologic interventions.   Goal: Patient's pain/discomfort is manageable  Description: Patient's pain/discomfort is manageable  Outcome: Ongoing  Goal: Pain level will decrease  Description: Pain level will decrease  Outcome: Ongoing  Goal: Control of acute pain  Description: Control of acute pain  Outcome: Ongoing  Goal: Control of chronic pain  Description: Control of chronic pain  Outcome: Ongoing     Problem: Skin Integrity:  Goal: Skin integrity will stabilize  Description: Skin integrity will stabilize  Outcome: Ongoing

## 2020-08-29 NOTE — PROGRESS NOTES
[facility] GI  Gastroenterology Progress Note    Cesia Roy is a 76 y.o. female patient. 1. Septicemia ([facility]    2. PAPITO (acute kidney injury) ([facility]    3. Intra-abdominal free air of unknown etiology    4. Fall, initial encounter        SUBJECTIVE:    No complaints     ROS:  Cardiovascular ROS: no chest pain or dyspnea on exertion  Gastrointestinal ROS: see above  Respiratory ROS: no cough, shortness of breath, or wheezing    Physical    VITALS:  /63   Pulse 70   Temp 97.5 °F (36.4 °C) (Oral)   Resp 16   Ht 5' 7\" (1.702 m)   Wt 230 lb (104.3 kg)   SpO2 94%   BMI 36.02 kg/m²   TEMPERATURE:  Current - Temp: 97.5 °F (36.4 °C); Max - Temp  Av.5 °F (35.8 °C)  Min: 96.1 °F (35.6 °C)  Max: 97.6 °F (36.4 °C)    NAD  RRR, Nl s1s2  Lungs CTA Bilaterally, normal effort  Abdomen soft, mild abdominal tenderness, + dressing. Bowel sounds normal  AAOx3, No asterixis     Data      CBC:   Recent Labs     20  0630 20  0654 20  0630   WBC 20.9* 13.5* 11.3*   RBC 3.18* 2.74* 3.12*   HGB 9.0* 7.6* 9.0*   HCT 28.4* 23.9* 27.6*    111* 105*   MCV 89.3 87.1 88.5   MCH 28.4 27.7 28.7   MCHC 31.8 31.8 32.5   RDW 18.1* 17.9* 18.0*        BMP:  Recent Labs     20  0630 20  0654 20  0630   * 135* 136   K 4.2 4.1 3.6    105 108   CO2 18* 18* 19*   BUN 36* 35* 27*   CREATININE 2.3* 2.0* 1.4*   CALCIUM 8.5 8.4 8.7   GLUCOSE 82 64* 76        Hepatic Function Panel:   Recent Labs     20  0654 20  0630   AST 22 20   ALT 6* 6*   BILIDIR  --  0.4*   BILITOT 0.7 0.9   ALKPHOS 80 92       No results for input(s): LIPASE, AMYLASE in the last 72 hours. Recent Labs     20  0654 20  0630   PROTIME 16.8* 15.6*   INR 1.44* 1.34*     No results for input(s): PTT in the last 72 hours. No results for input(s): OCCULTBLD in the last 72 hours. Radiology Review:    XR CHEST PORTABLE   Final Result   Nasogastric tube is seen extending below the diaphragm.   Dedicated abdominal   radiograph could be performed for better visualization as warranted. Bilateral airspace disease, increased on the right as compared to prior, for   which pneumonia or edema are considerations. XR ABDOMEN (2 VIEWS)   Final Result   Scattered dilated small bowel loops throughout the abdomen, increased   compared to prior CT scan, either due to ileus or early partial small bowel   obstruction         XR CHEST PORTABLE   Final Result   Lucency beneath the right hemidiaphragm compatible with free intraperitoneal   air. Apparently the patient has had recent abdominal surgery. This finding   was discussed with Dr. Pepe Mayers  At 6:48 pm on 8/24/2020. Status post placement of right jugular central venous catheter. No   pneumothorax. Patchy mid and lower lung ground-glass opacities suspicious for pneumonia,   increased since the prior CT. Correlation for pneumonia is recommended. CT ABSCESS DRAINAGE W CATH PLACEMENT S&I   Final Result   Successful CT guided placement of a drainage catheter in the pelvic abscess. CT ABDOMEN PELVIS W IV CONTRAST Additional Contrast? Oral   Final Result   1. Pelvic air/fluid collection almost certainly reflects an abscess or giant   colonic diverticula. This may be the result of acute diverticulitis. 2. Mass lesion ascending colon almost certainly reflecting primary   adenocarcinoma of the colon. 3. Diffuse hepatic lesions with newly visualized (contrast enhancement) mass   at the hepatic dome. Hepatic metastatic disease is a consideration as is   cirrhosis with regenerative nodules and hepatocellular carcinoma. 4. Bilateral lower lobe pulmonary nodules suspicious for metastatic disease. 5. Abdominal and pelvic ascites. 6. Sequela from portosystemic shunting with numerous collateral vessels about   the stomach, left upper quadrant and distal esophagus. Relatively small   esophageal and gastric varices.    7.  Calcific atherosclerotic disease aorta. CT ABDOMEN PELVIS WO CONTRAST Additional Contrast? None   Final Result   1. Non loculated fluid and gas collection in the pelvis concerning for   perforated viscus/possible abscess formation. 2. Prominent thickening of the ascending colon which may be related to   infectious or inflammatory process or underlying neoplastic process. 3. Bowel-gas pattern typical of adynamic ileus. 4. Hepatic cirrhosis with numerous hepatic nodules suggestive of   regenerative/siderotic nodules. MRI abdomen without and with IV contrast   correlation recommended. Metastatic disease is a consideration. 5. Hepatosplenomegaly and multifocal collateral vessels about the stomach   esophagus concerning for varices, all suggestive of portal venous   hypertension. 6. Abdominal and pelvic ascites. 7.  Diverticulosis coli without CT evidence of acute diverticulitis. Critical results were called by Dr. Cuauhtemoc Perez to [address] [place] [address]   on 8/16/2020 at 18:09. CT Cervical Spine WO Contrast   Final Result   No acute abnormality of the cervical spine. CT Head WO Contrast   Final Result   No acute intracranial abnormality. XR CHEST PORTABLE   Final Result   No acute process. XR RADIUS ULNA LEFT (2 VIEWS)   Final Result   No acute osseous abnormality. CT CHEST W CONTRAST    (Results Pending)     D&C 8/27   Findings: bulky 8 cm uterus with bleeding in vault. Uterus Packed with what appears to be tumor. Cervix and parametria normal.        ASSESSMENT :  Cirrhosis - new diagnosis per CT. bx c/w WINKLER cirrhosis. No alcohol history. Likely from fatty liver. Some ascites on CT. Seemed mildly confused at admission (baseline per her daughter) but ammonia was normal. repeat ammonia is elevated at 66 so lactulose started. Negative: AMA, hepatitis panel, A1AT phenotype. f-actin weak positive at 25. AFP normal. CEA 83.  Labs stable.      Abnormal CT of the colon and

## 2020-08-29 NOTE — PROGRESS NOTES
[facility] 82 [facility]        Progress Note    Patient Name: Ann Marie Soriano  MRN: [id number]  YOB: 1955  Date of Evaluation: 2020    Subjective:  No emesis, interested in eating  Pain controlled  No flatus or stool; mild bloating  Resting in bed at this time    Postoperative Day # 5, 1      Vital Signs:  Patient Vitals for the past 24 hrs:   BP Temp Temp src Pulse Resp SpO2   20 0815 111/73 97.4 °F (36.3 °C) Axillary 73 16 --   20 0313 124/63 97.5 °F (36.4 °C) Oral 70 16 94 %   20 2345 113/66 97.3 °F (36.3 °C) Oral 74 16 --   20 2135 117/63 97.5 °F (36.4 °C) Oral 70 16 95 %   20 1530 133/85 97.3 °F (36.3 °C) Oral 99 16 97 %   20 1500 129/85 -- -- 96 -- 97 %   20 1430 (!) 158/77 97.3 °F (36.3 °C) Oral 98 14 97 %   20 1345 -- -- -- 96 14 97 %   20 1330 -- -- -- 99 13 97 %   20 1315 (!) 153/79 -- -- 98 13 97 %   20 1310 (!) 151/68 -- -- 100 19 98 %   20 1300 (!) 147/61 -- -- 100 14 97 %   20 1255 (!) 149/109 -- -- 101 14 96 %   20 1249 (!) 163/81 97.6 °F (36.4 °C) Temporal 125 20 96 %      TEMPERATURE HISTORY 24H: Temp (24hrs), Av.5 °F (35.8 °C), Min:96.1 °F (35.6 °C), Max:97.6 °F (36.4 °C)    BLOOD PRESSURE HISTORY: Systolic (56DDP), NIMESH:126 , Min:90 , AMD:690    Diastolic (45ZKQ), KLQ:37, Min:47, Max:109      Intake/Output:  I/O last 3 completed shifts: In: 1200 [I.V.:1200]  Out: 520 [Urine:470; Drains:30; Blood:20]  No intake/output data recorded.   Drain/tube Output:  Closed/Suction Drain Left Back Bulb 10 [place]-Output (ml): 30 ml    Physical Exam:  General: awake, alert, oriented to person, place  Lungs: unlabored respirations  Abdomen: soft, mildly distended, appropriate incisional tenderness, hypoactive/tinkling bowel sounds present   Drain: serosanguinous   Skin/Wound: wound bed open and clean without drainage--dressing changed    Labs:  CBC:    Recent Labs 08/27/20  0654 08/28/20  0630 08/29/20  0558   WBC 13.5* 11.3* 9.3   HGB 7.6* 9.0* 8.3*   HCT 23.9* 27.6* 25.7*   * 105* 96*     BMP:    Recent Labs     08/27/20  0654 08/28/20  0630 08/29/20  0558   * 136 136   K 4.1 3.6 3.9    108 107   CO2 18* 19* 18*   BUN 35* 27* 21*   CREATININE 2.0* 1.4* 1.0   GLUCOSE 64* 76 125*     Hepatic:    Recent Labs     08/27/20  0654 08/28/20  0630 08/29/20  0558   AST 22 20 17   ALT 6* 6* <5*   BILITOT 0.7 0.9 0.7   ALKPHOS 80 92 85     Amylase:  No results found for: AMYLASE  Lipase:  No results found for: LIPASE   Mag:    Lab Results   Component Value Date    MG 2.30 08/22/2020    MG 2.60 08/21/2020     Phos:     Lab Results   Component Value Date    PHOS 3.8 08/29/2020    PHOS 4.2 08/28/2020      Coags:   Lab Results   Component Value Date    PROTIME 15.4 08/29/2020    INR 1.32 08/29/2020       Cultures:  Anaerobic culture  Lab Results   Component Value Date    LABANAE No anaerobes isolated 08/24/2020     Fungus stain  Results in Past 30 Days  Result Component Current Result Ref Range Previous Result Ref Range   Fungus Stain No Fungal elements seen (8/24/2020)  Not in Time Range      Gram stain  Results in Past 30 Days  Result Component Current Result Ref Range Previous Result Ref Range   Gram Stain Result 1+ WBC's (Polymorphonuclear)  No organisms seen   (8/24/2020)  1+ WBC's (Mononuclear)  1+ WBC's (Polymorphonuclear)  2+ Gram negative rods   (8/18/2020)      Organism  Lab Results   Component Value Date/Time    ORG Yeast (A) 08/24/2020 02:10 PM    ORG Lactobacillus species (A) 08/24/2020 02:10 PM     Surgical culture  Lab Results   Component Value Date/Time    CXSURG Rare growth  No further workup   08/24/2020 02:10 PM    CXSURG Rare growth  No further workup   08/24/2020 02:10 PM     Blood culture 1  Results in Past 30 Days  Result Component Current Result Ref Range Previous Result Ref Range   Blood Culture, Routine No Growth to date.   Any change in status will be called. (8/25/2020)  No Growth after 4 days of incubation. (8/16/2020)      Blood culture 2  Results in Past 30 Days  Result Component Current Result Ref Range Previous Result Ref Range   Culture, Blood 2 No Growth to date. Any change in status will be called. (8/25/2020)  No Growth after 4 days of incubation. (8/16/2020)      Fecal occult  No results found for requested labs within last 30 days. GI bacterial pathogens by PCR  No results found for requested labs within last 30 days. C. difficile  No results found for requested labs within last 30 days. Urine culture  No results found for: Roscoe Crocker    Pathology:   OR Pathology results pending     Endoscopy Pathology 8/20/2020--FINAL DIAGNOSIS:     A. Esophagus, biopsy:   - Fragments of squamous and gastric type columnar mucosa with mild   chronic inflammation.   - Negative for intestinal metaplasia. B. Ascending colon mass, biopsy:   - Poorly differentiated carcinoma, most consistent with adenocarcinoma. See comment. C. Colon polyp, transverse:   - Tubular adenoma. - Negative for high-grade dysplasia or malignancy. D. Colon polyp, descending:   - Tubular adenoma. - Negative for high-grade dysplasia or malignancy. E. Colon polyp, sigmoid:   - Tubular adenoma (multiple fragments). - Negative for high-grade dysplasia or malignancy. COMMENT:  The morphologic findings of ascending colon mass raises a   differential diagnosis of neuroendocrine carcinoma.  However, the   malignant cells are negative for neuroendocrine markers (synaptophysin   and chromogranin) .  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  Clinical correlation is recommended.      Imaging:  I have personally reviewed the following films:    Xr Abdomen (2 Views)    Result Date: 8/26/2020  EXAMINATION: TWO XRAY VIEWS OF THE ABDOMEN 8/26/2020 9:38 am COMPARISON: CT scan 08/17/2020 HISTORY: ORDERING SYSTEM PROVIDED HISTORY: Abdominal Pain TECHNOLOGIST PROVIDED HISTORY: Reason for exam:->Abdominal Pain Reason for Exam: abdominal pain Acuity: Acute Type of Exam: Initial FINDINGS: Scattered dilated loops small bowel are seen throughout the abdomen. No significant colonic gas is seen Drainage catheter projects in region the pelvis. Amado catheter is seen. Scattered dilated small bowel loops throughout the abdomen, increased compared to prior CT scan, either due to ileus or early partial small bowel obstruction     Scheduled Meds:   metoclopramide  10 mg Intravenous Q6H    oxymetazoline  2 spray Each Nostril Once    lidocaine   Topical Once    folic acid  1 mg Oral Daily    fluconazole  200 mg Intravenous Q24H    meropenem  1 g Intravenous Q12H    enoxaparin  30 mg Subcutaneous Daily    bupivacaine liposome  5 mL Subcutaneous Once    pantoprazole  40 mg Intravenous Daily    lactulose  20 g Oral TID    sodium chloride flush  10 mL Intravenous 2 times per day     Continuous Infusions:   dextrose 100 mL/hr (08/27/20 1300)     PRN Meds:.ketorolac, acetaminophen, phenol, glucose, dextrose, glucagon (rDNA), dextrose, [DISCONTINUED] promethazine **OR** ondansetron, promethazine, morphine **OR** [DISCONTINUED] morphine, potassium chloride **OR** potassium alternative oral replacement **OR** potassium chloride, lip balm petroleum free, sodium chloride flush, polyethylene glycol      Assessment:  76 y.o. female admitted with   1. Septicemia ([facility] [address]    2. PAPITO (acute kidney injury) ([facility] [address]    3. Intra-abdominal free air of unknown etiology    4.  Fall, initial encounter        Status-post exploratory laparotomy, evacuation of ascites, drainage of pelvic abscess, right hemicolectomy with primary ileotransverse colostomy anastomosis, and Abdiaziz-Cut liver biopsy  on 8/24/2020 for colon mass, right colon and hepatic flexure with necrotic cancer at hepatic flexure region with perforation, cirrhosis, pelvic abscess, ascites  Intraabdominal/pelvic abscess, status-post CT-guided drain placement on 8/18/2020  Sepsis   Cirrhosis  Acute kidney injury  Hyponatremia  COVID screening, negative  Anemia      Plan:    Wound clean  Dressing change tomorrow  Labs cont global improvement   Probably ready for diet tomorrow  Dc planning services  Follow drain outputs, remove soon  Colon path - advanced colon cancer, Endometrial pending    EDUCATION:  Educated patient on plan of care and disease process--all questions answered. Plans discussed with patient and nursing.         Signed:      Shu Quintero

## 2020-08-29 NOTE — PLAN OF CARE
Problem: Falls - Risk of:  Goal: Will remain free from falls  Description: Will remain free from falls  Outcome: Ongoing  Goal: Absence of physical injury  Description: Absence of physical injury  Outcome: Ongoing     Problem: Skin Integrity:  Goal: Will show no infection signs and symptoms  Description: Will show no infection signs and symptoms  Outcome: Ongoing  Goal: Absence of new skin breakdown  Description: Absence of new skin breakdown  Outcome: Ongoing     Problem: Infection:  Goal: Will remain free from infection  Description: Will remain free from infection  Outcome: Ongoing     Problem: Safety:  Goal: Free from accidental physical injury  Description: Free from accidental physical injury  Outcome: Ongoing  Goal: Free from intentional harm  Description: Free from intentional harm  Outcome: Ongoing     Problem: Daily Care:  Goal: Daily care needs are met  Description: Daily care needs are met  Outcome: Ongoing     Problem: Pain:  Goal: Patient's pain/discomfort is manageable  Description: Patient's pain/discomfort is manageable  Outcome: Ongoing  Goal: Pain level will decrease  Description: Pain level will decrease  Outcome: Ongoing  Goal: Control of acute pain  Description: Control of acute pain  Outcome: Ongoing  Goal: Control of chronic pain  Description: Control of chronic pain  Outcome: Ongoing     Problem: Skin Integrity:  Goal: Skin integrity will stabilize  Description: Skin integrity will stabilize  Outcome: Ongoing     Problem: Discharge Planning:  Goal: Patients continuum of care needs are met  Description: Patients continuum of care needs are met  Outcome: Ongoing     Problem: Nutrition  Goal: Optimal nutrition therapy  8/29/2020 0355 by dAeel Jiang RN  Outcome: Ongoing  8/28/2020 1523 by Raquel Gale RD, LD  Outcome: Ongoing     Problem: Confusion - Acute:  Goal: Absence of continued neurological deterioration signs and symptoms  Description: Absence of continued neurological deterioration signs and symptoms  Outcome: Ongoing  Goal: Mental status will be restored to baseline  Description: Mental status will be restored to baseline  Outcome: Ongoing     Problem: Discharge Planning:  Goal: Ability to perform activities of daily living will improve  Description: Ability to perform activities of daily living will improve  Outcome: Ongoing  Goal: Participates in care planning  Description: Participates in care planning  Outcome: Ongoing     Problem: Injury - Risk of, Physical Injury:  Goal: Will remain free from falls  Description: Will remain free from falls  Outcome: Ongoing  Goal: Absence of physical injury  Description: Absence of physical injury  Outcome: Ongoing     Problem: Mood - Altered:  Goal: Mood stable  Description: Mood stable  Outcome: Ongoing  Goal: Absence of abusive behavior  Description: Absence of abusive behavior  Outcome: Ongoing  Goal: Verbalizations of feeling emotionally comfortable while being cared for will increase  Description: Verbalizations of feeling emotionally comfortable while being cared for will increase  Outcome: Ongoing     Problem: Psychomotor Activity - Altered:  Goal: Absence of psychomotor disturbance signs and symptoms  Description: Absence of psychomotor disturbance signs and symptoms  Outcome: Ongoing     Problem: Sensory Perception - Impaired:  Goal: Demonstrations of improved sensory functioning will increase  Description: Demonstrations of improved sensory functioning will increase  Outcome: Ongoing  Goal: Decrease in sensory misperception frequency  Description: Decrease in sensory misperception frequency  Outcome: Ongoing  Goal: Able to refrain from responding to false sensory perceptions  Description: Able to refrain from responding to false sensory perceptions  Outcome: Ongoing  Goal: Demonstrates accurate environmental perceptions  Description: Demonstrates accurate environmental perceptions  Outcome: Ongoing  Goal: Able to distinguish

## 2020-08-29 NOTE — PROGRESS NOTES
MD Es Fernandez MD Jannifer Gallery, MD               Office: [phone]                      Fax: [phone]             [address] [place] [address]                   NEPHROLOGY INPATIENT PROGRESS NOTE:     PATIENT NAME: Corinne Galvan  : 1955  MRN: [id number]. IMPRESSION:       PAIPTO - improving to stable   - crea down   : Oligouric - now non-oliguric.    - needing albumin. NSS stopped due to crackles. But stable now   - Appears hypervolemic. Probably 3rd spacing  : Etiology of current PAPITO - ATN vs venous congestion vs. Decreased renal perfusion. WBC count higher  - Renal imaging: w/ CT - kidney unremarkable   - CK - 310 on admission then improved. - Avoid contrast exposure at this time. Associated problems: better to stable   - Electrolytes: Hyponatremia-better  - Weight up to 230 from 215. Also has hypoalbuminemia so may be 3rd spacing. Check daily weights. Cirrhosis   : BP: Ok to keep slightly higher   - Acid-Base: Acidosis - - non-AGMA - follow  - Anemia:  - Heme  Following       RECOMMENDATIONS:   - improved, with albumin for intravascular expansion  - continue to monitor w/o IVF for now   - monitor respiratory closely       Other problems: Management per primary and other consulting teams. # Cirrhosis , Portal HTN : new diagnosis, no h/o EtOH    # Sepsis, intra-abdominal abscess   - S/P CT-guided drain placement on 2020  - S/P right hemicolectomy       High complexity. Multiple medical problems. Discussed with patient and treatment team. Family, nursing. Thank you for allowing me to participate in this patient's care. Please do not hesitate to contact me for any questions/concerns. We will follow along with you.      Sean De La Cruz MD  [facility] [address]   Phone: [phone] or [address] [employer]  Fax: [phone]        Hospital Problems           Last Modified POA    Pelvic abscess in female 2020 Yes    Septicemia ([address] 8/17/2020 Yes    Colonic mass 8/25/2020 Yes    Cirrhosis of liver with ascites ([address] 8/25/2020 Yes    Intra-abdominal free air of unknown etiology 8/25/2020 Yes    PAPITO (acute kidney injury) ([address] 8/25/2020 Yes    Lung nodules 8/25/2020 Yes    Bandemia 8/25/2020 Yes    Intra-abdominal abscess ([facility] 8/25/2020 Yes    E coli infection 8/25/2020 Yes    Elevated CEA 8/25/2020 Yes    Ex-smoker 8/25/2020 Yes    Class 2 obesity due to excess calories with body mass index (BMI) of 36.0 to 36.9 in adult 8/25/2020 Yes    Portal hypertension ([facility] 8/25/2020 Yes    Mild malnutrition ([address] (Chronic) 8/25/2020 Yes    Tobacco abuse counseling 8/27/2020 Yes    Open abdominal wall wound 8/28/2020 Yes             ========================================    SUBJECTIVE:    No complaints today am     MEDICATIONS: reviewed by me. Medications Prior to Admission:  No current facility-administered medications on file prior to encounter. No current outpatient medications on file prior to encounter. No medications prior to admission. Scheduled Meds:   oxymetazoline  2 spray Each Nostril Once    lidocaine   Topical Once    folic acid  1 mg Oral Daily    fluconazole  200 mg Intravenous Q24H    meropenem  1 g Intravenous Q12H    enoxaparin  30 mg Subcutaneous Daily    bupivacaine liposome  5 mL Subcutaneous Once    pantoprazole  40 mg Intravenous Daily    lactulose  20 g Oral TID    sodium chloride flush  10 mL Intravenous 2 times per day     Continuous Infusions:   dextrose 100 mL/hr (08/27/20 1300)     PRN Meds:.phenol, glucose, dextrose, glucagon (rDNA), dextrose, [DISCONTINUED] promethazine **OR** ondansetron, promethazine, morphine **OR** morphine, potassium chloride **OR** potassium alternative oral replacement **OR** potassium chloride, lip balm petroleum free, sodium chloride flush, polyethylene glycol       PHYSICAL EXAM:  Recent vital signs and recent I/Os reviewed by me.      Wt Readings from Last 3 Encounters:   08/23/20 230 lb (104.3 kg)     BP Readings from Last 3 Encounters:   08/29/20 124/63   08/28/20 (!) 149/66   08/24/20 (!) 155/68     Patient Vitals for the past 24 hrs:   BP Temp Temp src Pulse Resp SpO2   08/29/20 0313 124/63 97.5 °F (36.4 °C) Oral 70 16 94 %   08/28/20 2345 113/66 97.3 °F (36.3 °C) Oral 74 16 --   08/28/20 2135 117/63 97.5 °F (36.4 °C) Oral 70 16 95 %   08/28/20 1530 133/85 97.3 °F (36.3 °C) Oral 99 16 97 %   08/28/20 1500 129/85 -- -- 96 -- 97 %   08/28/20 1430 (!) 158/77 97.3 °F (36.3 °C) Oral 98 14 97 %   08/28/20 1345 -- -- -- 96 14 97 %   08/28/20 1330 -- -- -- 99 13 97 %   08/28/20 1315 (!) 153/79 -- -- 98 13 97 %   08/28/20 1310 (!) 151/68 -- -- 100 19 98 %   08/28/20 1300 (!) 147/61 -- -- 100 14 97 %   08/28/20 1255 (!) 149/109 -- -- 101 14 96 %   08/28/20 1249 (!) 163/81 97.6 °F (36.4 °C) Temporal 125 20 96 %   08/28/20 0955 136/79 97.1 °F (36.2 °C) Temporal 77 18 93 %       Intake/Output Summary (Last 24 hours) at 8/29/2020 0908  Last data filed at 8/29/2020 0423  Gross per 24 hour   Intake 1200 ml   Output 520 ml   Net 680 ml         General: Awake, Alert,   HENT: Atraumatic, normocephalic   Eyes: Normal conjunctiva, Non-incteral sclera. Neck: Supple, JVD not visible. CVS:  Heart sounds are normal. No murmurs. No pericardial rub. RS: Normal respiratory efforts,  Lung fields are diminished . Abd: Soft , bowel sounds are normal, no distension and no tenderness to palpation. Skin: No rash , some bruises,   CNS: Awake Oriented , No focal.   Extremities/MSK: 2+ Edema, improving,  no cyanosis.            DATA:  Diagnostic tests reviewed by me for today's visit:    Recent Labs     08/27/20  0654 08/28/20  0630 08/29/20  0558   WBC 13.5* 11.3* 9.3   HCT 23.9* 27.6* 25.7*   * 105*  --      Iron Saturation:  No components found for: PERCENTFE  FERRITIN:    Lab Results   Component Value Date    FERRITIN 542.4 08/17/2020     IRON:    Lab Results for: LABA1C  RPR:  No results found for: RPR  HIV:  No results found for: HIV  KULWANT:  No results found for: ANATITER, KULWANT  RF:  No results found for: RF  DSDNA:  No components found for: DNA  AMYLASE:  No results found for: AMYLASE  LIPASE:  No results found for: LIPASE  Fibrinogen Level:  No components found for: FIB       BELOW MENTIONED RADIOLOGY STUDY RESULTS BY ME:    Ct Abdomen Pelvis Wo Contrast Additional Contrast? None    Result Date: 8/16/2020  EXAMINATION: CT OF THE ABDOMEN AND PELVIS WITHOUT CONTRAST 8/16/2020 5:11 pm TECHNIQUE: CT of the abdomen and pelvis was performed without the administration of intravenous contrast. Multiplanar reformatted images are provided for review. Dose modulation, iterative reconstruction, and/or weight based adjustment of the mA/kV was utilized to reduce the radiation dose to as low as reasonably achievable. COMPARISON: None. HISTORY: Acute nausea FINDINGS: Lower Chest: FOUR solid soft tissue pulmonary nodules right lower lobe, 7 x 8 mm (axial image 19), 11 x 12 mm (axial 11), 9 x 9 mm (axial image 10) and 4 x 4 mm (axial image 10). 3 mm pulmonary nodules are seen posterior basal segment left lower lobe. Mild nodular fullness about the distal esophagus. Heart size is upper normal. Organs: Prominent caudate lobe hypertrophy and poly lobular configuration of the liver as well as diffuse tiny hypoattenuating lesions throughout the liver suggestive of hepatic cirrhosis and siderotic nodules. No discrete mass lesion evident. 19 cm craniocaudal liver length. The spleen is mildly enlarged. The kidneys, gallbladder, adrenal glands and pancreas appear unremarkable. Multifocal collateral vessels are seen about the gastrohepatic ligament extending to the posterior mediastinum. GI/Bowel: Prominent diffuse thickening of the wall of the ascending colon.  Other portions of colon and small bowel appear unremarkable with exception of few gas distended small bowel loops typical of adynamic ileus. There are diverticula involving the descending and sigmoid colon. Pelvis: Fluid collection within the pelvis without definite margins has air bubbles within it concerning for sequela from perforated bowel/abscess formation. Uterus and adnexal structures appear unremarkable. Peritoneum/Retroperitoneum: Abdominal ascites. Calcific atherosclerosis aorta. Bones/Soft Tissues: No acute superficial soft tissue or osseous structure abnormality evident. 1. Non loculated fluid and gas collection in the pelvis concerning for perforated viscus/possible abscess formation. 2. Prominent thickening of the ascending colon which may be related to infectious or inflammatory process or underlying neoplastic process. 3. Bowel-gas pattern typical of adynamic ileus. 4. Hepatic cirrhosis with numerous hepatic nodules suggestive of regenerative/siderotic nodules. MRI abdomen without and with IV contrast correlation recommended. Metastatic disease is a consideration. 5. Hepatosplenomegaly and multifocal collateral vessels about the stomach esophagus concerning for varices, all suggestive of portal venous hypertension. 6. Abdominal and pelvic ascites. 7.  Diverticulosis coli without CT evidence of acute diverticulitis. Critical results were called by Dr. Toma Beard to [address] [place] [address] on 8/16/2020 at 18:09. Xr Radius Ulna Left (2 Views)    Result Date: 8/16/2020  EXAMINATION: TWO XRAY VIEWS OF THE LEFT FOREARM 8/16/2020 2:55 pm COMPARISON: None. HISTORY: ORDERING SYSTEM PROVIDED HISTORY: Fall TECHNOLOGIST PROVIDED HISTORY: Reason for exam:->Fall Reason for Exam: Frequent falls at home. Bruising mid shaft L forearm Acuity: Acute Type of Exam: Initial FINDINGS: There is no evidence of acute fracture. There is normal alignment. No acute joint abnormality. No focal osseous lesion. No focal soft tissue abnormality. No acute osseous abnormality.      Ct Head Wo Contrast    Result Date: 8/16/2020  EXAMINATION: CT OF THE HEAD WITHOUT CONTRAST  8/16/2020 3:15 pm TECHNIQUE: CT of the head was performed without the administration of intravenous contrast. Dose modulation, iterative reconstruction, and/or weight based adjustment of the mA/kV was utilized to reduce the radiation dose to as low as reasonably achievable. COMPARISON: None. HISTORY: ORDERING SYSTEM PROVIDED HISTORY: Fall TECHNOLOGIST PROVIDED HISTORY: Reason for exam:->Fall Has a \"code stroke\" or \"stroke alert\" been called? ->No Reason for Exam: Fall Acuity: Acute Type of Exam: Initial FINDINGS: BRAIN/VENTRICLES: The ventricles and sulci are diffusely enlarged. Low attenuation is seen in the periventricular and subcortical white matter. No acute intracranial hemorrhage or acute infarct is identified. ORBITS: The visualized portion of the orbits demonstrate no acute abnormality. SINUSES: The visualized paranasal sinuses and mastoid air cells demonstrate no acute abnormality. SOFT TISSUES/SKULL:  No acute abnormality of the visualized skull or soft tissues. No acute intracranial abnormality. Ct Cervical Spine Wo Contrast    Result Date: 8/16/2020  EXAMINATION: CT OF THE CERVICAL SPINE WITHOUT CONTRAST 8/16/2020 3:15 pm TECHNIQUE: CT of the cervical spine was performed without the administration of intravenous contrast. Multiplanar reformatted images are provided for review. Dose modulation, iterative reconstruction, and/or weight based adjustment of the mA/kV was utilized to reduce the radiation dose to as low as reasonably achievable. COMPARISON: None. HISTORY: ORDERING SYSTEM PROVIDED HISTORY: Fall TECHNOLOGIST PROVIDED HISTORY: Reason for exam:->Fall Reason for Exam: Fall Acuity: Acute Type of Exam: Initial FINDINGS: BONES/ALIGNMENT: There is no acute fracture or traumatic malalignment. DEGENERATIVE CHANGES: Multilevel degenerative changes. SOFT TISSUES: There is no prevertebral soft tissue swelling.      No acute abnormality of the cervical spine. Xr Chest Portable    Result Date: 8/16/2020  EXAMINATION: ONE XRAY VIEW OF THE CHEST 8/16/2020 2:55 pm COMPARISON: None. HISTORY: ORDERING SYSTEM PROVIDED HISTORY: Fall TECHNOLOGIST PROVIDED HISTORY: Reason for exam:->Fall Reason for Exam: Frequent falls at home. Bruising mid shaft L forearm Acuity: Acute Type of Exam: Initial FINDINGS: The lungs are without acute focal process. There is no effusion or pneumothorax. The cardiomediastinal silhouette is without acute process. The osseous structures are without acute process. No acute process.      Allen Mejia MD

## 2020-08-29 NOTE — PROGRESS NOTES
[facility] PROGRESS NOTE    8/29/2020 2:21 PM        Name: Rosalinda Rod . Admitted: 8/16/2020  Primary Care Provider: No primary care provider on file. (Tel: None)      Subjective:  Patient is a 75 yo female with hx nicotine use. The patient lives alone, she presented to hospital after being found on floor by her daughter. She has had multiple falls in past couple of months. CT abdomen and pelvis in ER showed pelvic abscess vs viscus perf. Colonic wall thickening, concerning for malignancy, liver cirrhosis portal hypertension. She had Ct-guided drain placement 8/18 for pelvic abscess 8/18. Subsequently found to have large ascending colon mass on colonoscopy 8/20, likely the source of abscess. 8/18/2020: Successful CT guided placement of a drainage catheter in the pelvic abscess. 8/24/2020: Exploratory laparotomy, evacuation of ascites, drainage of  pelvic abscess, right hemicolectomy with primary ileotransverse  colostomy anastomosis, and Abdiaziz-Cut liver biopsy. 8/28/2020: SECONDARY ABDOMINAL WOUND CLOSURE  DILATATION AND CURETTAGE    Presently resting in bed. States she feels pretty good. Reports she had small BM this morning. Still NPO. Operative pain well controlled. Denies n/v. Remains afebrile.      Reviewed interval ancillary notes    Current Medications  metoclopramide (REGLAN) injection 10 mg, Q6H  ketorolac (TORADOL) injection 15 mg, Q6H PRN  acetaminophen (TYLENOL) tablet 1,000 mg, Q6H PRN  oxymetazoline (AFRIN) 0.05 % nasal spray 2 spray, Once  lidocaine (XYLOCAINE) 2 % jelly, Once  phenol 1.4 % mouth spray 1 spray, Q2H PRN  glucose (GLUTOSE) 40 % oral gel 15 g, PRN  dextrose 50 % IV solution, PRN  glucagon (rDNA) injection 1 mg, PRN  dextrose 5 % solution, PRN  folic acid (FOLVITE) tablet 1 mg, Daily  ondansetron (ZOFRAN) injection 4 mg, Q4H PRN  promethazine (PHENERGAN) injection 12.5 mg, Q6H PRN  fluconazole (DIFLUCAN) in 0.9 % sodium chloride IVPB 200 mg, Q24H  meropenem (MERREM) 1 g in sodium chloride 0.9 % 100 mL IVPB (mini-bag), Q12H  enoxaparin (LOVENOX) injection 30 mg, Daily  bupivacaine liposome (EXPAREL) 1.3 % injection 66.5 mg, Once  morphine (PF) injection 2 mg, Q2H PRN  pantoprazole (PROTONIX) injection 40 mg, Daily  potassium chloride (KLOR-CON M) extended release tablet 40 mEq, PRN    Or  potassium bicarb-citric acid (EFFER-K) effervescent tablet 40 mEq, PRN    Or  potassium chloride 10 mEq/100 mL IVPB (Peripheral Line), PRN  lactulose (CHRONULAC) 10 GM/15ML solution 20 g, TID  lip balm petroleum free (PHYTOPLEX) stick, PRN  sodium chloride flush 0.9 % injection 10 mL, 2 times per day  sodium chloride flush 0.9 % injection 10 mL, PRN  polyethylene glycol (GLYCOLAX) packet 17 g, Daily PRN      Objective:  BP (!) 136/98   Pulse 70   Temp 97.5 °F (36.4 °C) (Oral)   Resp 16   Ht 5' 7\" (1.702 m)   Wt 230 lb (104.3 kg)   SpO2 92%   BMI 36.02 kg/m²     Intake/Output Summary (Last 24 hours) at 8/29/2020 1421  Last data filed at 8/29/2020 0423  Gross per 24 hour   Intake --   Output 500 ml   Net -500 ml      Wt Readings from Last 3 Encounters:   08/23/20 230 lb (104.3 kg)     General:  Awake, alert, oriented in NAD  Skin:  Warm and dry. No unusual bruising or rash  Neck:  Supple. No JVD appreciated  Chest:  Normal effort.   Diminished, no wheezes/rhonchi/rales  Cardiovascular:  RRR, normal S1/S2, no murmur/gallop/rub  Abdomen:  Soft, nontender, midline abdominal dressing C/D/I  : Amado draining yellow urine, small amount vaginal bleeding  Extremities:  2+ BLE edema  Neurological: No focal deficits  Psychological: Normal mood and affect      Labs and Tests:  CBC:   Recent Labs     08/27/20  0654 08/28/20  0630 08/29/20  0558   WBC 13.5* 11.3* 9.3   HGB 7.6* 9.0* 8.3*   * 105* 96*     BMP:    Recent Labs     08/27/20  0654 08/28/20  0630 08/29/20  0558   * 136 136   K 4.1 3.6 3.9    108 107   CO2 18* 19* 18*   BUN 35* 27* 21*   CREATININE 2.0* 1.4* 1.0   GLUCOSE 64* 76 125*     Hepatic:   Recent Labs     08/27/20  0654 08/28/20  0630 08/29/20  0558   AST 22 20 17   ALT 6* 6* <5*   BILITOT 0.7 0.9 0.7   ALKPHOS 80 92 85       CXR 8/16/2020:  No acute process. CT Head 8/16/2020:  No acute intracranial abnormality.           CT Cervical Spine 8/16/2020:  No acute abnormality of the cervical spine. CT Abdomen/Pelvis Without Contrast 8/16/2020:  . Non loculated fluid and gas collection in the pelvis concerning for    perforated viscus/possible abscess formation. 2. Prominent thickening of the ascending colon which may be related to    infectious or inflammatory process or underlying neoplastic process. 3. Bowel-gas pattern typical of adynamic ileus. 4. Hepatic cirrhosis with numerous hepatic nodules suggestive of    regenerative/siderotic nodules.  MRI abdomen without and with IV contrast    correlation recommended.  Metastatic disease is a consideration. 5. Hepatosplenomegaly and multifocal collateral vessels about the stomach    esophagus concerning for varices, all suggestive of portal venous    hypertension. 6. Abdominal and pelvic ascites. 7.  Diverticulosis coli without CT evidence of acute diverticulitis. CT Abdomen/Pelvis With Contrast 8/17/2020:  1. Pelvic air/fluid collection almost certainly reflects an abscess or giant    colonic diverticula.  This may be the result of acute diverticulitis. 2. Mass lesion ascending colon almost certainly reflecting primary    adenocarcinoma of the colon. 3. Diffuse hepatic lesions with newly visualized (contrast enhancement) mass    at the hepatic dome.  Hepatic metastatic disease is a consideration as is    cirrhosis with regenerative nodules and hepatocellular carcinoma. 4. Bilateral lower lobe pulmonary nodules suspicious for metastatic disease. 5. Abdominal and pelvic ascites.     6. Sequela from portosystemic shunting with numerous collateral vessels about    the stomach, left upper quadrant and distal esophagus.  Relatively small    esophageal and gastric varices. 7.  Calcific atherosclerotic disease aorta. CXR 8/24/2020:  FINDINGS:    There is been placement of a right jugular central venous catheter with its    tip projecting over the superior vena cava.  The heart size and mediastinal    contours are stable.         Bilateral mid and lower lung patchy ground-glass opacities today, increased    since the prior study.         No pneumothorax is evident.  Lucency beneath the right hemidiaphragm is new    since prior studies suspicious for free intraperitoneal air.              Impression    Lucency beneath the right hemidiaphragm compatible with free intraperitoneal    air.  Apparently the patient has had recent abdominal surgery.  This finding    was discussed with Dr. Cristina Kurtz 6:48 pm on 8/24/2020.         Status post placement of right jugular central venous catheter.  No    pneumothorax.         Patchy mid and lower lung ground-glass opacities suspicious for pneumonia,    increased since the prior CT.  Correlation for pneumonia is recommended. Abdominal Xray 8/26/2020:  Scattered dilated small bowel loops throughout the abdomen, increased    compared to prior CT scan, either due to ileus or early partial small bowel    obstruction        Surgical Pathology 8/24/2020:  FINAL DIAGNOSIS:        A. Right colon, hemicolectomy:        - Invasive poorly-differentiated adenocarcinoma with extensive          necrosis and full-thickness defect - SEE SYNOPTIC REPORT.        - Surgical resection margins negative for involvement.        - Metastatic adenocarcinoma in three of twenty-two lymph nodes          (3/22).      - One pericolonic tumor deposit.        B.  Liver, biopsy:        - Cirrhotic liver with moderate steatosis and associated features          suggestive for steatohepatitis; negative for adenocarcinoma          involvement - see comment. Problem List  Active Problems:    Pelvic abscess in female    Septicemia ([address]    Colonic mass    Cirrhosis of liver with ascites ([address]    Intra-abdominal free air of unknown etiology    PAPITO (acute kidney injury) ([address]    Lung nodules    Bandemia    Intra-abdominal abscess ([facility])    E coli infection    Elevated CEA    Ex-smoker    Class 2 obesity due to excess calories with body mass index (BMI) of 36.0 to 36.9 in adult    Portal hypertension ([facility])    Mild malnutrition ([facility])    Tobacco abuse counseling    Open abdominal wall wound  Resolved Problems:    * No resolved hospital problems. *       Assessment:   1. Pelvic abscess. Status post IR drainage 8/18, pelvic fluid cytology negative for malignant cells. Colonoscopy 8/20 with large necrotic ascending colon mass, likely source of abscess. Status post exp lap right transverse colectomy 8/24. Status post secondary wound closure 8/28. Initially on Zosyn, transitioned to IV cipro (8/20). IV Flagyl added 8/25. Now on meropenem and flucanazole. Afebrile, WBC trending down. Blood culture results (8/25) with no growth to date. Surgery and ID on case. 2. Liver cirrhosis / portal HTN. Biopsy consistent with WINKLER cirrhosis, negative for adenocarcinoma involvement. Started on lactulose for ammonia level 66 and confusion, ammonia level 23 (8/28). GI on case. 3. Colon cancer. CT scan showed thickening of ascending colon suspicious for malignancy, lesions liver and lung concerning for metastasis. CEA 83.3, AFP 2.4. Colon biopsy (8/24) showed poorly differentiate adenocarcinoma, metastatic adenocarcinoma in 3 of 232 lymph nodes. Patient will need CT chest for staging once renal function improves. Plan is for chemotherapy once she recovers from surgery. Oncology on board. 4. PAPITO. Creatinine 1.6 on admission suspected pre-renal, hypovolemia. Bumped post surgery now trending down.  Weight is up 15 lbs compared to admission, suspected 3rd spacing, receiving albumin, 1.8->2.4->2.5. Nephrology on board. 5. Abnormal CXR (8/27). Bilateral airspace disease, increased on right as compared to prior, pneumonia or edema are considerations. Possible 3rd spacing, receiving albumin. COVID-19 (8/22) negative. Remains afebrile, no cough. Encouraged IS use. 6. Acute anemia. Hgb 10.9 on admission, no previous studies for comparison. Suspect she likely had chronic iron deficiency anemia secondary to colon cancer and post menopausal bleeding. She received 1 unit PRBCs 8/27, Hgb 8.3 today. DC planning: Patient is extremely weak. Plan is for SNF on DC. Case management is assisting, family preference is [facility]         Diet: Diet NPO Effective Now Exceptions are: Ice Chips, Sips with Meds  Code:Full Code  DVT PPX: on enoxaparin      VIN Espinosa CNP   8/29/2020 2:21 PM

## 2020-08-30 NOTE — PROGRESS NOTES
Pt in bed c/o of nausea prn phenergan given . VSS . Amado in place with 200 cc of urine output . All 3 ports and pvp lines flushed well. Assessment completed no acute changes .  Call light in reach Electronically signed by Carlie Novoa RN on 8/29/2020 at 9:35 PM

## 2020-08-30 NOTE — PROGRESS NOTES
Pt in bed emesis x1 of green bile called made to  awaiting response . VSS . Dressing to ABD remains c/d/i . No c/o pain. Patient cleaned and given a new grown cancer.  Electronically signed by Manish Morales RN on 8/30/2020 at 5:37 AM

## 2020-08-30 NOTE — PLAN OF CARE
Problem: Falls - Risk of:  Goal: Will remain free from falls  Description: Will remain free from falls  Outcome: Ongoing  Goal: Absence of physical injury  Description: Absence of physical injury  Outcome: Ongoing     Problem: Skin Integrity:  Goal: Will show no infection signs and symptoms  Description: Will show no infection signs and symptoms  Outcome: Ongoing  Goal: Absence of new skin breakdown  Description: Absence of new skin breakdown  Outcome: Ongoing     Problem: Infection:  Goal: Will remain free from infection  Description: Will remain free from infection  Outcome: Ongoing     Problem: Safety:  Goal: Free from accidental physical injury  Description: Free from accidental physical injury  Outcome: Ongoing  Goal: Free from intentional harm  Description: Free from intentional harm  Outcome: Ongoing     Problem: Daily Care:  Goal: Daily care needs are met  Description: Daily care needs are met  Outcome: Ongoing     Problem: Pain:  Goal: Patient's pain/discomfort is manageable  Description: Patient's pain/discomfort is manageable  Outcome: Ongoing  Goal: Pain level will decrease  Description: Pain level will decrease  Outcome: Ongoing  Goal: Control of acute pain  Description: Control of acute pain  Outcome: Ongoing  Goal: Control of chronic pain  Description: Control of chronic pain  Outcome: Ongoing     Problem: Skin Integrity:  Goal: Skin integrity will stabilize  Description: Skin integrity will stabilize  Outcome: Ongoing     Problem: Discharge Planning:  Goal: Patients continuum of care needs are met  Description: Patients continuum of care needs are met  Outcome: Ongoing     Problem: Nutrition  Goal: Optimal nutrition therapy  Outcome: Ongoing     Problem: Confusion - Acute:  Goal: Absence of continued neurological deterioration signs and symptoms  Description: Absence of continued neurological deterioration signs and symptoms  Outcome: Ongoing  Goal: Mental status will be restored to baseline  Description: Mental status will be restored to baseline  Outcome: Ongoing     Problem: Discharge Planning:  Goal: Ability to perform activities of daily living will improve  Description: Ability to perform activities of daily living will improve  Outcome: Ongoing  Goal: Participates in care planning  Description: Participates in care planning  Outcome: Ongoing     Problem: Injury - Risk of, Physical Injury:  Goal: Will remain free from falls  Description: Will remain free from falls  Outcome: Ongoing  Goal: Absence of physical injury  Description: Absence of physical injury  Outcome: Ongoing     Problem: Mood - Altered:  Goal: Mood stable  Description: Mood stable  Outcome: Ongoing  Goal: Absence of abusive behavior  Description: Absence of abusive behavior  Outcome: Ongoing  Goal: Verbalizations of feeling emotionally comfortable while being cared for will increase  Description: Verbalizations of feeling emotionally comfortable while being cared for will increase  Outcome: Ongoing     Problem: Psychomotor Activity - Altered:  Goal: Absence of psychomotor disturbance signs and symptoms  Description: Absence of psychomotor disturbance signs and symptoms  Outcome: Ongoing     Problem: Sensory Perception - Impaired:  Goal: Demonstrations of improved sensory functioning will increase  Description: Demonstrations of improved sensory functioning will increase  Outcome: Ongoing  Goal: Decrease in sensory misperception frequency  Description: Decrease in sensory misperception frequency  Outcome: Ongoing  Goal: Able to refrain from responding to false sensory perceptions  Description: Able to refrain from responding to false sensory perceptions  Outcome: Ongoing  Goal: Demonstrates accurate environmental perceptions  Description: Demonstrates accurate environmental perceptions  Outcome: Ongoing  Goal: Able to distinguish between reality-based and nonreality-based thinking  Description: Able to distinguish between

## 2020-08-30 NOTE — PROGRESS NOTES
Patient is comfortable in bed, fall precautions in place, call light and phone by side, no needs at this time, will continue to monitor.  [employer] [employer]

## 2020-08-30 NOTE — PROGRESS NOTES
[facility] Laparoscopic [facility]        Progress Note    Patient Name: Cesia Roy  MRN: [id number]  YOB: 1955  Date of Evaluation: 2020    Subjective:  Pt had emesis, NG placed, large volume output  States had a BM  Pain controlled  Resting in bed at this time    Postoperative Day # 6, 2      Vital Signs:  Patient Vitals for the past 24 hrs:   BP Temp Temp src Pulse Resp SpO2   20 0827 132/82 97.6 °F (36.4 °C) Oral 89 16 95 %   20 0500 129/74 97.6 °F (36.4 °C) Oral 70 -- 94 %   20 2345 (!) 126/90 97.3 °F (36.3 °C) Oral 75 -- 93 %   20 2115 117/78 97.8 °F (36.6 °C) Oral 73 16 94 %   20 1730 128/81 97.7 °F (36.5 °C) Oral 76 16 93 %   20 1322 (!) 136/98 97.5 °F (36.4 °C) Oral 70 16 92 %      TEMPERATURE HISTORY 24H: Temp (24hrs), Av.6 °F (36.4 °C), Min:97.3 °F (36.3 °C), Max:97.8 °F (36.6 °C)    BLOOD PRESSURE HISTORY: Systolic (04PRS), HXD:593 , Min:111 , LDN:228    Diastolic (11VUJ), :63, Min:63, Max:98      Intake/Output:  I/O last 3 completed shifts:  In: -   Out: 300 [Urine:300]  No intake/output data recorded. Drain/tube Output:  Closed/Suction Drain Left Back Bulb 10 Turkmen-Output (ml): 30 ml    Physical Exam:  General: awake, alert, oriented to person, place  Lungs: unlabored respirations  Abdomen: soft, mildly distended, appropriate incisional tenderness, hypoactive bowel sounds present   Drain: serosanguinous   Skin/Wound: wound incision is clean, no drainage    Labs:  CBC:    Recent Labs     20  0630 20  0558   WBC 11.3* 9.3   HGB 9.0* 8.3*   HCT 27.6* 25.7*   * 96*     BMP:    Recent Labs     20  0630 20  0558    136   K 3.6 3.9    107   CO2 19* 18*   BUN 27* 21*   CREATININE 1.4* 1.0   GLUCOSE 76 125*     Hepatic:    Recent Labs     20  0630 20  0558   AST 20 17   ALT 6* <5*   BILITOT 0.9 0.7   ALKPHOS 92 85     Amylase:  No results found for: AMYLASE  Lipase:   No results found for: LIPASE   Mag:    Lab Results   Component Value Date    MG 2.30 08/22/2020    MG 2.60 08/21/2020     Phos:     Lab Results   Component Value Date    PHOS 3.8 08/29/2020    PHOS 4.2 08/28/2020      Coags:   Lab Results   Component Value Date    PROTIME 15.4 08/29/2020    INR 1.32 08/29/2020       Cultures:  Anaerobic culture  Lab Results   Component Value Date    LABANAE No anaerobes isolated 08/24/2020     Fungus stain  Results in Past 30 Days  Result Component Current Result Ref Range Previous Result Ref Range   Fungus Stain No Fungal elements seen (8/24/2020)  Not in Time Range      Gram stain  Results in Past 30 Days  Result Component Current Result Ref Range Previous Result Ref Range   Gram Stain Result 1+ WBC's (Polymorphonuclear)  No organisms seen   (8/24/2020)  1+ WBC's (Mononuclear)  1+ WBC's (Polymorphonuclear)  2+ Gram negative rods   (8/18/2020)      Organism  Lab Results   Component Value Date/Time    ORG Yeast (A) 08/24/2020 02:10 PM    ORG Lactobacillus species (A) 08/24/2020 02:10 PM     Surgical culture  Lab Results   Component Value Date/Time    CXSURG Rare growth  No further workup   08/24/2020 02:10 PM    CXSURG Rare growth  No further workup   08/24/2020 02:10 PM     Blood culture 1  Results in Past 30 Days  Result Component Current Result Ref Range Previous Result Ref Range   Blood Culture, Routine No Growth after 4 days of incubation. (8/25/2020)  No Growth after 4 days of incubation. (8/16/2020)      Blood culture 2  Results in Past 30 Days  Result Component Current Result Ref Range Previous Result Ref Range   Culture, Blood 2 No Growth after 4 days of incubation. (8/25/2020)  No Growth after 4 days of incubation. (8/16/2020)      Fecal occult  No results found for requested labs within last 30 days. GI bacterial pathogens by PCR  No results found for requested labs within last 30 days. C. difficile  No results found for requested labs within last 30 days.      Urine culture  No results found for: Gary Jackson    Pathology:   OR Pathology results pending     Endoscopy Pathology 8/20/2020--FINAL DIAGNOSIS:     A. Esophagus, biopsy:   - Fragments of squamous and gastric type columnar mucosa with mild   chronic inflammation.   - Negative for intestinal metaplasia. B. Ascending colon mass, biopsy:   - Poorly differentiated carcinoma, most consistent with adenocarcinoma. See comment. C. Colon polyp, transverse:   - Tubular adenoma. - Negative for high-grade dysplasia or malignancy. D. Colon polyp, descending:   - Tubular adenoma. - Negative for high-grade dysplasia or malignancy. E. Colon polyp, sigmoid:   - Tubular adenoma (multiple fragments). - Negative for high-grade dysplasia or malignancy. COMMENT:  The morphologic findings of ascending colon mass raises a   differential diagnosis of neuroendocrine carcinoma.  However, the   malignant cells are negative for neuroendocrine markers (synaptophysin   and chromogranin) .  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  Clinical correlation is recommended. Imaging:  I have personally reviewed the following films:    Xr Abdomen (2 Views)    Result Date: 8/26/2020  EXAMINATION: TWO XRAY VIEWS OF THE ABDOMEN 8/26/2020 9:38 am COMPARISON: CT scan 08/17/2020 HISTORY: ORDERING SYSTEM PROVIDED HISTORY: Abdominal Pain TECHNOLOGIST PROVIDED HISTORY: Reason for exam:->Abdominal Pain Reason for Exam: abdominal pain Acuity: Acute Type of Exam: Initial FINDINGS: Scattered dilated loops small bowel are seen throughout the abdomen. No significant colonic gas is seen Drainage catheter projects in region the pelvis. Amado catheter is seen.      Scattered dilated small bowel loops throughout the abdomen, increased compared to prior CT scan, either due to ileus or early partial small bowel obstruction     Scheduled Meds:   metoclopramide  10 mg Intravenous Q6H    lidocaine   Topical Once    nursing.         Signed:      Tonja Joshi

## 2020-08-30 NOTE — PROGRESS NOTES
OCCULTBLD in the last 72 hours. Radiology Review:    XR CHEST PORTABLE   Final Result   Nasogastric tube is seen extending below the diaphragm. Dedicated abdominal   radiograph could be performed for better visualization as warranted. Bilateral airspace disease, increased on the right as compared to prior, for   which pneumonia or edema are considerations. XR ABDOMEN (2 VIEWS)   Final Result   Scattered dilated small bowel loops throughout the abdomen, increased   compared to prior CT scan, either due to ileus or early partial small bowel   obstruction         XR CHEST PORTABLE   Final Result   Lucency beneath the right hemidiaphragm compatible with free intraperitoneal   air. Apparently the patient has had recent abdominal surgery. This finding   was discussed with Dr. Mavis Swanson  At 6:48 pm on 8/24/2020. Status post placement of right jugular central venous catheter. No   pneumothorax. Patchy mid and lower lung ground-glass opacities suspicious for pneumonia,   increased since the prior CT. Correlation for pneumonia is recommended. CT ABSCESS DRAINAGE W CATH PLACEMENT S&I   Final Result   Successful CT guided placement of a drainage catheter in the pelvic abscess. CT ABDOMEN PELVIS W IV CONTRAST Additional Contrast? Oral   Final Result   1. Pelvic air/fluid collection almost certainly reflects an abscess or giant   colonic diverticula. This may be the result of acute diverticulitis. 2. Mass lesion ascending colon almost certainly reflecting primary   adenocarcinoma of the colon. 3. Diffuse hepatic lesions with newly visualized (contrast enhancement) mass   at the hepatic dome. Hepatic metastatic disease is a consideration as is   cirrhosis with regenerative nodules and hepatocellular carcinoma. 4. Bilateral lower lobe pulmonary nodules suspicious for metastatic disease. 5. Abdominal and pelvic ascites.    6. Sequela from portosystemic shunting with numerous collateral vessels about   the stomach, left upper quadrant and distal esophagus. Relatively small   esophageal and gastric varices. 7.  Calcific atherosclerotic disease aorta. CT ABDOMEN PELVIS WO CONTRAST Additional Contrast? None   Final Result   1. Non loculated fluid and gas collection in the pelvis concerning for   perforated viscus/possible abscess formation. 2. Prominent thickening of the ascending colon which may be related to   infectious or inflammatory process or underlying neoplastic process. 3. Bowel-gas pattern typical of adynamic ileus. 4. Hepatic cirrhosis with numerous hepatic nodules suggestive of   regenerative/siderotic nodules. MRI abdomen without and with IV contrast   correlation recommended. Metastatic disease is a consideration. 5. Hepatosplenomegaly and multifocal collateral vessels about the stomach   esophagus concerning for varices, all suggestive of portal venous   hypertension. 6. Abdominal and pelvic ascites. 7.  Diverticulosis coli without CT evidence of acute diverticulitis. Critical results were called by Dr. Citlaly Turner to [address] [place] [address]   on 8/16/2020 at 18:09. CT Cervical Spine WO Contrast   Final Result   No acute abnormality of the cervical spine. CT Head WO Contrast   Final Result   No acute intracranial abnormality. XR CHEST PORTABLE   Final Result   No acute process. XR RADIUS ULNA LEFT (2 VIEWS)   Final Result   No acute osseous abnormality. CT CHEST W CONTRAST    (Results Pending)       ASSESSMENT :  Cirrhosis - new diagnosis per CT. bx c/w WINKLER cirrhosis. No alcohol history. Likely from fatty liver. Some ascites on CT. Seemed mildly confused at admission (baseline per her daughter) but ammonia was normal. repeat ammonia is elevated at 66 so lactulose started. Negative: AMA, hepatitis panel, A1AT phenotype. f-actin weak positive at 25. AFP normal. CEA 83.  Labs stable.      Abnormal CT of the

## 2020-08-30 NOTE — PROGRESS NOTES
[facility] PROGRESS NOTE    8/30/2020 11:59 AM        Name: Yanet Dutton . Admitted: 8/16/2020  Primary Care Provider: No primary care provider on file. (Tel: None)      Subjective:  Patient is a 77 yo female with hx nicotine use. The patient lives alone, she presented to hospital after being found on floor by her daughter. She has had multiple falls in past couple of months. CT abdomen and pelvis in ER showed pelvic abscess vs viscus perf. Colonic wall thickening, concerning for malignancy, liver cirrhosis portal hypertension. She had Ct-guided drain placement 8/18 for pelvic abscess 8/18. Subsequently found to have large ascending colon mass on colonoscopy 8/20, likely the source of abscess. 8/18/2020: Successful CT guided placement of a drainage catheter in the pelvic abscess. 8/24/2020: Exploratory laparotomy, evacuation of ascites, drainage of  pelvic abscess, right hemicolectomy with primary ileotransverse  colostomy anastomosis, and Abdiaziz-Cut liver biopsy. 8/28/2020: SECONDARY ABDOMINAL WOUND CLOSURE  DILATATION AND CURETTAGE    Presently resting in bed. She had n/v and NG placed with large volume output. Moon says she feels better, wants to eat. Denies shortness of breath, chest pain, operative pain well controlled. Remains afebrile. Labs were not obtained this morning, orders in place, I spoke with lab and they will be up to draw.      Reviewed interval ancillary notes    Current Medications  dextrose 5 % and 0.9 % NaCl with KCl 20 mEq infusion, Continuous  PN-Adult Premix 5/20 - Standard Electrolytes - Central Line, Continuous TPN  [START ON 9/6/2020] fat emulsion 20 % infusion 250 mL, Once per day on Mon Thu  metoclopramide (REGLAN) injection 10 mg, Q6H  ketorolac (TORADOL) injection 15 mg, Q6H PRN  acetaminophen (TYLENOL) tablet 1,000 mg, Q6H PRN  lidocaine (XYLOCAINE) 2 % jelly, Once  phenol 1.4 % mouth spray 1 spray, Q2H PRN  glucose (GLUTOSE) 40 % oral gel 15 g, PRN  dextrose 50 % IV solution, PRN  glucagon (rDNA) injection 1 mg, PRN  dextrose 5 % solution, PRN  folic acid (FOLVITE) tablet 1 mg, Daily  ondansetron (ZOFRAN) injection 4 mg, Q4H PRN  promethazine (PHENERGAN) injection 12.5 mg, Q6H PRN  fluconazole (DIFLUCAN) in 0.9 % sodium chloride IVPB 200 mg, Q24H  meropenem (MERREM) 1 g in sodium chloride 0.9 % 100 mL IVPB (mini-bag), Q12H  enoxaparin (LOVENOX) injection 30 mg, Daily  bupivacaine liposome (EXPAREL) 1.3 % injection 66.5 mg, Once  morphine (PF) injection 2 mg, Q2H PRN  pantoprazole (PROTONIX) injection 40 mg, Daily  potassium chloride (KLOR-CON M) extended release tablet 40 mEq, PRN    Or  potassium bicarb-citric acid (EFFER-K) effervescent tablet 40 mEq, PRN    Or  potassium chloride 10 mEq/100 mL IVPB (Peripheral Line), PRN  lactulose (CHRONULAC) 10 GM/15ML solution 20 g, TID  lip balm petroleum free (PHYTOPLEX) stick, PRN  sodium chloride flush 0.9 % injection 10 mL, 2 times per day  sodium chloride flush 0.9 % injection 10 mL, PRN  polyethylene glycol (GLYCOLAX) packet 17 g, Daily PRN      Objective:  /82   Pulse 89   Temp 97.6 °F (36.4 °C) (Oral)   Resp 16   Ht 5' 7\" (1.702 m)   Wt 230 lb (104.3 kg)   SpO2 95%   BMI 36.02 kg/m²     Intake/Output Summary (Last 24 hours) at 8/30/2020 1159  Last data filed at 8/30/2020 0500  Gross per 24 hour   Intake --   Output 300 ml   Net -300 ml      Wt Readings from Last 3 Encounters:   08/23/20 230 lb (104.3 kg)     General:  Awake, alert, oriented in NAD  Skin:  Warm and dry. No unusual bruising or rash  Neck:  Supple. No JVD appreciated  Chest:  Normal effort.   Clear to auscultation  Cardiovascular:  RRR, normal S1/S2, no murmur/gallop/rub  Abdomen:  Soft, mild operative tenderness, midline dressing C/D/I, hypoactive bowel sounds  Extremities:  2+ BLE edema  Neurological: No focal deficits  Psychological: Normal mood and affect    Labs and Tests:  CBC:   Recent Labs     08/28/20  0630 08/29/20  0558   WBC 11.3* 9.3   HGB 9.0* 8.3*   * 96*     BMP:    Recent Labs     08/28/20  0630 08/29/20  0558    136   K 3.6 3.9    107   CO2 19* 18*   BUN 27* 21*   CREATININE 1.4* 1.0   GLUCOSE 76 125*     Hepatic:   Recent Labs     08/28/20  0630 08/29/20  0558   AST 20 17   ALT 6* <5*   BILITOT 0.9 0.7   ALKPHOS 92 85       CXR 8/16/2020:  No acute process. CT Head 8/16/2020:  No acute intracranial abnormality.           CT Cervical Spine 8/16/2020:  No acute abnormality of the cervical spine. CT Abdomen/Pelvis Without Contrast 8/16/2020:  . Non loculated fluid and gas collection in the pelvis concerning for    perforated viscus/possible abscess formation. 2. Prominent thickening of the ascending colon which may be related to    infectious or inflammatory process or underlying neoplastic process. 3. Bowel-gas pattern typical of adynamic ileus. 4. Hepatic cirrhosis with numerous hepatic nodules suggestive of    regenerative/siderotic nodules.  MRI abdomen without and with IV contrast    correlation recommended.  Metastatic disease is a consideration. 5. Hepatosplenomegaly and multifocal collateral vessels about the stomach    esophagus concerning for varices, all suggestive of portal venous    hypertension. 6. Abdominal and pelvic ascites. 7.  Diverticulosis coli without CT evidence of acute diverticulitis. CT Abdomen/Pelvis With Contrast 8/17/2020:  1. Pelvic air/fluid collection almost certainly reflects an abscess or giant    colonic diverticula.  This may be the result of acute diverticulitis. 2. Mass lesion ascending colon almost certainly reflecting primary    adenocarcinoma of the colon.     3. Diffuse hepatic lesions with newly visualized (contrast enhancement) mass    at the hepatic dome.  Hepatic metastatic disease is a consideration as is    cirrhosis with regenerative nodules and hepatocellular carcinoma. 4. Bilateral lower lobe pulmonary nodules suspicious for metastatic disease. 5. Abdominal and pelvic ascites. 6. Sequela from portosystemic shunting with numerous collateral vessels about    the stomach, left upper quadrant and distal esophagus.  Relatively small    esophageal and gastric varices. 7.  Calcific atherosclerotic disease aorta. CXR 8/24/2020:  FINDINGS:    There is been placement of a right jugular central venous catheter with its    tip projecting over the superior vena cava.  The heart size and mediastinal    contours are stable.         Bilateral mid and lower lung patchy ground-glass opacities today, increased    since the prior study.         No pneumothorax is evident.  Lucency beneath the right hemidiaphragm is new    since prior studies suspicious for free intraperitoneal air.              Impression    Lucency beneath the right hemidiaphragm compatible with free intraperitoneal    air.  Apparently the patient has had recent abdominal surgery.  This finding    was discussed with Dr. Aldana  6:48 pm on 8/24/2020.         Status post placement of right jugular central venous catheter.  No    pneumothorax.         Patchy mid and lower lung ground-glass opacities suspicious for pneumonia,    increased since the prior CT.  Correlation for pneumonia is recommended. Abdominal Xray 8/26/2020:  Scattered dilated small bowel loops throughout the abdomen, increased    compared to prior CT scan, either due to ileus or early partial small bowel    obstruction      CXR 8/27/2020:  Nasogastric tube is seen extending below the diaphragm.  Dedicated abdominal    radiograph could be performed for better visualization as warranted.         Bilateral airspace disease, increased on the right as compared to prior, for    which pneumonia or edema are considerations. Surgical Pathology 8/24/2020:  FINAL DIAGNOSIS:        A.  Right colon, hemicolectomy:        - Invasive poorly-differentiated adenocarcinoma with extensive          necrosis and full-thickness defect - SEE SYNOPTIC REPORT.        - Surgical resection margins negative for involvement.        - Metastatic adenocarcinoma in three of twenty-two lymph nodes          (3/22).      - One pericolonic tumor deposit.        B. Liver, biopsy:        - Cirrhotic liver with moderate steatosis and associated features          suggestive for steatohepatitis; negative for adenocarcinoma          involvement - see comment. Problem List  Active Problems:    Pelvic abscess in female    Septicemia ([address]    Colonic mass    Cirrhosis of liver with ascites ([address]    Intra-abdominal free air of unknown etiology    PAPITO (acute kidney injury) ([address]    Lung nodules    Bandemia    Intra-abdominal abscess (HCC)    E coli infection    Elevated CEA    Ex-smoker    Class 2 obesity due to excess calories with body mass index (BMI) of 36.0 to 36.9 in adult    Portal hypertension (HCC)    Mild malnutrition (HCC)    Tobacco abuse counseling    Open abdominal wall wound  Resolved Problems:    * No resolved hospital problems. *       Assessment:   1. Pelvic abscess. Status post IR drainage 8/18, pelvic fluid cytology negative for malignant cells. Colonoscopy 8/20 with large necrotic ascending colon mass, likely source of abscess. Status post exp lap right transverse colectomy 8/24. Status post secondary wound closure 8/28. Initially on Zosyn, transitioned to IV cipro (8/20). IV Flagyl added 8/25. Now on meropenem and flucanazole. Remains afebrile, WBC trending down. Blood culture results (8/25) with no growth to date. Surgery and ID on case. 2. Liver cirrhosis / portal HTN. Biopsy consistent with WINKLER cirrhosis, negative for adenocarcinoma involvement. Started on lactulose for ammonia level 66 and confusion, ammonia level 23 (8/28). GI on case. 3. Colon cancer.  CT scan showed thickening of ascending colon suspicious for malignancy, lesions liver and lung concerning for metastasis. CEA 83.3, AFP 2.4. Colon biopsy (8/24) showed poorly differentiate adenocarcinoma, metastatic adenocarcinoma in 3 of 232 lymph nodes. Patient will need CT chest for staging once renal function improves. Plan is for chemotherapy once she recovers from surgery. Oncology on board. 4. PAPITO. Creatinine 1.6 on admission suspected pre-renal, hypovolemia. Bumped post surgery, creatinine 1.0. Nephrology on board. 5. Abnormal CXR (8/27). Bilateral airspace disease, increased on right as compared to prior, pneumonia or edema are considerations. Possible 3rd spacing, received albumin. COVID-19 (8/22) negative. Remains afebrile, no cough. Encouraged IS use. 6. Acute anemia. Hgb 10.9 on admission, no previous studies for comparison. Suspect she likely had chronic iron deficiency anemia secondary to colon cancer and post menopausal bleeding. She received 1 unit PRBCs 8/27, Hgb pending from today. DC planning: Plan is for SNF on DC. Case management is assisting, family preference is [facility] or [address] [place] [address] W. Not ready for DC, has NG tube in place.         Diet: Diet NPO Effective Now Exceptions are: Ice Chips, Sips with Meds  PN-Adult Premix 5/20 - Standard Electrolytes - Central Line  Code:Full Code  DVT PPX: on enoxaparin      VIN Randhawa - CNP   8/30/2020 11:59 AM

## 2020-08-30 NOTE — PROGRESS NOTES
MD Hanna Ornelas MD Chevis Else, MD               Office: [phone]                      Fax: [phone]             [address] [place] [address]                   NEPHROLOGY INPATIENT PROGRESS NOTE:     PATIENT NAME: Giselle Martinez  : 1955  MRN: [id number]. IMPRESSION:       PAPITO - improving to stable   - crea down   : Oligouric - now non-oliguric.    - needing albumin. NSS stopped due to crackles. But stable now   - Appears hypervolemic. Probably 3rd spacing  : Etiology of current PAPITO - ATN vs venous congestion vs. Decreased renal perfusion. WBC count higher  - Renal imaging: w/ CT - kidney unremarkable   - CK - 310 on admission then improved. - Avoid contrast exposure at this time. - BP and lytes stable  - making good UOP     Associated problems: better to stable   - Electrolytes: Hyponatremia-better  - Weight up to 230 from 215. Also has hypoalbuminemia so may be 3rd spacing. Check daily weights. Cirrhosis   : BP: Ok to keep slightly higher   - Acid-Base: Acidosis - - non-AGMA - follow  - Anemia:  - Heme  Following       RECOMMENDATIONS:   - Labs pending today  - has been ordered. - improved, with albumin for intravascular expansio  - continue IVF for now w/ upper GI losses   + starting PN   - monitor respiratory closely       Other problems: Management per primary and other consulting teams. # Cirrhosis , Portal HTN : new diagnosis, no h/o EtOH    # Sepsis, intra-abdominal abscess   - S/P CT-guided drain placement on 2020  - S/P right hemicolectomy       High complexity. Multiple medical problems. Discussed with patient and treatment team.   Thank you for allowing me to participate in this patient's care. Please do not hesitate to contact me for any questions/concerns. We will follow along with you.      Damon Osorio MD  [facility] [address]   Phone: [phone] or Via [employer]  Fax: [phone]        Hospital Problems           Last Modified POA    Pelvic abscess in female 8/16/2020 Yes    Septicemia ([address] 8/17/2020 Yes    Colonic mass 8/25/2020 Yes    Cirrhosis of liver with ascites ([address] 8/25/2020 Yes    Intra-abdominal free air of unknown etiology 8/25/2020 Yes    PAPITO (acute kidney injury) ([address] 8/25/2020 Yes    Lung nodules 8/25/2020 Yes    Bandemia 8/25/2020 Yes    Intra-abdominal abscess ([address] 8/25/2020 Yes    E coli infection 8/25/2020 Yes    Elevated CEA 8/25/2020 Yes    Ex-smoker 8/25/2020 Yes    Class 2 obesity due to excess calories with body mass index (BMI) of 36.0 to 36.9 in adult 8/25/2020 Yes    Portal hypertension ([address] 8/25/2020 Yes    Mild malnutrition ([address] (Chronic) 8/25/2020 Yes    Tobacco abuse counseling 8/27/2020 Yes    Open abdominal wall wound 8/28/2020 Yes             ========================================    SUBJECTIVE:    Seen resting in bed,   Today Per nursing \" NG tube place, about 1000 cc out instantly \"  abd pain     MEDICATIONS: reviewed by me. Medications Prior to Admission:  No current facility-administered medications on file prior to encounter. No current outpatient medications on file prior to encounter. No medications prior to admission.      Scheduled Meds:   metoclopramide  10 mg Intravenous Q6H    lidocaine   Topical Once    folic acid  1 mg Oral Daily    fluconazole  200 mg Intravenous Q24H    meropenem  1 g Intravenous Q12H    enoxaparin  30 mg Subcutaneous Daily    bupivacaine liposome  5 mL Subcutaneous Once    pantoprazole  40 mg Intravenous Daily    lactulose  20 g Oral TID    sodium chloride flush  10 mL Intravenous 2 times per day     Continuous Infusions:   dextrose 100 mL/hr (08/27/20 1300)     PRN Meds:.ketorolac, acetaminophen, phenol, glucose, dextrose, glucagon (rDNA), dextrose, [DISCONTINUED] promethazine **OR** ondansetron, promethazine, morphine **OR** [DISCONTINUED] morphine, potassium chloride **OR** 3.6 3.9    107   CO2 19* 18*   BUN 27* 21*   CREATININE 1.4* 1.0     Recent Labs     08/28/20  0630 08/29/20  0558   CALCIUM 8.7 8.5   PHOS 4.2 3.8     No results for input(s): PH, PCO2, PO2 in the last 72 hours.     Invalid input(s): German Pichardo    ABG:  No results found for: PH, PCO2, PO2, HCO3, BE, THGB, TCO2, O2SAT  VBG:    Lab Results   Component Value Date    PHVEN 7.435 08/23/2020    UFU7AOI 26.3 08/23/2020    BEVEN -5.6 08/23/2020    D9PVWIUK 99 08/23/2020       LDH:  No results found for: LDH  Uric Acid:    Lab Results   Component Value Date    LABURIC 5.9 08/17/2020       PT/INR:    Lab Results   Component Value Date    PROTIME 15.4 08/29/2020    INR 1.32 08/29/2020     Warfarin PT/INR:  No components found for: PTPATWAR, PTINRWAR  PTT:  No results found for: APTT, PTT[APTT}  Last 3 Troponin:    Lab Results   Component Value Date    TROPONINI <0.01 08/16/2020       U/A:    Lab Results   Component Value Date    COLORU ORANGE 08/16/2020    PROTEINU TRACE 08/16/2020    PHUR 5.0 08/16/2020    WBCUA 3 08/16/2020    RBCUA 7 08/16/2020    BACTERIA RARE 08/16/2020    CLARITYU CLOUDY 08/16/2020    SPECGRAV 1.022 08/16/2020    LEUKOCYTESUR SMALL 08/16/2020    UROBILINOGEN 1.0 08/16/2020    BILIRUBINUR SMALL 08/16/2020    BLOODU LARGE 08/16/2020    GLUCOSEU Negative 08/16/2020     Microalbumen/Creatinine ratio:  No components found for: RUCREAT  24 Hour Urine for Protein:  No components found for: RAWUPRO, UHRS3, CRTH73BQ, UTV3  24 Hour Urine for Creatinine Clearance:  No components found for: CREAT4, UHRS10, UTV10  Urine Toxicology:  No components found for: IAMMENTA, IBARBIT, IBENZO, ICOCAINE, IMARTHC, IOPIATES, IPHENCYC    HgBA1c:  No results found for: LABA1C  RPR:  No results found for: RPR  HIV:  No results found for: HIV  KULWANT:  No results found for: ANATITER, KULWANT  RF:  No results found for: RF  DSDNA:  No components found for: DNA  AMYLASE:  No results found for: AMYLASE  LIPASE:  No results found for: LIPASE  Fibrinogen Level:  No components found for: FIB       BELOW MENTIONED RADIOLOGY STUDY RESULTS BY ME:    Ct Abdomen Pelvis Wo Contrast Additional Contrast? None    Result Date: 8/16/2020  EXAMINATION: CT OF THE ABDOMEN AND PELVIS WITHOUT CONTRAST 8/16/2020 5:11 pm TECHNIQUE: CT of the abdomen and pelvis was performed without the administration of intravenous contrast. Multiplanar reformatted images are provided for review. Dose modulation, iterative reconstruction, and/or weight based adjustment of the mA/kV was utilized to reduce the radiation dose to as low as reasonably achievable. COMPARISON: None. HISTORY: Acute nausea FINDINGS: Lower Chest: FOUR solid soft tissue pulmonary nodules right lower lobe, 7 x 8 mm (axial image 19), 11 x 12 mm (axial 11), 9 x 9 mm (axial image 10) and 4 x 4 mm (axial image 10). 3 mm pulmonary nodules are seen posterior basal segment left lower lobe. Mild nodular fullness about the distal esophagus. Heart size is upper normal. Organs: Prominent caudate lobe hypertrophy and poly lobular configuration of the liver as well as diffuse tiny hypoattenuating lesions throughout the liver suggestive of hepatic cirrhosis and siderotic nodules. No discrete mass lesion evident. 19 cm craniocaudal liver length. The spleen is mildly enlarged. The kidneys, gallbladder, adrenal glands and pancreas appear unremarkable. Multifocal collateral vessels are seen about the gastrohepatic ligament extending to the posterior mediastinum. GI/Bowel: Prominent diffuse thickening of the wall of the ascending colon. Other portions of colon and small bowel appear unremarkable with exception of few gas distended small bowel loops typical of adynamic ileus. There are diverticula involving the descending and sigmoid colon. Pelvis: Fluid collection within the pelvis without definite margins has air bubbles within it concerning for sequela from perforated bowel/abscess formation.   Uterus and adnexal structures appear unremarkable. Peritoneum/Retroperitoneum: Abdominal ascites. Calcific atherosclerosis aorta. Bones/Soft Tissues: No acute superficial soft tissue or osseous structure abnormality evident. 1. Non loculated fluid and gas collection in the pelvis concerning for perforated viscus/possible abscess formation. 2. Prominent thickening of the ascending colon which may be related to infectious or inflammatory process or underlying neoplastic process. 3. Bowel-gas pattern typical of adynamic ileus. 4. Hepatic cirrhosis with numerous hepatic nodules suggestive of regenerative/siderotic nodules. MRI abdomen without and with IV contrast correlation recommended. Metastatic disease is a consideration. 5. Hepatosplenomegaly and multifocal collateral vessels about the stomach esophagus concerning for varices, all suggestive of portal venous hypertension. 6. Abdominal and pelvic ascites. 7.  Diverticulosis coli without CT evidence of acute diverticulitis. Critical results were called by Dr. Marsha Mike to [address] [place] [address] on 8/16/2020 at 18:09. Xr Radius Ulna Left (2 Views)    Result Date: 8/16/2020  EXAMINATION: TWO XRAY VIEWS OF THE LEFT FOREARM 8/16/2020 2:55 pm COMPARISON: None. HISTORY: ORDERING SYSTEM PROVIDED HISTORY: Fall TECHNOLOGIST PROVIDED HISTORY: Reason for exam:->Fall Reason for Exam: Frequent falls at home. Bruising mid shaft L forearm Acuity: Acute Type of Exam: Initial FINDINGS: There is no evidence of acute fracture. There is normal alignment. No acute joint abnormality. No focal osseous lesion. No focal soft tissue abnormality. No acute osseous abnormality.      Ct Head Wo Contrast    Result Date: 8/16/2020  EXAMINATION: CT OF THE HEAD WITHOUT CONTRAST  8/16/2020 3:15 pm TECHNIQUE: CT of the head was performed without the administration of intravenous contrast. Dose modulation, iterative reconstruction, and/or weight based adjustment of the mA/kV was utilized to reduce the radiation dose to as low as reasonably achievable. COMPARISON: None. HISTORY: ORDERING SYSTEM PROVIDED HISTORY: Fall TECHNOLOGIST PROVIDED HISTORY: Reason for exam:->Fall Has a \"code stroke\" or \"stroke alert\" been called? ->No Reason for Exam: Fall Acuity: Acute Type of Exam: Initial FINDINGS: BRAIN/VENTRICLES: The ventricles and sulci are diffusely enlarged. Low attenuation is seen in the periventricular and subcortical white matter. No acute intracranial hemorrhage or acute infarct is identified. ORBITS: The visualized portion of the orbits demonstrate no acute abnormality. SINUSES: The visualized paranasal sinuses and mastoid air cells demonstrate no acute abnormality. SOFT TISSUES/SKULL:  No acute abnormality of the visualized skull or soft tissues. No acute intracranial abnormality. Ct Cervical Spine Wo Contrast    Result Date: 8/16/2020  EXAMINATION: CT OF THE CERVICAL SPINE WITHOUT CONTRAST 8/16/2020 3:15 pm TECHNIQUE: CT of the cervical spine was performed without the administration of intravenous contrast. Multiplanar reformatted images are provided for review. Dose modulation, iterative reconstruction, and/or weight based adjustment of the mA/kV was utilized to reduce the radiation dose to as low as reasonably achievable. COMPARISON: None. HISTORY: ORDERING SYSTEM PROVIDED HISTORY: Fall TECHNOLOGIST PROVIDED HISTORY: Reason for exam:->Fall Reason for Exam: Fall Acuity: Acute Type of Exam: Initial FINDINGS: BONES/ALIGNMENT: There is no acute fracture or traumatic malalignment. DEGENERATIVE CHANGES: Multilevel degenerative changes. SOFT TISSUES: There is no prevertebral soft tissue swelling. No acute abnormality of the cervical spine. Xr Chest Portable    Result Date: 8/16/2020  EXAMINATION: ONE XRAY VIEW OF THE CHEST 8/16/2020 2:55 pm COMPARISON: None.  HISTORY: ORDERING SYSTEM PROVIDED HISTORY: Fall TECHNOLOGIST PROVIDED HISTORY: Reason for exam:->Fall Reason for Exam: Frequent falls at home. Bruising mid shaft L forearm Acuity: Acute Type of Exam: Initial FINDINGS: The lungs are without acute focal process. There is no effusion or pneumothorax. The cardiomediastinal silhouette is without acute process. The osseous structures are without acute process. No acute process.      Marine Malloy MD

## 2020-08-30 NOTE — PROGRESS NOTES
Clinical Pharmacy Note    Pharmacy consulted by Dr. Eduardo Quevedo to manage TPN    Current TPN rate: 25ml/hr  Goal TPN rate: To be determined - Dietary consult pending    Labs:  General Labs:  BMP:    Lab Results   Component Value Date     08/29/2020    K 3.9 08/29/2020    K 4.7 08/16/2020     08/29/2020    CO2 18 08/29/2020    BUN 21 08/29/2020    LABALBU 3.0 08/29/2020    CREATININE 1.0 08/29/2020    CALCIUM 8.5 08/29/2020    GFRAA >60 08/29/2020    LABGLOM 55 08/29/2020    GLUCOSE 125 08/29/2020     Blood sugars:     Labs have been ordered - results pending. Blood sugar management:  Plan to start q4 hour low dose sliding scale Humalog. Plan to start TPN to 25 ml/hr. Thank you for allowing pharmacy to participate in the care of this patient.     Abner Interiano, PharmD 8/30/2020

## 2020-08-31 NOTE — PROGRESS NOTES
Physical Therapy  Mya Saunders    Attempted to see pt for PT treatment. Patient currently off the floor for testing. Will hold therapy at this time and will follow up with pt as schedule permits.  Thanks, Batool Smith, DPT [id number]

## 2020-08-31 NOTE — PLAN OF CARE
Nutrition Problem #1: Predicted inadequate energy intake  Intervention: Food and/or Nutrient Delivery: Modify Parenteral Nutrition  Nutritional Goals: denis to PN as goal obtained

## 2020-08-31 NOTE — PROGRESS NOTES
Shift assessment completed. Medications given per MAR. Patient resting in bed and denying any needs. Dressing over abdominal surgical incision changed. The care plan and education has been reviewed and mutually agreed upon with the patient.

## 2020-08-31 NOTE — PROGRESS NOTES
[facility]  Gastroenterology Progress Note    Dana Donohue is a 76 y.o. female patient. 1. Septicemia ([facility] [address]    2. PAPITO (acute kidney injury) ([facility] [address]    3. Intra-abdominal free air of unknown etiology    4. Fall, initial encounter        SUBJECTIVE:    Patient had 1600 ml per NG tube yesterday. Patient denies abdominal pain   She had a BM today. ROS:  Cardiovascular ROS: no chest pain or dyspnea on exertion  Gastrointestinal ROS: see above  Respiratory ROS: no cough, shortness of breath, or wheezing    Physical    VITALS:  /76   Pulse 86   Temp 97.8 °F (36.6 °C) (Axillary)   Resp 16   Ht 5' 7\" (1.702 m)   Wt 230 lb (104.3 kg)   SpO2 97%   BMI 36.02 kg/m²   TEMPERATURE:  Current - Temp: 97.8 °F (36.6 °C); Max - Temp  Av.4 °F (36.3 °C)  Min: 96.8 °F (36 °C)  Max: 97.8 °F (36.6 °C)    NAD  RRR, Nl s1s2  Lungs CTA Bilaterally, normal effort  Abdomen soft, slightly tender, + dressing, NT, no HSM, Bowel sounds present  AAOx3, No asterixis     Data      CBC:   Recent Labs     20  0558 20  1241 20  0532   WBC 9.3 12.1* 15.6*   RBC 2.89* 3.33* 3.33*   HGB 8.3* 9.8* 9.7*   HCT 25.7* 30.9* 29.9*   PLT 96* 118* 115*   MCV 88.8 93.0 89.7   MCH 28.9 29.4 29.2   MCHC 32.5 31.6 32.6   RDW 18.2* 19.8* 19.5*        BMP:  Recent Labs     20  0558 20  1241 20  0532    139 140   K 3.9 3.8 4.4    112* 113*   CO2 18* 18* 17*   BUN 21* 22* 22*   CREATININE 1.0 0.8 0.7   CALCIUM 8.5 9.2 8.6   GLUCOSE 125* 98 156*        Hepatic Function Panel:   Recent Labs     20  0558 20  1241 20  0532   AST 17 30 23   ALT <5* 10 9*   BILIDIR 0.4* 0.3 <0.2   BILITOT 0.7 0.7 0.6   ALKPHOS 85 124 102       No results for input(s): LIPASE, AMYLASE in the last 72 hours. Recent Labs     20  0558 20  1241 20  0532   PROTIME 15.4* 14.3* 13.0   INR 1.32* 1.23* 1.12     No results for input(s): PTT in the last 72 hours.   No results for input(s): OCCULTBLD in the last 72 hours. Radiology Review:    XR ABDOMEN (KUB) (SINGLE AP VIEW)   Final Result   Multiple distended small bowel loops, similar to the previous study. No   significant colonic gas. Findings are concerning for a SBO. XR CHEST PORTABLE   Final Result   Nasogastric tube is seen extending below the diaphragm. Dedicated abdominal   radiograph could be performed for better visualization as warranted. Bilateral airspace disease, increased on the right as compared to prior, for   which pneumonia or edema are considerations. XR ABDOMEN (2 VIEWS)   Final Result   Scattered dilated small bowel loops throughout the abdomen, increased   compared to prior CT scan, either due to ileus or early partial small bowel   obstruction         XR CHEST PORTABLE   Final Result   Lucency beneath the right hemidiaphragm compatible with free intraperitoneal   air. Apparently the patient has had recent abdominal surgery. This finding   was discussed with Dr. Matias Ashton  At 6:48 pm on 8/24/2020. Status post placement of right jugular central venous catheter. No   pneumothorax. Patchy mid and lower lung ground-glass opacities suspicious for pneumonia,   increased since the prior CT. Correlation for pneumonia is recommended. CT ABSCESS DRAINAGE W CATH PLACEMENT S&I   Final Result   Successful CT guided placement of a drainage catheter in the pelvic abscess. CT ABDOMEN PELVIS W IV CONTRAST Additional Contrast? Oral   Final Result   1. Pelvic air/fluid collection almost certainly reflects an abscess or giant   colonic diverticula. This may be the result of acute diverticulitis. 2. Mass lesion ascending colon almost certainly reflecting primary   adenocarcinoma of the colon. 3. Diffuse hepatic lesions with newly visualized (contrast enhancement) mass   at the hepatic dome.   Hepatic metastatic disease is a consideration as is   cirrhosis with regenerative nodules and hepatocellular carcinoma. 4. Bilateral lower lobe pulmonary nodules suspicious for metastatic disease. 5. Abdominal and pelvic ascites. 6. Sequela from portosystemic shunting with numerous collateral vessels about   the stomach, left upper quadrant and distal esophagus. Relatively small   esophageal and gastric varices. 7.  Calcific atherosclerotic disease aorta. CT ABDOMEN PELVIS WO CONTRAST Additional Contrast? None   Final Result   1. Non loculated fluid and gas collection in the pelvis concerning for   perforated viscus/possible abscess formation. 2. Prominent thickening of the ascending colon which may be related to   infectious or inflammatory process or underlying neoplastic process. 3. Bowel-gas pattern typical of adynamic ileus. 4. Hepatic cirrhosis with numerous hepatic nodules suggestive of   regenerative/siderotic nodules. MRI abdomen without and with IV contrast   correlation recommended. Metastatic disease is a consideration. 5. Hepatosplenomegaly and multifocal collateral vessels about the stomach   esophagus concerning for varices, all suggestive of portal venous   hypertension. 6. Abdominal and pelvic ascites. 7.  Diverticulosis coli without CT evidence of acute diverticulitis. Critical results were called by Dr. Carlos Goss to [address] [place] [address]   on 8/16/2020 at 18:09. CT Cervical Spine WO Contrast   Final Result   No acute abnormality of the cervical spine. CT Head WO Contrast   Final Result   No acute intracranial abnormality. XR CHEST PORTABLE   Final Result   No acute process. XR RADIUS ULNA LEFT (2 VIEWS)   Final Result   No acute osseous abnormality. CT CHEST W CONTRAST    (Results Pending)       ASSESSMENT :  Cirrhosis - new diagnosis per CT. bx c/w WINKLER cirrhosis. No alcohol history. Likely from fatty liver. Some ascites on CT.  Seemed mildly confused at admission (baseline per her daughter) but ammonia was normal. repeat

## 2020-08-31 NOTE — PROGRESS NOTES
Call placed to Dr. Alvino Verma to notify of CT results, no new orders obtained at this time, will monitor.  To Best RN

## 2020-08-31 NOTE — PLAN OF CARE
Problem: Falls - Risk of:  Goal: Will remain free from falls  Description: Will remain free from falls  8/31/2020 5403 by Patricia Cleveland RN  Outcome: Ongoing  8/30/2020 1946 by Andee Dancer, RN  Outcome: Ongoing  Goal: Absence of physical injury  Description: Absence of physical injury  8/31/2020 9921 by Patricia Cleveland RN  Outcome: Ongoing  8/30/2020 1946 by Andee Dancer, RN  Outcome: Ongoing     Problem: Skin Integrity:  Goal: Will show no infection signs and symptoms  Description: Will show no infection signs and symptoms  8/31/2020 0832 by Patricia Cleveland RN  Outcome: Ongoing  8/30/2020 1946 by Andee Dancer, RN  Outcome: Ongoing  Goal: Absence of new skin breakdown  Description: Absence of new skin breakdown  8/31/2020 0832 by Patricia Cleveland RN  Outcome: Ongoing  8/30/2020 1946 by Andee Dancer, RN  Outcome: Ongoing     Problem: Infection:  Goal: Will remain free from infection  Description: Will remain free from infection  8/31/2020 0832 by Patricia Cleveland RN  Outcome: Ongoing  8/30/2020 1946 by Andee Dancer, RN  Outcome: Ongoing     Problem: Safety:  Goal: Free from accidental physical injury  Description: Free from accidental physical injury  8/31/2020 0832 by Patricia Cleveland RN  Outcome: Ongoing  8/30/2020 1946 by Andee Dancer, RN  Outcome: Ongoing  Goal: Free from intentional harm  Description: Free from intentional harm  8/31/2020 0832 by Patricia Cleveland RN  Outcome: Ongoing  8/30/2020 1946 by Andee Dancer, RN  Outcome: Ongoing     Problem: Daily Care:  Goal: Daily care needs are met  Description: Daily care needs are met  8/31/2020 3644 by Patricia Cleveland RN  Outcome: Ongoing  8/30/2020 1946 by Andee Dancer, RN  Outcome: Ongoing     Problem: Pain:  Goal: Patient's pain/discomfort is manageable  Description: Patient's pain/discomfort is manageable  8/31/2020 0832 by Patricia Cleveland RN  Outcome: Ongoing  8/30/2020 1946 by Andee Dancer, RN  Outcome: Ongoing  Goal: Pain level will decrease  Description: Pain level will decrease  8/31/2020 0832 by Ayana Eagle RN  Outcome: Ongoing  8/30/2020 1946 by Doreen Marshall RN  Outcome: Ongoing  Goal: Control of acute pain  Description: Control of acute pain  8/31/2020 0832 by Ayana Eagle RN  Outcome: Ongoing  8/30/2020 1946 by Doreen Marshall RN  Outcome: Ongoing  Goal: Control of chronic pain  Description: Control of chronic pain  8/31/2020 0832 by Ayana Eagle RN  Outcome: Ongoing  8/30/2020 1946 by Doreen Marshall RN  Outcome: Ongoing     Problem: Skin Integrity:  Goal: Skin integrity will stabilize  Description: Skin integrity will stabilize  8/31/2020 0832 by Ayana Eagle RN  Outcome: Ongoing  8/30/2020 1946 by Doreen Marshall RN  Outcome: Ongoing     Problem: Discharge Planning:  Goal: Patients continuum of care needs are met  Description: Patients continuum of care needs are met  8/31/2020 6748 by Ayana Eagle RN  Outcome: Ongoing  8/30/2020 1946 by Doreen Marshall RN  Outcome: Ongoing     Problem: Nutrition  Goal: Optimal nutrition therapy  8/31/2020 0832 by Ayana Eagle RN  Outcome: Ongoing  8/30/2020 1946 by Doreen Marshall RN  Outcome: Ongoing     Problem: Confusion - Acute:  Goal: Absence of continued neurological deterioration signs and symptoms  Description: Absence of continued neurological deterioration signs and symptoms  8/31/2020 0832 by Ayana Eagle RN  Outcome: Ongoing  8/30/2020 1946 by Doreen Marshall RN  Outcome: Ongoing  Goal: Mental status will be restored to baseline  Description: Mental status will be restored to baseline  8/31/2020 0832 by Ayana Eagle RN  Outcome: Ongoing  8/30/2020 1946 by Doreen Marshall RN  Outcome: Ongoing     Problem: Discharge Planning:  Goal: Ability to perform activities of daily living will improve  Description: Ability to perform activities of daily living will improve  8/31/2020 0832 by Ayana Eagle RN  Outcome: Ongoing  8/30/2020 1946 by Doreen Marshall RN  Outcome: Ongoing  Goal: Participates in care planning  Description: Participates in care planning  8/31/2020 9110 by Ciro Trinh RN  Outcome: Ongoing  8/30/2020 1946 by Chen Chaney RN  Outcome: Ongoing     Problem: Injury - Risk of, Physical Injury:  Goal: Will remain free from falls  Description: Will remain free from falls  8/31/2020 4289 by Ciro Trinh RN  Outcome: Ongoing  8/30/2020 1946 by Chen Chaney RN  Outcome: Ongoing  Goal: Absence of physical injury  Description: Absence of physical injury  8/31/2020 8344 by Ciro Trinh RN  Outcome: Ongoing  8/30/2020 1946 by Chen Chaney RN  Outcome: Ongoing     Problem: Mood - Altered:  Goal: Mood stable  Description: Mood stable  8/31/2020 0832 by Ciro Trinh RN  Outcome: Ongoing  8/30/2020 1946 by Chen Chaney RN  Outcome: Ongoing  Goal: Absence of abusive behavior  Description: Absence of abusive behavior  8/31/2020 0832 by Ciro Trinh RN  Outcome: Ongoing  8/30/2020 1946 by Chen Chaney RN  Outcome: Ongoing  Goal: Verbalizations of feeling emotionally comfortable while being cared for will increase  Description: Verbalizations of feeling emotionally comfortable while being cared for will increase  8/31/2020 0832 by Ciro Trinh RN  Outcome: Ongoing  8/30/2020 1946 by Chen Chaney RN  Outcome: Ongoing     Problem: Psychomotor Activity - Altered:  Goal: Absence of psychomotor disturbance signs and symptoms  Description: Absence of psychomotor disturbance signs and symptoms  8/31/2020 0832 by Ciro Trinh RN  Outcome: Ongoing  8/30/2020 1946 by Chen Chaney RN  Outcome: Ongoing     Problem: Sensory Perception - Impaired:  Goal: Demonstrations of improved sensory functioning will increase  Description: Demonstrations of improved sensory functioning will increase  8/31/2020 0832 by Ciro Trinh RN  Outcome: Ongoing  8/30/2020 1946 by Chen Chaney RN  Outcome: Ongoing  Goal: Decrease in sensory misperception frequency  Description: Decrease in sensory misperception frequency  8/31/2020 0404 by Susan Patel RN  Outcome: Ongoing  8/30/2020 1946 by Ghazal Martinez RN  Outcome: Ongoing  Goal: Able to refrain from responding to false sensory perceptions  Description: Able to refrain from responding to false sensory perceptions  8/31/2020 0832 by Susan Patel RN  Outcome: Ongoing  8/30/2020 1946 by Ghazal Martinez RN  Outcome: Ongoing  Goal: Demonstrates accurate environmental perceptions  Description: Demonstrates accurate environmental perceptions  8/31/2020 5989 by Susan Patel RN  Outcome: Ongoing  8/30/2020 1946 by Ghazal Martinez RN  Outcome: Ongoing  Goal: Able to distinguish between reality-based and nonreality-based thinking  Description: Able to distinguish between reality-based and nonreality-based thinking  8/31/2020 0832 by Susan Patel RN  Outcome: Ongoing  8/30/2020 1946 by Ghazal Martinez RN  Outcome: Ongoing  Goal: Able to interrupt nonreality-based thinking  Description: Able to interrupt nonreality-based thinking  8/31/2020 0832 by Susan Patel RN  Outcome: Ongoing  8/30/2020 1946 by Ghazal Martinez RN  Outcome: Ongoing     Problem: Sleep Pattern Disturbance:  Goal: Appears well-rested  Description: Appears well-rested  8/31/2020 0832 by Susan Patel RN  Outcome: Ongoing  8/30/2020 1946 by Ghazal Martinez RN  Outcome: Ongoing     Problem: HEMODYNAMIC STATUS  Goal: Patient has stable vital signs and fluid balance  8/31/2020 0832 by Susan Patel RN  Outcome: Ongoing  8/30/2020 1946 by Ghazal Martinez RN  Outcome: Ongoing     Problem: OXYGENATION/RESPIRATORY FUNCTION  Goal: Patient will achieve/maintain normal respiratory rate/effort  8/31/2020 0832 by Susan Patel RN  Outcome: Ongoing  8/30/2020 1946 by Ghazal Martinez RN  Outcome: Ongoing     Problem: ELIMINATION  Goal: Elimination patterns are normal or improving  Description: Elimination patterns return to pre-surgery normal patterns  8/31/2020 0832 by Susan Patel RN  Outcome: Ongoing  8/30/2020 1946 by Vera Fisher RN  Outcome: Ongoing     Problem: SKIN INTEGRITY  Goal: Skin integrity is maintained or improved  8/31/2020 0832 by Annia Wood RN  Outcome: Ongoing  8/30/2020 1946 by Vera Fisher RN  Outcome: Ongoing

## 2020-08-31 NOTE — PROGRESS NOTES
[facility]        Progress Note    Patient Name: Cesia Roy  MRN: [id number]  YOB: 1955  Date of Evaluation: 2020    Subjective:  Pt has NG in place, less output over time in place  States had a BM last pm and smear this am  Pain controlled  Resting in chair at this time    Postoperative Day # 7, 3      Vital Signs:  Patient Vitals for the past 24 hrs:   BP Temp Temp src Pulse Resp SpO2   20 0814 112/76 97.8 °F (36.6 °C) Axillary 86 16 97 %   20 0440 137/70 97.6 °F (36.4 °C) Oral 80 16 96 %   20 2335 (!) 113/58 96.8 °F (36 °C) Axillary 76 16 97 %   20 2135 127/76 97.6 °F (36.4 °C) Axillary 73 16 96 %   20 1224 123/80 97.2 °F (36.2 °C) Axillary 72 16 95 %      TEMPERATURE HISTORY 24H: Temp (24hrs), Av.4 °F (36.3 °C), Min:96.8 °F (36 °C), Max:97.8 °F (36.6 °C)    BLOOD PRESSURE HISTORY: Systolic (10MOE), UAH:343 , Min:112 , QAM:388    Diastolic (96WBR), TIP:72, Min:58, Max:90      Intake/Output:  I/O last 3 completed shifts:   In: 2241 [I.V.:2211; NG/GT:30]  Out: 2505 [Urine:900; Emesis/NG output:1605]  I/O this shift:  In: 30 [NG/GT:30]  Out: -   Drain/tube Output:  Closed/Suction Drain Left Back Bulb 10 Polish-Output (ml): 0 ml    Physical Exam:  General: awake, alert, oriented to person, place  Lungs: unlabored respirations  Abdomen: soft, mildly distended, appropriate incisional tenderness, hypoactive bowel sounds present   Drain: some darker drainage today   Skin/Wound: wound incision is clean, no drainage, no cellulitis    Labs:  CBC:    Recent Labs     20  0558 20  1241 20  0532   WBC 9.3 12.1* 15.6*   HGB 8.3* 9.8* 9.7*   HCT 25.7* 30.9* 29.9*   PLT 96* 118* 115*     BMP:    Recent Labs     20  0558 20  1241 20  0532    139 140   K 3.9 3.8 4.4    112* 113*   CO2 18* 18* 17*   BUN 21* 22* 22*   CREATININE 1.0 0.8 0.7   GLUCOSE 125* 98 156*     Hepatic:    Recent Labs     08/29/20  0558 08/30/20  1241 08/31/20  0532   AST 17 30 23   ALT <5* 10 9*   BILITOT 0.7 0.7 0.6   ALKPHOS 85 124 102     Amylase:  No results found for: AMYLASE  Lipase:  No results found for: LIPASE   Mag:    Lab Results   Component Value Date    MG 1.80 08/31/2020    MG 2.30 08/22/2020     Phos:     Lab Results   Component Value Date    PHOS 3.4 08/31/2020    PHOS 3.8 08/29/2020      Coags:   Lab Results   Component Value Date    PROTIME 13.0 08/31/2020    INR 1.12 08/31/2020       Cultures:  Anaerobic culture  Lab Results   Component Value Date    LABANAE No anaerobes isolated 08/24/2020     Fungus stain  Results in Past 30 Days  Result Component Current Result Ref Range Previous Result Ref Range   Fungus Stain No Fungal elements seen (8/24/2020)  Not in Time Range      Gram stain  Results in Past 30 Days  Result Component Current Result Ref Range Previous Result Ref Range   Gram Stain Result 1+ WBC's (Polymorphonuclear)  No organisms seen   (8/24/2020)  1+ WBC's (Mononuclear)  1+ WBC's (Polymorphonuclear)  2+ Gram negative rods   (8/18/2020)      Organism  Lab Results   Component Value Date/Time    ORG Yeast (A) 08/24/2020 02:10 PM    ORG Lactobacillus species (A) 08/24/2020 02:10 PM     Surgical culture  Lab Results   Component Value Date/Time    CXSURG Rare growth  No further workup   08/24/2020 02:10 PM    CXSURG Rare growth  No further workup   08/24/2020 02:10 PM     Blood culture 1  Results in Past 30 Days  Result Component Current Result Ref Range Previous Result Ref Range   Blood Culture, Routine No Growth after 4 days of incubation. (8/25/2020)  No Growth after 4 days of incubation. (8/16/2020)      Blood culture 2  Results in Past 30 Days  Result Component Current Result Ref Range Previous Result Ref Range   Culture, Blood 2 No Growth after 4 days of incubation. (8/25/2020)  No Growth after 4 days of incubation. (8/16/2020)      Fecal occult  No results found for requested labs within last 30 days. GI bacterial pathogens by PCR  No results found for requested labs within last 30 days. C. difficile  No results found for requested labs within last 30 days. Urine culture  No results found for: Bette Beebe    Pathology:   OR Pathology results pending     Endoscopy Pathology 8/20/2020--FINAL DIAGNOSIS:     A. Esophagus, biopsy:   - Fragments of squamous and gastric type columnar mucosa with mild   chronic inflammation.   - Negative for intestinal metaplasia. B. Ascending colon mass, biopsy:   - Poorly differentiated carcinoma, most consistent with adenocarcinoma. See comment. C. Colon polyp, transverse:   - Tubular adenoma. - Negative for high-grade dysplasia or malignancy. D. Colon polyp, descending:   - Tubular adenoma. - Negative for high-grade dysplasia or malignancy. E. Colon polyp, sigmoid:   - Tubular adenoma (multiple fragments). - Negative for high-grade dysplasia or malignancy. COMMENT:  The morphologic findings of ascending colon mass raises a   differential diagnosis of neuroendocrine carcinoma.  However, the   malignant cells are negative for neuroendocrine markers (synaptophysin   and chromogranin) .  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  Clinical correlation is recommended. Imaging:  I have personally reviewed the following films:    Xr Abdomen (2 Views)    Result Date: 8/26/2020  EXAMINATION: TWO XRAY VIEWS OF THE ABDOMEN 8/26/2020 9:38 am COMPARISON: CT scan 08/17/2020 HISTORY: ORDERING SYSTEM PROVIDED HISTORY: Abdominal Pain TECHNOLOGIST PROVIDED HISTORY: Reason for exam:->Abdominal Pain Reason for Exam: abdominal pain Acuity: Acute Type of Exam: Initial FINDINGS: Scattered dilated loops small bowel are seen throughout the abdomen. No significant colonic gas is seen Drainage catheter projects in region the pelvis. Amado catheter is seen.      Scattered dilated small bowel loops throughout the abdomen, increased compared to prior CT scan, either due to ileus or early partial small bowel obstruction     Scheduled Meds:   rifaximin  550 mg Oral BID    [START ON 9/6/2020] fat emulsion  250 mL Intravenous Once per day on Mon Thu    lidocaine   Topical Once    folic acid  1 mg Oral Daily    fluconazole  200 mg Intravenous Q24H    meropenem  1 g Intravenous Q12H    enoxaparin  30 mg Subcutaneous Daily    bupivacaine liposome  5 mL Subcutaneous Once    pantoprazole  40 mg Intravenous Daily    lactulose  20 g Oral TID    sodium chloride flush  10 mL Intravenous 2 times per day     Continuous Infusions:   dextrose 5% and 0.9% NaCl with KCl 20 mEq 125 mL/hr at 08/31/20 0331    PN-Adult Premix 5/20 - Standard Electrolytes - Central Line 25 mL/hr at 08/30/20 1825    dextrose 100 mL/hr (08/27/20 1300)     PRN Meds:.ketorolac, acetaminophen, phenol, glucose, dextrose, glucagon (rDNA), dextrose, [DISCONTINUED] promethazine **OR** ondansetron, promethazine, morphine **OR** [DISCONTINUED] morphine, potassium chloride **OR** potassium alternative oral replacement **OR** potassium chloride, lip balm petroleum free, sodium chloride flush, polyethylene glycol      Assessment:  76 y.o. female admitted with   1. Septicemia ([facility] [address]    2. PAPITO (acute kidney injury) ([facility] [address]    3. Intra-abdominal free air of unknown etiology    4.  Fall, initial encounter        Status-post exploratory laparotomy, evacuation of ascites, drainage of pelvic abscess, right hemicolectomy with primary ileotransverse colostomy anastomosis, and Abdiaziz-Cut liver biopsy  on 8/24/2020 for colon mass, right colon and hepatic flexure with necrotic cancer at hepatic flexure region with perforation, cirrhosis, pelvic abscess, ascites  Intraabdominal/pelvic abscess, status-post CT-guided drain placement on 8/18/2020  Sepsis   Cirrhosis  Acute kidney injury  Hyponatremia  COVID screening, negative  Anemia  Had secondary wound closure and D&C 8/28    Plan:    Has some progress with normal temp and VS, Cr normal, but WBC is up, and pelvic drain with low output but looks more abnormal today and yesterday - since prior surgery  Continue NG, IV, atbx, and TPN  Will do CT C/A/P today    EDUCATION:  Educated patient on plan of care and disease process--all questions answered. Plans discussed with patient and nursing.         Signed:      Yarelis Perea

## 2020-08-31 NOTE — PROGRESS NOTES
8. 3* 9.8* 9.7*   PLT 96* 118* 115*     BMP:    Recent Labs     08/29/20  0558 08/30/20  1241 08/31/20  0532    139 140   K 3.9 3.8 4.4    112* 113*   CO2 18* 18* 17*   BUN 21* 22* 22*   CREATININE 1.0 0.8 0.7   GLUCOSE 125* 98 156*     Hepatic:   Recent Labs     08/29/20  0558 08/30/20  1241 08/31/20  0532   AST 17 30 23   ALT <5* 10 9*   BILITOT 0.7 0.7 0.6   ALKPHOS 85 124 102       CXR 8/16/2020:  No acute process. CT Head 8/16/2020:  No acute intracranial abnormality.           CT Cervical Spine 8/16/2020:  No acute abnormality of the cervical spine. CT Abdomen/Pelvis Without Contrast 8/16/2020:  . Non loculated fluid and gas collection in the pelvis concerning for    perforated viscus/possible abscess formation. 2. Prominent thickening of the ascending colon which may be related to    infectious or inflammatory process or underlying neoplastic process. 3. Bowel-gas pattern typical of adynamic ileus. 4. Hepatic cirrhosis with numerous hepatic nodules suggestive of    regenerative/siderotic nodules.  MRI abdomen without and with IV contrast    correlation recommended.  Metastatic disease is a consideration. 5. Hepatosplenomegaly and multifocal collateral vessels about the stomach    esophagus concerning for varices, all suggestive of portal venous    hypertension. 6. Abdominal and pelvic ascites. 7.  Diverticulosis coli without CT evidence of acute diverticulitis. CT Abdomen/Pelvis With Contrast 8/17/2020:  1. Pelvic air/fluid collection almost certainly reflects an abscess or giant    colonic diverticula.  This may be the result of acute diverticulitis. 2. Mass lesion ascending colon almost certainly reflecting primary    adenocarcinoma of the colon.     3. Diffuse hepatic lesions with newly visualized (contrast enhancement) mass    at the hepatic dome.  Hepatic metastatic disease is a consideration as is    cirrhosis with regenerative nodules and hepatocellular carcinoma. 4. Bilateral lower lobe pulmonary nodules suspicious for metastatic disease. 5. Abdominal and pelvic ascites. 6. Sequela from portosystemic shunting with numerous collateral vessels about    the stomach, left upper quadrant and distal esophagus.  Relatively small    esophageal and gastric varices. 7.  Calcific atherosclerotic disease aorta. CXR 8/24/2020:  FINDINGS:    There is been placement of a right jugular central venous catheter with its    tip projecting over the superior vena cava.  The heart size and mediastinal    contours are stable.         Bilateral mid and lower lung patchy ground-glass opacities today, increased    since the prior study.         No pneumothorax is evident.  Lucency beneath the right hemidiaphragm is new    since prior studies suspicious for free intraperitoneal air.              Impression    Lucency beneath the right hemidiaphragm compatible with free intraperitoneal    air.  Apparently the patient has had recent abdominal surgery.  This finding    was discussed with Dr. Kym Ferguson 6:48 pm on 8/24/2020.         Status post placement of right jugular central venous catheter.  No    pneumothorax.         Patchy mid and lower lung ground-glass opacities suspicious for pneumonia,    increased since the prior CT.  Correlation for pneumonia is recommended. Abdominal Xray 8/26/2020:  Scattered dilated small bowel loops throughout the abdomen, increased    compared to prior CT scan, either due to ileus or early partial small bowel    obstruction      CXR 8/27/2020:  Nasogastric tube is seen extending below the diaphragm.  Dedicated abdominal    radiograph could be performed for better visualization as warranted.         Bilateral airspace disease, increased on the right as compared to prior, for    which pneumonia or edema are considerations. Surgical Pathology 8/24/2020:  FINAL DIAGNOSIS:        A.  Right colon, hemicolectomy:        - Invasive poorly-differentiated adenocarcinoma with extensive          necrosis and full-thickness defect - SEE SYNOPTIC REPORT.        - Surgical resection margins negative for involvement.        - Metastatic adenocarcinoma in three of twenty-two lymph nodes          (3/22).      - One pericolonic tumor deposit.        B. Liver, biopsy:        - Cirrhotic liver with moderate steatosis and associated features          suggestive for steatohepatitis; negative for adenocarcinoma          involvement - see comment. Problem List  Active Problems:    Pelvic abscess in female    Septicemia ([address]    Colonic mass    Cirrhosis of liver with ascites ([address]    Intra-abdominal free air of unknown etiology    PAPITO (acute kidney injury) ([address]    Lung nodules    Bandemia    Intra-abdominal abscess (HCC)    E coli infection    Elevated CEA    Ex-smoker    Class 2 obesity due to excess calories with body mass index (BMI) of 36.0 to 36.9 in adult    Portal hypertension (HCC)    Mild malnutrition (HCC)    Tobacco abuse counseling    Open abdominal wall wound  Resolved Problems:    * No resolved hospital problems. *       Assessment:   1. Pelvic abscess. Status post IR drainage 8/18, pelvic fluid cytology negative for malignant cells. Colonoscopy 8/20 with large necrotic ascending colon mass, likely source of abscess. Status post exp lap right transverse colectomy 8/24. Status post secondary wound closure 8/28. Initially on Zosyn, transitioned to IV cipro (8/20). IV Flagyl added 8/25. Now on meropenem and flucanazole. Remains afebrile, WBC trending down. Blood culture results (8/25) with no growth to date. Surgery and ID on case. 2. Liver cirrhosis / portal HTN. Biopsy consistent with WINKLER cirrhosis, negative for adenocarcinoma involvement. Started on lactulose for ammonia level 66 and confusion, ammonia level 23 (8/28). GI following. 3. Colon cancer.  CT scan showed thickening of ascending colon suspicious for malignancy, lesions liver and lung concerning for metastasis. CEA 83.3, AFP 2.4. Colon biopsy (8/24) showed poorly differentiate adenocarcinoma, metastatic adenocarcinoma in 3 of 232 lymph nodes. Patient will need CT chest for staging once renal function improves. Plan is for chemotherapy once she recovers from surgery. Oncology on board. 4. PAPITO. Creatinine 1.6 on admission suspected pre-renal, hypovolemia. Bumped post surgery, creatinine 1.0. Nephrology on board. 5. Abnormal CXR (8/27). Bilateral airspace disease, increased on right as compared to prior, pneumonia or edema are considerations. Possible 3rd spacing, received albumin. COVID-19 (8/22) negative. Remains afebrile, no cough. Encouraged IS use. 6. Acute anemia. Hgb 10.9 on admission, no previous studies for comparison. Suspect she likely had chronic iron deficiency anemia secondary to colon cancer and post menopausal bleeding. She received 1 unit PRBCs 8/27, Hgb pending from today. DC planning: Plan is for SNF on DC. Case management is assisting, family preference is [facility] or [address] [place] [address] W. Not ready for DC, has NG tube in place.         Diet: Diet NPO Effective Now Exceptions are: Ice Chips, Sips with Meds  PN-Adult Premix 5/20 - Standard Electrolytes - Central Line  Code:Full Code  DVT PPX: on enoxaparin      VIN Nunez - CNP   8/31/2020 8:47 AM

## 2020-08-31 NOTE — PROGRESS NOTES
Comprehensive Nutrition Assessment    Type and Reason for Visit:  Reassess    Nutrition Recommendations/Plan:    PN adv to rate of 40 mL/hr  Next bag () advance to goa lof 75 mL/hr (as lytes WNL)    Nutrition Assessment:  Pt continues to be nutritionally stable AEB inadequate energy intake over length of stay. IV fluids and PN were started . Lytes reviewed today: Na+ 140, K+ 4.4, Mg 1.80, phos 3.4. Recommend to advance PN toward goal and monitor lytes daily. Malnutrition Assessment:  Malnutrition Status:  Mild malnutrition    Context:  Acute Illness     Findings of the 6 clinical characteristics of malnutrition:  Energy Intake:  7 - 50% or less of estimated energy requirements for 5 or more days  Weight Loss:  Unable to assess     Body Fat Loss:  Unable to assess(droplet plus precautions)     Muscle Mass Loss:  Unable to assess(droplet plus precautions)    Fluid Accumulation:  7 - Moderate to Severe Extremities   Strength:  Not Performed    Estimated Daily Nutrient Needs:  Energy (kcal):  1177 - 1498 (11-14kcal/107kg); Weight Used for Energy Requirements:  Current     Protein (g):  92 - 122 (1.5 - 2.0g/61kg);  Weight Used for Protein Requirements:  Ideal        Fluid (ml/day):  1 mL/kcal; Weight Used for Fluid Requirements:  Current      Nutrition Related Findings:  BLE nonpitting edema; hypoactive BS; abd soft and rounded      Wounds:  Surgical Wound       Current Nutrition Therapies:    Diet NPO Effective Now Exceptions are: Ice Chips, Sips with Meds  PN-Adult Premix 5/20 - Standard Electrolytes - Central Line  PN-Adult Premix 5/20 - Standard Electrolytes - Central Line  Current Parenteral Nutrition Orders:  · Type and Formula: Premix Central   · Lipids: None  · Duration: Continuous  · Current PN Order Provides: Rate for  to begin with next ba mL/hr provides 960 mL, 845kcal, 48 g protien, dex load 1.2 mg/kg/min  · Goal PN Orders Provides: 75 mL/hr provides 1800 mL total volume, 1584 kcal, 90 g protein & dex load 2.3 mg/kg/min    Additional Calorie Sources:   D5% . 45NS @ 125 mL/hr provides 510 kcal    Anthropometric Measures:  · Height: 5' 7\" (170.2 cm)  · Current Body Weight: 236 lb (107 kg)   · Admission Body Weight: 215 lb (97.5 kg)    · Ideal Body Weight: 135 lbs; % Ideal Body Weight 174.8 %   · BMI: 37  · BMI Categories: Obese Class 2 (BMI 35.0 -39.9)       Nutrition Diagnosis:   · Predicted inadequate energy intake related to altered GI function as evidenced by NPO or clear liquid status due to medical condition, nutrition support - parenteral nutrition(not yet to goal)  · Increased nutrient needs related to increase demand for energy/nutrients as evidenced by wounds      Nutrition Interventions:   Food and/or Nutrient Delivery:  Modify Parenteral Nutrition  Nutrition Education/Counseling:  Education not indicated   Coordination of Nutrition Care:  Continued Inpatient Monitoring    Goals:  denis to PN as goal obtained       Nutrition Monitoring and Evaluation:   Food/Nutrient Intake Outcomes:  Parenteral Nutrition Intake/Tolerance  Physical Signs/Symptoms Outcomes:  Biochemical Data, GI Status, Weight     Discharge Planning:     Too soon to determine     Electronically signed by Alexandra Aguilera RD, LD on 8/31/20 at 2:03 PM EDT  Contact: [phone]

## 2020-08-31 NOTE — PROGRESS NOTES
Infectious Diseases   Progress Note      Admission Date: 8/16/2020  Hospital Day: Hospital Day: 16   Attending: Esme Goldstein MD  Date of service: 8/31/2020     Chief complaint/ Reason for consult:     · Worsening bandemia secondary to intra-abdominal abscess  · Acute kidney injury  · Colon mass, right colon and hepatic flexure with necrotic cancer as well as perforation status post expiratory laparotomy and right hemicolectomy with primary ileotransverse colostomy and anastomosis on 8/24/2020  · Ascites, status post evacuation of ascites on 8/24/2020 , malignant ascites should be ruled out  · Intra-abdominal abscess status post abscess evacuation on 8/24/2020, CT-guided drainage of the abscess on 8/20/2020 had yielded 80 mL of purulent fluid with cultures positive for E. coli and mixed anaerobic growth    Microbiology:        I have reviewed allavailable micro lab data and cultures    · Blood culture (2/2) - collected on 8/16/2020: Negative  · Abdominal abscess fluid culture - collectedon 8/18/2020: E. Coli    Escherichia coli (1)     Antibiotic  Interpretation  ANABELLA  Status     ampicillin  Sensitive  4  mcg/mL      ceFAZolin  Sensitive  <=4  mcg/mL      cefepime  Sensitive  <=0.12  mcg/mL      cefTRIAXone  Sensitive  <=0.25  mcg/mL      ciprofloxacin  Sensitive  <=0.25  mcg/mL      ertapenem  Sensitive  <=0.12  mcg/mL      gentamicin  Sensitive  <=1  mcg/mL      levofloxacin  Sensitive  <=0.12  mcg/mL      piperacillin-tazobactam  Sensitive  <=4  mcg/mL      trimethoprim-sulfamethoxazole  Sensitive  <=20  mcg/mL          · Abdominal surgical culture  - collected on 8/24/2020:  In process      Antibiotics and immunizations:       Current antibiotics: All antibiotics and their doses were reviewed by me    Recent Abx Admin                   fluconazole (DIFLUCAN) in 0.9 % sodium chloride IVPB 200 mg (mg) 200 mg New Bag 08/31/20 1415    rifaximin (XIFAXAN) tablet 550 mg (mg) 550 mg Given 08/31/20 1123 meropenem (MERREM) 1 g in sodium chloride 0.9 % 100 mL IVPB (mini-bag) (g) 1 g New Bag 08/31/20 1123     1 g New Bag 08/30/20 2342                  Immunization History: All immunization history was reviewed by me today. There is no immunization history on file for this patient. Known drug allergies: All allergies were reviewed and updated    Allergies   Allergen Reactions    Penicillins Other (See Comments)     Unknown reaction per pt        Social history:     Social History:  All social andepidemiologic history was reviewed and updated by me today as needed. · Tobacco use:   reports that she has been smoking cigarettes. She has a 25.00 pack-year smoking history. She does not have any smokeless tobacco history on file. · Alcohol use:   reports no history of alcohol use. · Currently lives in: [address] [place] [address]  ·  reports no history of drug use. Assessment:     The patient is a 76 y.o. old female who  has no past medical history on file.  with following problems:    · Worsening bandemia secondary to intra-abdominal abscess-white cell count had improved and has gone up again  · Acute kidney injury-resolved, serum creatinine is 0.7 today  · Colon mass, right colon and hepatic flexure with necrotic cancer as well as perforation status post expiratory laparotomy and right hemicolectomy with primary ileotransverse colostomy and anastomosis on 8/24/2020-metastatic colon cancer confirmed by path-General oncology and GYN oncology following  · Ascites, status post evacuation of ascites on 8/24/2020  -liver biopsy confirmed cirrhosis  · Intra-abdominal abscess status post abscess evacuation on 8/24/2020, CT-guided drainage of the abscess on 8/20/2020 had yielded 80 mL of purulent fluid with cultures positive for E. coli and mixed anaerobic growth-currently covered with meropenem  · Elevated CEA  · 25-pack-year smoking history-counseling done yesterday  · CT scan with IV contrast of abdomen and antibiotics and things to watch for like new rashes, lip swelling, severe reaction, worsening diarrhea, break through fever etc.  · Discussed patient's condition and what to expect. All of the patient's questions were addressed in a satisfactory manner and patient verbalized understanding all instructions. Level of complexity of visit: High     Risk of Complications/Morbidity: High       Thank you for involving me in the care of your patient. I will continue to follow. If you have anyadditional questions, please do not hesitate to contact me. Subjective: Interval history: Interval history was obtained from chart review and RN. The patient is afebrile. She is tolerating meropenem and fluconazole okay. No diarrhea     REVIEW OF SYSTEMS:     Review of Systems   Constitutional: Positive for fatigue. Negative for chills, diaphoresis and unexpected weight change. HENT: Negative for congestion, ear discharge, ear pain, facial swelling, hearing loss, rhinorrhea and trouble swallowing. Eyes: Negative for photophobia, discharge, redness and visual disturbance. Respiratory: Negative for apnea, cough, choking, chest tightness, shortness of breath and stridor. Cardiovascular: Negative for chest pain and palpitations. Gastrointestinal: Negative for abdominal pain, blood in stool, diarrhea and nausea. Endocrine: Negative for polydipsia, polyphagia and polyuria. Genitourinary: Negative for difficulty urinating, dysuria, frequency, hematuria, menstrual problem and vaginal discharge. Musculoskeletal: Negative for arthralgias, joint swelling, myalgias and neck stiffness. Skin: Negative for color change and rash. Allergic/Immunologic: Negative for immunocompromised state. Neurological: Negative for dizziness, seizures, speech difficulty, light-headedness and headaches. Hematological: Negative for adenopathy. Psychiatric/Behavioral: Negative for agitation, hallucinations and suicidal ideas. central venous catheter. No   pneumothorax. Patchy mid and lower lung ground-glass opacities suspicious for pneumonia,   increased since the prior CT. Correlation for pneumonia is recommended. CT ABSCESS DRAINAGE W CATH PLACEMENT S&I   Final Result   Successful CT guided placement of a drainage catheter in the pelvic abscess. CT ABDOMEN PELVIS W IV CONTRAST Additional Contrast? Oral   Final Result   1. Pelvic air/fluid collection almost certainly reflects an abscess or giant   colonic diverticula. This may be the result of acute diverticulitis. 2. Mass lesion ascending colon almost certainly reflecting primary   adenocarcinoma of the colon. 3. Diffuse hepatic lesions with newly visualized (contrast enhancement) mass   at the hepatic dome. Hepatic metastatic disease is a consideration as is   cirrhosis with regenerative nodules and hepatocellular carcinoma. 4. Bilateral lower lobe pulmonary nodules suspicious for metastatic disease. 5. Abdominal and pelvic ascites. 6. Sequela from portosystemic shunting with numerous collateral vessels about   the stomach, left upper quadrant and distal esophagus. Relatively small   esophageal and gastric varices. 7.  Calcific atherosclerotic disease aorta. CT ABDOMEN PELVIS WO CONTRAST Additional Contrast? None   Final Result   1. Non loculated fluid and gas collection in the pelvis concerning for   perforated viscus/possible abscess formation. 2. Prominent thickening of the ascending colon which may be related to   infectious or inflammatory process or underlying neoplastic process. 3. Bowel-gas pattern typical of adynamic ileus. 4. Hepatic cirrhosis with numerous hepatic nodules suggestive of   regenerative/siderotic nodules. MRI abdomen without and with IV contrast   correlation recommended. Metastatic disease is a consideration.    5. Hepatosplenomegaly and multifocal collateral vessels about the stomach   esophagus concerning for varices, all suggestive of portal venous   hypertension. 6. Abdominal and pelvic ascites. 7.  Diverticulosis coli without CT evidence of acute diverticulitis. Critical results were called by Dr. Toma Beard to [address] [place] [address]   on 8/16/2020 at 18:09. CT Cervical Spine WO Contrast   Final Result   No acute abnormality of the cervical spine. CT Head WO Contrast   Final Result   No acute intracranial abnormality. XR CHEST PORTABLE   Final Result   No acute process. XR RADIUS ULNA LEFT (2 VIEWS)   Final Result   No acute osseous abnormality. CT CHEST W CONTRAST    (Results Pending)       Medications: All current and past medications were reviewed.      rifaximin  550 mg Oral BID    iohexol        [START ON 9/6/2020] fat emulsion  250 mL Intravenous Once per day on Mon Thu    lidocaine   Topical Once    folic acid  1 mg Oral Daily    fluconazole  200 mg Intravenous Q24H    meropenem  1 g Intravenous Q12H    enoxaparin  30 mg Subcutaneous Daily    bupivacaine liposome  5 mL Subcutaneous Once    pantoprazole  40 mg Intravenous Daily    lactulose  20 g Oral TID    sodium chloride flush  10 mL Intravenous 2 times per day        PN-Adult Premix 5/20 - Standard Electrolytes - Central Line      dextrose 5% and 0.9% NaCl with KCl 20 mEq 75 mL/hr at 08/31/20 1123    PN-Adult Premix 5/20 - Standard Electrolytes - Central Line 25 mL/hr at 08/30/20 1825    dextrose 100 mL/hr (08/27/20 1300)       ketorolac, acetaminophen, phenol, glucose, dextrose, glucagon (rDNA), dextrose, [DISCONTINUED] promethazine **OR** ondansetron, promethazine, morphine **OR** [DISCONTINUED] morphine, potassium chloride **OR** potassium alternative oral replacement **OR** potassium chloride, lip balm petroleum free, sodium chloride flush, polyethylene glycol      Problem list:       Patient Active Problem List   Diagnosis Code    Pelvic abscess in female N73.9    Septicemia ([facility] A41.9    Colonic mass K63.89    Cirrhosis of liver with ascites (HCC) K74.60, R18.8    Intra-abdominal free air of unknown etiology K66.8    PAPITO (acute kidney injury) ([address] N17.9    Lung nodules R91.8    Bandemia D72.825    Intra-abdominal abscess (HCC) K65.1    E coli infection A49.8    Elevated CEA R97.0    Ex-smoker Z87.891    Class 2 obesity due to excess calories with body mass index (BMI) of 36.0 to 36.9 in adult E66.09, Z68.36    Portal hypertension (HCC) K76.6    Mild malnutrition (HCC) E44.1    Tobacco abuse counseling Z71.6    Open abdominal wall wound S31.109A       Please note that this chart was generated using Dragon dictation software. Although every effort was made to ensure the accuracy of this automated transcription, some errors in transcription may have occurred inadvertently. If you may need any clarification, please do not hesitate to contact me through EPIC or at the phone number provided below with my electronic signature. Any pictures or media included in this note were obtained after taking informed verbal consent from the patient and with their approval to include those in the patient's medical record.     Sin Barros MD, MPH  8/31/2020 , 2:59 PM   [facility] Infectious Disease   Office: [phone]  Fax: [phone]  Tuesday AM clinic:   [address] [place] [address] 120  Thursday AM [id number] [facility]

## 2020-08-31 NOTE — PLAN OF CARE
Problem: Falls - Risk of:  Goal: Will remain free from falls  Description: Will remain free from falls  8/31/2020 1922 by Mau Gordon RN  Outcome: Ongoing  8/31/2020 0832 by Ayana Mera RN  Outcome: Ongoing  Goal: Absence of physical injury  Description: Absence of physical injury  8/31/2020 1922 by Mau Gordon RN  Outcome: Ongoing  8/31/2020 0832 by Ayaan Mera RN  Outcome: Ongoing     Problem: Skin Integrity:  Goal: Will show no infection signs and symptoms  Description: Will show no infection signs and symptoms  8/31/2020 1922 by Mau Gordon RN  Outcome: Ongoing  8/31/2020 0832 by Ayana Mera RN  Outcome: Ongoing  Goal: Absence of new skin breakdown  Description: Absence of new skin breakdown  8/31/2020 1922 by Mau Gordon RN  Outcome: Ongoing  8/31/2020 0832 by Ayana Mera RN  Outcome: Ongoing     Problem: Infection:  Goal: Will remain free from infection  Description: Will remain free from infection  8/31/2020 1922 by Mau Gordon RN  Outcome: Ongoing  8/31/2020 0832 by Ayana Mera RN  Outcome: Ongoing     Problem: Safety:  Goal: Free from accidental physical injury  Description: Free from accidental physical injury  8/31/2020 1922 by Mau Gordon RN  Outcome: Ongoing  8/31/2020 0832 by Ayana Mera RN  Outcome: Ongoing  Goal: Free from intentional harm  Description: Free from intentional harm  8/31/2020 1922 by Mau Gordon RN  Outcome: Ongoing  8/31/2020 0832 by Ayana Mera RN  Outcome: Ongoing     Problem: Daily Care:  Goal: Daily care needs are met  Description: Daily care needs are met  8/31/2020 1922 by Mau Gordon RN  Outcome: Ongoing  8/31/2020 0832 by Ayana Mera RN  Outcome: Ongoing     Problem: Pain:  Goal: Patient's pain/discomfort is manageable  Description: Patient's pain/discomfort is manageable  8/31/2020 1922 by Mau Gordon RN  Outcome: Ongoing  8/31/2020 0832 by Ayana Mera RN  Outcome: Ongoing  Goal: Pain level will decrease  Description: Pain level will decrease  8/31/2020 1922 by Andee Dancer, RN  Outcome: Ongoing  8/31/2020 0832 by Patricia Cleveland RN  Outcome: Ongoing  Goal: Control of acute pain  Description: Control of acute pain  8/31/2020 1922 by Andee Dancer, RN  Outcome: Ongoing  8/31/2020 0832 by Patricia Cleveland RN  Outcome: Ongoing  Goal: Control of chronic pain  Description: Control of chronic pain  8/31/2020 1922 by Andee Dancer, RN  Outcome: Ongoing  8/31/2020 0832 by Patricia Cleveland RN  Outcome: Ongoing     Problem: Skin Integrity:  Goal: Skin integrity will stabilize  Description: Skin integrity will stabilize  8/31/2020 1922 by Andee Dancer, RN  Outcome: Ongoing  8/31/2020 0832 by Patricia Cleveland RN  Outcome: Ongoing     Problem: Discharge Planning:  Goal: Patients continuum of care needs are met  Description: Patients continuum of care needs are met  8/31/2020 1922 by Andee Dancer, RN  Outcome: Ongoing  8/31/2020 0832 by Patricia Cleveland RN  Outcome: Ongoing     Problem: Nutrition  Goal: Optimal nutrition therapy  8/31/2020 1922 by Andee Dancer, RN  Outcome: Ongoing  8/31/2020 1402 by Alyssa Carter RD, LD  Outcome: Ongoing  8/31/2020 0832 by Patricia Cleveland RN  Outcome: Ongoing     Problem: Confusion - Acute:  Goal: Absence of continued neurological deterioration signs and symptoms  Description: Absence of continued neurological deterioration signs and symptoms  8/31/2020 1922 by Andee Dancer, RN  Outcome: Ongoing  8/31/2020 0832 by Patricia Cleveland RN  Outcome: Ongoing  Goal: Mental status will be restored to baseline  Description: Mental status will be restored to baseline  8/31/2020 1922 by Andee Dancer, RN  Outcome: Ongoing  8/31/2020 0832 by Patricia Cleveland RN  Outcome: Ongoing     Problem: Discharge Planning:  Goal: Ability to perform activities of daily living will improve  Description: Ability to perform activities of daily living will improve  8/31/2020 1922 by Andee Dancer, RN  Outcome: Ongoing  8/31/2020 0832 by Radha Khoury Amandeep Foley RN  Outcome: Ongoing  Goal: Participates in care planning  Description: Participates in care planning  8/31/2020 1922 by Sri Toney RN  Outcome: Ongoing  8/31/2020 0832 by Roselia Crowder RN  Outcome: Ongoing     Problem: Injury - Risk of, Physical Injury:  Goal: Will remain free from falls  Description: Will remain free from falls  8/31/2020 1922 by Sri Toney RN  Outcome: Ongoing  8/31/2020 0832 by Roselia Crowder RN  Outcome: Ongoing  Goal: Absence of physical injury  Description: Absence of physical injury  8/31/2020 1922 by Sri Toney RN  Outcome: Ongoing  8/31/2020 0832 by Roselia Crowder RN  Outcome: Ongoing     Problem: Mood - Altered:  Goal: Mood stable  Description: Mood stable  8/31/2020 1922 by Sri Toney RN  Outcome: Ongoing  8/31/2020 0832 by Roselia Crowder RN  Outcome: Ongoing  Goal: Absence of abusive behavior  Description: Absence of abusive behavior  8/31/2020 1922 by Sri Toney RN  Outcome: Ongoing  8/31/2020 0832 by Roselia Crowder RN  Outcome: Ongoing  Goal: Verbalizations of feeling emotionally comfortable while being cared for will increase  Description: Verbalizations of feeling emotionally comfortable while being cared for will increase  8/31/2020 1922 by Sri Toney RN  Outcome: Ongoing  8/31/2020 0832 by Roselia Crowder RN  Outcome: Ongoing     Problem: Psychomotor Activity - Altered:  Goal: Absence of psychomotor disturbance signs and symptoms  Description: Absence of psychomotor disturbance signs and symptoms  8/31/2020 1922 by Sri Toney RN  Outcome: Ongoing  8/31/2020 0832 by Roselia Crowder RN  Outcome: Ongoing     Problem: Sensory Perception - Impaired:  Goal: Demonstrations of improved sensory functioning will increase  Description: Demonstrations of improved sensory functioning will increase  8/31/2020 1922 by Sri Toney RN  Outcome: Ongoing  8/31/2020 0832 by Roselia Crowder RN  Outcome: Ongoing  Goal: Decrease in sensory misperception 1922 by Doreen Marshall RN  Outcome: Ongoing  8/31/2020 0832 by Ayana Eagle RN  Outcome: Ongoing     Problem: SKIN INTEGRITY  Goal: Skin integrity is maintained or improved  8/31/2020 1922 by Doreen Marshall RN  Outcome: Ongoing  8/31/2020 0832 by Ayana Eagle RN  Outcome: Ongoing

## 2020-08-31 NOTE — PROGRESS NOTES
Upon entering room to change CVC dressing, noted pt had pulled NG. Stated, \"I don't need that tube and want to eat\". Education provided to pt on replacing NG and NPO status, continues to refuse. Dr. Ashlyn Lombardo on call and notified. Order to replace tube if pt begins vomiting, pt aware. Dressing to CVC changed per protocol. Pt refused repositioning, call light w/in reach.  Silver Lennox, RN

## 2020-08-31 NOTE — PROGRESS NOTES
MD Kirby Kingston MD Jarvis Lambing, MD               Office: [phone]                      Fax: [phone]             [address] [place] [address]                   NEPHROLOGY INPATIENT PROGRESS NOTE:     PATIENT NAME: Dylan Golden  : 1955  MRN: [id number]. IMPRESSION:       PAPITO - improving to stable   - crea down   : Oligouric - now non-oliguric.    : Etiology of current PAPITO - ATN vs venous congestion vs. Decreased renal perfusion. WBC count higher  - Renal imaging: w/ CT - kidney unremarkable   - CK - 310 on admission then improved. - Avoid contrast exposure at this time. Associated problems:    - Electrolytes: Hyponatremia-better  Hypokalemia - from needing repletion GI loss -   - Weight up to 230 from 215. Also has hypoalbuminemia so may be 3rd spacing. Monitor daily weights.   - h/o Cirrhosis   : BP: Ok to keep slightly higher   - Acid-Base: Acidosis - - non-AGMA - follow as w/ higher NG losses  - Anemia:  - Heme  Following       RECOMMENDATIONS:   - continue IVF for now w/ upper GI losses -is hypotonic, potassium repletion   + started on PN  -Monitor acidosis, if worsening with GI losses, would change to bicarbonate. - monitor respiratory closely       Other problems: Management per primary and other consulting teams. # Cirrhosis , Portal HTN : new diagnosis, no h/o EtOH    # Sepsis, intra-abdominal abscess   - S/P CT-guided drain placement on 2020  - S/P right hemicolectomy       High complexity. Multiple medical problems. Discussed with patient and treatment team.   Thank you for allowing me to participate in this patient's care. Please do not hesitate to contact me for any questions/concerns. We will follow along with you.      Thiago Telles MD  [facility] [address]   Phone: [phone] or Via Mobile [employer]  Fax: [phone]        Hospital Problems           Last Modified POA    Pelvic abscess in female 8/16/2020 Yes    Septicemia ([address] 8/17/2020 Yes    Colonic mass 8/25/2020 Yes    Cirrhosis of liver with ascites ([address] 8/25/2020 Yes    Intra-abdominal free air of unknown etiology 8/25/2020 Yes    PAPITO (acute kidney injury) ([address] 8/25/2020 Yes    Lung nodules 8/25/2020 Yes    Bandemia 8/25/2020 Yes    Intra-abdominal abscess ([address] 8/25/2020 Yes    E coli infection 8/25/2020 Yes    Elevated CEA 8/25/2020 Yes    Ex-smoker 8/25/2020 Yes    Class 2 obesity due to excess calories with body mass index (BMI) of 36.0 to 36.9 in adult 8/25/2020 Yes    Portal hypertension ([address] 8/25/2020 Yes    Mild malnutrition ([address] (Chronic) 8/25/2020 Yes    Tobacco abuse counseling 8/27/2020 Yes    Open abdominal wall wound 8/28/2020 Yes             ========================================    SUBJECTIVE:    Seen resting in bed,   No acute complaints  NGT w/ higher secretions    MEDICATIONS: reviewed by me. Medications Prior to Admission:  No current facility-administered medications on file prior to encounter. No current outpatient medications on file prior to encounter. No medications prior to admission.      Scheduled Meds:   [START ON 9/6/2020] fat emulsion  250 mL Intravenous Once per day on Mon Thu    lidocaine   Topical Once    folic acid  1 mg Oral Daily    fluconazole  200 mg Intravenous Q24H    meropenem  1 g Intravenous Q12H    enoxaparin  30 mg Subcutaneous Daily    bupivacaine liposome  5 mL Subcutaneous Once    pantoprazole  40 mg Intravenous Daily    lactulose  20 g Oral TID    sodium chloride flush  10 mL Intravenous 2 times per day     Continuous Infusions:   dextrose 5% and 0.9% NaCl with KCl 20 mEq 125 mL/hr at 08/31/20 0552    PN-Adult Premix 5/20 - Standard Electrolytes - Central Line 25 mL/hr at 08/30/20 1825    dextrose 100 mL/hr (08/27/20 1300)     PRN Meds:.ketorolac, acetaminophen, phenol, glucose, dextrose, glucagon (rDNA), dextrose, [DISCONTINUED] promethazine **OR** ondansetron, promethazine, morphine **OR** [DISCONTINUED] morphine, potassium chloride **OR** potassium alternative oral replacement **OR** potassium chloride, lip balm petroleum free, sodium chloride flush, polyethylene glycol       PHYSICAL EXAM:  Recent vital signs and recent I/Os reviewed by me. Wt Readings from Last 3 Encounters:   08/23/20 230 lb (104.3 kg)     BP Readings from Last 3 Encounters:   08/31/20 112/76   08/28/20 (!) 149/66   08/24/20 (!) 155/68     Patient Vitals for the past 24 hrs:   BP Temp Temp src Pulse Resp SpO2   08/31/20 0814 112/76 97.8 °F (36.6 °C) Axillary 86 16 97 %   08/31/20 0440 137/70 97.6 °F (36.4 °C) Oral 80 16 96 %   08/30/20 2335 (!) 113/58 96.8 °F (36 °C) Axillary 76 16 97 %   08/30/20 2135 127/76 97.6 °F (36.4 °C) Axillary 73 16 96 %   08/30/20 1224 123/80 97.2 °F (36.2 °C) Axillary 72 16 95 %       Intake/Output Summary (Last 24 hours) at 8/31/2020 0851  Last data filed at 8/31/2020 9404  Gross per 24 hour   Intake 2271 ml   Output 2505 ml   Net -234 ml         General: Awake, Alert,   HENT: Atraumatic, normocephalic   Eyes: Normal conjunctiva, Non-incteral sclera. Neck: Supple, JVD not visible. CVS:  Heart sounds are normal. No murmurs. No pericardial rub. RS: Normal respiratory efforts,  Lung fields are diminished . Abd: Soft , bowel sounds are normal,  distension and no tenderness to palpation. Skin: No rash , some bruises,   CNS: Awake Oriented , No focal.   Extremities/MSK: 2+ Edema, improving,  no cyanosis.            DATA:  Diagnostic tests reviewed by me for today's visit:    Recent Labs     08/29/20  0558 08/30/20  1241 08/31/20  0532   WBC 9.3 12.1* 15.6*   HCT 25.7* 30.9* 29.9*   PLT 96* 118* 115*     Iron Saturation:  No components found for: PERCENTFE  FERRITIN:    Lab Results   Component Value Date    FERRITIN 542.4 08/17/2020     IRON:    Lab Results   Component Value Date    IRON 26 08/17/2020     TIBC:    Lab Results   Component Value Date    TIBC 106 Fluid collection within the pelvis without definite margins has air bubbles within it concerning for sequela from perforated bowel/abscess formation. Uterus and adnexal structures appear unremarkable. Peritoneum/Retroperitoneum: Abdominal ascites. Calcific atherosclerosis aorta. Bones/Soft Tissues: No acute superficial soft tissue or osseous structure abnormality evident. 1. Non loculated fluid and gas collection in the pelvis concerning for perforated viscus/possible abscess formation. 2. Prominent thickening of the ascending colon which may be related to infectious or inflammatory process or underlying neoplastic process. 3. Bowel-gas pattern typical of adynamic ileus. 4. Hepatic cirrhosis with numerous hepatic nodules suggestive of regenerative/siderotic nodules. MRI abdomen without and with IV contrast correlation recommended. Metastatic disease is a consideration. 5. Hepatosplenomegaly and multifocal collateral vessels about the stomach esophagus concerning for varices, all suggestive of portal venous hypertension. 6. Abdominal and pelvic ascites. 7.  Diverticulosis coli without CT evidence of acute diverticulitis. Critical results were called by Dr. Cornel Mayorga to [address] [place] [address] on 8/16/2020 at 18:09. Xr Radius Ulna Left (2 Views)    Result Date: 8/16/2020  EXAMINATION: TWO XRAY VIEWS OF THE LEFT FOREARM 8/16/2020 2:55 pm COMPARISON: None. HISTORY: ORDERING SYSTEM PROVIDED HISTORY: Fall TECHNOLOGIST PROVIDED HISTORY: Reason for exam:->Fall Reason for Exam: Frequent falls at home. Bruising mid shaft L forearm Acuity: Acute Type of Exam: Initial FINDINGS: There is no evidence of acute fracture. There is normal alignment. No acute joint abnormality. No focal osseous lesion. No focal soft tissue abnormality. No acute osseous abnormality.      Ct Head Wo Contrast    Result Date: 8/16/2020  EXAMINATION: CT OF THE HEAD WITHOUT CONTRAST  8/16/2020 3:15 pm TECHNIQUE: CT of the head was performed without the administration of intravenous contrast. Dose modulation, iterative reconstruction, and/or weight based adjustment of the mA/kV was utilized to reduce the radiation dose to as low as reasonably achievable. COMPARISON: None. HISTORY: ORDERING SYSTEM PROVIDED HISTORY: Fall TECHNOLOGIST PROVIDED HISTORY: Reason for exam:->Fall Has a \"code stroke\" or \"stroke alert\" been called? ->No Reason for Exam: Fall Acuity: Acute Type of Exam: Initial FINDINGS: BRAIN/VENTRICLES: The ventricles and sulci are diffusely enlarged. Low attenuation is seen in the periventricular and subcortical white matter. No acute intracranial hemorrhage or acute infarct is identified. ORBITS: The visualized portion of the orbits demonstrate no acute abnormality. SINUSES: The visualized paranasal sinuses and mastoid air cells demonstrate no acute abnormality. SOFT TISSUES/SKULL:  No acute abnormality of the visualized skull or soft tissues. No acute intracranial abnormality. Ct Cervical Spine Wo Contrast    Result Date: 8/16/2020  EXAMINATION: CT OF THE CERVICAL SPINE WITHOUT CONTRAST 8/16/2020 3:15 pm TECHNIQUE: CT of the cervical spine was performed without the administration of intravenous contrast. Multiplanar reformatted images are provided for review. Dose modulation, iterative reconstruction, and/or weight based adjustment of the mA/kV was utilized to reduce the radiation dose to as low as reasonably achievable. COMPARISON: None. HISTORY: ORDERING SYSTEM PROVIDED HISTORY: Fall TECHNOLOGIST PROVIDED HISTORY: Reason for exam:->Fall Reason for Exam: Fall Acuity: Acute Type of Exam: Initial FINDINGS: BONES/ALIGNMENT: There is no acute fracture or traumatic malalignment. DEGENERATIVE CHANGES: Multilevel degenerative changes. SOFT TISSUES: There is no prevertebral soft tissue swelling. No acute abnormality of the cervical spine.      Xr Chest Portable    Result Date: 8/16/2020  EXAMINATION: ONE XRAY VIEW OF THE CHEST

## 2020-08-31 NOTE — PROGRESS NOTES
AM assessment complete. Dressing to abdominal incision remains CDI, awaiting dressing change for physician rounds. Pt rates pain 8/10 at this time, denies wanting any pain medication at this time. Educated on notifying nurse if pt changes mind, states understanding. The care plan and education has been reviewed and mutually agreed upon with the patient. Pt denies any further questions or concerns at this time, call light w/in reach.  Nafisa Hardin RN

## 2020-08-31 NOTE — PROGRESS NOTES
Message sent via [employer] serve to Dr. Celine Hernandez regarding CT results, no new orders obtained.  Denita Miner RN

## 2020-08-31 NOTE — PROGRESS NOTES
Message sent via [employer] serve to hospitalist regarding low urine outpt, no new orders obtained at this time, will monitor.  Elvie Gar RN

## 2020-09-01 NOTE — PROGRESS NOTES
[facility] GI  Gastroenterology Progress Note    North Perez is a 76 y.o. female patient. 1. Septicemia ([facility]    2. PAPITO (acute kidney injury) ([facility]    3. Intra-abdominal free air of unknown etiology    4. Fall, initial encounter        SUBJECTIVE:  Denies abdominal pain. ROS:  Cardiovascular ROS: no chest pain or dyspnea on exertion  Gastrointestinal ROS: see above  Respiratory ROS: no cough, shortness of breath, or wheezing    Physical    VITALS:  /74   Pulse 81   Temp 97.5 °F (36.4 °C) (Axillary)   Resp 18   Ht 5' 7\" (1.702 m)   Wt 236 lb 6 oz (107.2 kg)   SpO2 95%   BMI 37.02 kg/m²   TEMPERATURE:  Current - Temp: 97.5 °F (36.4 °C); Max - Temp  Av °F (36.7 °C)  Min: 97.5 °F (36.4 °C)  Max: 98.2 °F (36.8 °C)    NAD  RRR, Nl s1s2  Lungs CTA Bilaterally, normal effort  Abdomen soft, nontender, + dressing, NT, no HSM, Bowel sounds present  AAOx3, No asterixis     Data      CBC:   Recent Labs     20  0532 20  1830 20  0634   WBC 15.6* 16.1* 13.7*   RBC 3.33* 3.26* 3.12*   HGB 9.7* 9.4* 9.1*   HCT 29.9* 29.3* 28.6*   * 124* 109*   MCV 89.7 89.9 91.9   MCH 29.2 28.8 29.3   MCHC 32.6 32.1 31.9   RDW 19.5* 19.3* 19.6*        BMP:  Recent Labs     20  0532 20  1830 20  0634    141 141   K 4.4 4.4 4.3   * 114* 113*   CO2 17* 18* 19*   BUN 22* 22* 23*   CREATININE 0.7 0.8 0.7   CALCIUM 8.6 8.8 8.6   GLUCOSE 156* 154* 135*        Hepatic Function Panel:   Recent Labs     20  1241 08/20  0634   AST 30 23 16 15   ALT 10 9* 8* 6*   BILIDIR 0.3 <0.2  --  <0.2   BILITOT 0.7 0.6 0.6 0.6   ALKPHOS 124 102 96 90       No results for input(s): LIPASE, AMYLASE in the last 72 hours. Recent Labs     20  0634   PROTIME 13.0 13.1 13.6*   INR 1.12 1.13 1.17*     No results for input(s): PTT in the last 72 hours. No results for input(s): OCCULTBLD in the last 72 hours.       Radiology Review:    CT CHEST ABDOMEN PELVIS W CONTRAST   Final Result   1. Multifocal bilateral pulmonary ground-glass opacities. The differential   includes atypical infectious/inflammatory pneumonitis. 2. Multiple bilateral pulmonary nodules concerning for metastatic disease. 3. Cirrhosis with increasing abdominopelvic ascites. 4. Improving 7 cm recto uterine abscess. XR ABDOMEN (KUB) (SINGLE AP VIEW)   Final Result   Multiple distended small bowel loops, similar to the previous study. No   significant colonic gas. Findings are concerning for a SBO. XR CHEST PORTABLE   Final Result   Nasogastric tube is seen extending below the diaphragm. Dedicated abdominal   radiograph could be performed for better visualization as warranted. Bilateral airspace disease, increased on the right as compared to prior, for   which pneumonia or edema are considerations. XR ABDOMEN (2 VIEWS)   Final Result   Scattered dilated small bowel loops throughout the abdomen, increased   compared to prior CT scan, either due to ileus or early partial small bowel   obstruction         XR CHEST PORTABLE   Final Result   Lucency beneath the right hemidiaphragm compatible with free intraperitoneal   air. Apparently the patient has had recent abdominal surgery. This finding   was discussed with Dr. Bruce Espitia  At 6:48 pm on 8/24/2020. Status post placement of right jugular central venous catheter. No   pneumothorax. Patchy mid and lower lung ground-glass opacities suspicious for pneumonia,   increased since the prior CT. Correlation for pneumonia is recommended. CT ABSCESS DRAINAGE W CATH PLACEMENT S&I   Final Result   Successful CT guided placement of a drainage catheter in the pelvic abscess. CT ABDOMEN PELVIS W IV CONTRAST Additional Contrast? Oral   Final Result   1. Pelvic air/fluid collection almost certainly reflects an abscess or giant   colonic diverticula.   This may be the result of interviewed and examined the patient and I agree with the findings and recommended plan of care. In summary, my findings and plan are the following:     Patient just came back from paracentesis. 10.35 liters of fluid taken out. Fluid analysis suggesting peritonitis. She denies any abdominal pain. Give IV albumin for large volume paracentesis. Continue broad spectrum IV antibiotics. Surgery and ID on board.      Duane Beam, MD, MSc  [facility]

## 2020-09-01 NOTE — PROGRESS NOTES
33603ol of fluid removed  Spoke to patients CINTIA schmidt and advised her the amount of fluid removed and that patient was returning to the floor.

## 2020-09-01 NOTE — PROGRESS NOTES
Physical/Occupational Therapy  Giselle Martinez    Attempted to see pt for PT/OT treatment. Patient currently off the floor. Will hold therapy at this time and will follow up with pt as schedule permits.  Thanks, Judy Dumont, PT, DPT [id number], Davian Armenta, [facility], OTR/L [id number]

## 2020-09-01 NOTE — PROGRESS NOTES
[facility] PROGRESS NOTE    9/1/2020 9:37 AM        Name: Oli Chaidez . Admitted: 8/16/2020  Primary Care Provider: No primary care provider on file. (Tel: None)      Hospital course:  Patient is a 75 yo female with hx nicotine use. The patient lives alone, she presented to hospital after being found on floor by her daughter. She has had multiple falls in past couple of months. CT abdomen and pelvis in ER showed pelvic abscess vs viscus perf. Colonic wall thickening, concerning for malignancy, liver cirrhosis portal hypertension. She had Ct-guided drain placement 8/18 for pelvic abscess 8/18. Subsequently found to have large ascending colon mass on colonoscopy 8/20, likely the source of abscess. 8/18/2020: Successful CT guided placement of a drainage catheter in the pelvic abscess. 8/24/2020: Exploratory laparotomy, evacuation of ascites, drainage of  pelvic abscess, right hemicolectomy with primary ileotransverse  colostomy anastomosis, and Abdiaziz-Cut liver biopsy. 8/28/2020: SECONDARY ABDOMINAL WOUND CLOSURE  DILATATION AND CURETTAGE    8/30/2020: NGT placed    8/31/2020: NGT d/c'd   Subjective: Intermittent confusion yesterday and today. Poss 2/2 IV pain meds. She is s/p paracentesis. Pain is controlled. Her sister is present bedside. COVID neg.    Reviewed interval ancillary notes    Current Medications  rifaximin (XIFAXAN) tablet 550 mg, BID  PN-Adult Premix 5/20 - Standard Electrolytes - Central Line, Continuous TPN  acetaminophen (TYLENOL) tablet 650 mg, Q6H PRN    Or  acetaminophen (TYLENOL) suppository 650 mg, Q6H PRN  enoxaparin (LOVENOX) injection 30 mg, BID  dextrose 5 % and 0.9 % NaCl with KCl 20 mEq infusion, Continuous  [START ON 9/6/2020] fat emulsion 20 % infusion 250 mL, Once per day on Mon Thu  ketorolac (TORADOL) injection 15 mg, Q6H PRN  acetaminophen (TYLENOL) tablet 1,000 mg, Q6H PRN  lidocaine (XYLOCAINE) 2 % jelly, Once  phenol 1.4 % mouth spray 1 spray, Q2H PRN  glucose (GLUTOSE) 40 % oral gel 15 g, PRN  dextrose 50 % IV solution, PRN  glucagon (rDNA) injection 1 mg, PRN  dextrose 5 % solution, PRN  folic acid (FOLVITE) tablet 1 mg, Daily  ondansetron (ZOFRAN) injection 4 mg, Q4H PRN  promethazine (PHENERGAN) injection 12.5 mg, Q6H PRN  fluconazole (DIFLUCAN) in 0.9 % sodium chloride IVPB 200 mg, Q24H  meropenem (MERREM) 1 g in sodium chloride 0.9 % 100 mL IVPB (mini-bag), Q12H  bupivacaine liposome (EXPAREL) 1.3 % injection 66.5 mg, Once  morphine (PF) injection 2 mg, Q2H PRN  pantoprazole (PROTONIX) injection 40 mg, Daily  potassium chloride (KLOR-CON M) extended release tablet 40 mEq, PRN    Or  potassium bicarb-citric acid (EFFER-K) effervescent tablet 40 mEq, PRN    Or  potassium chloride 10 mEq/100 mL IVPB (Peripheral Line), PRN  lactulose (CHRONULAC) 10 GM/15ML solution 20 g, TID  lip balm petroleum free (PHYTOPLEX) stick, PRN  sodium chloride flush 0.9 % injection 10 mL, 2 times per day  sodium chloride flush 0.9 % injection 10 mL, PRN  polyethylene glycol (GLYCOLAX) packet 17 g, Daily PRN      Objective:  /74   Pulse 81   Temp 97.5 °F (36.4 °C) (Axillary)   Resp 18   Ht 5' 7\" (1.702 m)   Wt 236 lb 6 oz (107.2 kg)   SpO2 95%   BMI 37.02 kg/m²     Intake/Output Summary (Last 24 hours) at 9/1/2020 0937  Last data filed at 9/1/2020 0553  Gross per 24 hour   Intake 3396 ml   Output 825 ml   Net 2571 ml      Wt Readings from Last 3 Encounters:   08/31/20 236 lb 6 oz (107.2 kg)     General:  Awake, alert, oriented in NAD  Skin:  Warm and dry. No unusual bruising or rash  Neck:  Supple. No JVD appreciated  Chest:  Normal effort.   Clear to auscultation  Cardiovascular:  RRR, normal S1/S2, no murmur/gallop/rub  Abdomen:  Soft, mild operative tenderness, midline dressing C/D/I, hypoactive bowel sounds  Extremities:  2+ BLE edema  Neurological: No focal deficits  Psychological: Normal mood and affect    Labs and Tests:  CBC:   Recent Labs     08/31/20  0532 08/31/20  1830 09/01/20  0634   WBC 15.6* 16.1* 13.7*   HGB 9.7* 9.4* 9.1*   * 124* 109*     BMP:    Recent Labs     08/31/20  0532 08/31/20  1830 09/01/20  0634    141 141   K 4.4 4.4 4.3   * 114* 113*   CO2 17* 18* 19*   BUN 22* 22* 23*   CREATININE 0.7 0.8 0.7   GLUCOSE 156* 154* 135*     Hepatic:   Recent Labs     08/31/20  0532 08/31/20  1830 09/01/20  0634   AST 23 16 15   ALT 9* 8* 6*   BILITOT 0.6 0.6 0.6   ALKPHOS 102 96 90       CXR 8/16/2020:  No acute process. CT Head 8/16/2020:  No acute intracranial abnormality.           CT Cervical Spine 8/16/2020:  No acute abnormality of the cervical spine. CT Abdomen/Pelvis Without Contrast 8/16/2020:  . Non loculated fluid and gas collection in the pelvis concerning for    perforated viscus/possible abscess formation. 2. Prominent thickening of the ascending colon which may be related to    infectious or inflammatory process or underlying neoplastic process. 3. Bowel-gas pattern typical of adynamic ileus. 4. Hepatic cirrhosis with numerous hepatic nodules suggestive of    regenerative/siderotic nodules.  MRI abdomen without and with IV contrast    correlation recommended.  Metastatic disease is a consideration. 5. Hepatosplenomegaly and multifocal collateral vessels about the stomach    esophagus concerning for varices, all suggestive of portal venous    hypertension. 6. Abdominal and pelvic ascites. 7.  Diverticulosis coli without CT evidence of acute diverticulitis. CT Abdomen/Pelvis With Contrast 8/17/2020:  1. Pelvic air/fluid collection almost certainly reflects an abscess or giant    colonic diverticula.  This may be the result of acute diverticulitis. 2. Mass lesion ascending colon almost certainly reflecting primary    adenocarcinoma of the colon.     3. Diffuse hepatic lesions with newly visualized (contrast enhancement) mass    at the hepatic dome.  Hepatic metastatic disease is a consideration as is    cirrhosis with regenerative nodules and hepatocellular carcinoma. 4. Bilateral lower lobe pulmonary nodules suspicious for metastatic disease. 5. Abdominal and pelvic ascites. 6. Sequela from portosystemic shunting with numerous collateral vessels about    the stomach, left upper quadrant and distal esophagus.  Relatively small    esophageal and gastric varices. 7.  Calcific atherosclerotic disease aorta. CXR 8/24/2020:  FINDINGS:    There is been placement of a right jugular central venous catheter with its    tip projecting over the superior vena cava.  The heart size and mediastinal    contours are stable.         Bilateral mid and lower lung patchy ground-glass opacities today, increased    since the prior study.         No pneumothorax is evident.  Lucency beneath the right hemidiaphragm is new    since prior studies suspicious for free intraperitoneal air.              Impression    Lucency beneath the right hemidiaphragm compatible with free intraperitoneal    air.  Apparently the patient has had recent abdominal surgery.  This finding    was discussed with Dr. Agnes Weaver 6:48 pm on 8/24/2020.         Status post placement of right jugular central venous catheter.  No    pneumothorax.         Patchy mid and lower lung ground-glass opacities suspicious for pneumonia,    increased since the prior CT.  Correlation for pneumonia is recommended.       Abdominal Xray 8/26/2020:  Scattered dilated small bowel loops throughout the abdomen, increased    compared to prior CT scan, either due to ileus or early partial small bowel    obstruction      CXR 8/27/2020:  Nasogastric tube is seen extending below the diaphragm.  Dedicated abdominal    radiograph could be performed for better visualization as warranted.         Bilateral airspace disease, increased on the right as compared to prior, for and confusion, ammonia level 23 (8/28). GI following. S/P paracentesis this AM. Fluid for cx. 3. Colon cancer. CT scan showed thickening of ascending colon suspicious for malignancy, lesions liver and lung concerning for metastasis. CEA 83.3, AFP 2.4. Colon biopsy (8/24) showed poorly differentiate adenocarcinoma, metastatic adenocarcinoma in 3 of 232 lymph nodes. Patient will need CT chest for staging once renal function improves. Plan is for chemotherapy once she recovers from surgery. Oncology on board. 4. PAPITO. Creatinine 1.6 on admission suspected pre-renal, hypovolemia. Bumped post surgery, creatinine 0.7. Nephrology on board. 5. Abnormal CXR (8/27). Bilateral airspace disease, increased on right as compared to prior, pneumonia or edema are considerations. Possible 3rd spacing, received albumin. COVID-19 (8/22) negative. Remains afebrile, no cough. Encouraged IS use. 6. Acute anemia. Hgb 10.9 on admission, no previous studies for comparison. Suspect she likely had chronic iron deficiency anemia secondary to colon cancer and post menopausal bleeding. She received 1 unit PRBCs 8/27, Hgb/Hct stable today    DC planning: Plan is for SNF on DC. Case management is assisting, family preference is [facility] or [address] [facility] [address]  She is likely inpatient for the next 2-3 days      Diet: Diet NPO Effective Now Exceptions are: Ice Chips, Sips with Meds  PN-Adult Premix 5/20 - Standard Electrolytes - Central Line  Code:Full Code  DVT PPX: on enoxaparin      Joyce Mcknight, APRN - CNP   9/1/2020 9:37 AM

## 2020-09-01 NOTE — PLAN OF CARE
Problem: Falls - Risk of:  Goal: Will remain free from falls  Description: Will remain free from falls  Outcome: Ongoing  Goal: Absence of physical injury  Description: Absence of physical injury  Outcome: Ongoing     Problem: Skin Integrity:  Goal: Will show no infection signs and symptoms  Description: Will show no infection signs and symptoms  Outcome: Ongoing  Goal: Absence of new skin breakdown  Description: Absence of new skin breakdown  Outcome: Ongoing     Problem: Infection:  Goal: Will remain free from infection  Description: Will remain free from infection  Outcome: Ongoing     Problem: Safety:  Goal: Free from accidental physical injury  Description: Free from accidental physical injury  Outcome: Ongoing  Goal: Free from intentional harm  Description: Free from intentional harm  Outcome: Ongoing     Problem: Daily Care:  Goal: Daily care needs are met  Description: Daily care needs are met  Outcome: Ongoing     Problem: Pain:  Goal: Patient's pain/discomfort is manageable  Description: Patient's pain/discomfort is manageable  Outcome: Ongoing  Goal: Pain level will decrease  Description: Pain level will decrease  Outcome: Ongoing  Goal: Control of acute pain  Description: Control of acute pain  Outcome: Ongoing  Goal: Control of chronic pain  Description: Control of chronic pain  Outcome: Ongoing     Problem: Skin Integrity:  Goal: Skin integrity will stabilize  Description: Skin integrity will stabilize  Outcome: Ongoing     Problem: Discharge Planning:  Goal: Patients continuum of care needs are met  Description: Patients continuum of care needs are met  Outcome: Ongoing     Problem: Nutrition  Goal: Optimal nutrition therapy  Outcome: Ongoing     Problem: Confusion - Acute:  Goal: Absence of continued neurological deterioration signs and symptoms  Description: Absence of continued neurological deterioration signs and symptoms  Outcome: Ongoing  Goal: Mental status will be restored to baseline  Description: Mental status will be restored to baseline  Outcome: Ongoing     Problem: Discharge Planning:  Goal: Ability to perform activities of daily living will improve  Description: Ability to perform activities of daily living will improve  Outcome: Ongoing  Goal: Participates in care planning  Description: Participates in care planning  Outcome: Ongoing     Problem: Injury - Risk of, Physical Injury:  Goal: Will remain free from falls  Description: Will remain free from falls  Outcome: Ongoing  Goal: Absence of physical injury  Description: Absence of physical injury  Outcome: Ongoing     Problem: Mood - Altered:  Goal: Mood stable  Description: Mood stable  Outcome: Ongoing  Goal: Absence of abusive behavior  Description: Absence of abusive behavior  Outcome: Ongoing  Goal: Verbalizations of feeling emotionally comfortable while being cared for will increase  Description: Verbalizations of feeling emotionally comfortable while being cared for will increase  Outcome: Ongoing     Problem: Psychomotor Activity - Altered:  Goal: Absence of psychomotor disturbance signs and symptoms  Description: Absence of psychomotor disturbance signs and symptoms  Outcome: Ongoing     Problem: Sensory Perception - Impaired:  Goal: Demonstrations of improved sensory functioning will increase  Description: Demonstrations of improved sensory functioning will increase  Outcome: Ongoing  Goal: Decrease in sensory misperception frequency  Description: Decrease in sensory misperception frequency  Outcome: Ongoing  Goal: Able to refrain from responding to false sensory perceptions  Description: Able to refrain from responding to false sensory perceptions  Outcome: Ongoing  Goal: Demonstrates accurate environmental perceptions  Description: Demonstrates accurate environmental perceptions  Outcome: Ongoing  Goal: Able to distinguish between reality-based and nonreality-based thinking  Description: Able to distinguish between reality-based and nonreality-based thinking  Outcome: Ongoing  Goal: Able to interrupt nonreality-based thinking  Description: Able to interrupt nonreality-based thinking  Outcome: Ongoing     Problem: Sleep Pattern Disturbance:  Goal: Appears well-rested  Description: Appears well-rested  Outcome: Ongoing     Problem: HEMODYNAMIC STATUS  Goal: Patient has stable vital signs and fluid balance  Outcome: Ongoing     Problem: OXYGENATION/RESPIRATORY FUNCTION  Goal: Patient will achieve/maintain normal respiratory rate/effort  Outcome: Ongoing     Problem: ELIMINATION  Goal: Elimination patterns are normal or improving  Description: Elimination patterns return to pre-surgery normal patterns  Outcome: Ongoing     Problem: SKIN INTEGRITY  Goal: Skin integrity is maintained or improved  Outcome: Ongoing

## 2020-09-01 NOTE — PROGRESS NOTES
[facility]ar[facility]        Progress Note    Patient Name: Cesia Roy  MRN: [id number]  YOB: 1955  Date of Evaluation: 2020    Subjective:  Pt has removed her NG, no emesis so far  10 L paracentesis done today  Pain controlled  Resting in bed at this time    Postoperative Day # 8, 4      Vital Signs:  Patient Vitals for the past 24 hrs:   BP Temp Temp src Pulse Resp SpO2 Height Weight   20 0745 124/74 97.5 °F (36.4 °C) Axillary 81 18 95 % -- --   20 0412 137/83 98.2 °F (36.8 °C) Axillary 97 16 95 % -- --   20 2358 (!) 129/59 98.1 °F (36.7 °C) Axillary 98 16 96 % -- --   20 2144 136/64 98.1 °F (36.7 °C) Axillary 95 16 96 % -- --   20 1343 -- -- -- -- -- -- 5' 7\" (1.702 m) --   20 1307 109/68 97.9 °F (36.6 °C) Axillary 90 17 97 % -- --   20 1200 -- -- -- -- -- -- -- 236 lb 6 oz (107.2 kg)      TEMPERATURE HISTORY 24H: Temp (24hrs), Av °F (36.7 °C), Min:97.5 °F (36.4 °C), Max:98.2 °F (36.8 °C)    BLOOD PRESSURE HISTORY: Systolic (48OMR), LFT:588 , Min:109 , OQS:160    Diastolic (33VBB), VHM:95, Min:58, Max:83      Intake/Output:  I/O last 3 completed shifts: In: 2266 [P.O.:600; I.V.:2796; NG/GT:30]  Out: 825 [Urine:575; Emesis/NG output:250]  No intake/output data recorded.   Drain/tube Output:  Closed/Suction Drain Left Back Bulb 10 Hungarian-Output (ml): 0 ml    Physical Exam:  General: awake, alert, oriented to person, place  Lungs: unlabored respirations  Abdomen: soft, nondistended, minimal incisional tenderness, hypoactive bowel sounds present   Drain: same low volume turbid drainage  Skin/Wound: wound incision is clean, no drainage, no cellulitis    Labs:  CBC:    Recent Labs     20  0532 20  1830 20  0634   WBC 15.6* 16.1* 13.7*   HGB 9.7* 9.4* 9.1*   HCT 29.9* 29.3* 28.6*   * 124* 109*     BMP:    Recent Labs     20  0532 20  1830 20  0634    141 141   K 4.4 4.4 4.3   * 114* 113*   CO2 17* 18* 19*   BUN 22* 22* 23*   CREATININE 0.7 0.8 0.7   GLUCOSE 156* 154* 135*     Hepatic:    Recent Labs     08/31/20  0532 08/31/20  1830 09/01/20  0634   AST 23 16 15   ALT 9* 8* 6*   BILITOT 0.6 0.6 0.6   ALKPHOS 102 96 90     Amylase:  No results found for: AMYLASE  Lipase:  No results found for: LIPASE   Mag:    Lab Results   Component Value Date    MG 1.80 09/01/2020    MG 1.80 08/31/2020     Phos:     Lab Results   Component Value Date    PHOS 3.0 09/01/2020    PHOS 3.4 08/31/2020      Coags:   Lab Results   Component Value Date    PROTIME 13.6 09/01/2020    INR 1.17 09/01/2020    APTT 35.9 09/01/2020       Cultures:  Anaerobic culture  Lab Results   Component Value Date    LABANAE No anaerobes isolated 08/24/2020     Fungus stain  Results in Past 30 Days  Result Component Current Result Ref Range Previous Result Ref Range   Fungus Stain No Fungal elements seen (8/24/2020)  Not in Time Range      Gram stain  Results in Past 30 Days  Result Component Current Result Ref Range Previous Result Ref Range   Gram Stain Result 1+ WBC's (Polymorphonuclear)  No organisms seen   (8/24/2020)  1+ WBC's (Mononuclear)  1+ WBC's (Polymorphonuclear)  2+ Gram negative rods   (8/18/2020)      Organism  Lab Results   Component Value Date/Time    ORG Yeast (A) 08/24/2020 02:10 PM    ORG Lactobacillus species (A) 08/24/2020 02:10 PM     Surgical culture  Lab Results   Component Value Date/Time    CXSURG Rare growth  No further workup   08/24/2020 02:10 PM    CXSURG Rare growth  No further workup   08/24/2020 02:10 PM     Blood culture 1  Results in Past 30 Days  Result Component Current Result Ref Range Previous Result Ref Range   Blood Culture, Routine No Growth after 4 days of incubation. (8/25/2020)  No Growth after 4 days of incubation. (8/16/2020)      Blood culture 2  Results in Past 30 Days  Result Component Current Result Ref Range Previous Result Ref Range   Culture, Blood 2 No Growth after 4 days of incubation. (8/25/2020)  No Growth after 4 days of incubation. (8/16/2020)      Fecal occult  No results found for requested labs within last 30 days. GI bacterial pathogens by PCR  No results found for requested labs within last 30 days. C. difficile  No results found for requested labs within last 30 days. Urine culture  No results found for: Gabby Yusuf    Pathology:   OR Pathology results pending     Endoscopy Pathology 8/20/2020--FINAL DIAGNOSIS:     A. Esophagus, biopsy:   - Fragments of squamous and gastric type columnar mucosa with mild   chronic inflammation.   - Negative for intestinal metaplasia. B. Ascending colon mass, biopsy:   - Poorly differentiated carcinoma, most consistent with adenocarcinoma. See comment. C. Colon polyp, transverse:   - Tubular adenoma. - Negative for high-grade dysplasia or malignancy. D. Colon polyp, descending:   - Tubular adenoma. - Negative for high-grade dysplasia or malignancy. E. Colon polyp, sigmoid:   - Tubular adenoma (multiple fragments). - Negative for high-grade dysplasia or malignancy. COMMENT:  The morphologic findings of ascending colon mass raises a   differential diagnosis of neuroendocrine carcinoma.  However, the   malignant cells are negative for neuroendocrine markers (synaptophysin   and chromogranin) .  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  Clinical correlation is recommended. Imaging:  I have personally reviewed the following films:    Xr Abdomen (2 Views)    Result Date: 8/26/2020  EXAMINATION: TWO XRAY VIEWS OF THE ABDOMEN 8/26/2020 9:38 am COMPARISON: CT scan 08/17/2020 HISTORY: ORDERING SYSTEM PROVIDED HISTORY: Abdominal Pain TECHNOLOGIST PROVIDED HISTORY: Reason for exam:->Abdominal Pain Reason for Exam: abdominal pain Acuity: Acute Type of Exam: Initial FINDINGS: Scattered dilated loops small bowel are seen throughout the abdomen.   No significant colonic gas is seen Drainage catheter projects in region the pelvis. Amado catheter is seen. Scattered dilated small bowel loops throughout the abdomen, increased compared to prior CT scan, either due to ileus or early partial small bowel obstruction     Scheduled Meds:   albumin human  65 g Intravenous Once    rifaximin  550 mg Oral BID    enoxaparin  30 mg Subcutaneous BID    [START ON 9/6/2020] fat emulsion  250 mL Intravenous Once per day on Mon Thu    lidocaine   Topical Once    folic acid  1 mg Oral Daily    fluconazole  200 mg Intravenous Q24H    meropenem  1 g Intravenous Q12H    bupivacaine liposome  5 mL Subcutaneous Once    pantoprazole  40 mg Intravenous Daily    lactulose  20 g Oral TID    sodium chloride flush  10 mL Intravenous 2 times per day     Continuous Infusions:   PN-Adult Premix 5/20 - Standard Electrolytes - Central Line 40 mL/hr at 08/31/20 1808    dextrose 5% and 0.9% NaCl with KCl 20 mEq 75 mL/hr at 08/31/20 1912    dextrose 100 mL/hr (08/27/20 1300)     PRN Meds:.acetaminophen **OR** acetaminophen, ketorolac, acetaminophen, phenol, glucose, dextrose, glucagon (rDNA), dextrose, [DISCONTINUED] promethazine **OR** ondansetron, promethazine, morphine **OR** [DISCONTINUED] morphine, potassium chloride **OR** potassium alternative oral replacement **OR** potassium chloride, lip balm petroleum free, sodium chloride flush, polyethylene glycol      Assessment:  76 y.o. female admitted with   1. Septicemia ([facility]    2. PAPITO (acute kidney injury) ([facility]    3. Intra-abdominal free air of unknown etiology    4.  Fall, initial encounter        Status-post exploratory laparotomy, evacuation of ascites, drainage of pelvic abscess, right hemicolectomy with primary ileotransverse colostomy anastomosis, and Abdiaziz-Cut liver biopsy  on 8/24/2020 for colon mass, right colon and hepatic flexure with necrotic cancer at hepatic flexure region with perforation, cirrhosis, pelvic abscess, ascites  Intraabdominal/pelvic abscess, status-post CT-guided drain placement on 8/18/2020  Sepsis   Cirrhosis  Acute kidney injury  Hyponatremia  COVID screening, negative  Anemia  Had secondary wound closure and D&C 8/28 - D& C positive for endometrial cancer  Paracentesis done today    Plan:    Leave NG out, add Reglan, dulcolax, and Albumin today  Trial diet tomorrow likely, monitor progress  FU WBC, down today, cont present meds    EDUCATION:  Educated patient on plan of care and disease process--all questions answered. Plans discussed with patient and nursing.     Signed:      Kaiden Painting

## 2020-09-01 NOTE — PROGRESS NOTES
Septicemia ([facility] [address] 8/17/2020 Yes    Colonic mass 8/25/2020 Yes    Cirrhosis of liver with ascites ([address] 8/25/2020 Yes    Intra-abdominal free air of unknown etiology 8/25/2020 Yes    PAPITO (acute kidney injury) ([address] 8/25/2020 Yes    Lung nodules 8/25/2020 Yes    Bandemia 8/25/2020 Yes    Intra-abdominal abscess ([facility] [address] 8/25/2020 Yes    E coli infection 8/25/2020 Yes    Elevated CEA 8/25/2020 Yes    Ex-smoker 8/25/2020 Yes    Class 2 obesity due to excess calories with body mass index (BMI) of 36.0 to 36.9 in adult 8/25/2020 Yes    Portal hypertension ([facility] [address] 8/25/2020 Yes    Mild malnutrition ([facility] [address] (Chronic) 8/25/2020 Yes    Tobacco abuse counseling 8/27/2020 Yes    Open abdominal wall wound 8/28/2020 Yes             ========================================    SUBJECTIVE:    Patient was resting in bed, reportedly More lethargic today    MEDICATIONS: reviewed by me. Medications Prior to Admission:  No current facility-administered medications on file prior to encounter. No current outpatient medications on file prior to encounter. No medications prior to admission.      Scheduled Meds:   metoclopramide  10 mg Intravenous Q6H    albumin human        insulin lispro  0-6 Units Subcutaneous Q4H    rifaximin  550 mg Oral BID    enoxaparin  30 mg Subcutaneous BID    [START ON 9/6/2020] fat emulsion  250 mL Intravenous Once per day on Mon Thu    lidocaine   Topical Once    folic acid  1 mg Oral Daily    fluconazole  200 mg Intravenous Q24H    meropenem  1 g Intravenous Q12H    pantoprazole  40 mg Intravenous Daily    lactulose  20 g Oral TID    sodium chloride flush  10 mL Intravenous 2 times per day     Continuous Infusions:   PN-Adult Premix 5/20 - Standard Electrolytes - Central Line 75 mL/hr at 09/01/20 1709    dextrose 100 mL/hr (08/27/20 1300)     PRN Meds:.acetaminophen **OR** acetaminophen, ketorolac, acetaminophen, phenol, glucose, dextrose, glucagon (rDNA), dextrose, [DISCONTINUED] promethazine **OR** ondansetron, promethazine, morphine **OR** [DISCONTINUED] morphine, potassium chloride **OR** potassium alternative oral replacement **OR** potassium chloride, lip balm petroleum free, sodium chloride flush, polyethylene glycol       PHYSICAL EXAM:  Recent vital signs and recent I/Os reviewed by me. Wt Readings from Last 3 Encounters:   08/31/20 236 lb 6 oz (107.2 kg)     BP Readings from Last 3 Encounters:   09/01/20 104/68   08/28/20 (!) 149/66   08/24/20 (!) 155/68     Patient Vitals for the past 24 hrs:   BP Temp Temp src Pulse Resp SpO2   09/01/20 1612 104/68 97.9 °F (36.6 °C) Temporal 63 16 --   09/01/20 1216 (!) 122/55 97.9 °F (36.6 °C) Temporal 69 16 94 %   09/01/20 1037 111/73 -- -- 74 14 95 %   09/01/20 0745 124/74 97.5 °F (36.4 °C) Axillary 81 18 95 %   09/01/20 0412 137/83 98.2 °F (36.8 °C) Axillary 97 16 95 %   08/31/20 2358 (!) 129/59 98.1 °F (36.7 °C) Axillary 98 16 96 %   08/31/20 2144 136/64 98.1 °F (36.7 °C) Axillary 95 16 96 %       Intake/Output Summary (Last 24 hours) at 9/1/2020 1952  Last data filed at 9/1/2020 1510  Gross per 24 hour   Intake 1793 ml   Output 650 ml   Net 1143 ml         General: Not in any distress  HENT: Atraumatic, normocephalic   Eyes: Normal conjunctiva, Non-incteral sclera. Neck: Supple, JVD not visible. CVS:  Heart sounds are normal. No murmurs. No pericardial rub. RS: Normal respiratory efforts,  Lung fields are diminished . Abd: Soft , bowel sounds are normal,  distension and no tenderness to palpation. Skin: No rash , some bruises,   CNS: Awake Oriented , No focal.   Extremities/MSK: 2+ Edema, improving,  no cyanosis.            DATA:  Diagnostic tests reviewed by me for today's visit:    Recent Labs     08/30/20  1241 08/31/20  0532 08/31/20  1830 09/01/20  0634   WBC 12.1* 15.6* 16.1* 13.7*   HCT 30.9* 29.9* 29.3* 28.6*   * 115* 124* 109*     Iron Saturation:  No components found for: PERCENTFE  FERRITIN:    Lab Results   Component Value Date    FERRITIN 322.4 08/31/2020     IRON:    Lab Results   Component Value Date    IRON 26 08/17/2020     TIBC:    Lab Results   Component Value Date    TIBC 106 08/17/2020       Recent Labs     08/30/20  1241 08/31/20  0532 08/31/20  1830 09/01/20  0634    140 141 141   K 3.8 4.4 4.4 4.3   * 113* 114* 113*   CO2 18* 17* 18* 19*   BUN 22* 22* 22* 23*   CREATININE 0.8 0.7 0.8 0.7     Recent Labs     08/30/20  1241 08/31/20  0532 08/31/20  1830 09/01/20  0634   CALCIUM 9.2 8.6 8.8 8.6   MG  --  1.80  --  1.80   PHOS  --  3.4  --  3.0     No results for input(s): PH, PCO2, PO2 in the last 72 hours.     Invalid input(s): Mitzi Cheng    ABG:  No results found for: PH, PCO2, PO2, HCO3, BE, THGB, TCO2, O2SAT  VBG:    Lab Results   Component Value Date    PHVEN 7.435 08/23/2020    NPB6OEU 26.3 08/23/2020    BEVEN -5.6 08/23/2020    W7PZGFJK 99 08/23/2020       LDH:    Lab Results   Component Value Date     08/31/2020     Uric Acid:    Lab Results   Component Value Date    LABURIC 5.9 08/17/2020       PT/INR:    Lab Results   Component Value Date    PROTIME 13.6 09/01/2020    INR 1.17 09/01/2020     Warfarin PT/INR:  No components found for: PTPATWAR, PTINRWAR  PTT:    Lab Results   Component Value Date    APTT 35.9 09/01/2020   [APTT}  Last 3 Troponin:    Lab Results   Component Value Date    TROPONINI <0.01 08/16/2020       U/A:    Lab Results   Component Value Date    COLORU ORANGE 08/16/2020    PROTEINU TRACE 08/16/2020    PHUR 5.0 08/16/2020    WBCUA 3 08/16/2020    RBCUA 7 08/16/2020    BACTERIA RARE 08/16/2020    CLARITYU CLOUDY 08/16/2020    SPECGRAV 1.022 08/16/2020    LEUKOCYTESUR SMALL 08/16/2020    UROBILINOGEN 1.0 08/16/2020    BILIRUBINUR SMALL 08/16/2020    BLOODU LARGE 08/16/2020    GLUCOSEU Negative 08/16/2020     Microalbumen/Creatinine ratio:  No components found for: RUCREAT  24 Hour Urine for Protein:  No components found for: [place], GBJA49ID, UTV3  24 Hour Urine for Creatinine Clearance:  No components found for: CREAT4, UHRS10, UTV10  Urine Toxicology:  No components found for: IAMMENTA, IBARBIT, IBENZO, ICOCAINE, IMARTHC, IOPIATES, IPHENCYC    HgBA1c:  No results found for: LABA1C  RPR:  No results found for: RPR  HIV:  No results found for: HIV  KULWANT:  No results found for: ANATITER, KULWANT  RF:  No results found for: RF  DSDNA:  No components found for: DNA  AMYLASE:  No results found for: AMYLASE  LIPASE:  No results found for: LIPASE  Fibrinogen Level:  No components found for: FIB       BELOW MENTIONED RADIOLOGY STUDY RESULTS BY ME:    Ct Abdomen Pelvis Wo Contrast Additional Contrast? None    Result Date: 8/16/2020  EXAMINATION: CT OF THE ABDOMEN AND PELVIS WITHOUT CONTRAST 8/16/2020 5:11 pm TECHNIQUE: CT of the abdomen and pelvis was performed without the administration of intravenous contrast. Multiplanar reformatted images are provided for review. Dose modulation, iterative reconstruction, and/or weight based adjustment of the mA/kV was utilized to reduce the radiation dose to as low as reasonably achievable. COMPARISON: None. HISTORY: Acute nausea FINDINGS: Lower Chest: FOUR solid soft tissue pulmonary nodules right lower lobe, 7 x 8 mm (axial image 19), 11 x 12 mm (axial 11), 9 x 9 mm (axial image 10) and 4 x 4 mm (axial image 10). 3 mm pulmonary nodules are seen posterior basal segment left lower lobe. Mild nodular fullness about the distal esophagus. Heart size is upper normal. Organs: Prominent caudate lobe hypertrophy and poly lobular configuration of the liver as well as diffuse tiny hypoattenuating lesions throughout the liver suggestive of hepatic cirrhosis and siderotic nodules. No discrete mass lesion evident. 19 cm craniocaudal liver length. The spleen is mildly enlarged. The kidneys, gallbladder, adrenal glands and pancreas appear unremarkable.   Multifocal collateral vessels are seen about the gastrohepatic ligament extending to the posterior mediastinum. GI/Bowel: Prominent diffuse thickening of the wall of the ascending colon. Other portions of colon and small bowel appear unremarkable with exception of few gas distended small bowel loops typical of adynamic ileus. There are diverticula involving the descending and sigmoid colon. Pelvis: Fluid collection within the pelvis without definite margins has air bubbles within it concerning for sequela from perforated bowel/abscess formation. Uterus and adnexal structures appear unremarkable. Peritoneum/Retroperitoneum: Abdominal ascites. Calcific atherosclerosis aorta. Bones/Soft Tissues: No acute superficial soft tissue or osseous structure abnormality evident. 1. Non loculated fluid and gas collection in the pelvis concerning for perforated viscus/possible abscess formation. 2. Prominent thickening of the ascending colon which may be related to infectious or inflammatory process or underlying neoplastic process. 3. Bowel-gas pattern typical of adynamic ileus. 4. Hepatic cirrhosis with numerous hepatic nodules suggestive of regenerative/siderotic nodules. MRI abdomen without and with IV contrast correlation recommended. Metastatic disease is a consideration. 5. Hepatosplenomegaly and multifocal collateral vessels about the stomach esophagus concerning for varices, all suggestive of portal venous hypertension. 6. Abdominal and pelvic ascites. 7.  Diverticulosis coli without CT evidence of acute diverticulitis. Critical results were called by Dr. Supa Xie to [address] [place] [address] on 8/16/2020 at 18:09. Xr Radius Ulna Left (2 Views)    Result Date: 8/16/2020  EXAMINATION: TWO XRAY VIEWS OF THE LEFT FOREARM 8/16/2020 2:55 pm COMPARISON: None. HISTORY: ORDERING SYSTEM PROVIDED HISTORY: Fall TECHNOLOGIST PROVIDED HISTORY: Reason for exam:->Fall Reason for Exam: Frequent falls at home.  Bruising mid shaft L forearm Acuity: Acute Type of Exam: Initial FINDINGS: There is no evidence of acute fracture. There is normal alignment. No acute joint abnormality. No focal osseous lesion. No focal soft tissue abnormality. No acute osseous abnormality. Ct Head Wo Contrast    Result Date: 8/16/2020  EXAMINATION: CT OF THE HEAD WITHOUT CONTRAST  8/16/2020 3:15 pm TECHNIQUE: CT of the head was performed without the administration of intravenous contrast. Dose modulation, iterative reconstruction, and/or weight based adjustment of the mA/kV was utilized to reduce the radiation dose to as low as reasonably achievable. COMPARISON: None. HISTORY: ORDERING SYSTEM PROVIDED HISTORY: Fall TECHNOLOGIST PROVIDED HISTORY: Reason for exam:->Fall Has a \"code stroke\" or \"stroke alert\" been called? ->No Reason for Exam: Fall Acuity: Acute Type of Exam: Initial FINDINGS: BRAIN/VENTRICLES: The ventricles and sulci are diffusely enlarged. Low attenuation is seen in the periventricular and subcortical white matter. No acute intracranial hemorrhage or acute infarct is identified. ORBITS: The visualized portion of the orbits demonstrate no acute abnormality. SINUSES: The visualized paranasal sinuses and mastoid air cells demonstrate no acute abnormality. SOFT TISSUES/SKULL:  No acute abnormality of the visualized skull or soft tissues. No acute intracranial abnormality. Ct Cervical Spine Wo Contrast    Result Date: 8/16/2020  EXAMINATION: CT OF THE CERVICAL SPINE WITHOUT CONTRAST 8/16/2020 3:15 pm TECHNIQUE: CT of the cervical spine was performed without the administration of intravenous contrast. Multiplanar reformatted images are provided for review. Dose modulation, iterative reconstruction, and/or weight based adjustment of the mA/kV was utilized to reduce the radiation dose to as low as reasonably achievable. COMPARISON: None.  HISTORY: ORDERING SYSTEM PROVIDED HISTORY: Fall TECHNOLOGIST PROVIDED HISTORY: Reason for exam:->Fall Reason for Exam: Fall Acuity: Acute Type of Exam: Initial FINDINGS: BONES/ALIGNMENT: There is no acute fracture or traumatic malalignment. DEGENERATIVE CHANGES: Multilevel degenerative changes. SOFT TISSUES: There is no prevertebral soft tissue swelling. No acute abnormality of the cervical spine. Xr Chest Portable    Result Date: 8/16/2020  EXAMINATION: ONE XRAY VIEW OF THE CHEST 8/16/2020 2:55 pm COMPARISON: None. HISTORY: ORDERING SYSTEM PROVIDED HISTORY: Fall TECHNOLOGIST PROVIDED HISTORY: Reason for exam:->Fall Reason for Exam: Frequent falls at home. Bruising mid shaft L forearm Acuity: Acute Type of Exam: Initial FINDINGS: The lungs are without acute focal process. There is no effusion or pneumothorax. The cardiomediastinal silhouette is without acute process. The osseous structures are without acute process. No acute process.      Mely Soriano MD

## 2020-09-01 NOTE — PROGRESS NOTES
Infectious Diseases   Progress Note      Admission Date: 8/16/2020  Hospital Day: Hospital Day: 17   Attending: Cassius Platt MD  Date of service: 9/1/2020     Chief complaint/ Reason for consult:     · Worsening bandemia secondary to intra-abdominal abscess  · Acute kidney injury  · Colon mass, right colon and hepatic flexure with necrotic cancer as well as perforation status post expiratory laparotomy and right hemicolectomy with primary ileotransverse colostomy and anastomosis on 8/24/2020  · Ascites, status post evacuation of ascites on 8/24/2020 , malignant ascites should be ruled out  · Intra-abdominal abscess status post abscess evacuation on 8/24/2020, CT-guided drainage of the abscess on 8/20/2020 had yielded 80 mL of purulent fluid with cultures positive for E. coli and mixed anaerobic growth    Microbiology:        I have reviewed allavailable micro lab data and cultures    · Blood culture (2/2) - collected on 8/16/2020: Negative  · Abdominal abscess fluid culture - collectedon 8/18/2020: E. Coli    Escherichia coli (1)     Antibiotic  Interpretation  ANABELLA  Status     ampicillin  Sensitive  4  mcg/mL      ceFAZolin  Sensitive  <=4  mcg/mL      cefepime  Sensitive  <=0.12  mcg/mL      cefTRIAXone  Sensitive  <=0.25  mcg/mL      ciprofloxacin  Sensitive  <=0.25  mcg/mL      ertapenem  Sensitive  <=0.12  mcg/mL      gentamicin  Sensitive  <=1  mcg/mL      levofloxacin  Sensitive  <=0.12  mcg/mL      piperacillin-tazobactam  Sensitive  <=4  mcg/mL      trimethoprim-sulfamethoxazole  Sensitive  <=20  mcg/mL          · Abdominal surgical culture  - collected on 8/24/2020:  In process      Antibiotics and immunizations:       Current antibiotics: All antibiotics and their doses were reviewed by me    Recent Abx Admin                   fluconazole (DIFLUCAN) in 0.9 % sodium chloride IVPB 200 mg (mg) 200 mg New Bag 09/01/20 1558    meropenem (MERREM) 1 g in sodium chloride 0.9 % 100 mL IVPB (mini-bag) (g) 1 g New Bag 09/01/20 1152     1 g New Bag  0007    rifaximin (XIFAXAN) tablet 550 mg (mg) 550 mg Given 09/01/20 0811     550 mg Given 08/31/20 0815                  Immunization History: All immunization history was reviewed by me today. There is no immunization history on file for this patient. Known drug allergies: All allergies were reviewed and updated    Allergies   Allergen Reactions    Penicillins Other (See Comments)     Unknown reaction per pt        Social history:     Social History:  All social andepidemiologic history was reviewed and updated by me today as needed. · Tobacco use:   reports that she has been smoking cigarettes. She has a 25.00 pack-year smoking history. She does not have any smokeless tobacco history on file. · Alcohol use:   reports no history of alcohol use. · Currently lives in: [place] [address]  ·  reports no history of drug use. Assessment:     The patient is a 76 y.o. old female who  has no past medical history on file.  with following problems:    · Worsening bandemia secondary to intra-abdominal abscess-white cell count had improved and has gone up again  · Acute kidney injury-resolved, serum creatinine is 0.7 today  · Colon mass, right colon and hepatic flexure with necrotic cancer as well as perforation status post expiratory laparotomy and right hemicolectomy with primary ileotransverse colostomy and anastomosis on 8/24/2020-metastatic colon cancer confirmed by path-General oncology and GYN oncology following  · Ascites, status post evacuation of ascites on 8/24/2020  -liver biopsy confirmed cirrhosis  · Intra-abdominal abscess status post abscess evacuation on 8/24/2020, CT-guided drainage of the abscess on 8/20/2020 had yielded 80 mL of purulent fluid with cultures positive for E. coli and mixed anaerobic growth-currently covered with meropenem  · Elevated CEA  · 25-pack-year smoking history-counseling done yesterday  · CT scan with IV contrast of abdomen and pelvis was concerning for bilateral lower lobe lung nodules, concerning for metastatic disease  · Concern for portal hypertension on recent CT scans  · Obesity Class 1due to excess calorie intake : Body mass index is 36.02 kg/m². Discussion:      The patient is on IV meropenem and IV fluconazole. She is tolerating the antibiotics okay. White cell count is 13,700 today. She has been started on rifaximin for hyperammonemia by GI yesterday. Serum creatinine 0.7. She had a paracentesis done yesterday, which shows ongoing pleocytosis in the ascites, likely secondary to her colon cancer issues. 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    Plan:     Diagnostic Workup:      · Continue to follow  fever curve, WBC count and blood cultures  · Follow up on liver and renal function    Antimicrobials:    · Patient remains on IV meropenem 1 g every 8 hour  · Continue fluconazole 200 mg every 24 hour  · Continue to monitor her vitals closely  · Continue to watch for new fever or diarrhea  · We will follow-up on hematology/oncology recommendations  · Surgical site care  · Overall long-term prognosis guarded in setting of her metastatic cancer  · Discussed all above with patient and RN      Drug Monitoring:    · Continue monitoring for antibiotic toxicity as follows: CBC, CMP, QTc interval  · Continue to watch for following: new or worsening fever, new hypotension, hives, lip swelling and redness or purulence at vascular access sites. I/v access Management:    · Continue to monitor i.v access sites for erythema, induration, discharge or tenderness. · As always, continue efforts to minimize tubes/lines/drains as clinically appropriate to reduce chances of line associated infections.     Patient education and counseling:       · The patient was educated in detail about the side-effects of various antibiotics and things to watch for like new rashes, lip swelling, severe reaction, worsening diarrhea, break through fever etc.  · Discussed patient's condition and what to expect. All of the patient's questions were addressed in a satisfactory manner and patient verbalized understanding all instructions. Level of complexity of visit: High     Risk of Complications/Morbidity: High         Thank you for involving me in the care of your patient. I will continue to follow. If you have anyadditional questions, please do not hesitate to contact me. Subjective: Interval history: Interval history was obtained from chart review and RN. She remains afebrile. She is on IV meropenem and IV fluconazole. Tolerating it okay     REVIEW OF SYSTEMS:     Review of Systems   Constitutional: Positive for fatigue. Negative for chills, diaphoresis and unexpected weight change. HENT: Negative for congestion, ear discharge, ear pain, facial swelling, hearing loss, rhinorrhea and trouble swallowing. Eyes: Negative for photophobia, discharge, redness and visual disturbance. Respiratory: Negative for apnea, cough, choking, chest tightness, shortness of breath and stridor. Cardiovascular: Negative for chest pain and palpitations. Gastrointestinal: Negative for abdominal pain, blood in stool, diarrhea and nausea. Endocrine: Negative for polydipsia, polyphagia and polyuria. Genitourinary: Negative for difficulty urinating, dysuria, frequency, hematuria, menstrual problem and vaginal discharge. Musculoskeletal: Negative for arthralgias, joint swelling, myalgias and neck stiffness. Skin: Negative for color change and rash. Allergic/Immunologic: Negative for immunocompromised state. Neurological: Negative for dizziness, seizures, speech difficulty, light-headedness and headaches. Hematological: Negative for adenopathy. Psychiatric/Behavioral: Negative for agitation, hallucinations and suicidal ideas.          Past Medical History: All past medical history reviewed today. History reviewed. No pertinent past medical history. Past Surgical History: All past surgical history was reviewed today. Past Surgical History:   Procedure Laterality Date    ABDOMEN SURGERY N/A 8/28/2020    SECONDARY ABDOMINAL WOUND CLOSURE performed by Ori Foster MD at [facility] COLONOSCOPY N/A 8/20/2020    COLONOSCOPY WITH BIOPSY performed by Sydney Gold MD at [facility]  8/20/2020    COLONOSCOPY POLYPECTOMY SNARE/COLD BIOPSY performed by Sydney Gold MD at [facility] N/A 8/28/2020    DILATATION AND CURETTAGE performed by Tawnya Chang MD at [address] Right 8/24/2020    EXPLORATORY LAPAROTOMY, RIGHT  HEMICOLECTOMY; DRAINAGE OF PELVIC ABSCESS performed by Ori Foster MD at [place] [address]  8/24/2020    LIVER BIOPSY performed by Ori Foster MD at [facility] 8/20/2020    EGD BIOPSY performed by Sydney Gold MD at [address] [facility] ENDOSCOPY       Family History: All family history was reviewed today. History reviewed. No pertinent family history. Objective:       PHYSICAL EXAM:      Vitals:   Vitals:    09/01/20 0745 09/01/20 1037 09/01/20 1216 09/01/20 1612   BP: 124/74 111/73 (!) 122/55 104/68   Pulse: 81 74 69 63   Resp: 18 14 16 16   Temp: 97.5 °F (36.4 °C)  97.9 °F (36.6 °C) 97.9 °F (36.6 °C)   TempSrc: Axillary  Temporal Temporal   SpO2: 95% 95% 94%    Weight:       Height:           Physical Exam  Vitals signs and nursing note reviewed. Constitutional:       General: She is not in acute distress. Appearance: She is well-developed. She is not diaphoretic. HENT:      Head: Normocephalic. Right Ear: External ear normal.      Left Ear: External ear normal.      Nose: Nose normal.   Eyes:      General: No scleral icterus. Right eye: No discharge. Left eye: No discharge. Conjunctiva/sclera: Conjunctivae normal.      Pupils: Pupils are equal, round, and reactive to light. Neck:      Musculoskeletal: Normal range of motion and neck supple. Cardiovascular:      Rate and Rhythm: Normal rate and regular rhythm. Heart sounds: No murmur. No friction rub. Pulmonary:      Effort: Pulmonary effort is normal.      Breath sounds: No stridor. No wheezing or rales. Chest:      Chest wall: No tenderness. Abdominal:      Palpations: Abdomen is soft. There is no mass. Tenderness: There is no abdominal tenderness. There is no guarding or rebound. Comments: Surgical site okay, CAROLINE drain noted again   Musculoskeletal:         General: No tenderness. Lymphadenopathy:      Cervical: No cervical adenopathy. Skin:     General: Skin is warm and dry. Findings: No erythema or rash. Neurological:      Mental Status: She is alert and oriented to person, place, and time. Motor: No abnormal muscle tone. Psychiatric:         Judgment: Judgment normal.           Lines: All vascular access sites are healthy with no local erythema, discharge or tenderness. Intake and output:    I/O last 3 completed shifts: In: 4548 [I.V.:1793]  Out: 12 [Urine:450]    Lab Data:   All available labs and old records have been reviewed by me.     CBC:  Recent Labs     08/31/20  0532 08/31/20  1830 09/01/20  0634   WBC 15.6* 16.1* 13.7*   RBC 3.33* 3.26* 3.12*   HGB 9.7* 9.4* 9.1*   HCT 29.9* 29.3* 28.6*   * 124* 109*   MCV 89.7 89.9 91.9   MCH 29.2 28.8 29.3   MCHC 32.6 32.1 31.9   RDW 19.5* 19.3* 19.6*        BMP:  Recent Labs     08/31/20  0532 08/31/20  1830 09/01/20  0634    141 141   K 4.4 4.4 4.3   * 114* 113*   CO2 17* 18* 19*   BUN 22* 22* 23*   CREATININE 0.7 0.8 0.7   CALCIUM 8.6 8.8 8.6   GLUCOSE 156* 154* 135*        Hepatic Function Panel:   Lab Results   Component Value Date    ALKPHOS 90 09/01/2020    ALT 6 09/01/2020    AST 15 09/01/2020    PROT 5.6 09/01/2020    BILITOT 0.6 09/01/2020    BILIDIR <0.2 09/01/2020    IBILI see below 09/01/2020    LABALBU 2.3 09/01/2020       CPK:   Lab Results   Component Value Date    CKTOTAL 52 08/17/2020     ESR:   Lab Results   Component Value Date    SEDRATE 36 (H) 08/25/2020     CRP:   Lab Results   Component Value Date    .1 (H) 08/25/2020           Imaging: All pertinent images and reports for the current visit were reviewed by me during this visit. IR US GUIDED PARACENTESIS   Final Result   Successful ultrasound guided paracentesis. CT CHEST ABDOMEN PELVIS W CONTRAST   Final Result   1. Multifocal bilateral pulmonary ground-glass opacities. The differential   includes atypical infectious/inflammatory pneumonitis. 2. Multiple bilateral pulmonary nodules concerning for metastatic disease. 3. Cirrhosis with increasing abdominopelvic ascites. 4. Improving 7 cm recto uterine abscess. XR ABDOMEN (KUB) (SINGLE AP VIEW)   Final Result   Multiple distended small bowel loops, similar to the previous study. No   significant colonic gas. Findings are concerning for a SBO. XR CHEST PORTABLE   Final Result   Nasogastric tube is seen extending below the diaphragm. Dedicated abdominal   radiograph could be performed for better visualization as warranted. Bilateral airspace disease, increased on the right as compared to prior, for   which pneumonia or edema are considerations. XR ABDOMEN (2 VIEWS)   Final Result   Scattered dilated small bowel loops throughout the abdomen, increased   compared to prior CT scan, either due to ileus or early partial small bowel   obstruction         XR CHEST PORTABLE   Final Result   Lucency beneath the right hemidiaphragm compatible with free intraperitoneal   air. Apparently the patient has had recent abdominal surgery. This finding   was discussed with Dr. Supa Stinson  At 6:48 pm on 8/24/2020.       Status post placement of right jugular central venous catheter. No   pneumothorax. Patchy mid and lower lung ground-glass opacities suspicious for pneumonia,   increased since the prior CT. Correlation for pneumonia is recommended. CT ABSCESS DRAINAGE W CATH PLACEMENT S&I   Final Result   Successful CT guided placement of a drainage catheter in the pelvic abscess. CT ABDOMEN PELVIS W IV CONTRAST Additional Contrast? Oral   Final Result   1. Pelvic air/fluid collection almost certainly reflects an abscess or giant   colonic diverticula. This may be the result of acute diverticulitis. 2. Mass lesion ascending colon almost certainly reflecting primary   adenocarcinoma of the colon. 3. Diffuse hepatic lesions with newly visualized (contrast enhancement) mass   at the hepatic dome. Hepatic metastatic disease is a consideration as is   cirrhosis with regenerative nodules and hepatocellular carcinoma. 4. Bilateral lower lobe pulmonary nodules suspicious for metastatic disease. 5. Abdominal and pelvic ascites. 6. Sequela from portosystemic shunting with numerous collateral vessels about   the stomach, left upper quadrant and distal esophagus. Relatively small   esophageal and gastric varices. 7.  Calcific atherosclerotic disease aorta. CT ABDOMEN PELVIS WO CONTRAST Additional Contrast? None   Final Result   1. Non loculated fluid and gas collection in the pelvis concerning for   perforated viscus/possible abscess formation. 2. Prominent thickening of the ascending colon which may be related to   infectious or inflammatory process or underlying neoplastic process. 3. Bowel-gas pattern typical of adynamic ileus. 4. Hepatic cirrhosis with numerous hepatic nodules suggestive of   regenerative/siderotic nodules. MRI abdomen without and with IV contrast   correlation recommended. Metastatic disease is a consideration.    5. Hepatosplenomegaly and multifocal collateral vessels about the stomach esophagus concerning for varices, all suggestive of portal venous   hypertension. 6. Abdominal and pelvic ascites. 7.  Diverticulosis coli without CT evidence of acute diverticulitis. Critical results were called by Dr. Toma Beard to [address] [place] [address]   on 8/16/2020 at 18:09. CT Cervical Spine WO Contrast   Final Result   No acute abnormality of the cervical spine. CT Head WO Contrast   Final Result   No acute intracranial abnormality. XR CHEST PORTABLE   Final Result   No acute process. XR RADIUS ULNA LEFT (2 VIEWS)   Final Result   No acute osseous abnormality. CT CHEST W CONTRAST    (Results Pending)       Medications: All current and past medications were reviewed.      bisacodyl  10 mg Rectal Once    metoclopramide  10 mg Intravenous Q6H    albumin human        insulin lispro  0-6 Units Subcutaneous Q4H    rifaximin  550 mg Oral BID    enoxaparin  30 mg Subcutaneous BID    [START ON 9/6/2020] fat emulsion  250 mL Intravenous Once per day on Mon Thu    lidocaine   Topical Once    folic acid  1 mg Oral Daily    fluconazole  200 mg Intravenous Q24H    meropenem  1 g Intravenous Q12H    pantoprazole  40 mg Intravenous Daily    lactulose  20 g Oral TID    sodium chloride flush  10 mL Intravenous 2 times per day        PN-Adult Premix 5/20 - Standard Electrolytes - Central Line      sodium chloride 25 mL/hr at 09/01/20 1412    PN-Adult Premix 5/20 - Standard Electrolytes - Central Line 40 mL/hr at 08/31/20 1808    dextrose 100 mL/hr (08/27/20 1300)       acetaminophen **OR** acetaminophen, ketorolac, acetaminophen, phenol, glucose, dextrose, glucagon (rDNA), dextrose, [DISCONTINUED] promethazine **OR** ondansetron, promethazine, morphine **OR** [DISCONTINUED] morphine, potassium chloride **OR** potassium alternative oral replacement **OR** potassium chloride, lip balm petroleum free, sodium chloride flush, polyethylene glycol      Problem list: Patient Active Problem List   Diagnosis Code    Pelvic abscess in female N73.9    Septicemia ([facility] A41.9    Colonic mass K63.89    Cirrhosis of liver with ascites ([facility] K74.60, R18.8    Intra-abdominal free air of unknown etiology K66.8    PAPITO (acute kidney injury) ([facility] N17.9    Lung nodules R91.8    Bandemia D72.825    Intra-abdominal abscess ([facility] K65.1    E coli infection A49.8    Elevated CEA R97.0    Ex-smoker Z87.891    Class 2 obesity due to excess calories with body mass index (BMI) of 36.0 to 36.9 in adult E66.09, Z68.36    Portal hypertension (HCC) K76.6    Mild malnutrition (HCC) E44.1    Tobacco abuse counseling Z71.6    Open abdominal wall wound S31.109A       Please note that this chart was generated using Dragon dictation software. Although every effort was made to ensure the accuracy of this automated transcription, some errors in transcription may have occurred inadvertently. If you may need any clarification, please do not hesitate to contact me through EPIC or at the phone number provided below with my electronic signature. Any pictures or media included in this note were obtained after taking informed verbal consent from the patient and with their approval to include those in the patient's medical record.     Raj Mcpherson MD, MPH  9/1/2020 , 5:26 PM   [facility] Infectious Disease   Office: [phone]  Fax: [phone]  Tuesday AM clinic:   [address] [place] [address] 120  Thursday AM [id number] [facility]

## 2020-09-01 NOTE — PROGRESS NOTES
Morning med pass and assessment completed. Pt denies nausea/vomiting, given PRN medication per [facility] for 8/10 pain. Pt disoriented to time and place at this time, oriented to self. Bowel sounds hypoactive. Care plan and education were reviewed with patient and mutually agreed upon. Reviewed potential for falls and safety measures. Patient verbalized understanding and denies any need at this time. Will continue to monitor. 10:37 AM  Pt returned from paracentesis, drowsy but responds appropriately to voice, VSS. 12:41 PM  Albumin started per orders, infusing at 100 ml/hr at pharmacy recommendation. 3:34 PM   Surgery in to observe incision, new dry dressing placed. No packing or wet to dry gauze per surgery request. Low urine output, 200 ml since 7am. Perfectserve sent to nephrology to inform of low urine output and increased confusion. Awaiting response. Will continue to monitor. 4:04 PM  5.5 bottles albumin completed per order. 4:23 PM  Per nephrology, stop normal saline and check ammonia. Ammonia drawn and sent to lab.

## 2020-09-01 NOTE — PROGRESS NOTES
Shift assessment completed. Medications given per MAR. Patient resting in bed without any distress. Denies nausea/vomiting. Dressing over abdominal surgical incision changed. Family updated. The care plan and education has been reviewed and mutually agreed upon with the patient and family.

## 2020-09-02 NOTE — PROGRESS NOTES
21   BUN 22* 23* 27*   CREATININE 0.8 0.7 0.6   GLUCOSE 154* 135* 111*     Hepatic:    Recent Labs     08/31/20  1830 09/01/20  0634 09/02/20  0550   AST 16 15 16   ALT 8* 6* <5*   BILITOT 0.6 0.6 0.6   ALKPHOS 96 90 78     Amylase:  No results found for: AMYLASE  Lipase:  No results found for: LIPASE   Mag:    Lab Results   Component Value Date    MG 1.80 09/02/2020    MG 1.80 09/01/2020     Phos:     Lab Results   Component Value Date    PHOS 3.5 09/02/2020    PHOS 3.0 09/01/2020      Coags:   Lab Results   Component Value Date    PROTIME 12.7 09/02/2020    INR 1.09 09/02/2020    APTT 42.8 09/02/2020       Cultures:  Anaerobic culture  Lab Results   Component Value Date    LABANAE No anaerobes isolated 08/24/2020     Fungus stain  Results in Past 30 Days  Result Component Current Result Ref Range Previous Result Ref Range   Fungus Stain No Fungal elements seen (8/24/2020)  Not in Time Range      Gram stain  Results in Past 30 Days  Result Component Current Result Ref Range Previous Result Ref Range   Gram Stain Result No organisms seen  4+ WBC's (Polymorphonuclear)   (9/1/2020)  1+ WBC's (Polymorphonuclear)  No organisms seen   (8/24/2020)      Organism  Lab Results   Component Value Date/Time    ORG Yeast (A) 08/24/2020 02:10 PM    ORG Lactobacillus species (A) 08/24/2020 02:10 PM     Surgical culture  Lab Results   Component Value Date/Time    CXSURG Rare growth  No further workup   08/24/2020 02:10 PM    CXSURG Rare growth  No further workup   08/24/2020 02:10 PM     Blood culture 1  Results in Past 30 Days  Result Component Current Result Ref Range Previous Result Ref Range   Blood Culture, Routine No Growth after 4 days of incubation. (8/25/2020)  No Growth after 4 days of incubation. (8/16/2020)      Blood culture 2  Results in Past 30 Days  Result Component Current Result Ref Range Previous Result Ref Range   Culture, Blood 2 No Growth after 4 days of incubation. (8/25/2020)  No Growth after 4 days of incubation. (8/16/2020)      Fecal occult  No results found for requested labs within last 30 days. GI bacterial pathogens by PCR  No results found for requested labs within last 30 days. C. difficile  No results found for requested labs within last 30 days. Urine culture  No results found for: Gaetano Lovell    Pathology:   OR Pathology results pending     Endoscopy Pathology 8/20/2020--FINAL DIAGNOSIS:     A. Esophagus, biopsy:   - Fragments of squamous and gastric type columnar mucosa with mild   chronic inflammation.   - Negative for intestinal metaplasia. B. Ascending colon mass, biopsy:   - Poorly differentiated carcinoma, most consistent with adenocarcinoma. See comment. C. Colon polyp, transverse:   - Tubular adenoma. - Negative for high-grade dysplasia or malignancy. D. Colon polyp, descending:   - Tubular adenoma. - Negative for high-grade dysplasia or malignancy. E. Colon polyp, sigmoid:   - Tubular adenoma (multiple fragments). - Negative for high-grade dysplasia or malignancy. COMMENT:  The morphologic findings of ascending colon mass raises a   differential diagnosis of neuroendocrine carcinoma.  However, the   malignant cells are negative for neuroendocrine markers (synaptophysin   and chromogranin) .  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  Clinical correlation is recommended. Imaging:  I have personally reviewed the following films:    Xr Abdomen (2 Views)    Result Date: 8/26/2020  EXAMINATION: TWO XRAY VIEWS OF THE ABDOMEN 8/26/2020 9:38 am COMPARISON: CT scan 08/17/2020 HISTORY: ORDERING SYSTEM PROVIDED HISTORY: Abdominal Pain TECHNOLOGIST PROVIDED HISTORY: Reason for exam:->Abdominal Pain Reason for Exam: abdominal pain Acuity: Acute Type of Exam: Initial FINDINGS: Scattered dilated loops small bowel are seen throughout the abdomen. No significant colonic gas is seen Drainage catheter projects in region the pelvis. Amado catheter is seen. Scattered dilated small bowel loops throughout the abdomen, increased compared to prior CT scan, either due to ileus or early partial small bowel obstruction     Scheduled Meds:   insulin lispro  0-6 Units Subcutaneous Q4H    rifaximin  550 mg Oral BID    enoxaparin  30 mg Subcutaneous BID    [START ON 9/6/2020] fat emulsion  250 mL Intravenous Once per day on Mon Thu    lidocaine   Topical Once    folic acid  1 mg Oral Daily    fluconazole  200 mg Intravenous Q24H    meropenem  1 g Intravenous Q12H    pantoprazole  40 mg Intravenous Daily    lactulose  20 g Oral TID    sodium chloride flush  10 mL Intravenous 2 times per day     Continuous Infusions:   PN-Adult Premix 5/20 - Standard Electrolytes - Central Line 75 mL/hr at 09/01/20 1709    dextrose 100 mL/hr (08/27/20 1300)     PRN Meds:.acetaminophen **OR** acetaminophen, ketorolac, acetaminophen, phenol, glucose, dextrose, glucagon (rDNA), dextrose, [DISCONTINUED] promethazine **OR** ondansetron, promethazine, morphine **OR** [DISCONTINUED] morphine, potassium chloride **OR** potassium alternative oral replacement **OR** potassium chloride, lip balm petroleum free, sodium chloride flush, polyethylene glycol      Assessment:  76 y.o. female admitted with   1. Septicemia ([facility] [address]    2. PAPITO (acute kidney injury) ([facility] [address]    3. Intra-abdominal free air of unknown etiology    4.  Fall, initial encounter        Status-post exploratory laparotomy, evacuation of ascites, drainage of pelvic abscess, right hemicolectomy with primary ileotransverse colostomy anastomosis, and Abdiaziz-Cut liver biopsy  on 8/24/2020 for colon mass, right colon and hepatic flexure with necrotic cancer at hepatic flexure region with perforation, cirrhosis, pelvic abscess, ascites  Intraabdominal/pelvic abscess, status-post CT-guided drain placement on 8/18/2020  Sepsis   Cirrhosis  Acute kidney injury  Hyponatremia  COVID screening, negative  Anemia  Had secondary wound closure and D&C 8/28 - D& C positive for endometrial cancer  Paracentesis done    Plan:    Begin clears, continue Reglan, dulcolax,  Trial diet today, monitor progress  FU WBC, down to normal today, cont present meds    EDUCATION:  Educated patient on plan of care and disease process--all questions answered. Plans discussed with patient and nursing.     Signed:      Liss Holden

## 2020-09-02 NOTE — PROGRESS NOTES
CAROLINE drain to lower left back was accidentally dislodged while giving eulalio care in bed. Drain had no output today but upon removal began draining thin tan drainage. Page placed to Dr Geraldine Lange and informed of event. Telephone order taken to reinforce with dry sterile dressing overnight and change PRN.

## 2020-09-02 NOTE — PROGRESS NOTES
Pt resting awake in bed. Denies pain at this time. States she feels better overall since paracentesis. IJ flushed and all lumen are working. Amado in place drainage sathish urine. PO meds taken well with water. Low air loss alternating pressure specialty be in place. Pt is hopeful to attempt a diet today. Reports recent confusion but is answering questions appropriately today. Pt is axox4 and able to answer questions and follow commands throughout assessment. Call light in reach.

## 2020-09-02 NOTE — PROGRESS NOTES
intracranial abnormality. XR CHEST PORTABLE   Final Result   No acute process. XR RADIUS ULNA LEFT (2 VIEWS)   Final Result   No acute osseous abnormality. CT CHEST W CONTRAST    (Results Pending)       ASSESSMENT :  Cirrhosis - new diagnosis per CT. bx c/w WINKLER cirrhosis. No alcohol history. Likely from fatty liver. Some ascites on CT. Seemed mildly confused at admission (baseline per her daughter) but ammonia was normal. repeat ammonia is elevated at 66 so lactulose started. Negative: AMA, hepatitis panel, A1AT phenotype. f-actin weak positive at 25. AFP normal. CEA 83. Labs stable. Large volume ascites on CT 8/31. S/p paracentesis 9/1 with 10 L fluid removed. High nucleated cells c/w peritonitis from perforated colon ca. SAAG elevated c/w portal HTN.      Abnormal CT of the colon and liver, colon mass - CT with thickening of the ascending colon concerning for malignancy - path c/w AdenoCa. Also with liver and lung lesions concerning for metastatic disease.  Colonoscopy 8/20 with large necrotic ascending colon mass (carcinoma), likely the source of abscess. s/p exploratory laparotomy, evacuation of ascites, drainage of pelvic abscess, right hemicolectomy with primary ileotransverse colostomy anastomosis, and Abdiaziz-Cut liver biopsy on 8/24/2020    N/V - AXR 8/30 with multiple distended small bowel loops. No bowel distention on CT 8/31.      Pelvic abscess - from perforated colon ca     Esophagitis - EGD 8/20 with LA class C erosive esophagitis and nodularity at [employer] (path mild chronic inflammation).     Bulky uterus with bleeding in vault, suspected to be uterine cancer. Gyn oncology on board.  path c/w uterine adenocarcinoma.      PAPITO        PLAN:  - hgb noted to be lower this am. repeat hgb to ensure accuracy  - f/u ascitic fluid cx, cytology  - antibiotics  - lactulose TID, xifaxan   - PPI  - Will need Hep A and B vaccines outpatient.     Discussed with Dr. Ronda Shepard, BRAD  [address] [facility]     I have personally performed a face to face diagnostic evaluation on this patient. I have interviewed and examined the patient and I agree with the findings and recommended plan of care. In summary, my findings and plan are the following:     She denies abdominal pain but remains weak. S/p LVP yesterday and IV albumin was given. Her Hb noted to drop 9-->7.8. Repeat Hb was 8. She has no overt GI bleeding, ? Intraabdominal bleeding from paracentesis site. We will monitor her H/H Q8 and transfuse if Hb<7. Continue supportive care with IV PPI, IV antibiotics, lactulose and Xifaxan. She will require intermittent paracentesis, as needed.     Nallely Banks MD, MSc  [address] [facility]

## 2020-09-02 NOTE — PROGRESS NOTES
Harles Alcide, MD Eleonore Corpus, MD Brenda Dubin, MD               Office: [phone]                      Fax: [phone]             [address] [place] [address]                   NEPHROLOGY INPATIENT PROGRESS NOTE:     PATIENT NAME: Ann Marie Soriano  : 1955  MRN: [id number]. RECOMMENDATIONS:   -More awake today, checked ammonia fine.   -Stoped IV fluids for now, already on TPN with high-rate    -as concern with ascites with liver cirrhosis, ,  - PO diet per sx recs. -Continue to monitor acidosis, if worsening with GI losses, would change to bicarbonate. - monitor respiratory closely       IMPRESSION:       PAPITO - improving to stable   : Oligouric - now non-oliguric.    : Etiology of current PAPITO - ATN vs. Decreased renal perfusion with hemodynamic status with postop. - Renal imaging: w/ CT - kidney unremarkable   - CK - 310 on admission then improved. - Avoid contrast exposure at this time. Associated problems:    - Electrolytes: Hyponatremia-resolved  - Hypokalemia - from needing repletion GI loss -better  : BP: Ok to keep slightly higher   - Acid-Base: Acidosis - - non-AGMA - follow as w/ higher NG losses -follow  - Anemia:  - Heme  Following      Other problems: Management per primary and other consulting teams. #WINKLER cirrhosis, Portal HTN : new diagnosis, no h/o EtOH    # Sepsis, intra-abdominal abscess   - S/P CT-guided drain placement on 2020  - S/P right hemicolectomy       High complexity. Multiple medical problems. Discussed with patient and treatment team.  Family at bedside and nurse  Thank you for allowing me to participate in this patient's care. Please do not hesitate to contact me for any questions/concerns. We will follow along with you.      Brenda Dubin, MD  [facility] [address]   Phone: [phone] or Via [employer]  Fax: [phone]        Hospital Problems           Last Modified POA    Pelvic abscess in female 8/16/2020 Yes    Septicemia ([address] 8/17/2020 Yes    Colonic mass 8/25/2020 Yes    Cirrhosis of liver with ascites ([address] 8/25/2020 Yes    Intra-abdominal free air of unknown etiology 8/25/2020 Yes    PAPITO (acute kidney injury) ([address] 8/25/2020 Yes    Lung nodules 8/25/2020 Yes    Bandemia 8/25/2020 Yes    Intra-abdominal abscess ([address] 8/25/2020 Yes    E coli infection 8/25/2020 Yes    Elevated CEA 8/25/2020 Yes    Ex-smoker 8/25/2020 Yes    Class 2 obesity due to excess calories with body mass index (BMI) of 36.0 to 36.9 in adult 8/25/2020 Yes    Portal hypertension ([address] 8/25/2020 Yes    Mild malnutrition ([address] (Chronic) 8/25/2020 Yes    Tobacco abuse counseling 8/27/2020 Yes    Open abdominal wall wound 8/28/2020 Yes             ========================================    SUBJECTIVE:    Patient was resting in bed,   More awake today    MEDICATIONS: reviewed by me. Medications Prior to Admission:  No current facility-administered medications on file prior to encounter. No current outpatient medications on file prior to encounter. No medications prior to admission.      Scheduled Meds:   metoclopramide  10 mg Intravenous Q6H    bisacodyl        insulin lispro  0-6 Units Subcutaneous Q4H    rifaximin  550 mg Oral BID    enoxaparin  30 mg Subcutaneous BID    [START ON 9/6/2020] fat emulsion  250 mL Intravenous Once per day on Mon Thu    lidocaine   Topical Once    folic acid  1 mg Oral Daily    fluconazole  200 mg Intravenous Q24H    meropenem  1 g Intravenous Q12H    pantoprazole  40 mg Intravenous Daily    lactulose  20 g Oral TID    sodium chloride flush  10 mL Intravenous 2 times per day     Continuous Infusions:   PN-Adult Premix 5/20 - Standard Electrolytes - Central Line      PN-Adult Premix 5/20 - Standard Electrolytes - Central Line 75 mL/hr at 09/01/20 1709    dextrose 100 mL/hr (08/27/20 1300)     PRN Meds:.morphine **OR** [DISCONTINUED] morphine, ketorolac, acetaminophen, phenol, glucose, dextrose, glucagon (rDNA), dextrose, [DISCONTINUED] promethazine **OR** ondansetron, promethazine, potassium chloride **OR** potassium alternative oral replacement **OR** potassium chloride, lip balm petroleum free, sodium chloride flush, polyethylene glycol       PHYSICAL EXAM:  Recent vital signs and recent I/Os reviewed by me. Wt Readings from Last 3 Encounters:   08/31/20 236 lb 6 oz (107.2 kg)     BP Readings from Last 3 Encounters:   09/02/20 115/67   08/28/20 (!) 149/66   08/24/20 (!) 155/68     Patient Vitals for the past 24 hrs:   BP Temp Temp src Pulse Resp SpO2   09/02/20 1248 115/67 97.4 °F (36.3 °C) Oral 83 16 97 %   09/02/20 1011 -- -- -- -- 16 94 %   09/02/20 0749 117/66 97.5 °F (36.4 °C) Oral 69 16 97 %   09/02/20 0444 (!) 105/59 97.3 °F (36.3 °C) Oral 68 16 95 %   09/01/20 2302 (!) 109/55 96.8 °F (36 °C) Axillary 71 16 95 %   09/01/20 2007 120/75 97.9 °F (36.6 °C) Axillary 66 16 91 %   09/01/20 1612 104/68 97.9 °F (36.6 °C) Temporal 63 16 --       Intake/Output Summary (Last 24 hours) at 9/2/2020 1414  Last data filed at 9/2/2020 5529  Gross per 24 hour   Intake 1192 ml   Output 600 ml   Net 592 ml         General: Not in any distress  HENT: Atraumatic, normocephalic   Eyes: Normal conjunctiva, Non-incteral sclera. Neck: Supple, JVD not visible. CVS:  Heart sounds are normal. No murmurs. No pericardial rub. RS: Normal respiratory efforts,  Lung fields are diminished . Abd: Soft , bowel sounds are normal,  distension and no tenderness to palpation. Skin: No rash , some bruises,   CNS: Awake Oriented , No focal.   Extremities/MSK: 2+ Edema, improving,  no cyanosis.            DATA:  Diagnostic tests reviewed by me for today's visit:    Recent Labs     08/31/20  0532 08/31/20  1830 09/01/20  0634 09/02/20  0550 09/02/20  1350   WBC 15.6* 16.1* 13.7* 8.9  --    HCT 29.9* 29.3* 28.6* 24.8* 24.8*   * 124* 109* 74*  --      Iron Saturation:  No components found for: PERCENTFE  FERRITIN:    Lab Results   Component Value Date    FERRITIN 322.4 08/31/2020     IRON:    Lab Results   Component Value Date    IRON 26 08/17/2020     TIBC:    Lab Results   Component Value Date    TIBC 106 08/17/2020       Recent Labs     08/31/20  0532 08/31/20  1830 09/01/20  0634 09/02/20  0550    141 141 142   K 4.4 4.4 4.3 4.2   * 114* 113* 114*   CO2 17* 18* 19* 21   BUN 22* 22* 23* 27*   CREATININE 0.7 0.8 0.7 0.6     Recent Labs     08/31/20  0532 08/31/20  1830 09/01/20  0634 09/02/20  0550   CALCIUM 8.6 8.8 8.6 9.0   MG 1.80  --  1.80 1.80   PHOS 3.4  --  3.0 3.5     No results for input(s): PH, PCO2, PO2 in the last 72 hours.     Invalid input(s): Medardo Bleacher    ABG:  No results found for: PH, PCO2, PO2, HCO3, BE, THGB, TCO2, O2SAT  VBG:    Lab Results   Component Value Date    PHVEN 7.435 08/23/2020    AEN1SYP 26.3 08/23/2020    BEVEN -5.6 08/23/2020    N0KNSVUP 99 08/23/2020       LDH:    Lab Results   Component Value Date     08/31/2020     Uric Acid:    Lab Results   Component Value Date    LABURIC 5.9 08/17/2020       PT/INR:    Lab Results   Component Value Date    PROTIME 12.7 09/02/2020    INR 1.09 09/02/2020     Warfarin PT/INR:  No components found for: PTPATWAR, PTINRWAR  PTT:    Lab Results   Component Value Date    APTT 42.8 09/02/2020   [APTT}  Last 3 Troponin:    Lab Results   Component Value Date    TROPONINI <0.01 08/16/2020       U/A:    Lab Results   Component Value Date    COLORU ORANGE 08/16/2020    PROTEINU TRACE 08/16/2020    PHUR 5.0 08/16/2020    WBCUA 3 08/16/2020    RBCUA 7 08/16/2020    BACTERIA RARE 08/16/2020    CLARITYU CLOUDY 08/16/2020    SPECGRAV 1.022 08/16/2020    LEUKOCYTESUR SMALL 08/16/2020    UROBILINOGEN 1.0 08/16/2020    BILIRUBINUR SMALL 08/16/2020    BLOODU LARGE 08/16/2020    GLUCOSEU Negative 08/16/2020     Microalbumen/Creatinine ratio:  No components found for: RUCREAT  24 Hour Urine for Protein:  No components found for: Ghada Banner, [id number], UTV3  24 Hour Urine for Creatinine Clearance:  No components found for: CREAT4, UHRS10, UTV10  Urine Toxicology:  No components found for: IAMMENTA, IBARBIT, IBENZO, ICOCAINE, IMARTHC, IOPIATES, IPHENCYC    HgBA1c:  No results found for: LABA1C  RPR:  No results found for: RPR  HIV:  No results found for: HIV  KULWANT:  No results found for: ANATITER, KULWANT  RF:  No results found for: RF  DSDNA:  No components found for: DNA  AMYLASE:  No results found for: AMYLASE  LIPASE:  No results found for: LIPASE  Fibrinogen Level:  No components found for: FIB       BELOW MENTIONED RADIOLOGY STUDY RESULTS BY ME:    Ct Abdomen Pelvis Wo Contrast Additional Contrast? None    Result Date: 8/16/2020  EXAMINATION: CT OF THE ABDOMEN AND PELVIS WITHOUT CONTRAST 8/16/2020 5:11 pm TECHNIQUE: CT of the abdomen and pelvis was performed without the administration of intravenous contrast. Multiplanar reformatted images are provided for review. Dose modulation, iterative reconstruction, and/or weight based adjustment of the mA/kV was utilized to reduce the radiation dose to as low as reasonably achievable. COMPARISON: None. HISTORY: Acute nausea FINDINGS: Lower Chest: FOUR solid soft tissue pulmonary nodules right lower lobe, 7 x 8 mm (axial image 19), 11 x 12 mm (axial 11), 9 x 9 mm (axial image 10) and 4 x 4 mm (axial image 10). 3 mm pulmonary nodules are seen posterior basal segment left lower lobe. Mild nodular fullness about the distal esophagus. Heart size is upper normal. Organs: Prominent caudate lobe hypertrophy and poly lobular configuration of the liver as well as diffuse tiny hypoattenuating lesions throughout the liver suggestive of hepatic cirrhosis and siderotic nodules. No discrete mass lesion evident. 19 cm craniocaudal liver length. The spleen is mildly enlarged. The kidneys, gallbladder, adrenal glands and pancreas appear unremarkable.   Multifocal collateral vessels are seen about the gastrohepatic ligament extending to the posterior mediastinum. GI/Bowel: Prominent diffuse thickening of the wall of the ascending colon. Other portions of colon and small bowel appear unremarkable with exception of few gas distended small bowel loops typical of adynamic ileus. There are diverticula involving the descending and sigmoid colon. Pelvis: Fluid collection within the pelvis without definite margins has air bubbles within it concerning for sequela from perforated bowel/abscess formation. Uterus and adnexal structures appear unremarkable. Peritoneum/Retroperitoneum: Abdominal ascites. Calcific atherosclerosis aorta. Bones/Soft Tissues: No acute superficial soft tissue or osseous structure abnormality evident. 1. Non loculated fluid and gas collection in the pelvis concerning for perforated viscus/possible abscess formation. 2. Prominent thickening of the ascending colon which may be related to infectious or inflammatory process or underlying neoplastic process. 3. Bowel-gas pattern typical of adynamic ileus. 4. Hepatic cirrhosis with numerous hepatic nodules suggestive of regenerative/siderotic nodules. MRI abdomen without and with IV contrast correlation recommended. Metastatic disease is a consideration. 5. Hepatosplenomegaly and multifocal collateral vessels about the stomach esophagus concerning for varices, all suggestive of portal venous hypertension. 6. Abdominal and pelvic ascites. 7.  Diverticulosis coli without CT evidence of acute diverticulitis. Critical results were called by Dr. Cornel Mayorga to [address] [place] [address] on 8/16/2020 at 18:09. Xr Radius Ulna Left (2 Views)    Result Date: 8/16/2020  EXAMINATION: TWO XRAY VIEWS OF THE LEFT FOREARM 8/16/2020 2:55 pm COMPARISON: None. HISTORY: ORDERING SYSTEM PROVIDED HISTORY: Fall TECHNOLOGIST PROVIDED HISTORY: Reason for exam:->Fall Reason for Exam: Frequent falls at home.  Bruising mid shaft L forearm Acuity: Acute Type of Exam: Initial FINDINGS: There is no evidence of acute fracture. There is normal alignment. No acute joint abnormality. No focal osseous lesion. No focal soft tissue abnormality. No acute osseous abnormality. Ct Head Wo Contrast    Result Date: 8/16/2020  EXAMINATION: CT OF THE HEAD WITHOUT CONTRAST  8/16/2020 3:15 pm TECHNIQUE: CT of the head was performed without the administration of intravenous contrast. Dose modulation, iterative reconstruction, and/or weight based adjustment of the mA/kV was utilized to reduce the radiation dose to as low as reasonably achievable. COMPARISON: None. HISTORY: ORDERING SYSTEM PROVIDED HISTORY: Fall TECHNOLOGIST PROVIDED HISTORY: Reason for exam:->Fall Has a \"code stroke\" or \"stroke alert\" been called? ->No Reason for Exam: Fall Acuity: Acute Type of Exam: Initial FINDINGS: BRAIN/VENTRICLES: The ventricles and sulci are diffusely enlarged. Low attenuation is seen in the periventricular and subcortical white matter. No acute intracranial hemorrhage or acute infarct is identified. ORBITS: The visualized portion of the orbits demonstrate no acute abnormality. SINUSES: The visualized paranasal sinuses and mastoid air cells demonstrate no acute abnormality. SOFT TISSUES/SKULL:  No acute abnormality of the visualized skull or soft tissues. No acute intracranial abnormality. Ct Cervical Spine Wo Contrast    Result Date: 8/16/2020  EXAMINATION: CT OF THE CERVICAL SPINE WITHOUT CONTRAST 8/16/2020 3:15 pm TECHNIQUE: CT of the cervical spine was performed without the administration of intravenous contrast. Multiplanar reformatted images are provided for review. Dose modulation, iterative reconstruction, and/or weight based adjustment of the mA/kV was utilized to reduce the radiation dose to as low as reasonably achievable. COMPARISON: None.  HISTORY: ORDERING SYSTEM PROVIDED HISTORY: Fall TECHNOLOGIST PROVIDED HISTORY: Reason for exam:->Fall Reason for Exam: Fall Acuity: Acute Type of Exam: Initial FINDINGS: BONES/ALIGNMENT: There is no acute fracture or traumatic malalignment. DEGENERATIVE CHANGES: Multilevel degenerative changes. SOFT TISSUES: There is no prevertebral soft tissue swelling. No acute abnormality of the cervical spine. Xr Chest Portable    Result Date: 8/16/2020  EXAMINATION: ONE XRAY VIEW OF THE CHEST 8/16/2020 2:55 pm COMPARISON: None. HISTORY: ORDERING SYSTEM PROVIDED HISTORY: Fall TECHNOLOGIST PROVIDED HISTORY: Reason for exam:->Fall Reason for Exam: Frequent falls at home. Bruising mid shaft L forearm Acuity: Acute Type of Exam: Initial FINDINGS: The lungs are without acute focal process. There is no effusion or pneumothorax. The cardiomediastinal silhouette is without acute process. The osseous structures are without acute process. No acute process.      Fernando Ribeiro MD

## 2020-09-02 NOTE — PROGRESS NOTES
Patients chart was reviewed post Paracentesis procedure. No complications were noted post procedure.

## 2020-09-02 NOTE — PROGRESS NOTES
TRICHOMONAS, YEAST, BACTERIA, CLARITYU, SPECGRAV, LEUKOCYTESUR, UROBILINOGEN, BILIRUBINUR, BLOODU, GLUCOSEU, AMORPHOUS in the last 72 hours. Invalid input(s): KETONESU  Magnesium:   Lab Results   Component Value Date    MG 1.80 09/01/2020    MG 1.80 08/31/2020    MG 2.30 08/22/2020     KULWANT:  No results found for: ANATITER, KULWANT    RADIOLOGY:    Ct Abdomen Pelvis Wo Contrast Additional Contrast? None    Result Date: 8/16/2020  EXAMINATION: CT OF THE ABDOMEN AND PELVIS WITHOUT CONTRAST 8/16/2020 5:11 pm TECHNIQUE: CT of the abdomen and pelvis was performed without the administration of intravenous contrast. Multiplanar reformatted images are provided for review. Dose modulation, iterative reconstruction, and/or weight based adjustment of the mA/kV was utilized to reduce the radiation dose to as low as reasonably achievable. COMPARISON: None. HISTORY: Acute nausea FINDINGS: Lower Chest: FOUR solid soft tissue pulmonary nodules right lower lobe, 7 x 8 mm (axial image 19), 11 x 12 mm (axial 11), 9 x 9 mm (axial image 10) and 4 x 4 mm (axial image 10). 3 mm pulmonary nodules are seen posterior basal segment left lower lobe. Mild nodular fullness about the distal esophagus. Heart size is upper normal. Organs: Prominent caudate lobe hypertrophy and poly lobular configuration of the liver as well as diffuse tiny hypoattenuating lesions throughout the liver suggestive of hepatic cirrhosis and siderotic nodules. No discrete mass lesion evident. 19 cm craniocaudal liver length. The spleen is mildly enlarged. The kidneys, gallbladder, adrenal glands and pancreas appear unremarkable. Multifocal collateral vessels are seen about the gastrohepatic ligament extending to the posterior mediastinum. GI/Bowel: Prominent diffuse thickening of the wall of the ascending colon. Other portions of colon and small bowel appear unremarkable with exception of few gas distended small bowel loops typical of adynamic ileus.   There are diverticula involving the descending and sigmoid colon. Pelvis: Fluid collection within the pelvis without definite margins has air bubbles within it concerning for sequela from perforated bowel/abscess formation. Uterus and adnexal structures appear unremarkable. Peritoneum/Retroperitoneum: Abdominal ascites. Calcific atherosclerosis aorta. Bones/Soft Tissues: No acute superficial soft tissue or osseous structure abnormality evident. 1. Non loculated fluid and gas collection in the pelvis concerning for perforated viscus/possible abscess formation. 2. Prominent thickening of the ascending colon which may be related to infectious or inflammatory process or underlying neoplastic process. 3. Bowel-gas pattern typical of adynamic ileus. 4. Hepatic cirrhosis with numerous hepatic nodules suggestive of regenerative/siderotic nodules. MRI abdomen without and with IV contrast correlation recommended. Metastatic disease is a consideration. 5. Hepatosplenomegaly and multifocal collateral vessels about the stomach esophagus concerning for varices, all suggestive of portal venous hypertension. 6. Abdominal and pelvic ascites. 7.  Diverticulosis coli without CT evidence of acute diverticulitis. Critical results were called by Dr. Gera Bradshaw to [address] [place] [address] on 8/16/2020 at 18:09. Xr Radius Ulna Left (2 Views)    Result Date: 8/16/2020  EXAMINATION: TWO XRAY VIEWS OF THE LEFT FOREARM 8/16/2020 2:55 pm COMPARISON: None. HISTORY: ORDERING SYSTEM PROVIDED HISTORY: Fall TECHNOLOGIST PROVIDED HISTORY: Reason for exam:->Fall Reason for Exam: Frequent falls at home. Bruising mid shaft L forearm Acuity: Acute Type of Exam: Initial FINDINGS: There is no evidence of acute fracture. There is normal alignment. No acute joint abnormality. No focal osseous lesion. No focal soft tissue abnormality. No acute osseous abnormality.      Xr Abdomen (kub) (single Ap View)    Result Date: 8/30/2020  EXAMINATION: ONE SUPINE XRAY VIEW(S) OF THE ABDOMEN 8/30/2020 2:03 pm COMPARISON: 08/26/2020. HISTORY: ORDERING SYSTEM PROVIDED HISTORY: portable, N/V s/p NGT TECHNOLOGIST PROVIDED HISTORY: Reason for exam:->portable, N/V s/p NGT Reason for Exam: portable, N/V s/p NGT Acuity: Unknown Type of Exam: Unknown FINDINGS: Multiple distended small bowel loops in the mid abdomen without significant interval change. No significant colonic gas is identified. Enteric catheter in the stomach. Pigtail catheter in the mid pelvis. Scattered vascular calcification. Multiple distended small bowel loops, similar to the previous study. No significant colonic gas. Findings are concerning for a SBO. Ct Head Wo Contrast    Result Date: 8/16/2020  EXAMINATION: CT OF THE HEAD WITHOUT CONTRAST  8/16/2020 3:15 pm TECHNIQUE: CT of the head was performed without the administration of intravenous contrast. Dose modulation, iterative reconstruction, and/or weight based adjustment of the mA/kV was utilized to reduce the radiation dose to as low as reasonably achievable. COMPARISON: None. HISTORY: ORDERING SYSTEM PROVIDED HISTORY: Fall TECHNOLOGIST PROVIDED HISTORY: Reason for exam:->Fall Has a \"code stroke\" or \"stroke alert\" been called? ->No Reason for Exam: Fall Acuity: Acute Type of Exam: Initial FINDINGS: BRAIN/VENTRICLES: The ventricles and sulci are diffusely enlarged. Low attenuation is seen in the periventricular and subcortical white matter. No acute intracranial hemorrhage or acute infarct is identified. ORBITS: The visualized portion of the orbits demonstrate no acute abnormality. SINUSES: The visualized paranasal sinuses and mastoid air cells demonstrate no acute abnormality. SOFT TISSUES/SKULL:  No acute abnormality of the visualized skull or soft tissues. No acute intracranial abnormality.      Ct Cervical Spine Wo Contrast    Result Date: 8/16/2020  EXAMINATION: CT OF THE CERVICAL SPINE WITHOUT CONTRAST 8/16/2020 3:15 pm TECHNIQUE: CT of the cervical spine was performed without the administration of intravenous contrast. Multiplanar reformatted images are provided for review. Dose modulation, iterative reconstruction, and/or weight based adjustment of the mA/kV was utilized to reduce the radiation dose to as low as reasonably achievable. COMPARISON: None. HISTORY: ORDERING SYSTEM PROVIDED HISTORY: Fall TECHNOLOGIST PROVIDED HISTORY: Reason for exam:->Fall Reason for Exam: Fall Acuity: Acute Type of Exam: Initial FINDINGS: BONES/ALIGNMENT: There is no acute fracture or traumatic malalignment. DEGENERATIVE CHANGES: Multilevel degenerative changes. SOFT TISSUES: There is no prevertebral soft tissue swelling. No acute abnormality of the cervical spine. Ct Abscess Drainage W Cath Placement S&i    Result Date: 8/18/2020  PROCEDURE: CT GUIDEDPELVICABSCESS DRAINAGE CATHETER PLACEMENT MODERATE CONSCIOUS SEDATION <Completed Date> HISTORY: ORDERING SYSTEM PROVIDED HISTORY: Pelvic abscess possibly diverticular in origin TECHNOLOGIST PROVIDED HISTORY: Reason for exam:->Pelvic abscess possibly diverticular in origin Reason for Exam: Pelvic Abscess Acuity: Unknown Type of Exam: Unknown SEDATION: 1 mgversed and 50 mcg fentanyl were titrated intravenously for moderate sedation monitored under my direction. Total intraservice time of sedation was 21 minutes. The patient's vital signs were monitored throughout the procedure and recorded in the patient's medical record by the nurse. TECHNIQUE: Informed consent was obtained after a detailed explanation of the procedure including risks, benefits, and alternatives. Universal protocol was followed. The patient was placed on the CT table in the prone position. A suitable skin site was prepped and draped in sterile fashion following CT localization.   An 18 gauge needle was advanced under CT guidance into the pelvic fluid collection and a 0.035 guidewire was used to place a 10 Monegasque abscess drainage catheter after the fashion tract was dilated. The catheter was sutured to the skin and the patient tolerated the procedure well. The catheter was attached to CAROLINE suction drainage. Dose modulation, iterative reconstruction, and/or weight based adjustment of the mA/kV was utilized to reduce the radiation dose to as low as reasonably achievable. DLP: 201.39 mGy-cm Estimated blood loss: Less than 5 cc FINDINGS: A total of 80 mL of purulent fluid was removed and sent for diagnostic tests. Successful CT guided placement of a drainage catheter in the pelvic abscess. Ct Abdomen Pelvis W Iv Contrast Additional Contrast? Oral    Result Date: 8/17/2020  EXAMINATION: CT OF THE ABDOMEN AND PELVIS WITH CONTRAST 8/17/2020 5:35 pm TECHNIQUE: CT of the abdomen and pelvis was performed with the administration of intravenous contrast. Multiplanar reformatted images are provided for review. Dose modulation, iterative reconstruction, and/or weight based adjustment of the mA/kV was utilized to reduce the radiation dose to as low as reasonably achievable. COMPARISON: CT abdomen and pelvis 08/16/2020 HISTORY: ORDERING SYSTEM PROVIDED HISTORY: Pelvic abscess on non contrast CT Reason for Exam: follow up scan from yesterday Acuity: Acute Type of Exam: Subsequent/Follow-up FINDINGS: Lower Chest: Minimal subsegmental atelectasis posterior both lung bases. Pulmonary nodules right and left lower lobes. Cardiac and posterior mediastinal structures visualized are unremarkable. Liver: Hepatic morphologic features of cirrhosis with diffuse tiny nodular densities throughout the liver. Not evident on the CT performed yesterday's an area of masslike density posterior at the hepatic dome axial image 31 measuring 2.8 cm. Other organs: The spleen, pancreas, adrenal glands, gallbladder and kidneys appear unremarkable. GI/Bowel: Multifocal diverticula involve the descending and sigmoid colon without evidence of acute inflammatory change. Inflammatory changes may be present in the pelvis adjacent to apparent abscess, however evaluation the bowel is significantly limited because of the presence of ascites as well. There is prominent masslike thickening involving the ascending colon concerning for malignancy, conglomerate member measurement 9 x 12 x 10 cm axial image 114, coronal image 81. Pelvis: As demonstrated on yesterday's study there is a prominent air-fluid level collection, following contrast showing a thin rim of enhancement, separate from the bowel, 5.5 x 10 cm axial series 2, image 164, craniocaudal length 7 cm on coronal image 119. Prominent hypoattenuation central uterus, 5 cm axial series 2, image 158. Ascites is noted. Urinary bladder is decompressed by Amado catheter. Peritoneum/Retroperitoneum: Ascites is present. 12 mm ann hepatis lymph node axial image 70.  13 mm portacaval lymph node axial image 79. Multifocal collateral vessels are seen about the spleen, stomach and distal esophagus. Bones/Soft Tissues: No acute superficial soft tissue or osseous structure abnormality evident. 1. Pelvic air/fluid collection almost certainly reflects an abscess or giant colonic diverticula. This may be the result of acute diverticulitis. 2. Mass lesion ascending colon almost certainly reflecting primary adenocarcinoma of the colon. 3. Diffuse hepatic lesions with newly visualized (contrast enhancement) mass at the hepatic dome. Hepatic metastatic disease is a consideration as is cirrhosis with regenerative nodules and hepatocellular carcinoma. 4. Bilateral lower lobe pulmonary nodules suspicious for metastatic disease. 5. Abdominal and pelvic ascites. 6. Sequela from portosystemic shunting with numerous collateral vessels about the stomach, left upper quadrant and distal esophagus. Relatively small esophageal and gastric varices. 7.  Calcific atherosclerotic disease aorta.      Xr Chest Portable    Result Date: 8/27/2020  EXAMINATION: ONE XRAY VIEW OF THE CHEST 8/27/2020 12:22 pm COMPARISON: 08/24/2020 HISTORY: ORDERING SYSTEM PROVIDED HISTORY: verify NG placement TECHNOLOGIST PROVIDED HISTORY: Portable Verify NG placment Reason for exam:->verify NG placement Reason for exam:->portable Reason for Exam: ng placement Acuity: Unknown Type of Exam: Unknown FINDINGS: Right internal jugular central venous catheter extends to expected location of proximal superior vena cava. Nasogastric tube is seen. The distal most portion is obscured secondary to artifact though appears to extend to at least below the level of the diaphragm. Patchy bilateral heterogeneous pulmonary opacity is again demonstrated, slightly increased on the right as compared to prior. No pneumothorax. Cardiac and mediastinal silhouettes are unchanged. Nasogastric tube is seen extending below the diaphragm. Dedicated abdominal radiograph could be performed for better visualization as warranted. Bilateral airspace disease, increased on the right as compared to prior, for which pneumonia or edema are considerations. Xr Chest Portable    Result Date: 8/24/2020  EXAMINATION: ONE XRAY VIEW OF THE CHEST 8/24/2020 4:46 pm COMPARISON: 08/16/2020 chest x-ray, CT abdomen and pelvis 08/17/2020 HISTORY: ORDERING SYSTEM PROVIDED HISTORY: right IJ TLC placed TECHNOLOGIST PROVIDED HISTORY: Reason for exam:->right IJ TLC placed FINDINGS: There is been placement of a right jugular central venous catheter with its tip projecting over the superior vena cava. The heart size and mediastinal contours are stable. Bilateral mid and lower lung patchy ground-glass opacities today, increased since the prior study. No pneumothorax is evident. Lucency beneath the right hemidiaphragm is new since prior studies suspicious for free intraperitoneal air. Lucency beneath the right hemidiaphragm compatible with free intraperitoneal air. Apparently the patient has had recent abdominal surgery.   This finding was discussed with Dr. Gayle Moore  At 6:48 pm on 8/24/2020. Status post placement of right jugular central venous catheter. No pneumothorax. Patchy mid and lower lung ground-glass opacities suspicious for pneumonia, increased since the prior CT. Correlation for pneumonia is recommended. Xr Chest Portable    Result Date: 8/16/2020  EXAMINATION: ONE XRAY VIEW OF THE CHEST 8/16/2020 2:55 pm COMPARISON: None. HISTORY: ORDERING SYSTEM PROVIDED HISTORY: Fall TECHNOLOGIST PROVIDED HISTORY: Reason for exam:->Fall Reason for Exam: Frequent falls at home. Bruising mid shaft L forearm Acuity: Acute Type of Exam: Initial FINDINGS: The lungs are without acute focal process. There is no effusion or pneumothorax. The cardiomediastinal silhouette is without acute process. The osseous structures are without acute process. No acute process. Ir Us Guided Paracentesis    Result Date: 9/1/2020  PROCEDURE: ULTRASOUND GUIDED PARACENTESIS 9/1/2020 HISTORY: ORDERING SYSTEM PROVIDED HISTORY: Ascites TECHNOLOGIST PROVIDED HISTORY: Reason for exam:->Ascites TECHNIQUE: Informed consent was obtained after a detailed explanation of the procedure including risks, benefits, and alternatives. Universal protocol was followed. The right abdomen was prepped and draped in sterile fashion and local anesthesia was achieved with lidocaine. An 5 Setswana needle sheath was advanced under ultrasound guidance into ascites and paracentesis was performed. The patient tolerated the procedure well. 10.35 L removed. FINDINGS: A total of 10.35 L removed. Successful ultrasound guided paracentesis. Ct Chest Abdomen Pelvis W Contrast    Result Date: 8/31/2020  EXAMINATION: CT OF THE CHEST, ABDOMEN, AND PELVIS WITH CONTRAST 8/31/2020 1:50 pm TECHNIQUE: CT of the chest, abdomen and pelvis was performed with the administration of intravenous contrast. Multiplanar reformatted images are provided for review.  Dose modulation, iterative reconstruction, and/or weight based adjustment of the mA/kV was utilized to reduce the radiation dose to as low as reasonably achievable. COMPARISON: 08/17/2020 HISTORY: ORDERING SYSTEM PROVIDED HISTORY: colon cancer TECHNOLOGIST PROVIDED HISTORY: Reason for exam:->colon cancer Additional Contrast?->Oral Reason for Exam: colom cancer Acuity: Unknown Type of Exam: Unknown FINDINGS: Chest: Mediastinum: The central airways are patent. There is no hilar or mediastinal adenopathy. Lungs/pleura: There are patchy multifocal bilateral ground-glass opacities scattered throughout each lung with an upper lobe predominance. Numerous small noncalcified bilateral pulmonary nodules are present with the largest centered in the right lung base with diameter of 11 mm. The pleural spaces are clear. Soft Tissues/Bones: There is no fracture or aggressive osseous lesion. Abdomen/Pelvis: Organs: The liver is cirrhotic in appearance. Lesions previously described within the liver are not visible on the current exam.  The remainder of the solid abdominal organs are unremarkable. The remainder of the solid abdominal organs are unremarkable. GI/Bowel: Postsurgical changes of right hemicolectomy are present. There is no bowel dilatation, wall thickening or obstruction. Pelvis: The pelvic organs are unremarkable. The bladder is decompressed by Amado catheter and thus not evaluated. Peritoneum/Retroperitoneum: There has been interval increase in large volume ascites. There is no free air or intraperitoneal inflammatory change. Extensive portal venous collaterals are demonstrated throughout the upper abdomen/left upper quadrant. There is no adenopathy. Transgluteal pigtail drainage catheter remains coiled in the recto uterine space centered within a 7.7 x 3.8 cm rim enhancing fluid collection. Bones/Soft Tissues: There is mild diffuse soft tissue anasarca. 1. Multifocal bilateral pulmonary ground-glass opacities.   The differential allowing us to participate in this patients care.     Ankit Yusuf MD, 9/1/2020, 8:06 PM

## 2020-09-02 NOTE — PROGRESS NOTES
Infectious Diseases   Progress Note      Admission Date: 8/16/2020  Hospital Day: Hospital Day: 18   Attending: Shauna Fried MD  Date of service: 9/2/2020     Chief complaint/ Reason for consult:     · Worsening bandemia secondary to intra-abdominal abscess  · Acute kidney injury  · Colon mass, right colon and hepatic flexure with necrotic cancer as well as perforation status post expiratory laparotomy and right hemicolectomy with primary ileotransverse colostomy and anastomosis on 8/24/2020  · Ascites, status post evacuation of ascites on 8/24/2020 , malignant ascites should be ruled out  · Intra-abdominal abscess status post abscess evacuation on 8/24/2020, CT-guided drainage of the abscess on 8/20/2020 had yielded 80 mL of purulent fluid with cultures positive for E. coli and mixed anaerobic growth    Microbiology:        I have reviewed allavailable micro lab data and cultures    · Blood culture (2/2) - collected on 8/16/2020: Negative  · Abdominal abscess fluid culture - collectedon 8/18/2020: E. Coli    Escherichia coli (1)     Antibiotic  Interpretation  ANABELLA  Status     ampicillin  Sensitive  4  mcg/mL      ceFAZolin  Sensitive  <=4  mcg/mL      cefepime  Sensitive  <=0.12  mcg/mL      cefTRIAXone  Sensitive  <=0.25  mcg/mL      ciprofloxacin  Sensitive  <=0.25  mcg/mL      ertapenem  Sensitive  <=0.12  mcg/mL      gentamicin  Sensitive  <=1  mcg/mL      levofloxacin  Sensitive  <=0.12  mcg/mL      piperacillin-tazobactam  Sensitive  <=4  mcg/mL      trimethoprim-sulfamethoxazole  Sensitive  <=20  mcg/mL          · Abdominal surgical culture  - collected on 8/24/2020:  In process      Antibiotics and immunizations:       Current antibiotics: All antibiotics and their doses were reviewed by me    Recent Abx Admin                   meropenem (MERREM) 1 g in sodium chloride 0.9 % 100 mL IVPB (mini-bag) (g) 1 g New Bag 09/02/20 1139     1 g New Bag 09/01/20 1973    rifaximin (XIFAXAN) tablet 550 mg (mg) 550 mg Given 09/02/20 0836     550 mg Given 09/01/20 2014    fluconazole (DIFLUCAN) in 0.9 % sodium chloride IVPB 200 mg (mg) 200 mg New Bag 09/01/20 8639                  Immunization History: All immunization history was reviewed by me today. There is no immunization history on file for this patient. Known drug allergies: All allergies were reviewed and updated    Allergies   Allergen Reactions    Penicillins Other (See Comments)     Unknown reaction per pt        Social history:     Social History:  All social andepidemiologic history was reviewed and updated by me today as needed. · Tobacco use:   reports that she has been smoking cigarettes. She has a 25.00 pack-year smoking history. She does not have any smokeless tobacco history on file. · Alcohol use:   reports no history of alcohol use. · Currently lives in: [address] [place] [address] Road  ·  reports no history of drug use. Assessment:     The patient is a 76 y.o. old female who  has no past medical history on file.  with following problems:    · Worsening bandemia secondary to intra-abdominal abscess--bandemia resolved  · Acute kidney injury-resolved  · Colon mass, right colon and hepatic flexure with necrotic cancer as well as perforation status post expiratory laparotomy and right hemicolectomy with primary ileotransverse colostomy and anastomosis on 8/24/2020-metastatic colon cancer confirmed by path-General oncology and GYN oncology following  · Ascites, status post evacuation of ascites on 8/24/2020  -liver biopsy confirmed cirrhosis-hepatology following  · Intra-abdominal abscess status post abscess evacuation on 8/24/2020, CT-guided drainage of the abscess on 8/20/2020 had yielded 80 mL of purulent fluid with cultures positive for E. coli and mixed anaerobic growth-currently covered with meropenem  · Elevated CEA  · 25-pack-year smoking history-counseling done yesterday  · CT scan with IV contrast of abdomen and pelvis was concerning for bilateral lower lobe lung nodules, concerning for metastatic disease  · Concern for portal hypertension on recent CT scans  · Obesity Class 1due to excess calorie intake : Body mass index is 36.02 kg/m².-Counseling done      Discussion:      The patient is on IV meropenem and IV fluconazole. She is tolerating antibiotics okay. White cell count has normalized and is 89,000 today. She is on oral rifaximin per GI for hyperammonemia. Serum creatinine 0.6. Intra-abdominal  Abscess fluid culture from 9/1/2020 remai are in process, no growth so far. Plan:     Diagnostic Workup:      · Continue to follow  fever curve, WBC count and blood cultures  · Follow up on liver and renal function    Antimicrobials:    · Will continue meropenem 1 g every 8 hour for now  · We will plan to switch IV meropenem to IV ertapenem 1 g every 24 hour   · Continue fluconazole 200 mg every 24 hours  · Will plan to continue IV antibiotics for around 10 more days  · Pain control  · Start oral probiotics twice daily  · Discussed all above with patient and RN    SKYLER has been updated with infusion orders as follows: Out-patient antibiotic therapy (OPAT)/ Antibiotic Infusion orders          Diagnosis: Intra-abdominal abscess       Organism/ culture: E. coli, lactobacillus, yeast     Name and dose of Antimicrobial: *IV ertapenem 1 g every 24 hour, IV fluconazole 20 mg every 24 hour     Antimicrobial start date: calculated from 9/2/2020     Antimicrobial completion date planned: 9/14/2020     Lab monitoring: CBC, Chem 12* once a week, to be       collected every Monday or Tuesday morning until the patient is off IV  antibiotics. Fax weekly lab results to Avis Wallace MD's office at        [phone]. Please maintain PICC line until the patient is on IV antibiotics.        ** Please notify Avis Wallace MD's office with any change in patient's        status, transfer out of facility or to hospital by calling [phone]           Outpatient Follow up: No outpatient ID f/u needed if continues to do well. Due to COVID 19 pandemic, if needed, a f/u video visit can be arranged via my office at patient's request on as-needed basis. Physician Signature:  Electronically signed by Bunny Oropeza MD on                                                               9/2/20 at 2:28 PM EDT         Drug Monitoring:    · Continue monitoring for antibiotic toxicity as follows: CBC, CMP  · Continue to watch for following: new or worsening fever, new hypotension, hives, lip swelling and redness or purulence at vascular access sites. I/v access Management:    · Continue to monitor i.v access sites for erythema, induration, discharge or tenderness. · As always, continue efforts to minimize tubes/lines/drains as clinically appropriate to reduce chances of line associated infections. Patient education and counseling:       · The patient was educated in detail about the side-effects of various antibiotics and things to watch for like new rashes, lip swelling, severe reaction, worsening diarrhea, break through fever etc.  · Discussed patient's condition and what to expect. All of the patient's questions were addressed in a satisfactory manner and patient verbalized understanding all instructions. Weight loss counseling:    Extensive weight loss counseling was done. It is important to set a realistic weight loss goal. First goal should be to avoid gaining more weight and staying at current weight (or within 5 percent). People at high risk of developing diabetes who are able to lose 5 percent of their body weight and maintain this weight will reduce their risk of developing diabetes by about 50 percent and reduce their blood pressure. Losing more than 15 percent of  body weight and staying at this weight is an extremely good result, even if you never reach your \"dream\" or \"ideal\" weight.     Lifestyle changes including vaginal discharge. Musculoskeletal: Negative for arthralgias, joint swelling, myalgias and neck stiffness. Skin: Negative for color change and rash. Allergic/Immunologic: Negative for immunocompromised state. Neurological: Negative for dizziness, seizures, speech difficulty, light-headedness and headaches. Hematological: Negative for adenopathy. Psychiatric/Behavioral: Negative for agitation, hallucinations and suicidal ideas. Past Medical History: All past medical history reviewed today. History reviewed. No pertinent past medical history. Past Surgical History: All past surgical history was reviewed today. Past Surgical History:   Procedure Laterality Date    ABDOMEN SURGERY N/A 8/28/2020    SECONDARY ABDOMINAL WOUND CLOSURE performed by Jose Harden MD at [facility] [address] COLONOSCOPY N/A 8/20/2020    COLONOSCOPY WITH BIOPSY performed by Nishi Stanley MD at [address] [facility]  8/20/2020    COLONOSCOPY POLYPECTOMY SNARE/COLD BIOPSY performed by Nishi Stanley MD at [facility] N/A 8/28/2020    DILATATION AND CURETTAGE performed by Jose Horner MD at [address] Right 8/24/2020    EXPLORATORY LAPAROTOMY, RIGHT  HEMICOLECTOMY; DRAINAGE OF PELVIC ABSCESS performed by Jose Harden MD at [place] [address]  8/24/2020    LIVER BIOPSY performed by Jose Harden MD at [facility] 8/20/2020    EGD BIOPSY performed by Nishi Stanley MD at [address] [facility] ENDOSCOPY       Family History: All family history was reviewed today. History reviewed. No pertinent family history.     Objective:       PHYSICAL EXAM:      Vitals:   Vitals:    09/02/20 0444 09/02/20 0749 09/02/20 1011 09/02/20 1248   BP: (!) 105/59 117/66  115/67   Pulse: 68 69  83   Resp: 16 16 16 16   Temp: 97.3 °F (36.3 °C) 97.5 °F (36.4 °C)  97.4 °F 9. 1* 7.8* 8.0*   HCT 29.3* 28.6* 24.8* 24.8*   * 109* 74*  --    MCV 89.9 91.9 91.5  --    MCH 28.8 29.3 28.9  --    MCHC 32.1 31.9 31.6  --    RDW 19.3* 19.6* 19.6*  --         BMP:  Recent Labs     08/31/20  1830 09/01/20  0634 09/02/20  0550    141 142   K 4.4 4.3 4.2   * 113* 114*   CO2 18* 19* 21   BUN 22* 23* 27*   CREATININE 0.8 0.7 0.6   CALCIUM 8.8 8.6 9.0   GLUCOSE 154* 135* 111*        Hepatic Function Panel:   Lab Results   Component Value Date    ALKPHOS 78 09/02/2020    ALT <5 09/02/2020    AST 16 09/02/2020    PROT 5.1 09/02/2020    BILITOT 0.6 09/02/2020    BILIDIR <0.2 09/01/2020    IBILI see below 09/01/2020    LABALBU 2.5 09/02/2020       CPK:   Lab Results   Component Value Date    CKTOTAL 52 08/17/2020     ESR:   Lab Results   Component Value Date    SEDRATE 36 (H) 08/25/2020     CRP:   Lab Results   Component Value Date    .1 (H) 08/25/2020           Imaging: All pertinent images and reports for the current visit were reviewed by me during this visit. IR US GUIDED PARACENTESIS   Final Result   Successful ultrasound guided paracentesis. CT CHEST ABDOMEN PELVIS W CONTRAST   Final Result   1. Multifocal bilateral pulmonary ground-glass opacities. The differential   includes atypical infectious/inflammatory pneumonitis. 2. Multiple bilateral pulmonary nodules concerning for metastatic disease. 3. Cirrhosis with increasing abdominopelvic ascites. 4. Improving 7 cm recto uterine abscess. XR ABDOMEN (KUB) (SINGLE AP VIEW)   Final Result   Multiple distended small bowel loops, similar to the previous study. No   significant colonic gas. Findings are concerning for a SBO. XR CHEST PORTABLE   Final Result   Nasogastric tube is seen extending below the diaphragm. Dedicated abdominal   radiograph could be performed for better visualization as warranted.       Bilateral airspace disease, increased on the right as compared to prior, for   which pneumonia or edema are considerations. XR ABDOMEN (2 VIEWS)   Final Result   Scattered dilated small bowel loops throughout the abdomen, increased   compared to prior CT scan, either due to ileus or early partial small bowel   obstruction         XR CHEST PORTABLE   Final Result   Lucency beneath the right hemidiaphragm compatible with free intraperitoneal   air. Apparently the patient has had recent abdominal surgery. This finding   was discussed with Dr. Allyson Oneil  At 6:48 pm on 8/24/2020. Status post placement of right jugular central venous catheter. No   pneumothorax. Patchy mid and lower lung ground-glass opacities suspicious for pneumonia,   increased since the prior CT. Correlation for pneumonia is recommended. CT ABSCESS DRAINAGE W CATH PLACEMENT S&I   Final Result   Successful CT guided placement of a drainage catheter in the pelvic abscess. CT ABDOMEN PELVIS W IV CONTRAST Additional Contrast? Oral   Final Result   1. Pelvic air/fluid collection almost certainly reflects an abscess or giant   colonic diverticula. This may be the result of acute diverticulitis. 2. Mass lesion ascending colon almost certainly reflecting primary   adenocarcinoma of the colon. 3. Diffuse hepatic lesions with newly visualized (contrast enhancement) mass   at the hepatic dome. Hepatic metastatic disease is a consideration as is   cirrhosis with regenerative nodules and hepatocellular carcinoma. 4. Bilateral lower lobe pulmonary nodules suspicious for metastatic disease. 5. Abdominal and pelvic ascites. 6. Sequela from portosystemic shunting with numerous collateral vessels about   the stomach, left upper quadrant and distal esophagus. Relatively small   esophageal and gastric varices. 7.  Calcific atherosclerotic disease aorta. CT ABDOMEN PELVIS WO CONTRAST Additional Contrast? None   Final Result   1.  Non loculated fluid and gas collection in the pelvis concerning for perforated viscus/possible abscess formation. 2. Prominent thickening of the ascending colon which may be related to   infectious or inflammatory process or underlying neoplastic process. 3. Bowel-gas pattern typical of adynamic ileus. 4. Hepatic cirrhosis with numerous hepatic nodules suggestive of   regenerative/siderotic nodules. MRI abdomen without and with IV contrast   correlation recommended. Metastatic disease is a consideration. 5. Hepatosplenomegaly and multifocal collateral vessels about the stomach   esophagus concerning for varices, all suggestive of portal venous   hypertension. 6. Abdominal and pelvic ascites. 7.  Diverticulosis coli without CT evidence of acute diverticulitis. Critical results were called by Dr. Rodger Crenshaw to [address] [place] [address]   on 8/16/2020 at 18:09. CT Cervical Spine WO Contrast   Final Result   No acute abnormality of the cervical spine. CT Head WO Contrast   Final Result   No acute intracranial abnormality. XR CHEST PORTABLE   Final Result   No acute process. XR RADIUS ULNA LEFT (2 VIEWS)   Final Result   No acute osseous abnormality. CT CHEST W CONTRAST    (Results Pending)       Medications: All current and past medications were reviewed.      metoclopramide  10 mg Intravenous Q6H    bisacodyl        insulin lispro  0-6 Units Subcutaneous Q4H    rifaximin  550 mg Oral BID    enoxaparin  30 mg Subcutaneous BID    [START ON 9/6/2020] fat emulsion  250 mL Intravenous Once per day on Mon Thu    lidocaine   Topical Once    folic acid  1 mg Oral Daily    fluconazole  200 mg Intravenous Q24H    meropenem  1 g Intravenous Q12H    pantoprazole  40 mg Intravenous Daily    lactulose  20 g Oral TID    sodium chloride flush  10 mL Intravenous 2 times per day        PN-Adult Premix 5/20 - Standard Electrolytes - Central Line      PN-Adult Premix 5/20 - Standard Electrolytes - Central Line 75 mL/hr at 09/01/20 1709    dextrose 100 mL/hr (08/27/20 1300)       morphine **OR** [DISCONTINUED] morphine, ketorolac, acetaminophen, phenol, glucose, dextrose, glucagon (rDNA), dextrose, [DISCONTINUED] promethazine **OR** ondansetron, promethazine, potassium chloride **OR** potassium alternative oral replacement **OR** potassium chloride, lip balm petroleum free, sodium chloride flush, polyethylene glycol      Problem list:       Patient Active Problem List   Diagnosis Code    Pelvic abscess in female N73.9    Septicemia ([facility] A41.9    Colonic mass K63.89    Cirrhosis of liver with ascites ([facility] K74.60, R18.8    Intra-abdominal free air of unknown etiology K66.8    PAPITO (acute kidney injury) ([facility] N17.9    Lung nodules R91.8    Bandemia D72.825    Intra-abdominal abscess ([facility] K65.1    E coli infection A49.8    Elevated CEA R97.0    Ex-smoker Z87.891    Class 2 obesity due to excess calories with body mass index (BMI) of 36.0 to 36.9 in adult E66.09, Z68.36    Portal hypertension (HCC) K76.6    Mild malnutrition (HCC) E44.1    Tobacco abuse counseling Z71.6    Open abdominal wall wound S31.109A       Please note that this chart was generated using Dragon dictation software. Although every effort was made to ensure the accuracy of this automated transcription, some errors in transcription may have occurred inadvertently. If you may need any clarification, please do not hesitate to contact me through EPIC or at the phone number provided below with my electronic signature. Any pictures or media included in this note were obtained after taking informed verbal consent from the patient and with their approval to include those in the patient's medical record.     Tyler Mckay MD, MPH  9/2/2020 , 2:23 PM   [facility] Infectious Disease   Office: [phone]  Fax: [phone]  Tuesday AM clinic:   [address] [place] [address] 120  Thursday AM [id number] DeboraFabien tony

## 2020-09-02 NOTE — PROGRESS NOTES
Pt ambulated full lap of unit hallway. Gait and balance are WDL. Back to bed without issue. Denies nausea.

## 2020-09-02 NOTE — PROGRESS NOTES
[facility]  Gynecologic Oncology   Progress Note    Grade 3 Colon cancer  Grade 1 Endometrial cancer    SUBJECTIVE:  \"I feel good\"    OBJECTIVE:           Physical Exam  VITALS:  /66   Pulse 69   Temp 97.5 °F (36.4 °C) (Oral)   Resp 16   Ht 5' 7\" (1.702 m)   Wt 236 lb 6 oz (107.2 kg)   SpO2 94%   BMI 37.02 kg/m²   24HR INTAKE/OUTPUT:    Intake/Output Summary (Last 24 hours) at 9/2/2020 1050  Last data filed at 9/2/2020 7276  Gross per 24 hour   Intake 1192 ml   Output 600 ml   Net 592 ml     CONSTITUTIONAL:  awake, alert, cooperative, no apparent distress, and appears stated age  ABDOMEN:  Distended, non tender, bandage c/d/i  MUSCULOSKELETAL:  There is no redness, warmth, or swelling of the joints. Full range of motion noted. Motor strength is 5 out of 5 all extremities bilaterally. Tone is normal.    DATA:  CBC with Differential:    Lab Results   Component Value Date    WBC 8.9 09/02/2020    RBC 2.71 09/02/2020    HGB 7.8 09/02/2020    HCT 24.8 09/02/2020    PLT 74 09/02/2020    MCV 91.5 09/02/2020    MCH 28.9 09/02/2020    MCHC 31.6 09/02/2020    RDW 19.6 09/02/2020    LYMPHOPCT 14.4 09/02/2020    MONOPCT 4.6 09/02/2020    BASOPCT 0.1 09/02/2020    MONOSABS 0.4 09/02/2020    LYMPHSABS 1.3 09/02/2020    EOSABS 0.2 09/02/2020    BASOSABS 0.0 09/02/2020     BMP:    Lab Results   Component Value Date     09/02/2020    K 4.2 09/02/2020    K 4.4 08/31/2020     09/02/2020    CO2 21 09/02/2020    BUN 27 09/02/2020    LABALBU 2.5 09/02/2020    CREATININE 0.6 09/02/2020    CALCIUM 9.0 09/02/2020    GFRAA >60 09/02/2020    LABGLOM >60 09/02/2020    GLUCOSE 111 09/02/2020     Magnesium:    Lab Results   Component Value Date    MG 1.80 09/02/2020     Phosphorus:    Lab Results   Component Value Date    PHOS 3.5 09/02/2020       ASSESSMENT AND PLAN:  D/w pt today dx of Grade 1 endometrial cancer and typical mgmt. Options are surgery, primary radiation or hormonal therapy.    Unfortunately, surgery is not a good option at this time. I think she needs to recover more before we embark on any mgmt for the endometrial cancer. I have requested IHC MMR testing of both the endometrial and colon tumor as it is not standard to test this at [facility]. Gricelda Atwood, [address] [place] [address] Oncology  [phone] ([phone])

## 2020-09-02 NOTE — PROGRESS NOTES
Pt attempted to voice via bed pan but was unable. States she did not think she would be able to transfer to bedside commode. Unable to produce. Took off of bed pan after 20 minutes. Gave PO lactulose and encouraged to call out if she would like to try again later. Visitor at bedside. Pt  Was noted to have a small amount of coughing both after trying clear liquids earlier and after taking her lactulose.  Will page out to see if pt needs SLP eval.

## 2020-09-02 NOTE — PROGRESS NOTES
[facility] PROGRESS NOTE    9/2/2020 6:17 PM        Name: Lloyd Mejia . Admitted: 8/16/2020  Primary Care Provider: No primary care provider on file. (Tel: None)    Brief Course:   Patient is a 75 yo female with hx nicotine use. The patient lives alone, she presented to hospital after being found on floor by her daughter. She has had multiple falls in past couple of months. CT abdomen and pelvis in ER showed pelvic abscess vs viscus perf. Colonic wall thickening, concerning for malignancy, liver cirrhosis portal hypertension. She had Ct-guided drain placement 8/18 for pelvic abscess 8/18. Subsequently found to have large ascending colon mass on colonoscopy 8/20, likely the source of abscess.       8/18/2020: Successful CT guided placement of a drainage catheter in the pelvic abscess.      8/24/2020: Exploratory laparotomy, evacuation of ascites, drainage of  pelvic abscess, right hemicolectomy with primary ileotransverse  colostomy anastomosis, and Abdiaziz-Cut liver biopsy.     8/28/2020: SECONDARY ABDOMINAL WOUND CLOSURE  DILATATION AND CURETTAGE  8/31/2020: NGT d/c'd   9/1/2020    CC: Pelvic abscess    Subjective: She seen and examined today. Afebrile overnight; status post 10 L paracentesis on 9/1/20. NG tube was discontinued and patient was okay to take clears. Patient also having bowel movements.   Patient had elevated blood sugar this morning ; will monitor and increased insulin dose if needed    Reviewed interval ancillary notes    Current Medications  metoclopramide (REGLAN) injection 10 mg, Q6H  morphine (PF) injection 2 mg, Q4H PRN  PN-Adult Premix 5/20 - Standard Electrolytes - Central Line, Continuous TPN  bisacodyl (DULCOLAX) 10 MG suppository,   lactobacillus (CULTURELLE) capsule 1 capsule, BID WC  insulin lispro (1 Unit Dial) 0-6 Units, Q4H  rifaximin (XIFAXAN) tablet 550 mg, BID  enoxaparin (LOVENOX) injection 30 mg, BID  [START ON 9/6/2020] fat emulsion 20 % infusion 250 mL, Once per day on Mon Thu  ketorolac (TORADOL) injection 15 mg, Q6H PRN  acetaminophen (TYLENOL) tablet 1,000 mg, Q6H PRN  lidocaine (XYLOCAINE) 2 % jelly, Once  phenol 1.4 % mouth spray 1 spray, Q2H PRN  glucose (GLUTOSE) 40 % oral gel 15 g, PRN  dextrose 50 % IV solution, PRN  glucagon (rDNA) injection 1 mg, PRN  dextrose 5 % solution, PRN  folic acid (FOLVITE) tablet 1 mg, Daily  ondansetron (ZOFRAN) injection 4 mg, Q4H PRN  promethazine (PHENERGAN) injection 12.5 mg, Q6H PRN  fluconazole (DIFLUCAN) in 0.9 % sodium chloride IVPB 200 mg, Q24H  meropenem (MERREM) 1 g in sodium chloride 0.9 % 100 mL IVPB (mini-bag), Q12H  pantoprazole (PROTONIX) injection 40 mg, Daily  potassium chloride (KLOR-CON M) extended release tablet 40 mEq, PRN    Or  potassium bicarb-citric acid (EFFER-K) effervescent tablet 40 mEq, PRN    Or  potassium chloride 10 mEq/100 mL IVPB (Peripheral Line), PRN  lactulose (CHRONULAC) 10 GM/15ML solution 20 g, TID  lip balm petroleum free (PHYTOPLEX) stick, PRN  sodium chloride flush 0.9 % injection 10 mL, 2 times per day  sodium chloride flush 0.9 % injection 10 mL, PRN  polyethylene glycol (GLYCOLAX) packet 17 g, Daily PRN        Objective:  /60   Pulse 73   Temp 97.7 °F (36.5 °C) (Oral)   Resp 16   Ht 5' 7\" (1.702 m)   Wt 236 lb 6 oz (107.2 kg)   SpO2 96%   BMI 37.02 kg/m²     Intake/Output Summary (Last 24 hours) at 9/2/2020 1817  Last data filed at 9/2/2020 1443  Gross per 24 hour   Intake 2438 ml   Output 600 ml   Net 1838 ml      Wt Readings from Last 3 Encounters:   08/31/20 236 lb 6 oz (107.2 kg)       General appearance: Ill-looking  Eyes: Sclera clear. Pupils equal.  ENT: Moist oral mucosa. Trachea midline, no adenopathy. Cardiovascular: Regular rhythm, normal S1, S2. No murmur. No edema in lower extremities  Respiratory: Not using accessory muscles. Good inspiratory effort.  Clear to auscultation ABDOMEN (2 VIEWS)   Final Result   Scattered dilated small bowel loops throughout the abdomen, increased   compared to prior CT scan, either due to ileus or early partial small bowel   obstruction         XR CHEST PORTABLE   Final Result   Lucency beneath the right hemidiaphragm compatible with free intraperitoneal   air. Apparently the patient has had recent abdominal surgery. This finding   was discussed with Dr. Baylee Santillan  At 6:48 pm on 8/24/2020. Status post placement of right jugular central venous catheter. No   pneumothorax. Patchy mid and lower lung ground-glass opacities suspicious for pneumonia,   increased since the prior CT. Correlation for pneumonia is recommended. CT ABSCESS DRAINAGE W CATH PLACEMENT S&I   Final Result   Successful CT guided placement of a drainage catheter in the pelvic abscess. CT ABDOMEN PELVIS W IV CONTRAST Additional Contrast? Oral   Final Result   1. Pelvic air/fluid collection almost certainly reflects an abscess or giant   colonic diverticula. This may be the result of acute diverticulitis. 2. Mass lesion ascending colon almost certainly reflecting primary   adenocarcinoma of the colon. 3. Diffuse hepatic lesions with newly visualized (contrast enhancement) mass   at the hepatic dome. Hepatic metastatic disease is a consideration as is   cirrhosis with regenerative nodules and hepatocellular carcinoma. 4. Bilateral lower lobe pulmonary nodules suspicious for metastatic disease. 5. Abdominal and pelvic ascites. 6. Sequela from portosystemic shunting with numerous collateral vessels about   the stomach, left upper quadrant and distal esophagus. Relatively small   esophageal and gastric varices. 7.  Calcific atherosclerotic disease aorta. CT ABDOMEN PELVIS WO CONTRAST Additional Contrast? None   Final Result   1. Non loculated fluid and gas collection in the pelvis concerning for   perforated viscus/possible abscess formation. 2. Prominent thickening of the ascending colon which may be related to   infectious or inflammatory process or underlying neoplastic process. 3. Bowel-gas pattern typical of adynamic ileus. 4. Hepatic cirrhosis with numerous hepatic nodules suggestive of   regenerative/siderotic nodules. MRI abdomen without and with IV contrast   correlation recommended. Metastatic disease is a consideration. 5. Hepatosplenomegaly and multifocal collateral vessels about the stomach   esophagus concerning for varices, all suggestive of portal venous   hypertension. 6. Abdominal and pelvic ascites. 7.  Diverticulosis coli without CT evidence of acute diverticulitis. Critical results were called by Dr. Supa Xie to [address] [place] [address]   on 8/16/2020 at 18:09. CT Cervical Spine WO Contrast   Final Result   No acute abnormality of the cervical spine. CT Head WO Contrast   Final Result   No acute intracranial abnormality. XR CHEST PORTABLE   Final Result   No acute process. XR RADIUS ULNA LEFT (2 VIEWS)   Final Result   No acute osseous abnormality.          CT CHEST W CONTRAST    (Results Pending)       Discussed care with family    I viewed CXR images and EKG tracings   Discussed film/CT with reading physician      Spent 30 minutes with patient and family at bedside and on unit reviewing medical records and labs, spent greater than 50% time counseling patient and/or coordinating care with RN    Problem List  Active Problems:    Pelvic abscess in female    Septicemia ([address]    Colonic mass    Cirrhosis of liver with ascites ([address]    Intra-abdominal free air of unknown etiology    PAPITO (acute kidney injury) ([address]    Lung nodules    Bandemia    Intra-abdominal abscess ([address]    E coli infection    Elevated CEA    Ex-smoker    Class 2 obesity due to excess calories with body mass index (BMI) of 36.0 to 36.9 in adult    Portal hypertension ([address]    Mild malnutrition ([address]    Tobacco abuse comparison. Suspect she likely had chronic iron deficiency anemia secondary to colon cancer and post menopausal bleeding. She received 1 unit PRBCs 8/27, Hgb/Hct stable today     DC planning: Plan is for SNF on DC. Case management is assisting, family preference is [facility] or [address] She is likely inpatient for the next 2-3 days          Diet: PN-Adult Premix 5/20 - Standard Electrolytes - Central Line  Diet NPO Effective Now Exceptions are: Ice Chips, Sips with Meds  Code:Full Code  DVT PPX: On Lovenox  Disposition  Plan is for SNF on DC. Case management is assisting, family preference is [facility] or [address]  She is likely inpatient for the next 2-3 days           Daisy Singh MD   9/2/2020 6:17 PM

## 2020-09-02 NOTE — PROGRESS NOTES
Pt complaining of nausea and increased abdominal pain. Abdomen is soft. Instructed not to attempt more clear liquids at this time. Page placed to Dr Dagmar Mcgregor to inform.

## 2020-09-02 NOTE — PROGRESS NOTES
Shift assessment completed. Medications given per MAR. Dressing over abdominal surgical incision changed. Patient resting in bed without distress. Family updated. The care plan and education has been reviewed and mutually agreed upon with the patient and family.

## 2020-09-03 NOTE — PROGRESS NOTES
Occupational Therapy  Facility/Department: [address] ORTHO/NEURO NURSING  Daily Treatment Note  NAME: Yanet Dutton  : 1955  MRN: [id number]    Date of Service: 9/3/2020    Discharge Recommendations: Yanet Dutton scored a 10/24 on the AM-PAC ADL Inpatient form. Current research shows that an AM-PAC score of 17 or less is typically not associated with a discharge to the patient's home setting. Based on the patient's AM-PAC score and their current ADL deficits, it is recommended that the patient have 3-5 sessions per week of Occupational Therapy at d/c to increase the patient's independence. Please see assessment section for further patient specific details. If patient discharges prior to next session this note will serve as a discharge summary. Please see below for the latest assessment towards goals. OT Equipment Recommendations  Equipment Needed: No  Other: TBD next level of care    Assessment   Performance deficits / Impairments: Decreased functional mobility ; Decreased ADL status; Decreased cognition;Decreased endurance;Decreased balance;Decreased safe awareness;Decreased high-level IADLs  Assessment: Pt is not at her baseline level of occupational function, based on the above deficits associated wtih pelvic abscess. Pt would benefit from continued skilled acute OT services to address these deficits. Treatment Diagnosis: Decreased ADL/IADL status, functional mobility, endurance, cognition, balance and safety awareness associated with pelvic abscess  Prognosis: Fair;Good  OT Education: OT Role;Plan of Care;Transfer Training;Family Education  Patient Education: OT eval, d/c recommendation. Pt needs continued reinforcement d/t cognitive deficits. Barriers to Learning: Cognition  REQUIRES OT FOLLOW UP: Yes  Activity Tolerance  Activity Tolerance: Patient limited by fatigue;Treatment limited secondary to decreased cognition  Safety Devices  Safety Devices in place: Yes  Type of devices:  All fall risk precautions in place; Left in bed;Bed alarm in place;Call light within reach;Nurse notified; Patient at risk for falls         Patient Diagnosis(es): The primary encounter diagnosis was Septicemia [facility]). Diagnoses of PAPITO (acute kidney injury) ([facility] Intra-abdominal free air of unknown etiology, and Fall, initial encounter were also pertinent to this visit. has no past medical history on file. has a past surgical history that includes Tonsillectomy; Eye surgery; Upper gastrointestinal endoscopy (N/A, 8/20/2020); Colonoscopy (N/A, 8/20/2020); Colonoscopy (8/20/2020); hemicolectomy (Right, 8/24/2020); liver biopsy (8/24/2020); Abdomen surgery (N/A, 8/28/2020); and Dilation and curettage of uterus (N/A, 8/28/2020). Restrictions  Restrictions/Precautions  Restrictions/Precautions: Fall Risk(High fall risk, full liquid diet)  Required Braces or Orthoses?: No  Position Activity Restriction  Other position/activity restrictions: This is a 67-year-old female with apparent history of frequent falls who presents after being found on the ground by her family. When the patient arrived she was disheveled, incontinent. She denies any specific complaints. The patient's work-up today was extensive. Her work-up included a CT of the abdomen that shows findings worrisome for possible abscess versus perforation. Questionable colon mass with metastatic lesions to liver and lung. Diagnosed with sepsis. Status-post exploratory laparotomy, evacuation of ascites, drainage of pelvic abscess, right hemicolectomy with primary ileotransverse colostomy anastomosis, and Abdiaziz-Cut liver biopsy  on 8/24/2020 for colon mass, right colon and hepatic flexure with necrotic cancer at hepatic flexure region with perforation, cirrhosis, pelvic abscess, ascites.  8/28/2020: secondary abdominal wound closure, dilatation and curettage  Subjective   General  Chart Reviewed: Yes  Response to previous treatment: Patient with no complaints implement solutions;Assistance required to generate solutions  Insights: Decreased awareness of deficits  Initiation: Requires cues for all  Sequencing: Requires cues for all                    Type of ROM/Therapeutic Exercise  Type of ROM/Therapeutic Exercise: AROM  Comment: x10 reps shoulder flexion AROM supine in bed                    Plan   Plan  Times per week: 3-5  Times per day: Daily  Specific instructions for Next Treatment: cotx  Current Treatment Recommendations: Patient/Caregiver Education & Training, Functional Mobility Training, Safety Education & Training, Self-Care / ADL, Endurance Training, Cognitive Reorientation           AM-PAC Score        AM-[facility] Inpatient Daily Activity Raw Score: 10 (09/03/20 1445)  AM-PAC Inpatient ADL T-Scale Score : 27.31 (09/03/20 1445)  ADL Inpatient CMS 0-100% Score: 74.7 (09/03/20 1445)  ADL Inpatient CMS G-Code Modifier : CL (09/03/20 1445)    Goals  Short term goals  Time Frame for Short term goals: Discharge  Short term goal 1: SBA for bed mobility - discontinue goal 8/25  Short term goal 2: S/U for feeding/grooming - continue goal 8/25  Short term goal 3: Supervision for UB bathing/Min A for UB dressing -discontinue goal 8/25  Short term goal 4: Mod A for LB bathing/dressing - discontinue goal 8/25  Short term goal 5: CGA for functional transfers to ADL surfaces w/RW - discontinue goal 8/25  Long term goals  Time Frame for Long term goals : LTG=STG  Long term goal 1: CGA for functional  mobiltiy w/RW for ADL activity - KRISTAL 9/3  Long term goal 2: New goal: Min A for UB bathing and dressing. - not addressed 9/3  Long term goal 3: New goal: Max A for LB bathing/dressing. - not addressed 9/3  Long term goal 4: New goal: Min A for functional transfers to ADL surfaces with RW. - attempt, max A of 2 9/3       Therapy Time   Individual Concurrent Group Co-treatment   Time In       1318   Time Out       1343   Minutes       25   Timed Code Treatment Minutes: 25 Minutes Total Treatment Minutes:  JAYLEEN Dorsey, [place], [place] [id number]

## 2020-09-03 NOTE — PLAN OF CARE
Nutrition Problem #1: Predicted inadequate energy intake  Intervention: Food and/or Nutrient Delivery: Continue Current Diet, Continue Current Parenteral Nutrition  Nutritional Goals: New goal: Pt will consume at least 50% of meals and supplements

## 2020-09-03 NOTE — CARE COORDINATION
Chart reviewed for discharge planning. Barrier(s) to discharge-Onc, Gyn/Onc, ID, Surgery,Nephrology, and GI following. Tentative discharge plan-SNF-[facility] vs [facility]-dtr stated [facility] closest to her home-had COVID test on 08/31/20. Tentative discharge date-few days yet. *Case management will continue to follow progress and update discharge plan as needed.     Electronically signed by JAMILA Hall on 9/3/2020 at 12:00 PM

## 2020-09-03 NOTE — PLAN OF CARE
Ongoing  9/3/2020 0306 by Rashida Jacobson RN  Outcome: Ongoing  Goal: Participates in care planning  Description: Participates in care planning  9/3/2020 1418 by Dorothy Matta, RN  Outcome: Ongoing  9/3/2020 0306 by Rashida Jacobson RN  Outcome: Ongoing     Problem: Injury - Risk of, Physical Injury:  Goal: Will remain free from falls  Description: Will remain free from falls  9/3/2020 1418 by Dorothy Matta RN  Outcome: Ongoing  9/3/2020 0306 by Rashida Jacobson RN  Outcome: Ongoing  Goal: Absence of physical injury  Description: Absence of physical injury  9/3/2020 1418 by Dorothy Matta, RN  Outcome: Ongoing  9/3/2020 0306 by Rashida Jacobson RN  Outcome: Ongoing     Problem: Mood - Altered:  Goal: Mood stable  Description: Mood stable  9/3/2020 1418 by Dorothy Matta, RN  Outcome: Ongoing  9/3/2020 0306 by Rashida Jacobson RN  Outcome: Ongoing  Goal: Absence of abusive behavior  Description: Absence of abusive behavior  9/3/2020 1418 by Dorothy Matta, RN  Outcome: Ongoing  9/3/2020 0306 by Rashida Jacobson RN  Outcome: Ongoing  Goal: Verbalizations of feeling emotionally comfortable while being cared for will increase  Description: Verbalizations of feeling emotionally comfortable while being cared for will increase  9/3/2020 1418 by Dorothy Matta, RN  Outcome: Ongoing  9/3/2020 0306 by Rashida Jacobson RN  Outcome: Ongoing     Problem: Psychomotor Activity - Altered:  Goal: Absence of psychomotor disturbance signs and symptoms  Description: Absence of psychomotor disturbance signs and symptoms  9/3/2020 1418 by Dorothy Matta, RN  Outcome: Ongoing  9/3/2020 0306 by Rashida Jacobson RN  Outcome: Ongoing     Problem: Sensory Perception - Impaired:  Goal: Demonstrations of improved sensory functioning will increase  Description: Demonstrations of improved sensory functioning will increase  9/3/2020 1418 by Dorothy Matta, RN  Outcome: Ongoing  9/3/2020 0306 by Rashida Jacobson RN  Outcome: Ongoing  Goal: Decrease in sensory misperception frequency  Description: Decrease in sensory misperception frequency  9/3/2020 1418 by Gail Galarza RN  Outcome: Ongoing  9/3/2020 0306 by Hernan Poole RN  Outcome: Ongoing  Goal: Able to refrain from responding to false sensory perceptions  Description: Able to refrain from responding to false sensory perceptions  9/3/2020 1418 by Gail Galarza RN  Outcome: Ongoing  9/3/2020 0306 by Hernan Poole RN  Outcome: Ongoing  Goal: Demonstrates accurate environmental perceptions  Description: Demonstrates accurate environmental perceptions  9/3/2020 1418 by Gail Galarza RN  Outcome: Ongoing  9/3/2020 0306 by Hernan Poole RN  Outcome: Ongoing  Goal: Able to distinguish between reality-based and nonreality-based thinking  Description: Able to distinguish between reality-based and nonreality-based thinking  9/3/2020 1418 by Gail Galarza RN  Outcome: Ongoing  9/3/2020 0306 by Hernan Poole RN  Outcome: Ongoing  Goal: Able to interrupt nonreality-based thinking  Description: Able to interrupt nonreality-based thinking  9/3/2020 1418 by Gail Galarza RN  Outcome: Ongoing  9/3/2020 0306 by Hernan Poole RN  Outcome: Ongoing     Problem: Sleep Pattern Disturbance:  Goal: Appears well-rested  Description: Appears well-rested  9/3/2020 1418 by Gail Galarza RN  Outcome: Ongoing  9/3/2020 0306 by Hernan Poole RN  Outcome: Ongoing     Problem: HEMODYNAMIC STATUS  Goal: Patient has stable vital signs and fluid balance  9/3/2020 1418 by Gail Galarza RN  Outcome: Ongoing  9/3/2020 0306 by Hernan Poole RN  Outcome: Ongoing     Problem: OXYGENATION/RESPIRATORY FUNCTION  Goal: Patient will achieve/maintain normal respiratory rate/effort  9/3/2020 1418 by Gail Galarza RN  Outcome: Ongoing  9/3/2020 0306 by Hernan Poole RN  Outcome: Ongoing     Problem: ELIMINATION  Goal: Elimination patterns are normal or improving  Description: Elimination patterns return to pre-surgery normal patterns  9/3/2020 1418 by Marai Dolores Silver RN  Outcome: Ongoing  9/3/2020 0306 by Molly Andrews RN  Outcome: Ongoing     Problem: SKIN INTEGRITY  Goal: Skin integrity is maintained or improved  9/3/2020 1418 by Maria Dolores Silver RN  Outcome: Ongoing  9/3/2020 0306 by Molly Andrews RN  Outcome: Ongoing

## 2020-09-03 NOTE — PROGRESS NOTES
[facility]   Progress Note      9/3/2020 8:43 AM        Name: Natacha Albert . Admitted: 8/16/2020    SUBJECTIVE:  She is feeling better, she remains NPO on TPN. Planning to go to rehab on discharge.      Reviewed interval ancillary notes    Current Medications  metoclopramide (REGLAN) injection 10 mg, Q6H  morphine (PF) injection 2 mg, Q4H PRN  PN-Adult Premix 5/20 - Standard Electrolytes - Central Line, Continuous TPN  lactobacillus (CULTURELLE) capsule 1 capsule, BID WC  insulin lispro (1 Unit Dial) 0-6 Units, Q4H  rifaximin (XIFAXAN) tablet 550 mg, BID  enoxaparin (LOVENOX) injection 30 mg, BID  [START ON 9/6/2020] fat emulsion 20 % infusion 250 mL, Once per day on Mon Thu  ketorolac (TORADOL) injection 15 mg, Q6H PRN  acetaminophen (TYLENOL) tablet 1,000 mg, Q6H PRN  lidocaine (XYLOCAINE) 2 % jelly, Once  phenol 1.4 % mouth spray 1 spray, Q2H PRN  glucose (GLUTOSE) 40 % oral gel 15 g, PRN  dextrose 50 % IV solution, PRN  glucagon (rDNA) injection 1 mg, PRN  dextrose 5 % solution, PRN  folic acid (FOLVITE) tablet 1 mg, Daily  ondansetron (ZOFRAN) injection 4 mg, Q4H PRN  promethazine (PHENERGAN) injection 12.5 mg, Q6H PRN  fluconazole (DIFLUCAN) in 0.9 % sodium chloride IVPB 200 mg, Q24H  meropenem (MERREM) 1 g in sodium chloride 0.9 % 100 mL IVPB (mini-bag), Q12H  pantoprazole (PROTONIX) injection 40 mg, Daily  potassium chloride (KLOR-CON M) extended release tablet 40 mEq, PRN    Or  potassium bicarb-citric acid (EFFER-K) effervescent tablet 40 mEq, PRN    Or  potassium chloride 10 mEq/100 mL IVPB (Peripheral Line), PRN  lactulose (CHRONULAC) 10 GM/15ML solution 20 g, TID  lip balm petroleum free (PHYTOPLEX) stick, PRN  sodium chloride flush 0.9 % injection 10 mL, 2 times per day  sodium chloride flush 0.9 % injection 10 mL, PRN  polyethylene glycol (GLYCOLAX) packet 17 g, Daily PRN        Objective:  /71   Pulse 74   Temp 97.6 °F (36.4 °C) (Oral)   Resp 18   Ht 5' 7\" (1.702 m)   Wt 236 lb 6 oz (107.2 kg)   SpO2 98%   BMI 37.02 kg/m²     Intake/Output Summary (Last 24 hours) at 9/3/2020 0843  Last data filed at 9/3/2020 0408  Gross per 24 hour   Intake 1886 ml   Output 650 ml   Net 1236 ml      Wt Readings from Last 3 Encounters:   08/31/20 236 lb 6 oz (107.2 kg)       General appearance:  Appears comfortable  Eyes: Sclera clear. Pupils equal.  ENT: Moist oral mucosa. Trachea midline, no adenopathy. Cardiovascular: Regular rhythm, normal S1, S2. No murmur. No edema in lower extremities  Respiratory: Not using accessory muscles. Good inspiratory effort. Clear to auscultation bilaterally, no wheeze or crackles. GI: Abdomen soft, no tenderness, not distended  Musculoskeletal: No cyanosis in digits, neck supple  Neurology: CN 2-12 grossly intact. No speech or motor deficits  Psych: Normal affect.  Alert and oriented in time, place and person  Skin: Warm, dry, normal turgor    Labs and Tests:  CBC:   Recent Labs     09/01/20  0634 09/02/20  0550 09/02/20  1350 09/03/20  0426   WBC 13.7* 8.9  --  9.9   HGB 9.1* 7.8* 8.0* 8.6*   * 74*  --  85*     BMP:    Recent Labs     09/01/20  0634 09/02/20  0550 09/03/20  0426    142 141   K 4.3 4.2 4.6   * 114* 114*   CO2 19* 21 21   BUN 23* 27* 31*   CREATININE 0.7 0.6 0.9   GLUCOSE 135* 111* 88     Hepatic:   Recent Labs     09/01/20  0634 09/02/20  0550 09/03/20  0426   AST 15 16 44*   ALT 6* <5* 13   BILITOT 0.6 0.6 0.7   ALKPHOS 90 78 146*       ASSESSMENT AND PLAN    Active Problems:    Pelvic abscess in female    Septicemia ([facility] [address]    Colonic mass    Cirrhosis of liver with ascites (HCC)    Intra-abdominal free air of unknown etiology    PAPITO (acute kidney injury) ([facility] [address]    Lung nodules    Bandemia    Intra-abdominal abscess (HCC)    E coli infection    Elevated CEA    Ex-smoker    Class 2 obesity due to excess calories with body mass index (BMI) of 36.0 to 36.9 in adult    Portal hypertension (HCC)    Mild malnutrition ([facility] [address]    Tobacco abuse counseling    Open abdominal wall wound    Receiving intravenous antibiotic treatment as outpatient    Complex care coordination  Resolved Problems:    * No resolved hospital problems. *      Colon cancer  - S/p exploratory laparotomy with right hemicolectomy on 8/24/2020. Anticipate secondary wound closure on Friday 8/28/2020.  - CEA is 83.3.  - Plan for chemotherapy as outpatient after healed from surgery.     Endometrial cancer  -  150.5  - s/p D&C by Dr. Mary Masterson     Cirrhosis of the liver   - New diagnosis.   - Management per GI.  - Ascites - s/p paracentesis 9/2/2020 for 10 liters     Intraabdominal/pelvic abscess  - CT-guided drain placement on 8/18/2020  - Antibiotics per ID.      Anemia  - Iron studies consistent with anemia of chronic disease.   - soluble transferrin receptor pending  - Start folic acid 1 mg daily for folate deficiency.      Post-op ileus     Kurt Campos CNP  [facility]    Patient appears stable will need post operative treatment with chemotherapy

## 2020-09-03 NOTE — FLOWSHEET NOTE
PICC line education:    -Risks  -Benefits  -Alternatives  -Procedure    Discussed the above with patient, verbalized understanding, answered all questions. Provided with information on PICC care to review. PICC tip verified via 3[employer] (Ok to use). Reported off to patient's  Nurse [facility] RN.

## 2020-09-03 NOTE — PROGRESS NOTES
[facility] 82 and Laparoscopic Surgery        Progress Note    Patient Name: Arcenio Merrill  MRN: [id number]  YOB: 1955  Date of Evaluation: 9/3/2020    Subjective:  Pt has appetite, some nausea yesterday, no emesis. Then has had multiple BM per nursing  Posterior pelvic drain was dislodged  Pain controlled  Resting in chair    Postoperative Day # 10, 6      Vital Signs:  Patient Vitals for the past 24 hrs:   BP Temp Temp src Pulse Resp SpO2   20 0810 110/71 97.6 °F (36.4 °C) Oral 74 18 98 %   20 0402 117/77 97.6 °F (36.4 °C) Oral 82 19 98 %   20 2334 (!) 112/59 97.5 °F (36.4 °C) Oral 74 17 97 %   20 1959 110/71 97.5 °F (36.4 °C) Oral 74 16 97 %   20 1712 104/60 97.7 °F (36.5 °C) Oral 73 16 96 %   20 1248 115/67 97.4 °F (36.3 °C) Oral 83 16 97 %   20 1011 -- -- -- -- 16 94 %      TEMPERATURE HISTORY 24H: Temp (24hrs), Av.6 °F (36.4 °C), Min:97.4 °F (36.3 °C), Max:97.7 °F (36.5 °C)    BLOOD PRESSURE HISTORY: Systolic (44OOR), KVZ:434 , Min:104 , JLX:906    Diastolic (63ESH), IYM:75, Min:55, Max:77      Intake/Output:  I/O last 3 completed shifts: In: Veronique Jessy [P.O.:480; I.V.:1406]  Out: 650 [Urine:650]  No intake/output data recorded.   Drain/tube Output:  Closed/Suction Drain Left Back Bulb 10 Latvian-Output (ml): 0 ml    Physical Exam:  General: awake, alert, oriented to person, place  Lungs: unlabored respirations  Abdomen: soft, nondistended, no tenderness, hypoactive bowel sounds present   Drain: nil - same as preop / preop abscess  Skin/Wound: wound incision is clean, no drainage, no cellulitis    Labs:  CBC:    Recent Labs     20  0634 20  0550 20  1350 20  0426   WBC 13.7* 8.9  --  9.9   HGB 9.1* 7.8* 8.0* 8.6*   HCT 28.6* 24.8* 24.8* 26.7*   * 74*  --  85*     BMP:    Recent Labs     20  0634 20  0550 20  0426    142 141   K 4.3 4.2 4.6   * 114* 114*   CO2 19* 21 21   BUN 23* 27* 31*   CREATININE 0.7 0.6 0.9   GLUCOSE 135* 111* 88     Hepatic:    Recent Labs     09/01/20  0634 09/02/20  0550 09/03/20  0426   AST 15 16 44*   ALT 6* <5* 13   BILITOT 0.6 0.6 0.7   ALKPHOS 90 78 146*     Amylase:  No results found for: AMYLASE  Lipase:  No results found for: LIPASE   Mag:    Lab Results   Component Value Date    MG 1.80 09/03/2020    MG 1.80 09/02/2020     Phos:     Lab Results   Component Value Date    PHOS 4.2 09/03/2020    PHOS 3.5 09/02/2020      Coags:   Lab Results   Component Value Date    PROTIME 12.5 09/03/2020    INR 1.08 09/03/2020    APTT 39.8 09/03/2020       Cultures:  Anaerobic culture  Lab Results   Component Value Date    LABANAE No anaerobes isolated 08/24/2020     Fungus stain  Results in Past 30 Days  Result Component Current Result Ref Range Previous Result Ref Range   Fungus Stain No Fungal elements seen (8/24/2020)  Not in Time Range      Gram stain  Results in Past 30 Days  Result Component Current Result Ref Range Previous Result Ref Range   Gram Stain Result No organisms seen  4+ WBC's (Polymorphonuclear)   (9/1/2020)  1+ WBC's (Polymorphonuclear)  No organisms seen   (8/24/2020)      Organism  Lab Results   Component Value Date/Time    ORG Yeast (A) 08/24/2020 02:10 PM    ORG Lactobacillus species (A) 08/24/2020 02:10 PM     Surgical culture  Lab Results   Component Value Date/Time    CXSURG Rare growth  No further workup   08/24/2020 02:10 PM    CXSURG Rare growth  No further workup   08/24/2020 02:10 PM     Blood culture 1  Results in Past 30 Days  Result Component Current Result Ref Range Previous Result Ref Range   Blood Culture, Routine No Growth after 4 days of incubation. (8/25/2020)  No Growth after 4 days of incubation. (8/16/2020)      Blood culture 2  Results in Past 30 Days  Result Component Current Result Ref Range Previous Result Ref Range   Culture, Blood 2 No Growth after 4 days of incubation. (8/25/2020)  No Growth after 4 days of incubation. (8/16/2020)      Fecal occult  No results found for requested labs within last 30 days. GI bacterial pathogens by PCR  No results found for requested labs within last 30 days. C. difficile  No results found for requested labs within last 30 days. Urine culture  No results found for: [facility]    Pathology:   OR Pathology results pending     Endoscopy Pathology 8/20/2020--FINAL DIAGNOSIS:     A. Esophagus, biopsy:   - Fragments of squamous and gastric type columnar mucosa with mild   chronic inflammation.   - Negative for intestinal metaplasia. B. Ascending colon mass, biopsy:   - Poorly differentiated carcinoma, most consistent with adenocarcinoma. See comment. C. Colon polyp, transverse:   - Tubular adenoma. - Negative for high-grade dysplasia or malignancy. D. Colon polyp, descending:   - Tubular adenoma. - Negative for high-grade dysplasia or malignancy. E. Colon polyp, sigmoid:   - Tubular adenoma (multiple fragments). - Negative for high-grade dysplasia or malignancy. COMMENT:  The morphologic findings of ascending colon mass raises a   differential diagnosis of neuroendocrine carcinoma.  However, the   malignant cells are negative for neuroendocrine markers (synaptophysin   and chromogranin) .  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  Clinical correlation is recommended. Imaging:  I have personally reviewed the following films:    Xr Abdomen (2 Views)    Result Date: 8/26/2020  EXAMINATION: TWO XRAY VIEWS OF THE ABDOMEN 8/26/2020 9:38 am COMPARISON: CT scan 08/17/2020 HISTORY: ORDERING SYSTEM PROVIDED HISTORY: Abdominal Pain TECHNOLOGIST PROVIDED HISTORY: Reason for exam:->Abdominal Pain Reason for Exam: abdominal pain Acuity: Acute Type of Exam: Initial FINDINGS: Scattered dilated loops small bowel are seen throughout the abdomen. No significant colonic gas is seen Drainage catheter projects in region the pelvis.   Amado catheter is seen. Scattered dilated small bowel loops throughout the abdomen, increased compared to prior CT scan, either due to ileus or early partial small bowel obstruction     Scheduled Meds:   metoclopramide  10 mg Intravenous Q6H    lactobacillus  1 capsule Oral BID WC    insulin lispro  0-6 Units Subcutaneous Q4H    rifaximin  550 mg Oral BID    enoxaparin  30 mg Subcutaneous BID    [START ON 9/6/2020] fat emulsion  250 mL Intravenous Once per day on Mon Thu    lidocaine   Topical Once    folic acid  1 mg Oral Daily    fluconazole  200 mg Intravenous Q24H    meropenem  1 g Intravenous Q12H    pantoprazole  40 mg Intravenous Daily    lactulose  20 g Oral TID    sodium chloride flush  10 mL Intravenous 2 times per day     Continuous Infusions:   PN-Adult Premix 5/20 - Standard Electrolytes - Central Line 75 mL/hr at 09/02/20 1733    dextrose 100 mL/hr (08/27/20 1300)     PRN Meds:.morphine **OR** [DISCONTINUED] morphine, ketorolac, acetaminophen, phenol, glucose, dextrose, glucagon (rDNA), dextrose, [DISCONTINUED] promethazine **OR** ondansetron, promethazine, potassium chloride **OR** potassium alternative oral replacement **OR** potassium chloride, lip balm petroleum free, sodium chloride flush, polyethylene glycol      Assessment:  76 y.o. female admitted with   1. Septicemia ([facility] [address]    2. PAPITO (acute kidney injury) ([facility] [address]    3. Intra-abdominal free air of unknown etiology    4.  Fall, initial encounter        Status-post exploratory laparotomy, evacuation of ascites, drainage of pelvic abscess, right hemicolectomy with primary ileotransverse colostomy anastomosis, and Abdiaziz-Cut liver biopsy  on 8/24/2020 for colon mass, right colon and hepatic flexure with necrotic cancer at hepatic flexure region with perforation, cirrhosis, pelvic abscess, ascites  Intraabdominal/pelvic abscess, status-post CT-guided drain placement on 8/18/2020  Sepsis   Cirrhosis  Acute kidney injury  Hyponatremia  COVID screening,

## 2020-09-03 NOTE — PROGRESS NOTES
Continuous  · Current PN Order Provides: As below  · Goal PN Orders Provides: 1584 calories, 90 grams protein, dextrose load 2.33 mg/kg/min      Anthropometric Measures:  · Height: 5' 7\" (170.2 cm)  · Current Body Weight: 236 lb (107 kg)   · Admission Body Weight: 215 lb (97.5 kg)     · Ideal Body Weight: 135 lbs; % Ideal Body Weight 174.8 %   · BMI: 37  · BMI Categories: Obese Class 2 (BMI 35.0 -39.9)       Nutrition Diagnosis:   · Predicted inadequate energy intake related to altered GI function as evidenced by nutrition support - parenteral nutrition    · Increased nutrient needs related to increase demand for energy/nutrients as evidenced by wounds      Nutrition Interventions:   Food and/or Nutrient Delivery:  Continue Current Diet, Continue Current Parenteral Nutrition  Nutrition Education/Counseling:  Education not indicated   Coordination of Nutrition Care:  Continued Inpatient Monitoring    Goals:  New goal: Pt will consume at least 50% of meals and supplements       Nutrition Monitoring and Evaluation:   Food/Nutrient Intake Outcomes:  Parenteral Nutrition Intake/Tolerance, Food and Nutrient Intake, Supplement Intake, Diet Advancement/Tolerance  Physical Signs/Symptoms Outcomes:  Biochemical Data, GI Status     Discharge Planning:     Too soon to determine     Electronically signed by Alpesh Alvarez RD, LD on 9/3/20 at 10:30 AM EDT    Contact: [phone]

## 2020-09-03 NOTE — PROGRESS NOTES
238 PM  CVC in neck leaking fluid. OK per ID and Nephrology to place PICC line and remove CVC. Called and left a message for PICC nurse. Consent signed and on the chart. 4:20 PM  Urine output low. Nephrology aware. OK to remove perkins today.

## 2020-09-03 NOTE — PROGRESS NOTES
CAROLINE drain removal site dressing saturated with serosanguinous drainage. Dressing changed with 4x4 and tegaderm. Pt with multiple loose bowel movements overnight. Complete linen change and eulalio care provided. Repositioned in bed. All needs met at this time. Call light within reach.

## 2020-09-03 NOTE — PROGRESS NOTES
Amado removed and PICC placed. CVC removed due to leaking. Patient tolerated it well. Pressure held for 5 minutes. No complications noted.

## 2020-09-03 NOTE — PROGRESS NOTES
8: 23 AM  Patient assisted up to bathroom and then up to the chair with the steady. Chair alarm on. Patient answering questions appropriately this morning. BS checked and was 539. Rechecked the BS on the other hand and was 113. Patient not showing symptoms of a high blood sugar. I believe the high reading was an error. Vitals stable. Will continue to monitor. 3659: Morning assessment complete. VSS. Abdomen soft and rounded. A&O x4 this AM. Dr. Acuña Caller in to see pt. Orders for perkins d/c and advance to full liquid diet. Call light within reach. Pt demonstrated how to use properly. Will continue to monitor. Denies further needs. The care plan and education has been reviewed and mutually agreed upon with the patient.

## 2020-09-03 NOTE — PROGRESS NOTES
endurance  Activity Tolerance: Increased lethargy noted upon arrival.     Patient Diagnosis(es): The primary encounter diagnosis was Septicemia ([facility] [address] Diagnoses of PAPITO (acute kidney injury) ([facility] [address] Intra-abdominal free air of unknown etiology, and Fall, initial encounter were also pertinent to this visit. has no past medical history on file. has a past surgical history that includes Tonsillectomy; Eye surgery; Upper gastrointestinal endoscopy (N/A, 8/20/2020); Colonoscopy (N/A, 8/20/2020); Colonoscopy (8/20/2020); hemicolectomy (Right, 8/24/2020); liver biopsy (8/24/2020); Abdomen surgery (N/A, 8/28/2020); and Dilation and curettage of uterus (N/A, 8/28/2020). Restrictions  Restrictions/Precautions  Restrictions/Precautions: Fall Risk(High fall risk, full liquid diet)  Required Braces or Orthoses?: No  Position Activity Restriction  Other position/activity restrictions: This is a 68-year-old female with apparent history of frequent falls who presents after being found on the ground by her family. When the patient arrived she was disheveled, incontinent. She denies any specific complaints. The patient's work-up today was extensive. Her work-up included a CT of the abdomen that shows findings worrisome for possible abscess versus perforation. Questionable colon mass with metastatic lesions to liver and lung. Diagnosed with sepsis. Status-post exploratory laparotomy, evacuation of ascites, drainage of pelvic abscess, right hemicolectomy with primary ileotransverse colostomy anastomosis, and Abdiaziz-Cut liver biopsy  on 8/24/2020 for colon mass, right colon and hepatic flexure with necrotic cancer at hepatic flexure region with perforation, cirrhosis, pelvic abscess, ascites.  8/28/2020: secondary abdominal wound closure, dilatation and curettage  Subjective   General  Chart Reviewed: Yes  Family / Caregiver Present: No  Subjective  Subjective: Pt denies pain at rest. \"I'll try, but I'm not sure if I will be Daily  Current Treatment Recommendations: Strengthening, Balance Training, Functional Mobility Training, Transfer Training, ADL/Self-care Training, Endurance Training, Gait Training, Stair training, Safety Education & Training, Home Exercise Program, Patient/Caregiver Education & Training, Equipment Evaluation, Education, & procurement  Safety Devices  Type of devices: Bed alarm in place, Call light within reach, Nurse notified, Left in bed  Restraints  Initially in place: No     Therapy Time   Individual Concurrent Group Co-treatment   Time In       1318   Time Out       1343   Minutes       [address] [place] [address]      I agree with the note above. Goals addressed by PT this session.    [address] [place], [place] [id number]

## 2020-09-03 NOTE — PROGRESS NOTES
the last 72 hours. Radiology Review:    IR US GUIDED PARACENTESIS   Final Result   Successful ultrasound guided paracentesis. CT CHEST ABDOMEN PELVIS W CONTRAST   Final Result   1. Multifocal bilateral pulmonary ground-glass opacities. The differential   includes atypical infectious/inflammatory pneumonitis. 2. Multiple bilateral pulmonary nodules concerning for metastatic disease. 3. Cirrhosis with increasing abdominopelvic ascites. 4. Improving 7 cm recto uterine abscess. XR ABDOMEN (KUB) (SINGLE AP VIEW)   Final Result   Multiple distended small bowel loops, similar to the previous study. No   significant colonic gas. Findings are concerning for a SBO. XR CHEST PORTABLE   Final Result   Nasogastric tube is seen extending below the diaphragm. Dedicated abdominal   radiograph could be performed for better visualization as warranted. Bilateral airspace disease, increased on the right as compared to prior, for   which pneumonia or edema are considerations. XR ABDOMEN (2 VIEWS)   Final Result   Scattered dilated small bowel loops throughout the abdomen, increased   compared to prior CT scan, either due to ileus or early partial small bowel   obstruction         XR CHEST PORTABLE   Final Result   Lucency beneath the right hemidiaphragm compatible with free intraperitoneal   air. Apparently the patient has had recent abdominal surgery. This finding   was discussed with Dr. Lonnie Graff  At 6:48 pm on 8/24/2020. Status post placement of right jugular central venous catheter. No   pneumothorax. Patchy mid and lower lung ground-glass opacities suspicious for pneumonia,   increased since the prior CT. Correlation for pneumonia is recommended. CT ABSCESS DRAINAGE W CATH PLACEMENT S&I   Final Result   Successful CT guided placement of a drainage catheter in the pelvic abscess.          CT ABDOMEN PELVIS W IV CONTRAST Additional Contrast? Oral   Final Result 1. Pelvic air/fluid collection almost certainly reflects an abscess or giant   colonic diverticula. This may be the result of acute diverticulitis. 2. Mass lesion ascending colon almost certainly reflecting primary   adenocarcinoma of the colon. 3. Diffuse hepatic lesions with newly visualized (contrast enhancement) mass   at the hepatic dome. Hepatic metastatic disease is a consideration as is   cirrhosis with regenerative nodules and hepatocellular carcinoma. 4. Bilateral lower lobe pulmonary nodules suspicious for metastatic disease. 5. Abdominal and pelvic ascites. 6. Sequela from portosystemic shunting with numerous collateral vessels about   the stomach, left upper quadrant and distal esophagus. Relatively small   esophageal and gastric varices. 7.  Calcific atherosclerotic disease aorta. CT ABDOMEN PELVIS WO CONTRAST Additional Contrast? None   Final Result   1. Non loculated fluid and gas collection in the pelvis concerning for   perforated viscus/possible abscess formation. 2. Prominent thickening of the ascending colon which may be related to   infectious or inflammatory process or underlying neoplastic process. 3. Bowel-gas pattern typical of adynamic ileus. 4. Hepatic cirrhosis with numerous hepatic nodules suggestive of   regenerative/siderotic nodules. MRI abdomen without and with IV contrast   correlation recommended. Metastatic disease is a consideration. 5. Hepatosplenomegaly and multifocal collateral vessels about the stomach   esophagus concerning for varices, all suggestive of portal venous   hypertension. 6. Abdominal and pelvic ascites. 7.  Diverticulosis coli without CT evidence of acute diverticulitis. Critical results were called by Dr. Claudia Savage to [address] [place] [address]   on 8/16/2020 at 18:09. CT Cervical Spine WO Contrast   Final Result   No acute abnormality of the cervical spine.          CT Head WO Contrast   Final Result   No acute intracranial abnormality. XR CHEST PORTABLE   Final Result   No acute process. XR RADIUS ULNA LEFT (2 VIEWS)   Final Result   No acute osseous abnormality. CT CHEST W CONTRAST    (Results Pending)       ASSESSMENT :  Cirrhosis - new diagnosis per CT. bx c/w WINKLER cirrhosis. No alcohol history. Likely from fatty liver. Some ascites on CT. Seemed mildly confused at admission (baseline per her daughter) but ammonia was normal. repeat ammonia is elevated at 66 so lactulose started. Negative: AMA, hepatitis panel, A1AT phenotype. f-actin weak positive at 25. AFP normal. CEA 83. Labs stable. Large volume ascites on CT 8/31. S/p paracentesis 9/1 with 10 L fluid removed. High nucleated cells c/w peritonitis from perforated colon ca. SAAG elevated c/w portal HTN. Cytology negative.      Abnormal CT of the colon and liver, colon mass - CT with thickening of the ascending colon concerning for malignancy - path c/w AdenoCa. Also with liver and lung lesions concerning for metastatic disease.  Colonoscopy 8/20 with large necrotic ascending colon mass (carcinoma), likely the source of abscess. s/p exploratory laparotomy, evacuation of ascites, drainage of pelvic abscess, right hemicolectomy with primary ileotransverse colostomy anastomosis, and Abdiaziz-Cut liver biopsy on 8/24/2020    N/V - AXR 8/30 with multiple distended small bowel loops. No bowel distention on CT 8/31. Resolved.      Pelvic abscess - from perforated colon ca     Esophagitis - EGD 8/20 with LA class C erosive esophagitis and nodularity at [address] (path mild chronic inflammation).     Bulky uterus with bleeding in vault, suspected to be uterine cancer. Gyn oncology on board.  path c/w uterine adenocarcinoma.      Anemia - hgb improved on repeat.         PLAN:  - f/u ascitic fluid cx - NGTD  - antibiotics  - lactulose TID, xifaxan   - PPI  - Will need Hep A and B vaccines outpatient.     Discussed with Dr. Mary Ann Ann, BRAD  [facility]

## 2020-09-03 NOTE — PROGRESS NOTES
swallowing. Dietary Recommendations: Continue full liquid diet, meds as tolerated     Strategies: 90 degree positioning with all p.o. intake; small bites/sips; alternate textures through meal; reduce rate of intake    Treatment/Goals: Speech therapy for dysphagia tx 3-5 times per week. ST.) Pt will tolerate recommended diet without s/s of aspiration   2.) If clinical symptoms of penetration/aspiration continue to be noted,Pt will tolerate MBS to r/o aspiration and determine appropriate diet/liquid level. 3.) Pt will tolerate diet advance to least restricted diet, as clinically indicated, with no signs of aspiration     Oral motor Exam:  Dentition: missing many teeth  Labial/Facial: within functional limits   Lingual: reduced coordination and ROM  Voice: within functional limits     Oral Phase:   Suspected premature bolus loss to pharynx    Pharyngeal Phase:  Suspected pharyngeal pooling  Delayed swallow initiation  Decreased laryngeal elevation via palpation   Apparent decreased pharyngeal clearing  Intermittent throat clearing (immediate and delayed) with thin liquids and nectar thick liquids    Patient/Family Education:Education, results and recommendations given to the Pt and nurse, who verbalized understanding    Timed Code Treatment: 0 minutes    Total Treatment Time: 25 minutes    Discharge Recommendations: Speech Therapy for Speech/Dysphagia treatment at discharge. If patient discharges prior to next session this note will serve as a discharge summary.       Zaida Espinoza M.A., [address] [place] [address]  Speech-Language Pathologist

## 2020-09-03 NOTE — PROGRESS NOTES
MD Noe Mosquera MD Arville Holmes, MD               Office: [phone]                      Fax: [phone]             [address] [place] [address]                   NEPHROLOGY INPATIENT PROGRESS NOTE:     PATIENT NAME: Christel Benoit  : 1955  MRN: [id number]. RECOMMENDATIONS:   -More awake today, checked ammonia fine.   -Stoped IV fluids for now, already on TPN with high-rate    -as concern with ascites with liver cirrhosis, ,   - in day or so - will consider lasix + albumin for mild fluid overload   - PO diet per sx recs. -Continue to monitor acidosis, if worsening with GI losses, would change to bicarbonate. - monitor respiratory closely       IMPRESSION:       PAPITO - improving to stable   : Oligouric - now non-oliguric.    : Etiology of current PAPITO - ATN vs. Decreased renal perfusion with hemodynamic status with postop. - Renal imaging: w/ CT - kidney unremarkable   - CK - 310 on admission then improved. - Avoid contrast exposure at this time. Associated problems:    - Electrolytes: Hyponatremia-resolved  - Hypokalemia - from needing repletion GI loss -better  : BP: Ok to keep slightly higher   - Acid-Base: Acidosis - - non-AGMA - follow as w/ higher NG losses -follow  - Anemia:  - Heme  Following      Other problems: Management per primary and other consulting teams. #WINKLER cirrhosis, Portal HTN : new diagnosis, no h/o EtOH    # Sepsis, intra-abdominal abscess   - S/P CT-guided drain placement on 2020  - S/P right hemicolectomy       High complexity. Multiple medical problems. Discussed with patient and treatment team. Dr Rahul Otto   Thank you for allowing me to participate in this patient's care. Please do not hesitate to contact me for any questions/concerns. We will follow along with you.      Chani Bright MD  [facility] [address]   Phone: [phone] or Via [employer]  Fax: [phone] Problems           Last Modified POA    Pelvic abscess in female 8/16/2020 Yes    Septicemia ([address] 8/17/2020 Yes    Colonic mass 8/25/2020 Yes    Cirrhosis of liver with ascites ([address] 8/25/2020 Yes    Intra-abdominal free air of unknown etiology 8/25/2020 Yes    PAPITO (acute kidney injury) ([address] 8/25/2020 Yes    Lung nodules 8/25/2020 Yes    Bandemia 8/25/2020 Yes    Intra-abdominal abscess ([address] 8/25/2020 Yes    E coli infection 8/25/2020 Yes    Elevated CEA 8/25/2020 Yes    Ex-smoker 8/25/2020 Yes    Class 2 obesity due to excess calories with body mass index (BMI) of 36.0 to 36.9 in adult 8/25/2020 Yes    Portal hypertension ([address] 8/25/2020 Yes    Mild malnutrition ([address] (Chronic) 8/25/2020 Yes    Tobacco abuse counseling 8/27/2020 Yes    Open abdominal wall wound 8/28/2020 Yes    Receiving intravenous antibiotic treatment as outpatient 9/2/2020 Yes    Complex care coordination 9/2/2020 Yes             ========================================    SUBJECTIVE:    REMAINS More awake today  Swelling worsening   MEDICATIONS: reviewed by me. Medications Prior to Admission:  No current facility-administered medications on file prior to encounter. No current outpatient medications on file prior to encounter. No medications prior to admission.      Scheduled Meds:   metoclopramide  10 mg Intravenous Q6H    bisacodyl  10 mg Rectal Once    lactobacillus  1 capsule Oral BID WC    insulin lispro  0-6 Units Subcutaneous Q4H    rifaximin  550 mg Oral BID    enoxaparin  30 mg Subcutaneous BID    [START ON 9/6/2020] fat emulsion  250 mL Intravenous Once per day on Mon Thu    lidocaine   Topical Once    folic acid  1 mg Oral Daily    fluconazole  200 mg Intravenous Q24H    meropenem  1 g Intravenous Q12H    pantoprazole  40 mg Intravenous Daily    lactulose  20 g Oral TID    sodium chloride flush  10 mL Intravenous 2 times per day     Continuous Infusions:   PN-Adult Premix 5/20 - Standard Electrolytes - Central Line 75 mL/hr at 09/02/20 1733    dextrose 100 mL/hr (08/27/20 1300)     PRN Meds:.acetaminophen, phenol, glucose, dextrose, glucagon (rDNA), dextrose, [DISCONTINUED] promethazine **OR** ondansetron, promethazine, potassium chloride **OR** potassium alternative oral replacement **OR** potassium chloride, lip balm petroleum free, sodium chloride flush, polyethylene glycol       PHYSICAL EXAM:  Recent vital signs and recent I/Os reviewed by me. Wt Readings from Last 3 Encounters:   08/31/20 236 lb 6 oz (107.2 kg)     BP Readings from Last 3 Encounters:   09/03/20 132/83   08/28/20 (!) 149/66   08/24/20 (!) 155/68     Patient Vitals for the past 24 hrs:   BP Temp Temp src Pulse Resp SpO2   09/03/20 1244 132/83 97.5 °F (36.4 °C) Oral 78 18 99 %   09/03/20 1207 118/79 97.6 °F (36.4 °C) Oral 72 16 98 %   09/03/20 0810 110/71 97.6 °F (36.4 °C) Oral 74 18 98 %   09/03/20 0402 117/77 97.6 °F (36.4 °C) Oral 82 19 98 %   09/02/20 2334 (!) 112/59 97.5 °F (36.4 °C) Oral 74 17 97 %   09/02/20 1959 110/71 97.5 °F (36.4 °C) Oral 74 16 97 %   09/02/20 1712 104/60 97.7 °F (36.5 °C) Oral 73 16 96 %   09/02/20 1248 115/67 97.4 °F (36.3 °C) Oral 83 16 97 %       Intake/Output Summary (Last 24 hours) at 9/3/2020 1246  Last data filed at 9/3/2020 0408  Gross per 24 hour   Intake 1886 ml   Output 650 ml   Net 1236 ml         General: Not in any distress  HENT: Atraumatic, normocephalic   Eyes: Normal conjunctiva, Non-incteral sclera. Neck: Supple, JVD not visible. CVS:  Heart sounds are normal. No murmurs. No pericardial rub. RS: Normal respiratory efforts,  Lung fields are diminished . Abd: Soft , bowel sounds are normal,  distension and no tenderness to palpation. Skin: No rash , some bruises,   CNS: Awake Oriented , No focal.   Extremities/MSK: 2+ Edema, improving,  no cyanosis.            DATA:  Diagnostic tests reviewed by me for today's visit:    Recent Labs     08/31/20  1830 09/01/20  0634 09/02/20  0506 09/02/20  1350 09/03/20  0426   WBC 16.1* 13.7* 8.9  --  9.9   HCT 29.3* 28.6* 24.8* 24.8* 26.7*   * 109* 74*  --  85*     Iron Saturation:  No components found for: PERCENTFE  FERRITIN:    Lab Results   Component Value Date    FERRITIN 322.4 08/31/2020     IRON:    Lab Results   Component Value Date    IRON 26 08/17/2020     TIBC:    Lab Results   Component Value Date    TIBC 106 08/17/2020       Recent Labs     08/31/20  1830 09/01/20  0634 09/02/20  0550 09/03/20  0426    141 142 141   K 4.4 4.3 4.2 4.6   * 113* 114* 114*   CO2 18* 19* 21 21   BUN 22* 23* 27* 31*   CREATININE 0.8 0.7 0.6 0.9     Recent Labs     08/31/20  1830 09/01/20  0634 09/02/20  0550 09/03/20  0426   CALCIUM 8.8 8.6 9.0 8.9   MG  --  1.80 1.80 1.80   PHOS  --  3.0 3.5 4.2     No results for input(s): PH, PCO2, PO2 in the last 72 hours.     Invalid input(s): [place]    ABG:  No results found for: PH, PCO2, PO2, HCO3, BE, THGB, TCO2, O2SAT  VBG:    Lab Results   Component Value Date    PHVEN 7.435 08/23/2020    WCB0HMV 26.3 08/23/2020    BEVEN -5.6 08/23/2020    V7GSHINP 99 08/23/2020       LDH:    Lab Results   Component Value Date     08/31/2020     Uric Acid:    Lab Results   Component Value Date    LABURIC 5.9 08/17/2020       PT/INR:    Lab Results   Component Value Date    PROTIME 12.5 09/03/2020    INR 1.08 09/03/2020     Warfarin PT/INR:  No components found for: PTPATWAR, PTINRWAR  PTT:    Lab Results   Component Value Date    APTT 39.8 09/03/2020   [APTT}  Last 3 Troponin:    Lab Results   Component Value Date    TROPONINI <0.01 08/16/2020       U/A:    Lab Results   Component Value Date    COLORU ORANGE 08/16/2020    PROTEINU TRACE 08/16/2020    PHUR 5.0 08/16/2020    WBCUA 3 08/16/2020    RBCUA 7 08/16/2020    BACTERIA RARE 08/16/2020    CLARITYU CLOUDY 08/16/2020    SPECGRAV 1.022 08/16/2020    LEUKOCYTESUR SMALL 08/16/2020    UROBILINOGEN 1.0 08/16/2020    BILIRUBINUR SMALL 08/16/2020 liver length. The spleen is mildly enlarged. The kidneys, gallbladder, adrenal glands and pancreas appear unremarkable. Multifocal collateral vessels are seen about the gastrohepatic ligament extending to the posterior mediastinum. GI/Bowel: Prominent diffuse thickening of the wall of the ascending colon. Other portions of colon and small bowel appear unremarkable with exception of few gas distended small bowel loops typical of adynamic ileus. There are diverticula involving the descending and sigmoid colon. Pelvis: Fluid collection within the pelvis without definite margins has air bubbles within it concerning for sequela from perforated bowel/abscess formation. Uterus and adnexal structures appear unremarkable. Peritoneum/Retroperitoneum: Abdominal ascites. Calcific atherosclerosis aorta. Bones/Soft Tissues: No acute superficial soft tissue or osseous structure abnormality evident. 1. Non loculated fluid and gas collection in the pelvis concerning for perforated viscus/possible abscess formation. 2. Prominent thickening of the ascending colon which may be related to infectious or inflammatory process or underlying neoplastic process. 3. Bowel-gas pattern typical of adynamic ileus. 4. Hepatic cirrhosis with numerous hepatic nodules suggestive of regenerative/siderotic nodules. MRI abdomen without and with IV contrast correlation recommended. Metastatic disease is a consideration. 5. Hepatosplenomegaly and multifocal collateral vessels about the stomach esophagus concerning for varices, all suggestive of portal venous hypertension. 6. Abdominal and pelvic ascites. 7.  Diverticulosis coli without CT evidence of acute diverticulitis. Critical results were called by Dr. Angelica Gil to [address] [place] [address] on 8/16/2020 at 18:09. Xr Radius Ulna Left (2 Views)    Result Date: 8/16/2020  EXAMINATION: TWO XRAY VIEWS OF THE LEFT FOREARM 8/16/2020 2:55 pm COMPARISON: None.  HISTORY: ORDERING SYSTEM PROVIDED HISTORY: Fall TECHNOLOGIST PROVIDED HISTORY: Reason for exam:->Fall Reason for Exam: Frequent falls at home. Bruising mid shaft L forearm Acuity: Acute Type of Exam: Initial FINDINGS: There is no evidence of acute fracture. There is normal alignment. No acute joint abnormality. No focal osseous lesion. No focal soft tissue abnormality. No acute osseous abnormality. Ct Head Wo Contrast    Result Date: 8/16/2020  EXAMINATION: CT OF THE HEAD WITHOUT CONTRAST  8/16/2020 3:15 pm TECHNIQUE: CT of the head was performed without the administration of intravenous contrast. Dose modulation, iterative reconstruction, and/or weight based adjustment of the mA/kV was utilized to reduce the radiation dose to as low as reasonably achievable. COMPARISON: None. HISTORY: ORDERING SYSTEM PROVIDED HISTORY: Fall TECHNOLOGIST PROVIDED HISTORY: Reason for exam:->Fall Has a \"code stroke\" or \"stroke alert\" been called? ->No Reason for Exam: Fall Acuity: Acute Type of Exam: Initial FINDINGS: BRAIN/VENTRICLES: The ventricles and sulci are diffusely enlarged. Low attenuation is seen in the periventricular and subcortical white matter. No acute intracranial hemorrhage or acute infarct is identified. ORBITS: The visualized portion of the orbits demonstrate no acute abnormality. SINUSES: The visualized paranasal sinuses and mastoid air cells demonstrate no acute abnormality. SOFT TISSUES/SKULL:  No acute abnormality of the visualized skull or soft tissues. No acute intracranial abnormality. Ct Cervical Spine Wo Contrast    Result Date: 8/16/2020  EXAMINATION: CT OF THE CERVICAL SPINE WITHOUT CONTRAST 8/16/2020 3:15 pm TECHNIQUE: CT of the cervical spine was performed without the administration of intravenous contrast. Multiplanar reformatted images are provided for review.  Dose modulation, iterative reconstruction, and/or weight based adjustment of the mA/kV was utilized to reduce the radiation dose to as low as reasonably

## 2020-09-03 NOTE — PROGRESS NOTES
Assessment complete, meds given. Patient had large, liquid BM. Abdominal dressing had small amount of serosanguinous drainage. Abdominal wound clean, dry, intact, and well-approximated. Abdominal dressing changed. Patient given PRN morphine for pain. Patient oriented to person, place, and situation. The care plan and education has been reviewed and mutually agreed upon with the patient, reinforcement necessary d/t frequent confusion. Patient in bed, bed in lowest position, bed alarm on, bedside table and call light within reach. No further needs expressed at this time. 9485 EDT  Patient gradually disoriented to time, place, and situation. Pulling off gown and pulling at wires. Patient reoriented. Fall precautions remain in place. Will continue to monitor. 6601 EDT  Bed alarm going off, patient found with legs dangling over side of bed. Patient helped into a comfortable position in bed. Patient disoriented to time, place, and situation. Requesting to go to their room and to speak with a . After reorientation, patient requesting to speak with a doctor about going home. Fall risk education provided to patient, bed alarm on, bed in lowest position, call light and bedside table within reach. Camera placed in room. Patient provided warm blanket. No further needs expressed at this time. Will continue to monitor.

## 2020-09-04 NOTE — PROGRESS NOTES
MD Mady Denton MD Joy Sports, MD               Office: [phone]                      Fax: [phone]             [address] [place] [address]                   NEPHROLOGY INPATIENT PROGRESS NOTE:     PATIENT NAME: North Perez  : 1955  MRN: [id number]. RECOMMENDATIONS:   -Stoped IV fluids for now, already on TPN with high-rate    -as concern with ascites with liver cirrhosis, ,   - in day or so - will NOW start a low dose lasix + albumin for mild fluid overload   - PO diet per sx recs. -Continue to monitor acidosis, if worsening with GI losses, would change to bicarbonate. - monitor respiratory closely       IMPRESSION:       PAPITO - improving to stable   : Oligouric - now non-oliguric.    : Etiology of current PAPITO - ATN vs. Decreased renal perfusion with hemodynamic status with postop. - Renal imaging: w/ CT - kidney unremarkable   - CK - 310 on admission then improved. - Avoid contrast exposure at this time. Associated problems:    - Electrolytes: Hyponatremia-resolved  - Hypokalemia - from needing repletion GI loss -better  : BP: Ok to keep slightly higher   - Acid-Base: Acidosis - - non-AGMA - follow as w/ higher NG losses -follow  - Anemia:  - Heme  Following      Other problems: Management per primary and other consulting teams. #WINKLER cirrhosis, Portal HTN : new diagnosis, no h/o EtOH    # Sepsis, intra-abdominal abscess   - S/P CT-guided drain placement on 2020  - S/P right hemicolectomy       High complexity. Multiple medical problems. Discussed with patient and treatment team. Dr Giovanna Santiago   Thank you for allowing me to participate in this patient's care. Please do not hesitate to contact me for any questions/concerns. We will follow along with you.      Ana Lima MD  [facility] [address]   Phone: [phone] or [address] [employer]  Fax: [phone]        Hospital Problems           Last Modified POA    Pelvic abscess in female 8/16/2020 Yes    Septicemia ([address] 8/17/2020 Yes    Colonic mass 8/25/2020 Yes    Cirrhosis of liver with ascites ([address] 8/25/2020 Yes    Intra-abdominal free air of unknown etiology 8/25/2020 Yes    PAPITO (acute kidney injury) ([address] 8/25/2020 Yes    Lung nodules 8/25/2020 Yes    Bandemia 8/25/2020 Yes    Intra-abdominal abscess ([address] 8/25/2020 Yes    E coli infection 8/25/2020 Yes    Elevated CEA 8/25/2020 Yes    Ex-smoker 8/25/2020 Yes    Class 2 obesity due to excess calories with body mass index (BMI) of 36.0 to 36.9 in adult 8/25/2020 Yes    Portal hypertension ([address] 8/25/2020 Yes    Mild malnutrition ([address] (Chronic) 8/25/2020 Yes    Tobacco abuse counseling 8/27/2020 Yes    Open abdominal wall wound 8/28/2020 Yes    Receiving intravenous antibiotic treatment as outpatient 9/2/2020 Yes    Complex care coordination 9/2/2020 Yes             ========================================    SUBJECTIVE:    SAID LEG Swelling worsening a lot  No SOB  Seen lying flat     MEDICATIONS: reviewed by me. Medications Prior to Admission:  No current facility-administered medications on file prior to encounter. No current outpatient medications on file prior to encounter. No medications prior to admission.      Scheduled Meds:   furosemide  10 mg Intravenous BID    albumin human  12.5 g Intravenous Q12H    bisacodyl  10 mg Rectal Once    lidocaine 1 % injection  5 mL Intradermal Once    sodium chloride flush  10 mL Intravenous 2 times per day    lactobacillus  1 capsule Oral BID WC    insulin lispro  0-6 Units Subcutaneous Q4H    rifaximin  550 mg Oral BID    enoxaparin  30 mg Subcutaneous BID    [START ON 9/6/2020] fat emulsion  250 mL Intravenous Once per day on Mon Thu    lidocaine   Topical Once    folic acid  1 mg Oral Daily    fluconazole  200 mg Intravenous Q24H    meropenem  1 g Intravenous Q12H    pantoprazole  40 mg Intravenous Daily    lactulose  20 g Oral TID    sodium chloride flush  10 mL Intravenous 2 times per day     Continuous Infusions:   PN-Adult Premix 5/20 - Standard Electrolytes - Central Line 75 mL/hr at 09/03/20 1736    dextrose 100 mL/hr (08/27/20 1300)     PRN Meds:.sodium chloride flush, acetaminophen, phenol, glucose, dextrose, glucagon (rDNA), dextrose, [DISCONTINUED] promethazine **OR** ondansetron, promethazine, potassium chloride **OR** potassium alternative oral replacement **OR** potassium chloride, lip balm petroleum free, sodium chloride flush, polyethylene glycol       PHYSICAL EXAM:  Recent vital signs and recent I/Os reviewed by me. Wt Readings from Last 3 Encounters:   08/31/20 236 lb 6 oz (107.2 kg)     BP Readings from Last 3 Encounters:   09/04/20 101/66   08/28/20 (!) 149/66   08/24/20 (!) 155/68     Patient Vitals for the past 24 hrs:   BP Temp Temp src Pulse Resp SpO2   09/04/20 1108 101/66 97.7 °F (36.5 °C) Oral 82 16 99 %   09/04/20 0900 111/60 98.1 °F (36.7 °C) Oral 85 14 --   09/04/20 0400 123/64 97.5 °F (36.4 °C) Oral 82 16 97 %   09/04/20 0350 122/64 97.8 °F (36.6 °C) Oral 78 14 98 %   09/04/20 0002 103/71 97.8 °F (36.6 °C) Oral 79 14 97 %   09/03/20 2215 113/75 97.8 °F (36.6 °C) Oral 80 18 97 %   09/03/20 1730 129/80 97.7 °F (36.5 °C) Oral 84 18 99 %   09/03/20 1244 132/83 97.5 °F (36.4 °C) Oral 78 18 99 %       Intake/Output Summary (Last 24 hours) at 9/4/2020 1229  Last data filed at 9/4/2020 0359  Gross per 24 hour   Intake 2801 ml   Output 260 ml   Net 2541 ml         General: Not in any distress  HENT: Atraumatic, normocephalic   Eyes: Normal conjunctiva, Non-incteral sclera. Neck: Supple, JVD not visible. CVS:  Heart sounds are normal. No murmurs. No pericardial rub. RS: Normal respiratory efforts,  Lung fields are diminished . Abd: Soft , bowel sounds are normal,  distension and no tenderness to palpation.   Skin: No rash , some bruises,   CNS: Awake Oriented , No focal.   Extremities/MSK: 2+ Edema, worsening , no cyanosis. DATA:  Diagnostic tests reviewed by me for today's visit:    Recent Labs     09/02/20  0550 09/02/20  1350 09/03/20  0426 09/04/20  0439   WBC 8.9  --  9.9 8.9   HCT 24.8* 24.8* 26.7* 25.5*   PLT 74*  --  85* 89*     Iron Saturation:  No components found for: PERCENTFE  FERRITIN:    Lab Results   Component Value Date    FERRITIN 322.4 08/31/2020     IRON:    Lab Results   Component Value Date    IRON 26 08/17/2020     TIBC:    Lab Results   Component Value Date    TIBC 106 08/17/2020       Recent Labs     09/02/20  0550 09/03/20  0426 09/04/20  0439    141 138   K 4.2 4.6 4.6   * 114* 111*   CO2 21 21 21   BUN 27* 31* 30*   CREATININE 0.6 0.9 0.7     Recent Labs     09/02/20  0550 09/03/20  0426 09/04/20  0439   CALCIUM 9.0 8.9 8.6   MG 1.80 1.80 1.80   PHOS 3.5 4.2 3.7     No results for input(s): PH, PCO2, PO2 in the last 72 hours.     Invalid input(s): Katherine Embs    ABG:  No results found for: PH, PCO2, PO2, HCO3, BE, THGB, TCO2, O2SAT  VBG:    Lab Results   Component Value Date    PHVEN 7.435 08/23/2020    FKP7JLL 26.3 08/23/2020    BEVEN -5.6 08/23/2020    X8MKZEIV 99 08/23/2020       LDH:    Lab Results   Component Value Date     08/31/2020     Uric Acid:    Lab Results   Component Value Date    LABURIC 5.9 08/17/2020       PT/INR:    Lab Results   Component Value Date    PROTIME 13.0 09/04/2020    INR 1.12 09/04/2020     Warfarin PT/INR:  No components found for: PTPATWAR, PTINRWAR  PTT:    Lab Results   Component Value Date    APTT 40.7 09/04/2020   [APTT}  Last 3 Troponin:    Lab Results   Component Value Date    TROPONINI <0.01 08/16/2020       U/A:    Lab Results   Component Value Date    COLORU ORANGE 08/16/2020    PROTEINU TRACE 08/16/2020    PHUR 5.0 08/16/2020    WBCUA 3 08/16/2020    RBCUA 7 08/16/2020    BACTERIA RARE 08/16/2020    CLARITYU CLOUDY 08/16/2020    SPECGRAV 1.022 08/16/2020    LEUKOCYTESUR SMALL 08/16/2020    UROBILINOGEN 1.0 08/16/2020    BILIRUBINUR SMALL 08/16/2020    BLOODU LARGE 08/16/2020    GLUCOSEU Negative 08/16/2020     Microalbumen/Creatinine ratio:  No components found for: RUCREAT  24 Hour Urine for Protein:  No components found for: RAWUPRO, UHRS3, VBOJ64WI, UTV3  24 Hour Urine for Creatinine Clearance:  No components found for: CREAT4, UHRS10, UTV10  Urine Toxicology:  No components found for: IAMMENTA, IBARBIT, IBENZO, ICOCAINE, IMARTHC, IOPIATES, IPHENCYC    HgBA1c:  No results found for: LABA1C  RPR:  No results found for: RPR  HIV:  No results found for: HIV  KULWANT:  No results found for: ANATITER, KULWANT  RF:  No results found for: RF  DSDNA:  No components found for: DNA  AMYLASE:  No results found for: AMYLASE  LIPASE:  No results found for: LIPASE  Fibrinogen Level:  No components found for: FIB       BELOW MENTIONED RADIOLOGY STUDY RESULTS BY ME:    Ct Abdomen Pelvis Wo Contrast Additional Contrast? None    Result Date: 8/16/2020  EXAMINATION: CT OF THE ABDOMEN AND PELVIS WITHOUT CONTRAST 8/16/2020 5:11 pm TECHNIQUE: CT of the abdomen and pelvis was performed without the administration of intravenous contrast. Multiplanar reformatted images are provided for review. Dose modulation, iterative reconstruction, and/or weight based adjustment of the mA/kV was utilized to reduce the radiation dose to as low as reasonably achievable. COMPARISON: None. HISTORY: Acute nausea FINDINGS: Lower Chest: FOUR solid soft tissue pulmonary nodules right lower lobe, 7 x 8 mm (axial image 19), 11 x 12 mm (axial 11), 9 x 9 mm (axial image 10) and 4 x 4 mm (axial image 10). 3 mm pulmonary nodules are seen posterior basal segment left lower lobe. Mild nodular fullness about the distal esophagus. Heart size is upper normal. Organs: Prominent caudate lobe hypertrophy and poly lobular configuration of the liver as well as diffuse tiny hypoattenuating lesions throughout the liver suggestive of hepatic cirrhosis and siderotic nodules.   No COMPARISON: None. HISTORY: ORDERING SYSTEM PROVIDED HISTORY: Fall TECHNOLOGIST PROVIDED HISTORY: Reason for exam:->Fall Reason for Exam: Frequent falls at home. Bruising mid shaft L forearm Acuity: Acute Type of Exam: Initial FINDINGS: There is no evidence of acute fracture. There is normal alignment. No acute joint abnormality. No focal osseous lesion. No focal soft tissue abnormality. No acute osseous abnormality. Ct Head Wo Contrast    Result Date: 8/16/2020  EXAMINATION: CT OF THE HEAD WITHOUT CONTRAST  8/16/2020 3:15 pm TECHNIQUE: CT of the head was performed without the administration of intravenous contrast. Dose modulation, iterative reconstruction, and/or weight based adjustment of the mA/kV was utilized to reduce the radiation dose to as low as reasonably achievable. COMPARISON: None. HISTORY: ORDERING SYSTEM PROVIDED HISTORY: Fall TECHNOLOGIST PROVIDED HISTORY: Reason for exam:->Fall Has a \"code stroke\" or \"stroke alert\" been called? ->No Reason for Exam: Fall Acuity: Acute Type of Exam: Initial FINDINGS: BRAIN/VENTRICLES: The ventricles and sulci are diffusely enlarged. Low attenuation is seen in the periventricular and subcortical white matter. No acute intracranial hemorrhage or acute infarct is identified. ORBITS: The visualized portion of the orbits demonstrate no acute abnormality. SINUSES: The visualized paranasal sinuses and mastoid air cells demonstrate no acute abnormality. SOFT TISSUES/SKULL:  No acute abnormality of the visualized skull or soft tissues. No acute intracranial abnormality. Ct Cervical Spine Wo Contrast    Result Date: 8/16/2020  EXAMINATION: CT OF THE CERVICAL SPINE WITHOUT CONTRAST 8/16/2020 3:15 pm TECHNIQUE: CT of the cervical spine was performed without the administration of intravenous contrast. Multiplanar reformatted images are provided for review.  Dose modulation, iterative reconstruction, and/or weight based adjustment of the mA/kV was utilized to reduce the radiation dose to as low as reasonably achievable. COMPARISON: None. HISTORY: ORDERING SYSTEM PROVIDED HISTORY: Fall TECHNOLOGIST PROVIDED HISTORY: Reason for exam:->Fall Reason for Exam: Fall Acuity: Acute Type of Exam: Initial FINDINGS: BONES/ALIGNMENT: There is no acute fracture or traumatic malalignment. DEGENERATIVE CHANGES: Multilevel degenerative changes. SOFT TISSUES: There is no prevertebral soft tissue swelling. No acute abnormality of the cervical spine. Xr Chest Portable    Result Date: 8/16/2020  EXAMINATION: ONE XRAY VIEW OF THE CHEST 8/16/2020 2:55 pm COMPARISON: None. HISTORY: ORDERING SYSTEM PROVIDED HISTORY: Fall TECHNOLOGIST PROVIDED HISTORY: Reason for exam:->Fall Reason for Exam: Frequent falls at home. Bruising mid shaft L forearm Acuity: Acute Type of Exam: Initial FINDINGS: The lungs are without acute focal process. There is no effusion or pneumothorax. The cardiomediastinal silhouette is without acute process. The osseous structures are without acute process. No acute process.      Mario Jiang MD
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
## 2020-09-04 NOTE — PROGRESS NOTES
Occupational Therapy  Facility/Department: [address] ORTHO/NEURO NURSING  Daily Treatment Note  NAME: Rox Dumont  : 1955  MRN: [id number]    Date of Service: 2020    Discharge Recommendations: Rox Dumont scored a  on the AM-PAC ADL Inpatient form. Current research shows that an AM-PAC score of 17 or less is typically not associated with a discharge to the patient's home setting. Based on the patient's AM-PAC score and their current ADL deficits, it is recommended that the patient have 3-5 sessions per week of Occupational Therapy at d/c to increase the patient's independence. Please see assessment section for further patient specific details. If patient discharges prior to next session this note will serve as a discharge summary. Please see below for the latest assessment towards goals. OT Equipment Recommendations  Equipment Needed: No  Other: TBD next level of care    Assessment   Performance deficits / Impairments: Decreased functional mobility ; Decreased ADL status; Decreased cognition;Decreased endurance;Decreased balance;Decreased safe awareness;Decreased high-level IADLs  Assessment: Pt is not at her baseline level of occupational function, based on the above deficits associated wtih pelvic abscess. Pt would benefit from continued skilled acute OT services to address these deficits. Treatment Diagnosis: Decreased ADL/IADL status, functional mobility, endurance, cognition, balance and safety awareness associated with pelvic abscess  Prognosis: Fair;Good  OT Education: OT Role;Plan of Care;Transfer Training  Patient Education: Education for benefits of OOB activity. Patient needs continued reinforcement of activities  Barriers to Learning: Cognition  Activity Tolerance  Activity Tolerance: Patient limited by fatigue;Treatment limited secondary to decreased cognition  Safety Devices  Safety Devices in place: Yes  Type of devices:  All fall risk precautions in place;Call light within reach; Chair alarm in place; Patient at risk for falls;Nurse notified; Left in chair;Gait belt         Patient Diagnosis(es): The primary encounter diagnosis was Septicemia ([facility] [address] [address] Diagnoses of PAPITO (acute kidney injury) ([facility] [address] Intra-abdominal free air of unknown etiology, and Fall, initial encounter were also pertinent to this visit. has no past medical history on file. has a past surgical history that includes Tonsillectomy; Eye surgery; Upper gastrointestinal endoscopy (N/A, 8/20/2020); Colonoscopy (N/A, 8/20/2020); Colonoscopy (8/20/2020); hemicolectomy (Right, 8/24/2020); liver biopsy (8/24/2020); Abdomen surgery (N/A, 8/28/2020); and Dilation and curettage of uterus (N/A, 8/28/2020). Restrictions  Restrictions/Precautions  Restrictions/Precautions: Fall Risk(High fall risk, full liquid diet)  Required Braces or Orthoses?: No  Position Activity Restriction  Other position/activity restrictions: This is a 58-year-old female with apparent history of frequent falls who presents after being found on the ground by her family. When the patient arrived she was disheveled, incontinent. She denies any specific complaints. The patient's work-up today was extensive. Her work-up included a CT of the abdomen that shows findings worrisome for possible abscess versus perforation. Questionable colon mass with metastatic lesions to liver and lung. Diagnosed with sepsis. Status-post exploratory laparotomy, evacuation of ascites, drainage of pelvic abscess, right hemicolectomy with primary ileotransverse colostomy anastomosis, and Abdiaziz-Cut liver biopsy  on 8/24/2020 for colon mass, right colon and hepatic flexure with necrotic cancer at hepatic flexure region with perforation, cirrhosis, pelvic abscess, ascites.  8/28/2020: secondary abdominal wound closure, dilatation and curettage  Subjective   General  Chart Reviewed: Yes  Response to previous treatment: Patient with no complaints from previous session  Family / Caregiver Present: Yes(Camelia CHAMBERLAIN called for pain meds)  Referring Practitioner: Kojo Matthews MD , for d/c planning  Diagnosis: Pelvic abscess, s/p Exploratory laparotomy, evacuation of ascites, drainage of pelvic abscess, right hemicolectomy  Subjective  Subjective: Pt supine in bed upon arrival, agreeable to therapy with education and encouragement. c/o \"fatigue and wanting to sleep. \"  General Comment  Comments: Pt goes by \"Chantelle\"  Pain Assessment  Pain Assessment: 0-10  Pain Level: 7  Pain Type: Surgical pain  Pain Location: Abdomen  Pain Orientation: Mid  Pre Treatment Pain Screening  Intervention List: Patient able to continue with treatment;Nurse/Physician notified  Vital Signs  Patient Currently in Pain: Yes   Orientation  Orientation  Orientation Level: Oriented to person;Oriented to situation;Disoriented to time;Disoriented to place(knows \[facility] [place] [facility]\"" but thinks she is in [place])  Objective    ADL  Grooming: Setup(seated, washed face)  UE Bathing: Moderate assistance(barrier cream applied under breasts)  LE Bathing: Maximum assistance  UE Dressing: Moderate assistance(gown)  LE Dressing: Dependent/Total(brief in bed, socks already on)  Toileting: Dependent/Total(incontinent of scant amount of bowel movement)        Balance  Sitting Balance: Stand by assistance(dynamic within DIDI seated on EOB)  Standing Balance: Dependent/Total(assist of 2 to STEADY)  Functional Mobility  Functional - Mobility Device: Other(STEADY)  Assist Level: Dependent/Total  Functional Mobility Comments: bed>chair with steady  Bed mobility  Rolling to Left: Moderate assistance  Rolling to Right: Moderate assistance  Supine to Sit: 2 Person assistance;Dependent/Total(Mod of 2)  Scooting: Moderate assistance(seated scooting back on bed)  Comment: Patient complains of being warm and dizzy at EOB, vitals taken all [facility].   Transfers  Sit to stand: Dependent/Total;2 Person assistance(Mod of 2 sit >stand from EOB to STEADY, Min of 2 from high STEADY seat to stance.)  Stand to sit: Dependent/Total(stand to sit from STEADY to chair with Mod of 2)                       Cognition  Overall Cognitive Status: Exceptions  Arousal/Alertness: Appropriate responses to stimuli(slow speech, responds to questions)  Following Commands: Follows one step commands with repetition; Follows one step commands with increased time  Attention Span: Difficulty dividing attention  Memory: Decreased recall of recent events(initially decreased recall of , however was able to recall with time)  Safety Judgement: Decreased awareness of need for safety  Problem Solving: Decreased awareness of errors;Assistance required to correct errors made;Assistance required to implement solutions;Assistance required to generate solutions  Insights: Decreased awareness of deficits  Initiation: Requires cues for some  Sequencing: Requires cues for some                                         Plan   Plan  Times per week: 3-5  Times per day: Daily  Specific instructions for Next Treatment: cotx  Current Treatment Recommendations: Patient/Caregiver Education & Training, Functional Mobility Training, Safety Education & Training, Self-Care / ADL, Endurance Training, Cognitive Reorientation    AM-PAC Score        AM-[facility] Inpatient Daily Activity Raw Score: 12 (20)  AM-PAC Inpatient ADL T-Scale Score : 30.6 (20)  ADL Inpatient CMS 0-100% Score: 66.57 (20)  ADL Inpatient CMS G-Code Modifier : CL (20)    Goals  Short term goals  Time Frame for Short term goals: Discharge  Short term goal 1: SBA for bed mobility - discontinue goal   Short term goal 2: S/U for feeding/grooming - continue goal   Short term goal 3: Supervision for UB bathing/Min A for UB dressing -discontinue goal   Short term goal 4: Mod A for LB bathing/dressing - discontinue goal   Short term goal 5: CGA for functional transfers to ADL surfaces w/RW - discontinue goal

## 2020-09-04 NOTE — PROGRESS NOTES
Physical Therapy  Facility/Department: [address] ORTHO/NEURO NURSING  Daily Treatment Note  NAME: Azalia Chandra  : 1955  MRN: [id number]    Date of Service: 2020    Discharge Maria Dolores Esquivel scored a  on the AM-PAC short mobility form. Current research shows that an AM-PAC score of 17 or less is typically not associated with a discharge to the patient's home setting. Based on the patient's AM-PAC score and their current functional mobility deficits, it is recommended that the patient have 3-5 sessions per week of Physical Therapy at d/c to increase the patient's independence. Please see assessment section for further patient specific details. If patient discharges prior to next session this note will serve as a discharge summary. Please see below for the latest assessment towards goals. 3-5 sessions per week   PT Equipment Recommendations  Equipment Needed: No    Assessment   Body structures, Functions, Activity limitations: Decreased functional mobility ; Decreased ADL status; Decreased ROM; Decreased strength;Decreased safe awareness;Decreased cognition;Decreased endurance;Decreased balance; Increased pain;Decreased posture  Assessment: Pt limited by the above deficits and would benefit from continued skilled care to maximize pt functional mobility and safety. Pt requires a lot of encouragement and education to get out of bed this date. Pt fatigues very quickly. Recommend rehab to facilitate return to [facility]. PT Education: Goals; General Safety;PT Role;Plan of Care;Home Exercise Program;Transfer Training  Patient Education: Importance of mobility and OOB activity. Pt verbalizing understanding. Barriers to Learning: cognition  REQUIRES PT FOLLOW UP: Yes  Activity Tolerance  Activity Tolerance: Patient limited by fatigue;Patient limited by endurance     Patient Diagnosis(es): The primary encounter diagnosis was Septicemia ([facility] [address]  Diagnoses of PAPITO (acute kidney injury) ([facility] [address] Intra-abdominal free air of unknown etiology, and Fall, initial encounter were also pertinent to this visit. has no past medical history on file. has a past surgical history that includes Tonsillectomy; Eye surgery; Upper gastrointestinal endoscopy (N/A, 8/20/2020); Colonoscopy (N/A, 8/20/2020); Colonoscopy (8/20/2020); hemicolectomy (Right, 8/24/2020); liver biopsy (8/24/2020); Abdomen surgery (N/A, 8/28/2020); and Dilation and curettage of uterus (N/A, 8/28/2020). Restrictions  Restrictions/Precautions  Restrictions/Precautions: Fall Risk(High fall risk, full liquid diet)  Required Braces or Orthoses?: No  Position Activity Restriction  Other position/activity restrictions: This is a 58-year-old female with apparent history of frequent falls who presents after being found on the ground by her family. When the patient arrived she was disheveled, incontinent. She denies any specific complaints. The patient's work-up today was extensive. Her work-up included a CT of the abdomen that shows findings worrisome for possible abscess versus perforation. Questionable colon mass with metastatic lesions to liver and lung. Diagnosed with sepsis. Status-post exploratory laparotomy, evacuation of ascites, drainage of pelvic abscess, right hemicolectomy with primary ileotransverse colostomy anastomosis, and Abdiaziz-Cut liver biopsy  on 8/24/2020 for colon mass, right colon and hepatic flexure with necrotic cancer at hepatic flexure region with perforation, cirrhosis, pelvic abscess, ascites. 8/28/2020: secondary abdominal wound closure, dilatation and curettage     Subjective   General  Chart Reviewed: Yes  Response To Previous Treatment: Patient with no complaints from previous session.   Family / Caregiver Present: No  Subjective  Subjective: Pt reports abd pain 7/10; nursing, Camelia, called and attending to pt needs  General Comment  Comments: Pt supine in bed upon arrival. Agreeable to PT/OT; pt requires encouragement Education & Training, Home Exercise Program, Patient/Caregiver Education & Training, Equipment Evaluation, Education, & procurement  Safety Devices  Type of devices:  All fall risk precautions in place, Call light within reach, Chair alarm in place, Gait belt, Patient at risk for falls, Left in chair, Nurse notified  Restraints  Initially in place: No     Therapy Time   Individual Concurrent Group Co-treatment   Time In 0852         Time Out 0932         Minutes 40         Timed Code Treatment Minutes: Marilin 1981, [facility], [facility]

## 2020-09-04 NOTE — PROGRESS NOTES
Speech Language Pathology  Dysphagia Treatment Note    Name:  Yoni Berry  :   1955  Medical Diagnosis:  Pelvic abscess in female [N73.9]  Pelvic abscess in female [N73.9]  Treatment Diagnosis: Oropharyngeal Dysphagia  Pain level: denied    Current Diet Level: pt was on a full liquid diet yesterday but was recently upgraded to a general diet this date. Tolerance of Current Diet Level: Per RN and pt, no difficulty with current diet    Assessment of Texture Tolerance:  -Impressions: Pt seen sitting upright in bed, alert and cooperative. Sister present at bedside. Pt reported no difficulty with swallowing. She reported she has not felt sensation of food/drinks feeling \"stuck\" like she did yesterday. Pt agreeable to trials of thin liquids, mixed consistency peaches in fruit juice, and regular/dry texture brian crackers. Pt with limited dentition, so mastication was prolonged. Mildly increased time for bolus formation but complete oral clearance achieved. Thin liquids revealed suspected premature spillage to pharynx, delayed swallow initiation, and apparent reduced laryngeal elevation. Intermittent throat clearing noted during session, which did not appear to be texture specific. Pt did not feel like any textures provided felt stuck in throat. Pt appears to demonstrate improved pharyngeal clearance this date. Educated pt re: swallow strategies/aspiration precautions, pt v/u. At this time, recommend continue regular texture diet/thin liquids.  Continue to monitor for s/s aspiration or changes in respiratory status.    -Compensatory strategies:  90 degree positioning with all p.o. intake; small bites/sips; alternate textures through meal; reduce rate of intake    Diet and Treatment Recommendations:  Continue regular texture diet/thin liquids--encourage pt to order softer menu items; meds as tolerated    ST.) Pt will tolerate recommended diet without s/s of aspiration (ongoing, 2020)  2.) If clinical symptoms of penetration/aspiration continue to be noted,Pt will tolerate MBS to r/o aspiration and determine appropriate diet/liquid level. (ongoing, 9/4/2020)  3.) Pt will tolerate diet advance to least restricted diet, as clinically indicated, with no signs of aspiration (ongoing, 9/4/2020)    Plan:  Continued daily Dysphagia treatment with goals per plan of care. Patient/Family Education:Education given to the Pt and nurse, who verbalized understanding    Discharge Recommendations:  Pt will benefit from continued skilled Speech Therapy for Dysphagia services, prior to returning home. Timed Code Treatment: 0 minutes    Total Treatment Time: 15 minutes    If patient discharges prior to next session this note will serve as a discharge summary. Signature:    Derrick Causey M.S. Brenda  Speech-Language Pathologist

## 2020-09-04 NOTE — PROGRESS NOTES
[facility] PROGRESS NOTE    9/3/2020 8:49 PM        Name: Pedro Zamarripa . Admitted: 8/16/2020  Primary Care Provider: No primary care provider on file. (Tel: None)    Brief Course:   Patient is a 77 yo female with hx nicotine use. The patient lives alone, she presented to hospital after being found on floor by her daughter. She has had multiple falls in past couple of months. CT abdomen and pelvis in ER showed pelvic abscess vs viscus perf. Colonic wall thickening, concerning for malignancy, liver cirrhosis portal hypertension. She had Ct-guided drain placement 8/18 for pelvic abscess 8/18. Subsequently found to have large ascending colon mass on colonoscopy 8/20, likely the source of abscess.       8/18/2020: Successful CT guided placement of a drainage catheter in the pelvic abscess.      8/24/2020: Exploratory laparotomy, evacuation of ascites, drainage of  pelvic abscess, right hemicolectomy with primary ileotransverse  colostomy anastomosis, and Abdiaziz-Cut liver biopsy.     8/28/2020: SECONDARY ABDOMINAL WOUND CLOSURE  DILATATION AND CURETTAGE  8/31/2020: NGT d/c'd   9/1/2020    CC: Pelvic abscess    Subjective: Patient seen and examined today. No acute events overnight;  tolerating PO diet, TPN discontinued. Amado's and drain out.       Reviewed interval ancillary notes    Current Medications  metoclopramide (REGLAN) injection 10 mg, Q6H  bisacodyl (DULCOLAX) suppository 10 mg, Once  lidocaine PF 1 % injection 5 mL, Once  sodium chloride flush 0.9 % injection 10 mL, 2 times per day  sodium chloride flush 0.9 % injection 10 mL, PRN  PN-Adult Premix 5/20 - Standard Electrolytes - Central Line, Continuous TPN  lactobacillus (CULTURELLE) capsule 1 capsule, BID WC  insulin lispro (1 Unit Dial) 0-6 Units, Q4H  rifaximin (XIFAXAN) tablet 550 mg, BID  enoxaparin (LOVENOX) injection 30 mg, BID  [START ON 9/6/2020] fat emulsion 20 % infusion 250 mL, Once per day on Mon Thu  acetaminophen (TYLENOL) tablet 1,000 mg, Q6H PRN  lidocaine (XYLOCAINE) 2 % jelly, Once  phenol 1.4 % mouth spray 1 spray, Q2H PRN  glucose (GLUTOSE) 40 % oral gel 15 g, PRN  dextrose 50 % IV solution, PRN  glucagon (rDNA) injection 1 mg, PRN  dextrose 5 % solution, PRN  folic acid (FOLVITE) tablet 1 mg, Daily  ondansetron (ZOFRAN) injection 4 mg, Q4H PRN  promethazine (PHENERGAN) injection 12.5 mg, Q6H PRN  fluconazole (DIFLUCAN) in 0.9 % sodium chloride IVPB 200 mg, Q24H  meropenem (MERREM) 1 g in sodium chloride 0.9 % 100 mL IVPB (mini-bag), Q12H  pantoprazole (PROTONIX) injection 40 mg, Daily  potassium chloride (KLOR-CON M) extended release tablet 40 mEq, PRN    Or  potassium bicarb-citric acid (EFFER-K) effervescent tablet 40 mEq, PRN    Or  potassium chloride 10 mEq/100 mL IVPB (Peripheral Line), PRN  lactulose (CHRONULAC) 10 GM/15ML solution 20 g, TID  lip balm petroleum free (PHYTOPLEX) stick, PRN  sodium chloride flush 0.9 % injection 10 mL, 2 times per day  sodium chloride flush 0.9 % injection 10 mL, PRN  polyethylene glycol (GLYCOLAX) packet 17 g, Daily PRN        Objective:  /80   Pulse 84   Temp 97.7 °F (36.5 °C) (Oral)   Resp 18   Ht 5' 7\" (1.702 m)   Wt 236 lb 6 oz (107.2 kg)   SpO2 99%   BMI 37.02 kg/m²     Intake/Output Summary (Last 24 hours) at 9/3/2020 2049  Last data filed at 9/3/2020 1736  Gross per 24 hour   Intake 1872 ml   Output 485 ml   Net 1387 ml      Wt Readings from Last 3 Encounters:   08/31/20 236 lb 6 oz (107.2 kg)       General appearance: Ill-looking  Eyes: Sclera clear. Pupils equal.  ENT: Moist oral mucosa. Trachea midline, no adenopathy. Cardiovascular: Regular rhythm, normal S1, S2. No murmur. No edema in lower extremities  Respiratory: Not using accessory muscles. Good inspiratory effort. Clear to auscultation bilaterally, no wheeze or crackles.    GI: Abdomen soft, surgical dressing in place; drain by the side  Musculoskeletal: No cyanosis in digits, neck supple  Neurology: CN 2-12 grossly intact. No speech or motor deficits  Psych: Normal affect. Alert and oriented in time, place and person  Skin: Warm, dry, normal turgor  Extremity exam shows brisk capillary refill. Peripheral pulses are palpable in lower extremities     Labs and Tests:  CBC:   Recent Labs     09/01/20  0634 09/02/20  0550 09/02/20  1350 09/03/20  0426   WBC 13.7* 8.9  --  9.9   HGB 9.1* 7.8* 8.0* 8.6*   * 74*  --  85*     BMP:    Recent Labs     09/01/20  0634 09/02/20  0550 09/03/20  0426    142 141   K 4.3 4.2 4.6   * 114* 114*   CO2 19* 21 21   BUN 23* 27* 31*   CREATININE 0.7 0.6 0.9   GLUCOSE 135* 111* 88     Hepatic:   Recent Labs     09/01/20  0634 09/02/20  0550 09/03/20  0426   AST 15 16 44*   ALT 6* <5* 13   BILITOT 0.6 0.6 0.7   ALKPHOS 90 78 146*     IR US GUIDED PARACENTESIS   Final Result   Successful ultrasound guided paracentesis. CT CHEST ABDOMEN PELVIS W CONTRAST   Final Result   1. Multifocal bilateral pulmonary ground-glass opacities. The differential   includes atypical infectious/inflammatory pneumonitis. 2. Multiple bilateral pulmonary nodules concerning for metastatic disease. 3. Cirrhosis with increasing abdominopelvic ascites. 4. Improving 7 cm recto uterine abscess. XR ABDOMEN (KUB) (SINGLE AP VIEW)   Final Result   Multiple distended small bowel loops, similar to the previous study. No   significant colonic gas. Findings are concerning for a SBO. XR CHEST PORTABLE   Final Result   Nasogastric tube is seen extending below the diaphragm. Dedicated abdominal   radiograph could be performed for better visualization as warranted. Bilateral airspace disease, increased on the right as compared to prior, for   which pneumonia or edema are considerations.          XR ABDOMEN (2 VIEWS)   Final Result   Scattered dilated small bowel loops throughout the abdomen, increased compared to prior CT scan, either due to ileus or early partial small bowel   obstruction         XR CHEST PORTABLE   Final Result   Lucency beneath the right hemidiaphragm compatible with free intraperitoneal   air. Apparently the patient has had recent abdominal surgery. This finding   was discussed with Dr. Jose Polo  At 6:48 pm on 8/24/2020. Status post placement of right jugular central venous catheter. No   pneumothorax. Patchy mid and lower lung ground-glass opacities suspicious for pneumonia,   increased since the prior CT. Correlation for pneumonia is recommended. CT ABSCESS DRAINAGE W CATH PLACEMENT S&I   Final Result   Successful CT guided placement of a drainage catheter in the pelvic abscess. CT ABDOMEN PELVIS W IV CONTRAST Additional Contrast? Oral   Final Result   1. Pelvic air/fluid collection almost certainly reflects an abscess or giant   colonic diverticula. This may be the result of acute diverticulitis. 2. Mass lesion ascending colon almost certainly reflecting primary   adenocarcinoma of the colon. 3. Diffuse hepatic lesions with newly visualized (contrast enhancement) mass   at the hepatic dome. Hepatic metastatic disease is a consideration as is   cirrhosis with regenerative nodules and hepatocellular carcinoma. 4. Bilateral lower lobe pulmonary nodules suspicious for metastatic disease. 5. Abdominal and pelvic ascites. 6. Sequela from portosystemic shunting with numerous collateral vessels about   the stomach, left upper quadrant and distal esophagus. Relatively small   esophageal and gastric varices. 7.  Calcific atherosclerotic disease aorta. CT ABDOMEN PELVIS WO CONTRAST Additional Contrast? None   Final Result   1. Non loculated fluid and gas collection in the pelvis concerning for   perforated viscus/possible abscess formation.    2. Prominent thickening of the ascending colon which may be related to   infectious or inflammatory Complex care coordination  Resolved Problems:    * No resolved hospital problems. *       Assessment & Plan:     Pelvic abscess. Status post IR drainage 8/18, pelvic fluid cytology negative for malignant cells. Colonoscopy 8/20 with large necrotic ascending colon mass, likely source of abscess. Status post exp lap right transverse colectomy 8/24. Status post secondary wound closure 8/28. Initially on Zosyn, transitioned to IV cipro (8/20). IV Flagyl added 8/25. Now on meropenem and flucanazole. Remains afebrile, WBC trending down. Blood culture results (8/25) with no growth to date. Surgery and ID on on board    Liver cirrhosis / portal HTN. Biopsy consistent with WINKLER cirrhosis, negative for adenocarcinoma involvement. Started on lactulose for ammonia level 66 and confusion, ammonia level 23 (8/28). GI following. S/P paracentesis; 10 L removed 9/1/2020 ,fluid for cx. Colon cancer. CT scan showed thickening of ascending colon suspicious for malignancy, lesions liver and lung concerning for metastasis. CEA 83.3, AFP 2.4. Colon biopsy (8/24) showed poorly differentiate adenocarcinoma, metastatic adenocarcinoma in 3 of 232 lymph nodes. Patient will need CT chest for staging once renal function improves. Plan is for chemotherapy once she recovers from surgery. Oncology on board. Grade 1 endometrial cancer: Poor candidate for any surgery at this time per oncology    PAPITO. Improved ; urology on board appreciate their recs  Creatinine 1.6 on admission suspected pre-renal, hypovolemia. Abnormal CXR (8/27). Bilateral airspace disease, increased on right as compared to prior, pneumonia or edema are considerations. Possible 3rd spacing, received albumin. COVID-19 (8/22) negative. Remains afebrile, no cough. Encouraged IS use. Acute anemia. Hgb 10.9 on admission, no previous studies for comparison. Suspect she likely had chronic iron deficiency anemia secondary to colon cancer and post menopausal bleeding.  She received 1 unit PRBCs 8/27, Hgb/Hct stable      DC planning: Plan is for SNF on DC. Case management is assisting, family preference is [place] or [address] She is likely inpatient for the next 3 to 4 days        Diet: DIET FULL LIQUID;  Dietary Nutrition Supplements: Standard High Calorie Oral Supplement  PN-Adult Premix 5/20 - Standard Electrolytes - Central Line  Code:Full Code  DVT PPX: On Lovenox  Disposition  Plan is for SNF on DC. Case management is assisting, family preference is [facility] or [address]  She is likely inpatient for the next 2-3 days           Raquel Sibley MD   9/3/2020 8:49 PM

## 2020-09-04 NOTE — PLAN OF CARE
Problem: Falls - Risk of:  Goal: Will remain free from falls  Description: Will remain free from falls  9/4/2020 0122 by Dustin Escobar RN  Outcome: Ongoing  9/3/2020 1418 by Martha Cortes RN  Outcome: Ongoing  Goal: Absence of physical injury  Description: Absence of physical injury  9/4/2020 0122 by Dustin Escobar RN  Outcome: Ongoing  9/3/2020 1418 by Martha Cortes RN  Outcome: Ongoing     Problem: Skin Integrity:  Goal: Will show no infection signs and symptoms  Description: Will show no infection signs and symptoms  9/4/2020 0122 by Dustin Escobar RN  Outcome: Ongoing  9/3/2020 1418 by Martha Cortes RN  Outcome: Ongoing  Goal: Absence of new skin breakdown  Description: Absence of new skin breakdown  9/4/2020 0122 by Dustin Escobar RN  Outcome: Ongoing  9/3/2020 1418 by Martha Cortes RN  Outcome: Ongoing     Problem: Infection:  Goal: Will remain free from infection  Description: Will remain free from infection  9/4/2020 0122 by Dustin Escobar RN  Outcome: Ongoing  9/3/2020 1418 by Martha Cortes RN  Outcome: Ongoing     Problem: Safety:  Goal: Free from accidental physical injury  Description: Free from accidental physical injury  9/4/2020 0122 by Dustin Escobar RN  Outcome: Ongoing  9/3/2020 1418 by Martha Cortes RN  Outcome: Ongoing  Goal: Free from intentional harm  Description: Free from intentional harm  9/4/2020 0122 by Dustin Escobar RN  Outcome: Ongoing  9/3/2020 1418 by Martha Cortes RN  Outcome: Ongoing     Problem: Daily Care:  Goal: Daily care needs are met  Description: Daily care needs are met  9/4/2020 0122 by Dustin Escobar RN  Outcome: Ongoing  9/3/2020 1418 by Martha Cortes RN  Outcome: Ongoing     Problem: Pain:  Goal: Patient's pain/discomfort is manageable  Description: Patient's pain/discomfort is manageable  9/4/2020 0122 by Dustin Escobar RN  Outcome: Ongoing  9/3/2020 1418 by Martha Cortes RN  Outcome: Ongoing  Goal: Pain level will decrease  Description: Pain level will decrease  9/4/2020 0122 by Yoni Malhotra RN  Outcome: Ongoing  9/3/2020 1418 by Umm Small RN  Outcome: Ongoing  Goal: Control of acute pain  Description: Control of acute pain  9/4/2020 0122 by Yoni Malhotra RN  Outcome: Ongoing  9/3/2020 1418 by Umm Small RN  Outcome: Ongoing  Goal: Control of chronic pain  Description: Control of chronic pain  9/4/2020 0122 by Yoni Malhotra RN  Outcome: Ongoing  9/3/2020 1418 by Umm Small RN  Outcome: Ongoing     Problem: Skin Integrity:  Goal: Skin integrity will stabilize  Description: Skin integrity will stabilize  9/4/2020 0122 by Yoni Malhotra RN  Outcome: Ongoing  9/3/2020 1418 by Umm Small RN  Outcome: Ongoing     Problem: Discharge Planning:  Goal: Patients continuum of care needs are met  Description: Patients continuum of care needs are met  9/4/2020 0122 by Yoni Malhotra RN  Outcome: Ongoing  9/3/2020 1418 by Umm Small RN  Outcome: Ongoing     Problem: Nutrition  Goal: Optimal nutrition therapy  9/4/2020 0122 by Yoni Malhotra RN  Outcome: Ongoing  9/3/2020 1418 by Umm Small RN  Outcome: Ongoing     Problem: Confusion - Acute:  Goal: Absence of continued neurological deterioration signs and symptoms  Description: Absence of continued neurological deterioration signs and symptoms  9/4/2020 0122 by Yoni Malhotra RN  Outcome: Ongoing  9/3/2020 1418 by Umm Small RN  Outcome: Ongoing  Goal: Mental status will be restored to baseline  Description: Mental status will be restored to baseline  9/4/2020 0122 by Yoni Malhotra RN  Outcome: Ongoing  9/3/2020 1418 by Umm Small RN  Outcome: Ongoing     Problem: Discharge Planning:  Goal: Ability to perform activities of daily living will improve  Description: Ability to perform activities of daily living will improve  9/4/2020 0122 by Yoni Malhotra RN  Outcome: Ongoing  9/3/2020 1418 by Umm Small RN  Outcome: Ongoing  Goal: Participates in care planning  Description: Participates in care planning  9/4/2020 0122 by Estevan Saucedo RN  Outcome: Ongoing  9/3/2020 1418 by Jessica López RN  Outcome: Ongoing     Problem: Injury - Risk of, Physical Injury:  Goal: Will remain free from falls  Description: Will remain free from falls  9/4/2020 0122 by Estevan Saucedo RN  Outcome: Ongoing  9/3/2020 1418 by Jessica López RN  Outcome: Ongoing  Goal: Absence of physical injury  Description: Absence of physical injury  9/4/2020 0122 by Estevan Saucedo RN  Outcome: Ongoing  9/3/2020 1418 by Jessica López RN  Outcome: Ongoing     Problem: Mood - Altered:  Goal: Mood stable  Description: Mood stable  9/4/2020 0122 by Estevan Saucedo RN  Outcome: Ongoing  9/3/2020 1418 by Jessica López RN  Outcome: Ongoing  Goal: Absence of abusive behavior  Description: Absence of abusive behavior  9/4/2020 0122 by Estevan Saucedo RN  Outcome: Ongoing  9/3/2020 1418 by Jessica López RN  Outcome: Ongoing  Goal: Verbalizations of feeling emotionally comfortable while being cared for will increase  Description: Verbalizations of feeling emotionally comfortable while being cared for will increase  9/4/2020 0122 by Estevan Saucedo RN  Outcome: Ongoing  9/3/2020 1418 by Jessica López RN  Outcome: Ongoing     Problem: Psychomotor Activity - Altered:  Goal: Absence of psychomotor disturbance signs and symptoms  Description: Absence of psychomotor disturbance signs and symptoms  9/4/2020 0122 by Estevan Saucedo RN  Outcome: Ongoing  9/3/2020 1418 by Jessica López RN  Outcome: Ongoing     Problem: Sensory Perception - Impaired:  Goal: Demonstrations of improved sensory functioning will increase  Description: Demonstrations of improved sensory functioning will increase  9/4/2020 0122 by Estevan Saucedo RN  Outcome: Ongoing  9/3/2020 1418 by Jessica López RN  Outcome: Ongoing  Goal: Decrease in sensory misperception frequency  Description: Decrease in sensory misperception frequency  9/4/2020 0122 by Yoni Malhotra RN  Outcome: Ongoing  9/3/2020 1418 by Umm Small RN  Outcome: Ongoing  Goal: Able to refrain from responding to false sensory perceptions  Description: Able to refrain from responding to false sensory perceptions  9/4/2020 0122 by Yoni Malhotra RN  Outcome: Ongoing  9/3/2020 1418 by Umm Small RN  Outcome: Ongoing  Goal: Demonstrates accurate environmental perceptions  Description: Demonstrates accurate environmental perceptions  9/4/2020 0122 by Yoni Malhotra RN  Outcome: Ongoing  9/3/2020 1418 by Umm Small RN  Outcome: Ongoing  Goal: Able to distinguish between reality-based and nonreality-based thinking  Description: Able to distinguish between reality-based and nonreality-based thinking  9/4/2020 0122 by Yoni Malhotra RN  Outcome: Ongoing  9/3/2020 1418 by Umm Small RN  Outcome: Ongoing  Goal: Able to interrupt nonreality-based thinking  Description: Able to interrupt nonreality-based thinking  9/4/2020 0122 by Yoni Malhotra RN  Outcome: Ongoing  9/3/2020 1418 by Umm Small RN  Outcome: Ongoing     Problem: Sleep Pattern Disturbance:  Goal: Appears well-rested  Description: Appears well-rested  9/4/2020 0122 by Yoni Malhotra RN  Outcome: Ongoing  9/3/2020 1418 by Umm Small RN  Outcome: Ongoing     Problem: HEMODYNAMIC STATUS  Goal: Patient has stable vital signs and fluid balance  9/4/2020 0122 by Yoni Malhotra RN  Outcome: Ongoing  9/3/2020 1418 by Umm Small RN  Outcome: Ongoing     Problem: OXYGENATION/RESPIRATORY FUNCTION  Goal: Patient will achieve/maintain normal respiratory rate/effort  9/4/2020 0122 by Yoni Malhotra RN  Outcome: Ongoing  9/3/2020 1418 by Umm Small RN  Outcome: Ongoing     Problem: ELIMINATION  Goal: Elimination patterns are normal or improving  Description: Elimination patterns return to pre-surgery normal patterns  9/4/2020 0122 by Tashi Orellana CINTIA Ram  Outcome: Ongoing  9/3/2020 1418 by Jessica López RN  Outcome: Ongoing     Problem: SKIN INTEGRITY  Goal: Skin integrity is maintained or improved  9/4/2020 0122 by Estevan Saucedo RN  Outcome: Ongoing  9/3/2020 1418 by Jessica López RN  Outcome: Ongoing

## 2020-09-04 NOTE — PROGRESS NOTES
[facility] 82 [facility]ar[facility]        Progress Note    Patient Name: Acacia Evans  MRN: [id number]  YOB: 1955  Date of Evaluation: 2020    Subjective:  Pt has appetite, no nausea, no emesis. Has had multiple BM  Pain controlled  Resting in chair    Postoperative Day # 11, 7      Vital Signs:  Patient Vitals for the past 24 hrs:   BP Temp Temp src Pulse Resp SpO2   20 1108 101/66 97.7 °F (36.5 °C) Oral 82 16 99 %   20 0900 111/60 98.1 °F (36.7 °C) Oral 85 14 --   20 0400 123/64 97.5 °F (36.4 °C) Oral 82 16 97 %   20 0350 122/64 97.8 °F (36.6 °C) Oral 78 14 98 %   20 0002 103/71 97.8 °F (36.6 °C) Oral 79 14 97 %   20 2215 113/75 97.8 °F (36.6 °C) Oral 80 18 97 %   20 1730 129/80 97.7 °F (36.5 °C) Oral 84 18 99 %      TEMPERATURE HISTORY 24H: Temp (24hrs), Av.8 °F (36.6 °C), Min:97.5 °F (36.4 °C), Max:98.1 °F (36.7 °C)    BLOOD PRESSURE HISTORY: Systolic (76UTW), GNX:838 , Min:101 , EA    Diastolic (54YRU), KBZ:98, Min:60, Max:83      Intake/Output:  I/O last 3 completed shifts: In: 2801 [P.O.:120; I.V.:1606]  Out: 260 [Urine:260]  No intake/output data recorded.   Drain/tube Output:  Closed/Suction Drain Left Back Bulb 10 [place]-Output (ml): 0 ml    Physical Exam:  General: awake, alert, oriented to person, place  Lungs: unlabored respirations  Abdomen: soft, nondistended, no tenderness, active bowel sounds present   Skin/Wound: wound incision is clean, no drainage, no cellulitis    Labs:  CBC:    Recent Labs     20  0550 20  1350 20  0426 20  0439   WBC 8.9  --  9.9 8.9   HGB 7.8* 8.0* 8.6* 8.3*   HCT 24.8* 24.8* 26.7* 25.5*   PLT 74*  --  85* 89*     BMP:    Recent Labs     20  0550 20  0426 20  0439    141 138   K 4.2 4.6 4.6   * 114* 111*   CO2 21 21 21   BUN 27* 31* 30*   CREATININE 0.6 0.9 0.7   GLUCOSE 111* 88 109*     Hepatic:    Recent Labs     20  0550 09/03/20  0426 09/04/20  0439   AST 16 44* 56*   ALT <5* 13 20   BILITOT 0.6 0.7 0.5   ALKPHOS 78 146* 191*     Amylase:  No results found for: AMYLASE  Lipase:  No results found for: LIPASE   Mag:    Lab Results   Component Value Date    MG 1.80 09/04/2020    MG 1.80 09/03/2020     Phos:     Lab Results   Component Value Date    PHOS 3.7 09/04/2020    PHOS 4.2 09/03/2020      Coags:   Lab Results   Component Value Date    PROTIME 13.0 09/04/2020    INR 1.12 09/04/2020    APTT 40.7 09/04/2020       Cultures:  Anaerobic culture  Lab Results   Component Value Date    LABANAE No anaerobes isolated 08/24/2020     Fungus stain  Results in Past 30 Days  Result Component Current Result Ref Range Previous Result Ref Range   Fungus Stain No Fungal elements seen (8/24/2020)  Not in Time Range      Gram stain  Results in Past 30 Days  Result Component Current Result Ref Range Previous Result Ref Range   Gram Stain Result No organisms seen  4+ WBC's (Polymorphonuclear)   (9/1/2020)  1+ WBC's (Polymorphonuclear)  No organisms seen   (8/24/2020)      Organism  Lab Results   Component Value Date/Time    ORG Yeast (A) 08/24/2020 02:10 PM    ORG Lactobacillus species (A) 08/24/2020 02:10 PM     Surgical culture  Lab Results   Component Value Date/Time    CXSURG Rare growth  No further workup   08/24/2020 02:10 PM    CXSURG Rare growth  No further workup   08/24/2020 02:10 PM     Blood culture 1  Results in Past 30 Days  Result Component Current Result Ref Range Previous Result Ref Range   Blood Culture, Routine No Growth after 4 days of incubation. (8/25/2020)  No Growth after 4 days of incubation. (8/16/2020)      Blood culture 2  Results in Past 30 Days  Result Component Current Result Ref Range Previous Result Ref Range   Culture, Blood 2 No Growth after 4 days of incubation. (8/25/2020)  No Growth after 4 days of incubation. (8/16/2020)      Fecal occult  No results found for requested labs within last 30 days.      GI bacterial pathogens by PCR  No results found for requested labs within last 30 days. C. difficile  No results found for requested labs within last 30 days. Urine culture  No results found for: Patricia Boss    Pathology:   OR Pathology results pending     Endoscopy Pathology 8/20/2020--FINAL DIAGNOSIS:     A. Esophagus, biopsy:   - Fragments of squamous and gastric type columnar mucosa with mild   chronic inflammation.   - Negative for intestinal metaplasia. B. Ascending colon mass, biopsy:   - Poorly differentiated carcinoma, most consistent with adenocarcinoma. See comment. C. Colon polyp, transverse:   - Tubular adenoma. - Negative for high-grade dysplasia or malignancy. D. Colon polyp, descending:   - Tubular adenoma. - Negative for high-grade dysplasia or malignancy. E. Colon polyp, sigmoid:   - Tubular adenoma (multiple fragments). - Negative for high-grade dysplasia or malignancy. COMMENT:  The morphologic findings of ascending colon mass raises a   differential diagnosis of neuroendocrine carcinoma.  However, the   malignant cells are negative for neuroendocrine markers (synaptophysin   and chromogranin) .  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  Clinical correlation is recommended. Imaging:  I have personally reviewed the following films:    Xr Abdomen (2 Views)    Result Date: 8/26/2020  EXAMINATION: TWO XRAY VIEWS OF THE ABDOMEN 8/26/2020 9:38 am COMPARISON: CT scan 08/17/2020 HISTORY: ORDERING SYSTEM PROVIDED HISTORY: Abdominal Pain TECHNOLOGIST PROVIDED HISTORY: Reason for exam:->Abdominal Pain Reason for Exam: abdominal pain Acuity: Acute Type of Exam: Initial FINDINGS: Scattered dilated loops small bowel are seen throughout the abdomen. No significant colonic gas is seen Drainage catheter projects in region the pelvis. Amado catheter is seen.      Scattered dilated small bowel loops throughout the abdomen, increased compared to prior CT scan, either due to ileus or early partial small bowel obstruction     Scheduled Meds:   furosemide  10 mg Intravenous BID    albumin human  12.5 g Intravenous BID    spironolactone  50 mg Oral Daily    bisacodyl  10 mg Rectal Once    lidocaine 1 % injection  5 mL Intradermal Once    sodium chloride flush  10 mL Intravenous 2 times per day    lactobacillus  1 capsule Oral BID     insulin lispro  0-6 Units Subcutaneous Q4H    rifaximin  550 mg Oral BID    enoxaparin  30 mg Subcutaneous BID    [START ON 9/6/2020] fat emulsion  250 mL Intravenous Once per day on Mon Thu    lidocaine   Topical Once    folic acid  1 mg Oral Daily    fluconazole  200 mg Intravenous Q24H    meropenem  1 g Intravenous Q12H    pantoprazole  40 mg Intravenous Daily    lactulose  20 g Oral TID    sodium chloride flush  10 mL Intravenous 2 times per day     Continuous Infusions:   PN-Adult Premix 5/20 - Standard Electrolytes - Central Line      PN-Adult Premix 5/20 - Standard Electrolytes - Central Line 75 mL/hr at 09/03/20 1736    dextrose 100 mL/hr (08/27/20 1300)     PRN Meds:.sodium chloride flush, acetaminophen, phenol, glucose, dextrose, glucagon (rDNA), dextrose, [DISCONTINUED] promethazine **OR** ondansetron, promethazine, potassium chloride **OR** potassium alternative oral replacement **OR** potassium chloride, lip balm petroleum free, sodium chloride flush, polyethylene glycol      Assessment:  76 y.o. female admitted with   1. Septicemia ([facility] [address]    2. PAPITO (acute kidney injury) ([facility] [address]    3. Intra-abdominal free air of unknown etiology    4.  Fall, initial encounter        Status-post exploratory laparotomy, evacuation of ascites, drainage of pelvic abscess, right hemicolectomy with primary ileotransverse colostomy anastomosis, and Abdiaziz-Cut liver biopsy  on 8/24/2020 for colon mass, right colon and hepatic flexure with necrotic cancer at hepatic flexure region with perforation, cirrhosis, pelvic abscess, ascites  Intraabdominal/pelvic abscess, status-post CT-guided drain placement on 8/18/2020  Sepsis   Cirrhosis  Acute kidney injury  Hyponatremia  COVID screening, negative  Anemia  Had secondary wound closure and D&C 8/28 - D& C positive for endometrial cancer  Paracentesis done    Plan:    Excellent progress  Regular diet  DC TPN  Amado and drain out  Looking good for discharge planning from surgery standpoint, SNF placement    EDUCATION:  Educated patient on plan of care and disease process--all questions answered. Plans discussed with patient and nursing.     Signed:      Renaldo Hernandez

## 2020-09-04 NOTE — PROGRESS NOTES
[facility]   Progress Note      9/4/2020 5:42 PM        Name: Jazimne Franco . Admitted: 8/16/2020    SUBJECTIVE:      Feels better. Abdominal pain has improved. No external bleeding  No fever.     Reviewed interval ancillary notes    Current Medications  furosemide (LASIX) injection 10 mg, BID  albumin human 5 % IV solution 12.5 g, BID  PN-Adult Premix 5/20 - Standard Electrolytes - Central Line, Continuous TPN  spironolactone (ALDACTONE) tablet 50 mg, Daily  bisacodyl (DULCOLAX) suppository 10 mg, Once  lidocaine PF 1 % injection 5 mL, Once  sodium chloride flush 0.9 % injection 10 mL, 2 times per day  sodium chloride flush 0.9 % injection 10 mL, PRN  PN-Adult Premix 5/20 - Standard Electrolytes - Central Line, Continuous TPN  lactobacillus (CULTURELLE) capsule 1 capsule, BID WC  insulin lispro (1 Unit Dial) 0-6 Units, Q4H  rifaximin (XIFAXAN) tablet 550 mg, BID  enoxaparin (LOVENOX) injection 30 mg, BID  [START ON 9/6/2020] fat emulsion 20 % infusion 250 mL, Once per day on Mon Thu  acetaminophen (TYLENOL) tablet 1,000 mg, Q6H PRN  lidocaine (XYLOCAINE) 2 % jelly, Once  phenol 1.4 % mouth spray 1 spray, Q2H PRN  glucose (GLUTOSE) 40 % oral gel 15 g, PRN  dextrose 50 % IV solution, PRN  glucagon (rDNA) injection 1 mg, PRN  dextrose 5 % solution, PRN  folic acid (FOLVITE) tablet 1 mg, Daily  ondansetron (ZOFRAN) injection 4 mg, Q4H PRN  promethazine (PHENERGAN) injection 12.5 mg, Q6H PRN  fluconazole (DIFLUCAN) in 0.9 % sodium chloride IVPB 200 mg, Q24H  meropenem (MERREM) 1 g in sodium chloride 0.9 % 100 mL IVPB (mini-bag), Q12H  pantoprazole (PROTONIX) injection 40 mg, Daily  potassium chloride (KLOR-CON M) extended release tablet 40 mEq, PRN    Or  potassium bicarb-citric acid (EFFER-K) effervescent tablet 40 mEq, PRN    Or  potassium chloride 10 mEq/100 mL IVPB (Peripheral Line), PRN  lactulose (CHRONULAC) 10 GM/15ML solution 20 g, TID  lip balm petroleum free (PHYTOPLEX) stick, PRN  sodium chloride flush 0.9 % injection 10 mL, 2 times per day  sodium chloride flush 0.9 % injection 10 mL, PRN  polyethylene glycol (GLYCOLAX) packet 17 g, Daily PRN        Objective:  BP (!) 112/55   Pulse 74   Temp 97.4 °F (36.3 °C) (Oral)   Resp 14   Ht 5' 7\" (1.702 m)   Wt 236 lb 6 oz (107.2 kg)   SpO2 99%   BMI 37.02 kg/m²     Intake/Output Summary (Last 24 hours) at 9/4/2020 1742  Last data filed at 9/4/2020 0359  Gross per 24 hour   Intake 1569 ml   Output --   Net 1569 ml      Wt Readings from Last 3 Encounters:   08/31/20 236 lb 6 oz (107.2 kg)       Conscious alert oriented. Appears comfortable. No neck fullness. Respiratory efforts are normal.    Abdomen distended. Mild tenderness +    No leg edema    No focal deficits.     Labs and Tests:  CBC:   Recent Labs     09/02/20  0550 09/02/20  1350 09/03/20  0426 09/04/20  0439   WBC 8.9  --  9.9 8.9   HGB 7.8* 8.0* 8.6* 8.3*   PLT 74*  --  85* 89*     BMP:    Recent Labs     09/02/20  0550 09/03/20  0426 09/04/20  0439    141 138   K 4.2 4.6 4.6   * 114* 111*   CO2 21 21 21   BUN 27* 31* 30*   CREATININE 0.6 0.9 0.7   GLUCOSE 111* 88 109*     Hepatic:   Recent Labs     09/02/20  0550 09/03/20  0426 09/04/20  0439   AST 16 44* 56*   ALT <5* 13 20   BILITOT 0.6 0.7 0.5   ALKPHOS 78 146* 191*       ASSESSMENT AND PLAN    Active Problems:    Pelvic abscess in female    Septicemia ([facility] [address]    Colonic mass    Cirrhosis of liver with ascites (HCC)    Intra-abdominal free air of unknown etiology    [facility]    Lung nodules    Bandemia    Intra-abdominal abscess (HCC)    E coli infection    Elevated CEA    Ex-smoker    Class 2 obesity due to excess calories with body mass index (BMI) of 36.0 to 36.9 in adult    Portal hypertension (HCC)    Mild malnutrition (HCC)    Tobacco abuse counseling    Open abdominal wall wound    Receiving intravenous antibiotic treatment as outpatient    Complex care coordination  Resolved Problems:    * No resolved hospital problems. *      Colon cancer  - S/p exploratory laparotomy with right hemicolectomy on 8/24/2020. Anticipate secondary wound closure on Friday 8/28/2020.  - CEA is 83.3.  - Plan for chemotherapy as outpatient after healed from surgery.     Endometrial cancer  -  150.5  - s/p D&C by Dr. Leonora Mcgregor     Cirrhosis of the liver   - New diagnosis. - Management per GI.  - Ascites - s/p paracentesis 9/2/2020 for 10 liters     Intraabdominal/pelvic abscess  - CT-guided drain placement on 8/18/2020  - Antibiotics per ID.      Anemia  - Iron studies consistent with anemia of chronic disease.   - soluble transferrin receptor pending  - Start folic acid 1 mg daily for folate deficiency.      Post-op ileus     Overall improving. TPN has been discontinued. Diet is being advanced.     Mell Victoria MD

## 2020-09-04 NOTE — PROGRESS NOTES
the last 72 hours. No results for input(s): OCCULTBLD in the last 72 hours. Radiology Review:    IR US GUIDED PARACENTESIS   Final Result   Successful ultrasound guided paracentesis. CT CHEST ABDOMEN PELVIS W CONTRAST   Final Result   1. Multifocal bilateral pulmonary ground-glass opacities. The differential   includes atypical infectious/inflammatory pneumonitis. 2. Multiple bilateral pulmonary nodules concerning for metastatic disease. 3. Cirrhosis with increasing abdominopelvic ascites. 4. Improving 7 cm recto uterine abscess. XR ABDOMEN (KUB) (SINGLE AP VIEW)   Final Result   Multiple distended small bowel loops, similar to the previous study. No   significant colonic gas. Findings are concerning for a SBO. XR CHEST PORTABLE   Final Result   Nasogastric tube is seen extending below the diaphragm. Dedicated abdominal   radiograph could be performed for better visualization as warranted. Bilateral airspace disease, increased on the right as compared to prior, for   which pneumonia or edema are considerations. XR ABDOMEN (2 VIEWS)   Final Result   Scattered dilated small bowel loops throughout the abdomen, increased   compared to prior CT scan, either due to ileus or early partial small bowel   obstruction         XR CHEST PORTABLE   Final Result   Lucency beneath the right hemidiaphragm compatible with free intraperitoneal   air. Apparently the patient has had recent abdominal surgery. This finding   was discussed with Dr. Ana Lilia Bean  At 6:48 pm on 8/24/2020. Status post placement of right jugular central venous catheter. No   pneumothorax. Patchy mid and lower lung ground-glass opacities suspicious for pneumonia,   increased since the prior CT. Correlation for pneumonia is recommended. CT ABSCESS DRAINAGE W CATH PLACEMENT S&I   Final Result   Successful CT guided placement of a drainage catheter in the pelvic abscess.          CT ABDOMEN PELVIS W IV CONTRAST Additional Contrast? Oral   Final Result   1. Pelvic air/fluid collection almost certainly reflects an abscess or giant   colonic diverticula. This may be the result of acute diverticulitis. 2. Mass lesion ascending colon almost certainly reflecting primary   adenocarcinoma of the colon. 3. Diffuse hepatic lesions with newly visualized (contrast enhancement) mass   at the hepatic dome. Hepatic metastatic disease is a consideration as is   cirrhosis with regenerative nodules and hepatocellular carcinoma. 4. Bilateral lower lobe pulmonary nodules suspicious for metastatic disease. 5. Abdominal and pelvic ascites. 6. Sequela from portosystemic shunting with numerous collateral vessels about   the stomach, left upper quadrant and distal esophagus. Relatively small   esophageal and gastric varices. 7.  Calcific atherosclerotic disease aorta. CT ABDOMEN PELVIS WO CONTRAST Additional Contrast? None   Final Result   1. Non loculated fluid and gas collection in the pelvis concerning for   perforated viscus/possible abscess formation. 2. Prominent thickening of the ascending colon which may be related to   infectious or inflammatory process or underlying neoplastic process. 3. Bowel-gas pattern typical of adynamic ileus. 4. Hepatic cirrhosis with numerous hepatic nodules suggestive of   regenerative/siderotic nodules. MRI abdomen without and with IV contrast   correlation recommended. Metastatic disease is a consideration. 5. Hepatosplenomegaly and multifocal collateral vessels about the stomach   esophagus concerning for varices, all suggestive of portal venous   hypertension. 6. Abdominal and pelvic ascites. 7.  Diverticulosis coli without CT evidence of acute diverticulitis. Critical results were called by Dr. Nikkie Ramirez to [address] [place] [address]   on 8/16/2020 at 18:09.          CT Cervical Spine WO Contrast   Final Result   No acute abnormality of the cervical spine.         CT Head WO Contrast   Final Result   No acute intracranial abnormality. XR CHEST PORTABLE   Final Result   No acute process. XR RADIUS ULNA LEFT (2 VIEWS)   Final Result   No acute osseous abnormality. CT CHEST W CONTRAST    (Results Pending)       ASSESSMENT :  Cirrhosis - new diagnosis per CT. bx c/w WINKLER cirrhosis. No alcohol history. Likely from fatty liver. Some ascites on CT. Seemed mildly confused at admission (baseline per her daughter) but ammonia was normal. repeat ammonia is elevated at 66 so lactulose started. Negative: AMA, hepatitis panel, A1AT phenotype. f-actin weak positive at 25. AFP normal. CEA 83. Labs stable. Large volume ascites on CT 8/31. S/p paracentesis 9/1 with 10 L fluid removed. High nucleated cells c/w peritonitis from perforated colon ca. Cx negative. SAAG elevated c/w portal HTN. Cytology negative.      Abnormal CT of the colon and liver, colon mass - CT with thickening of the ascending colon concerning for malignancy - path c/w AdenoCa. Also with liver and lung lesions concerning for metastatic disease.  Colonoscopy 8/20 with large necrotic ascending colon mass (carcinoma), likely the source of abscess. s/p exploratory laparotomy, evacuation of ascites, drainage of pelvic abscess, right hemicolectomy with primary ileotransverse colostomy anastomosis, and Abdiaziz-Cut liver biopsy on 8/24/2020    N/V - AXR 8/30 with multiple distended small bowel loops. No bowel distention on CT 8/31. Resolved.      Pelvic abscess - from perforated colon ca     Esophagitis - EGD 8/20 with LA class C erosive esophagitis and nodularity at [address] (path mild chronic inflammation).     Bulky uterus with bleeding in vault, suspected to be uterine cancer. Gyn oncology on board. path c/w uterine adenocarcinoma.      Anemia - hgb improved on repeat.      PAPITO - resolved.         PLAN:  - repeat ammonia level  - lactulose TID and titreat for goal of 3 BMs daily  - continue xifaxan   - started on lasix per nephrology. Add aldactone 50 mg daily. - daily weights   - PPI  - Will need Hep A and B vaccines outpatient.     Discussed with Dr. Fazal Celaya, PA-C  [address]    I have personally performed a face to face diagnostic evaluation on this patient. I have interviewed and examined the patient and I agree with the findings and recommended plan of care. In summary, my findings and plan are the following:     Patient feels better today. She has less abdominal pain. No overt GI bleed. She is conversant. Repeat ammonia level is normal. Hb stable in 8+ range. Continue xifaxan and lactulose   To prevent re-accumulation of ascites, will restart aldactone at 50 mg daily. Renal has started lasix. Limit sodium intake to <2 grams/day.      Mika Louie MD, MSc  [place] [facility] [address] [facility]

## 2020-09-05 NOTE — PROGRESS NOTES
[facility] PROGRESS NOTE    9/5/2020 6:58 PM        Name: Maliha Cook . Admitted: 8/16/2020  Primary Care Provider: No primary care provider on file. (Tel: None)    Brief Course:   Patient is a 77 yo female with hx nicotine use. The patient lives alone, she presented to hospital after being found on floor by her daughter. She has had multiple falls in past couple of months. CT abdomen and pelvis in ER showed pelvic abscess vs viscus perf. Colonic wall thickening, concerning for malignancy, liver cirrhosis portal hypertension. She had Ct-guided drain placement 8/18 for pelvic abscess 8/18. Subsequently found to have large ascending colon mass on colonoscopy 8/20, likely the source of abscess.       8/18/2020: Successful CT guided placement of a drainage catheter in the pelvic abscess.      8/24/2020: Exploratory laparotomy, evacuation of ascites, drainage of  pelvic abscess, right hemicolectomy with primary ileotransverse  colostomy anastomosis, and Abdiaziz-Cut liver biopsy.     8/28/2020: SECONDARY ABDOMINAL WOUND CLOSURE  DILATATION AND CURETTAGE  8/31/2020: NGT d/c'd   9/1/2020    CC: Pelvic abscess    Subjective: Patient seen and examined today. Patient hemodynamically stable, no acute events overnight; Tolerating PO diet, TPN discontinued.   Started on Lasix infusion and albumin per nephrology recommendation      Reviewed interval ancillary notes    Current Medications  furosemide (LASIX) 100 mg in dextrose 5 % 100 mL infusion, Continuous  albumin human 5 % IV solution 12.5 g, Q6H  bisacodyl (DULCOLAX) suppository 10 mg, Once  lidocaine PF 1 % injection 5 mL, Once  sodium chloride flush 0.9 % injection 10 mL, 2 times per day  sodium chloride flush 0.9 % injection 10 mL, PRN  lactobacillus (CULTURELLE) capsule 1 capsule, BID WC  insulin lispro (1 Unit Dial) 0-6 Units, Q4H  rifaximin (XIFAXAN) tablet 550 mg, wheeze or crackles. GI: Abdomen soft, surgical dressing in place; drain by the side  Musculoskeletal: No cyanosis in digits, neck supple  Neurology: CN 2-12 grossly intact. No speech or motor deficits  Psych: Normal affect. Alert and oriented in time, place and person  Skin: Warm, dry, normal turgor  Extremity exam shows brisk capillary refill. Peripheral pulses are palpable in lower extremities     Labs and Tests:  CBC:   Recent Labs     09/03/20 0426 09/04/20 0439 09/05/20 0437 09/05/20  1420   WBC 9.9 8.9 8.1  --    HGB 8.6* 8.3* 7.1* 7.6*   PLT 85* 89* 73*  --      BMP:    Recent Labs     09/03/20 0426 09/04/20 0439 09/05/20 0437    138 138   K 4.6 4.6 4.5   * 111* 110   CO2 21 21 22   BUN 31* 30* 29*   CREATININE 0.9 0.7 0.6   GLUCOSE 88 109* 113*     Hepatic:   Recent Labs     09/03/20 0426 09/04/20 0439 09/05/20 0437   AST 44* 56* 33   ALT 13 20 16   BILITOT 0.7 0.5 0.6   ALKPHOS 146* 191* 174*     IR US GUIDED PARACENTESIS   Final Result   Successful ultrasound guided paracentesis. CT CHEST ABDOMEN PELVIS W CONTRAST   Final Result   1. Multifocal bilateral pulmonary ground-glass opacities. The differential   includes atypical infectious/inflammatory pneumonitis. 2. Multiple bilateral pulmonary nodules concerning for metastatic disease. 3. Cirrhosis with increasing abdominopelvic ascites. 4. Improving 7 cm recto uterine abscess. XR ABDOMEN (KUB) (SINGLE AP VIEW)   Final Result   Multiple distended small bowel loops, similar to the previous study. No   significant colonic gas. Findings are concerning for a SBO. XR CHEST PORTABLE   Final Result   Nasogastric tube is seen extending below the diaphragm. Dedicated abdominal   radiograph could be performed for better visualization as warranted. Bilateral airspace disease, increased on the right as compared to prior, for   which pneumonia or edema are considerations.          XR ABDOMEN (2 VIEWS)   Final Result   Scattered dilated small bowel loops throughout the abdomen, increased   compared to prior CT scan, either due to ileus or early partial small bowel   obstruction         XR CHEST PORTABLE   Final Result   Lucency beneath the right hemidiaphragm compatible with free intraperitoneal   air. Apparently the patient has had recent abdominal surgery. This finding   was discussed with Dr. Cameron Gleason  At 6:48 pm on 8/24/2020. Status post placement of right jugular central venous catheter. No   pneumothorax. Patchy mid and lower lung ground-glass opacities suspicious for pneumonia,   increased since the prior CT. Correlation for pneumonia is recommended. CT ABSCESS DRAINAGE W CATH PLACEMENT S&I   Final Result   Successful CT guided placement of a drainage catheter in the pelvic abscess. CT ABDOMEN PELVIS W IV CONTRAST Additional Contrast? Oral   Final Result   1. Pelvic air/fluid collection almost certainly reflects an abscess or giant   colonic diverticula. This may be the result of acute diverticulitis. 2. Mass lesion ascending colon almost certainly reflecting primary   adenocarcinoma of the colon. 3. Diffuse hepatic lesions with newly visualized (contrast enhancement) mass   at the hepatic dome. Hepatic metastatic disease is a consideration as is   cirrhosis with regenerative nodules and hepatocellular carcinoma. 4. Bilateral lower lobe pulmonary nodules suspicious for metastatic disease. 5. Abdominal and pelvic ascites. 6. Sequela from portosystemic shunting with numerous collateral vessels about   the stomach, left upper quadrant and distal esophagus. Relatively small   esophageal and gastric varices. 7.  Calcific atherosclerotic disease aorta. CT ABDOMEN PELVIS WO CONTRAST Additional Contrast? None   Final Result   1. Non loculated fluid and gas collection in the pelvis concerning for   perforated viscus/possible abscess formation.    2. Prominent thickening of the ascending colon which may be related to   infectious or inflammatory process or underlying neoplastic process. 3. Bowel-gas pattern typical of adynamic ileus. 4. Hepatic cirrhosis with numerous hepatic nodules suggestive of   regenerative/siderotic nodules. MRI abdomen without and with IV contrast   correlation recommended. Metastatic disease is a consideration. 5. Hepatosplenomegaly and multifocal collateral vessels about the stomach   esophagus concerning for varices, all suggestive of portal venous   hypertension. 6. Abdominal and pelvic ascites. 7.  Diverticulosis coli without CT evidence of acute diverticulitis. Critical results were called by Dr. Dayna Arriaga to [address] [place] [address]   on 8/16/2020 at 18:09. CT Cervical Spine WO Contrast   Final Result   No acute abnormality of the cervical spine. CT Head WO Contrast   Final Result   No acute intracranial abnormality. XR CHEST PORTABLE   Final Result   No acute process. XR RADIUS ULNA LEFT (2 VIEWS)   Final Result   No acute osseous abnormality.          CT CHEST W CONTRAST    (Results Pending)       Discussed care with family    I viewed CXR images and EKG tracings   Discussed film/CT with reading physician      Spent 30 minutes with patient and family at bedside and on unit reviewing medical records and labs, spent greater than 50% time counseling patient and/or coordinating care with RN    Problem List  Active Problems:    Pelvic abscess in female    Septicemia ([address]    Colonic mass    Cirrhosis of liver with ascites ([address]    Intra-abdominal free air of unknown etiology    PAPITO (acute kidney injury) ([address]    Lung nodules    Bandemia    Intra-abdominal abscess ([address]    E coli infection    Elevated CEA    Ex-smoker    Class 2 obesity due to excess calories with body mass index (BMI) of 36.0 to 36.9 in adult    Portal hypertension ([address]    Mild malnutrition ([address]    Tobacco abuse counseling    Open abdominal wall wound    Receiving intravenous antibiotic treatment as outpatient    Complex care coordination  Resolved Problems:    * No resolved hospital problems. *       Assessment & Plan:     Pelvic abscess. Status post IR drainage 8/18, pelvic fluid cytology negative for malignant cells. Colonoscopy 8/20 with large necrotic ascending colon mass, likely source of abscess. Status post exp lap right transverse colectomy 8/24. Status post secondary wound closure 8/28. Initially on Zosyn, transitioned to IV cipro (8/20). IV Flagyl added 8/25. Now on meropenem and flucanazole. Remains afebrile, WBC trending down. Blood culture results (8/25) with no growth to date. Surgery and ID on on board    Liver cirrhosis / portal HTN. Biopsy consistent with WINKLER cirrhosis, negative for adenocarcinoma involvement. Started on lactulose for ammonia level 66 and confusion, ammonia level 23 (8/28). GI following. S/P paracentesis; 10 L removed 9/1/2020 ,fluid for cx. Colon cancer. CT scan showed thickening of ascending colon suspicious for malignancy, lesions liver and lung concerning for metastasis. CEA 83.3, AFP 2.4. Colon biopsy (8/24) showed poorly differentiate adenocarcinoma, metastatic adenocarcinoma in 3 of 232 lymph nodes. Patient will need CT chest for staging once renal function improves. Plan is for chemotherapy once she recovers from surgery. Oncology on board. Grade 1 endometrial cancer: Poor candidate for any surgery at this time per oncology    PAPITO. Improved: Nephrology on board appreciate their recs  Creatinine 1.6 on admission suspected pre-renal, hypovolemia. Hypoalbuminemia with + volume status: Started on Lasix drip and albumin    Abnormal CXR (8/27). Bilateral airspace disease, increased on right as compared to prior, pneumonia or edema are considerations. Possible 3rd spacing, received albumin. COVID-19 (8/22) negative. Remains afebrile, no cough. Encouraged IS use. Acute anemia.  Hgb 10.9 on admission, no previous studies for comparison. Suspect she likely had chronic iron deficiency anemia secondary to colon cancer and post menopausal bleeding. She received 1 unit PRBCs 8/27, Hgb/Hct stable      DC planning: Plan is for SNF on DC. Case management is assisting, family preference is [facility] or [address] She is likely inpatient for the next 3 to 4 days        Diet: Dietary Nutrition Supplements: Standard High Calorie Oral Supplement  DIET GENERAL;  Code:Full Code  DVT PPX: On Lovenox  Disposition  Plan is for SNF on DC. Case management is assisting, family preference is [facility] or [address]  She is likely inpatient for the next 2-3 days           Doreen Mills MD   9/5/2020 6:58 PM

## 2020-09-05 NOTE — PLAN OF CARE
Problem: Falls - Risk of:  Goal: Will remain free from falls  Description: Will remain free from falls  9/5/2020 1615 by Evan Wilson RN  Outcome: Ongoing  9/5/2020 0410 by Eladia Boyd RN  Outcome: Ongoing  Goal: Absence of physical injury  Description: Absence of physical injury  9/5/2020 1615 by Evan Wilson RN  Outcome: Ongoing  9/5/2020 0410 by Eladia Boyd RN  Outcome: Ongoing     Problem: Skin Integrity:  Goal: Will show no infection signs and symptoms  Description: Will show no infection signs and symptoms  9/5/2020 1615 by Evan Wilson RN  Outcome: Ongoing  9/5/2020 0410 by Eladia Boyd RN  Outcome: Ongoing  Goal: Absence of new skin breakdown  Description: Absence of new skin breakdown  9/5/2020 1615 by Evan Wilson RN  Outcome: Ongoing  9/5/2020 0410 by Eladia Boyd RN  Outcome: Ongoing     Problem: Infection:  Goal: Will remain free from infection  Description: Will remain free from infection  9/5/2020 1615 by Evan Wilson RN  Outcome: Ongoing  9/5/2020 0410 by Eladia Boyd RN  Outcome: Ongoing     Problem: Safety:  Goal: Free from accidental physical injury  Description: Free from accidental physical injury  9/5/2020 1615 by Evan Wilson RN  Outcome: Ongoing  9/5/2020 0410 by Eladia Boyd RN  Outcome: Ongoing  Goal: Free from intentional harm  Description: Free from intentional harm  9/5/2020 1615 by Evan Wilson RN  Outcome: Ongoing  9/5/2020 0410 by Eladia Boyd RN  Outcome: Ongoing     Problem: Daily Care:  Goal: Daily care needs are met  Description: Daily care needs are met  9/5/2020 1615 by Evan Wilson RN  Outcome: Ongoing  9/5/2020 0410 by Eladia Boyd RN  Outcome: Ongoing     Problem: Pain:  Goal: Patient's pain/discomfort is manageable  Description: Patient's pain/discomfort is manageable  9/5/2020 1615 by Evan Wilson RN  Outcome: Ongoing  9/5/2020 0410 by Eladia Boyd RN  Outcome: Ongoing  Goal: Pain level will decrease  Description: Pain level will decrease  9/5/2020 1615 by Raul Ryder RN  Outcome: Ongoing  9/5/2020 0410 by Sophie Lopez RN  Outcome: Ongoing  Goal: Control of acute pain  Description: Control of acute pain  9/5/2020 1615 by Raul Ryder RN  Outcome: Ongoing  9/5/2020 0410 by Sophie Lopez RN  Outcome: Ongoing  Goal: Control of chronic pain  Description: Control of chronic pain  9/5/2020 1615 by Raul Ryder RN  Outcome: Ongoing  9/5/2020 0410 by Sophie Lopez RN  Outcome: Ongoing     Problem: Skin Integrity:  Goal: Skin integrity will stabilize  Description: Skin integrity will stabilize  9/5/2020 1615 by Raul Ryder RN  Outcome: Ongoing  9/5/2020 0410 by Sophie Lopez RN  Outcome: Ongoing     Problem: Discharge Planning:  Goal: Patients continuum of care needs are met  Description: Patients continuum of care needs are met  9/5/2020 1615 by Raul Ryder RN  Outcome: Ongoing  9/5/2020 0410 by Sophie Lopez RN  Outcome: Ongoing     Problem: Nutrition  Goal: Optimal nutrition therapy  9/5/2020 1615 by Raul Ryder RN  Outcome: Ongoing  9/5/2020 0410 by Sophie Lopez RN  Outcome: Ongoing     Problem: Confusion - Acute:  Goal: Absence of continued neurological deterioration signs and symptoms  Description: Absence of continued neurological deterioration signs and symptoms  9/5/2020 1615 by Raul Ryder RN  Outcome: Ongoing  9/5/2020 0410 by Sophie Lopez RN  Outcome: Ongoing  Goal: Mental status will be restored to baseline  Description: Mental status will be restored to baseline  9/5/2020 1615 by Raul Ryder RN  Outcome: Ongoing  9/5/2020 0410 by Sophie Lopez RN  Outcome: Ongoing     Problem: Discharge Planning:  Goal: Ability to perform activities of daily living will improve  Description: Ability to perform activities of daily living will improve  9/5/2020 1615 by Raul Ryder RN  Outcome: Ongoing  9/5/2020 0410 by Sophie Lopez RN  Outcome: Ongoing  Goal: Participates in care planning  Description: RN  Outcome: Ongoing  9/5/2020 0410 by Jie Unger RN  Outcome: Ongoing  Goal: Able to refrain from responding to false sensory perceptions  Description: Able to refrain from responding to false sensory perceptions  9/5/2020 1615 by Janice Gonzalez RN  Outcome: Ongoing  9/5/2020 0410 by Jie Unger RN  Outcome: Ongoing  Goal: Demonstrates accurate environmental perceptions  Description: Demonstrates accurate environmental perceptions  9/5/2020 1615 by Janice Gonzalez RN  Outcome: Ongoing  9/5/2020 0410 by Jie Unger RN  Outcome: Ongoing  Goal: Able to distinguish between reality-based and nonreality-based thinking  Description: Able to distinguish between reality-based and nonreality-based thinking  9/5/2020 1615 by Janice Gonzalez RN  Outcome: Ongoing  9/5/2020 0410 by Jie Unger RN  Outcome: Ongoing  Goal: Able to interrupt nonreality-based thinking  Description: Able to interrupt nonreality-based thinking  9/5/2020 1615 by Janice Gonzalez RN  Outcome: Ongoing  9/5/2020 0410 by Jie Unger RN  Outcome: Ongoing     Problem: Sleep Pattern Disturbance:  Goal: Appears well-rested  Description: Appears well-rested  9/5/2020 1615 by Janice Gonzalez RN  Outcome: Ongoing  9/5/2020 0410 by Jie Unger RN  Outcome: Ongoing     Problem: HEMODYNAMIC STATUS  Goal: Patient has stable vital signs and fluid balance  9/5/2020 1615 by Janice Gonzalez RN  Outcome: Ongoing  9/5/2020 0410 by Jie Unger RN  Outcome: Ongoing     Problem: OXYGENATION/RESPIRATORY FUNCTION  Goal: Patient will achieve/maintain normal respiratory rate/effort  9/5/2020 1615 by Janice Gonzalez RN  Outcome: Ongoing  9/5/2020 0410 by Jie Unger RN  Outcome: Ongoing     Problem: ELIMINATION  Goal: Elimination patterns are normal or improving  Description: Elimination patterns return to pre-surgery normal patterns  9/5/2020 1615 by Janice Gonzalez RN  Outcome: Ongoing  9/5/2020 0410 by Jie Unger RN  Outcome: Ongoing     Problem:

## 2020-09-05 NOTE — PROGRESS NOTES
Sharmon Soulier is a 76 y.o. female patient.     Current Facility-Administered Medications   Medication Dose Route Frequency Provider Last Rate Last Dose    furosemide (LASIX) injection 10 mg  10 mg Intravenous BID Bianca Villa MD   10 mg at 09/05/20 0908    albumin human 5 % IV solution 12.5 g  12.5 g Intravenous BID Bianca Villa MD   Stopped at 09/05/20 1010    PN-Adult Premix 5/20 - Standard Electrolytes - Central Line   Intravenous Continuous TPN Tonja Joshi MD 75 mL/hr at 09/04/20 1823      spironolactone (ALDACTONE) tablet 50 mg  50 mg Oral Daily Lawrence Figueroa PA-C   50 mg at 09/05/20 5330    bisacodyl (DULCOLAX) suppository 10 mg  10 mg Rectal Once Tonja Joshi MD        lidocaine PF 1 % injection 5 mL  5 mL Intradermal Once Talita Arteaga MD        sodium chloride flush 0.9 % injection 10 mL  10 mL Intravenous 2 times per day Talita Arteaga MD   10 mL at 09/04/20 2111    sodium chloride flush 0.9 % injection 10 mL  10 mL Intravenous PRN Talita Arteaga MD   10 mL at 09/04/20 0354    lactobacillus (CULTURELLE) capsule 1 capsule  1 capsule Oral BID WC Talita Arteaga MD   1 capsule at 09/05/20 0905    insulin lispro (1 Unit Dial) 0-6 Units  0-6 Units Subcutaneous Q4H Tonja Joshi MD   1 Units at 09/05/20 1121    rifaximin (XIFAXAN) tablet 550 mg  071 mg Oral BID Lawrence Figueroa PA-C   550 mg at 09/05/20 0904    enoxaparin (LOVENOX) injection 30 mg  30 mg Subcutaneous BID VIN Minor - CNP   30 mg at 09/05/20 0904    [START ON 9/6/2020] fat emulsion 20 % infusion 250 mL  250 mL Intravenous Once per day on Mon Thu Tonja Joshi MD        acetaminophen (TYLENOL) tablet 1,000 mg  1,000 mg Oral Q6H PRN Tonja Joshi MD   1,000 mg at 09/04/20 2110    lidocaine (XYLOCAINE) 2 % jelly   Topical Once Tonja Joshi MD        phenol 1.4 % mouth spray 1 spray  1 spray Mouth/Throat Q2H PRN Tonja Joshi MD Lonnie Graff MD   10 mL at 09/03/20 2228    sodium chloride flush 0.9 % injection 10 mL  10 mL Intravenous PRN Jeronimo Best MD   10 mL at 08/27/20 0538    polyethylene glycol (GLYCOLAX) packet 17 g  17 g Oral Daily PRN Jeronimo Best MD         Allergies   Allergen Reactions    Penicillins Other (See Comments)     Unknown reaction per pt      Active Problems:    Pelvic abscess in female    Septicemia ([address]    Colonic mass    Cirrhosis of liver with ascites ([facility] [address]    Intra-abdominal free air of unknown etiology    PAPITO (acute kidney injury) ([facility] [address]    Lung nodules    Bandemia    Intra-abdominal abscess (HCC)    E coli infection    Elevated CEA    Ex-smoker    Class 2 obesity due to excess calories with body mass index (BMI) of 36.0 to 36.9 in adult    Portal hypertension (HCC)    Mild malnutrition (HCC)    Tobacco abuse counseling    Open abdominal wall wound    Receiving intravenous antibiotic treatment as outpatient    Complex care coordination  Resolved Problems:    * No resolved hospital problems. *    Blood pressure 125/76, pulse 81, temperature 97.4 °F (36.3 °C), temperature source Oral, resp. rate 16, height 5' 7\" (1.702 m), weight 223 lb 6.4 oz (101.3 kg), SpO2 98 %. Subjective:  Symptoms:  Stable. Diet:  Dietary issues: po intake fair. Activity level: Impaired due to weakness. Pain:  She complains of pain that is mild. Objective:  General Appearance:  Comfortable. Vital signs: (most recent): Blood pressure 125/76, pulse 81, temperature 97.4 °F (36.3 °C), temperature source Oral, resp. rate 16, height 5' 7\" (1.702 m), weight 223 lb 6.4 oz (101.3 kg), SpO2 98 %. Output: Producing urine. Lungs:  Normal respiratory rate. Abdomen: Abdomen is soft. (Mild distention  Incision loosely approximated with small amount of SS drainage. No evidence of infection  ). Assessment & Plan POD # 12 S/P R colon resection. POD # 8 S/P secondary wound closure. Doing fairly well. Tolerating general diet. D/C TPN. Working on disposition to ECF.     Kalani Dubon MD  9/5/2020

## 2020-09-05 NOTE — PROGRESS NOTES
0901: Shift assessment complete. VSS. Alert and oriented to place, person, and situation, disoriented to time. See flowsheets. Morning medications given per MAR. The care plan and education has been reviewed and mutually agreed upon with the patient. Pt needs expressed, call light within reach, will continue to monitor.

## 2020-09-05 NOTE — PROGRESS NOTES
[facility] Note    Patient Active Problem List   Diagnosis    Pelvic abscess in female    Septicemia ([facility] [address]    Colonic mass    Cirrhosis of liver with ascites ([facility] [address]    Intra-abdominal free air of unknown etiology    PAPITO (acute kidney injury) ([facility] [address]    Lung nodules    Bandemia    Intra-abdominal abscess ([facility] [address]    E coli infection    Elevated CEA    Ex-smoker    Class 2 obesity due to excess calories with body mass index (BMI) of 36.0 to 36.9 in adult    Portal hypertension (HCC)    Mild malnutrition ([facility] [address]    Tobacco abuse counseling    Open abdominal wall wound    Receiving intravenous antibiotic treatment as outpatient    Complex care coordination       Past Medical History:   has no past medical history on file. Past Social History:   reports that she has been smoking cigarettes. She has a 25.00 pack-year smoking history. She does not have any smokeless tobacco history on file. She reports that she does not drink alcohol or use drugs. Subjective:  No complaints. Still volume + with lasix + albumin    Review of Systems   Constitutional: Positive for fatigue. Negative for activity change, appetite change, chills, fever and unexpected weight change. HENT: Negative for congestion and facial swelling. Eyes: Negative for photophobia, discharge and redness. Respiratory: Negative for cough, chest tightness and shortness of breath. Cardiovascular: Positive for leg swelling. Negative for chest pain and palpitations. Gastrointestinal: Negative for abdominal distention, abdominal pain, blood in stool, constipation, diarrhea, nausea and vomiting. Endocrine: Negative for cold intolerance, heat intolerance and polyuria. Genitourinary: Negative for decreased urine volume, difficulty urinating, flank pain and hematuria. Musculoskeletal: Negative for joint swelling and neck pain.    Neurological: Negative for dizziness, seizures, syncope, speech difficulty, light-headedness and headaches. Hematological: Does not bruise/bleed easily. Psychiatric/Behavioral: Negative for agitation, confusion and hallucinations. Objective:  +9.4L. Lowest weight 215lbs, currently at 233    /76   Pulse 81   Temp 97.4 °F (36.3 °C) (Oral)   Resp 16   Ht 5' 7\" (1.702 m)   Wt 223 lb 6.4 oz (101.3 kg)   SpO2 98%   BMI 34.99 kg/m²     Wt Readings from Last 3 Encounters:   09/05/20 223 lb 6.4 oz (101.3 kg)       BP Readings from Last 3 Encounters:   09/05/20 125/76   08/28/20 (!) 149/66   08/24/20 (!) 155/68     Chest- rhonchi  Heart-regular  Abd-soft  Ext- 2+ edema    Labs  Hemoglobin   Date Value Ref Range Status   09/05/2020 7.1 (L) 12.0 - 16.0 g/dL Final     Hematocrit   Date Value Ref Range Status   09/05/2020 22.3 (L) 36.0 - 48.0 % Final     WBC   Date Value Ref Range Status   09/05/2020 8.1 4.0 - 11.0 K/uL Final     Platelets   Date Value Ref Range Status   09/05/2020 73 (L) 135 - 450 K/uL Final     Comment:     consistent with previous results     Lab Results   Component Value Date    CREATININE 0.6 09/05/2020    BUN 29 (H) 09/05/2020     09/05/2020    K 4.5 09/05/2020     09/05/2020    CO2 22 09/05/2020     RECOMMENDATIONS:   -start Lasix infusion and increase albumin since significant weight gain and +volume status  - planning to discontinue TPN per Surgery  - stop SPNL    IMPRESSION:        PAPITO - improved  : Oligouric - now non-oliguric.    : Etiology of current PAPITO - ATN vs. Decreased renal perfusion with hemodynamic status with postop. - Renal imaging: w/ CT - kidney unremarkable   - CK - 310 on admission then improved. - Avoid contrast exposure at this time. Associated problems:    - Electrolytes: Hyponatremia-resolved  - Hypokalemia - from needing repletion GI loss -better  : BP: follow with diuresis  - Acid-Base: Acidosis - - non-AGMA - better  - Anemia:  - Heme  Following.   Follow Hgb.      #WINKLER cirrhosis, Portal HTN : new diagnosis, no h/o EtOH     # Sepsis, intra-abdominal abscess   - S/P CT-guided drain placement on 8/18/2020  - S/P right hemicolectomy 8/24  -better      Discussed with Agatha Contreras Loop Problems            Last Modified POA     Pelvic abscess in female 8/16/2020 Yes     Septicemia ([address] 8/17/2020 Yes     Colonic mass 8/25/2020 Yes     Cirrhosis of liver with ascites ([address] 8/25/2020 Yes     Intra-abdominal free air of unknown etiology 8/25/2020 Yes     PAPITO (acute kidney injury) ([address] 8/25/2020 Yes     Lung nodules 8/25/2020 Yes     Bandemia 8/25/2020 Yes     Intra-abdominal abscess ([address] 8/25/2020 Yes     E coli infection 8/25/2020 Yes     Elevated CEA 8/25/2020 Yes     Ex-smoker 8/25/2020 Yes     Class 2 obesity due to excess calories with body mass index (BMI) of 36.0 to 36.9 in adult 8/25/2020 Yes     Portal hypertension ([address] 8/25/2020 Yes     Mild malnutrition ([address] (Chronic) 8/25/2020 Yes     Tobacco abuse counseling 8/27/2020 Yes     Open abdominal wall wound 8/28/2020 Yes     Receiving intravenous antibiotic treatment as outpatient 9/2/2020 Yes     Complex care coordination 9/2/2020 Yes         Biju Nicole MD

## 2020-09-05 NOTE — PLAN OF CARE
Problem: Falls - Risk of:  Goal: Will remain free from falls  Description: Will remain free from falls  Outcome: Ongoing  Goal: Absence of physical injury  Description: Absence of physical injury  Outcome: Ongoing     Problem: Skin Integrity:  Goal: Will show no infection signs and symptoms  Description: Will show no infection signs and symptoms  Outcome: Ongoing  Goal: Absence of new skin breakdown  Description: Absence of new skin breakdown  Outcome: Ongoing     Problem: Infection:  Goal: Will remain free from infection  Description: Will remain free from infection  Outcome: Ongoing     Problem: Safety:  Goal: Free from accidental physical injury  Description: Free from accidental physical injury  Outcome: Ongoing  Goal: Free from intentional harm  Description: Free from intentional harm  Outcome: Ongoing     Problem: Daily Care:  Goal: Daily care needs are met  Description: Daily care needs are met  Outcome: Ongoing     Problem: Pain:  Goal: Patient's pain/discomfort is manageable  Description: Patient's pain/discomfort is manageable  Outcome: Ongoing  Goal: Pain level will decrease  Description: Pain level will decrease  Outcome: Ongoing  Goal: Control of acute pain  Description: Control of acute pain  Outcome: Ongoing  Goal: Control of chronic pain  Description: Control of chronic pain  Outcome: Ongoing     Problem: Skin Integrity:  Goal: Skin integrity will stabilize  Description: Skin integrity will stabilize  Outcome: Ongoing     Problem: Discharge Planning:  Goal: Patients continuum of care needs are met  Description: Patients continuum of care needs are met  Outcome: Ongoing     Problem: Nutrition  Goal: Optimal nutrition therapy  Outcome: Ongoing     Problem: Confusion - Acute:  Goal: Absence of continued neurological deterioration signs and symptoms  Description: Absence of continued neurological deterioration signs and symptoms  Outcome: Ongoing  Goal: Mental status will be restored to reality-based and nonreality-based thinking  Outcome: Ongoing  Goal: Able to interrupt nonreality-based thinking  Description: Able to interrupt nonreality-based thinking  Outcome: Ongoing     Problem: Sleep Pattern Disturbance:  Goal: Appears well-rested  Description: Appears well-rested  Outcome: Ongoing     Problem: HEMODYNAMIC STATUS  Goal: Patient has stable vital signs and fluid balance  Outcome: Ongoing     Problem: OXYGENATION/RESPIRATORY FUNCTION  Goal: Patient will achieve/maintain normal respiratory rate/effort  Outcome: Ongoing     Problem: ELIMINATION  Goal: Elimination patterns are normal or improving  Description: Elimination patterns return to pre-surgery normal patterns  Outcome: Ongoing     Problem: SKIN INTEGRITY  Goal: Skin integrity is maintained or improved  Outcome: Ongoing

## 2020-09-05 NOTE — PROGRESS NOTES
[facility] PROGRESS NOTE    9/5/2020 6:57 PM        Name: Flaca Adan . Admitted: 8/16/2020  Primary Care Provider: No primary care provider on file. (Tel: None)    Brief Course:   Patient is a 75 yo female with hx nicotine use. The patient lives alone, she presented to hospital after being found on floor by her daughter. She has had multiple falls in past couple of months. CT abdomen and pelvis in ER showed pelvic abscess vs viscus perf. Colonic wall thickening, concerning for malignancy, liver cirrhosis portal hypertension. She had Ct-guided drain placement 8/18 for pelvic abscess 8/18. Subsequently found to have large ascending colon mass on colonoscopy 8/20, likely the source of abscess.       8/18/2020: Successful CT guided placement of a drainage catheter in the pelvic abscess.      8/24/2020: Exploratory laparotomy, evacuation of ascites, drainage of  pelvic abscess, right hemicolectomy with primary ileotransverse  colostomy anastomosis, and Abdiaziz-Cut liver biopsy.     8/28/2020: SECONDARY ABDOMINAL WOUND CLOSURE  DILATATION AND CURETTAGE  8/31/2020: NGT d/c'd   9/1/2020    CC: Pelvic abscess    Subjective: Patient seen and examined today. No acute events overnight;  tolerating PO diet, plan to discontinue TPN discontinued. Amado's and drain out.       Reviewed interval ancillary notes    Current Medications  furosemide (LASIX) 100 mg in dextrose 5 % 100 mL infusion, Continuous  albumin human 5 % IV solution 12.5 g, Q6H  bisacodyl (DULCOLAX) suppository 10 mg, Once  lidocaine PF 1 % injection 5 mL, Once  sodium chloride flush 0.9 % injection 10 mL, 2 times per day  sodium chloride flush 0.9 % injection 10 mL, PRN  lactobacillus (CULTURELLE) capsule 1 capsule, BID WC  insulin lispro (1 Unit Dial) 0-6 Units, Q4H  rifaximin (XIFAXAN) tablet 550 mg, BID  enoxaparin (LOVENOX) injection 30 mg, BID  [START ON 9/6/2020] fat emulsion 20 % infusion 250 mL, Once per day on Mon Thu  acetaminophen (TYLENOL) tablet 1,000 mg, Q6H PRN  lidocaine (XYLOCAINE) 2 % jelly, Once  phenol 1.4 % mouth spray 1 spray, Q2H PRN  glucose (GLUTOSE) 40 % oral gel 15 g, PRN  dextrose 50 % IV solution, PRN  glucagon (rDNA) injection 1 mg, PRN  dextrose 5 % solution, PRN  folic acid (FOLVITE) tablet 1 mg, Daily  ondansetron (ZOFRAN) injection 4 mg, Q4H PRN  promethazine (PHENERGAN) injection 12.5 mg, Q6H PRN  fluconazole (DIFLUCAN) in 0.9 % sodium chloride IVPB 200 mg, Q24H  meropenem (MERREM) 1 g in sodium chloride 0.9 % 100 mL IVPB (mini-bag), Q12H  pantoprazole (PROTONIX) injection 40 mg, Daily  potassium chloride (KLOR-CON M) extended release tablet 40 mEq, PRN    Or  potassium bicarb-citric acid (EFFER-K) effervescent tablet 40 mEq, PRN    Or  potassium chloride 10 mEq/100 mL IVPB (Peripheral Line), PRN  lactulose (CHRONULAC) 10 GM/15ML solution 20 g, TID  lip balm petroleum free (PHYTOPLEX) stick, PRN  sodium chloride flush 0.9 % injection 10 mL, 2 times per day  sodium chloride flush 0.9 % injection 10 mL, PRN  polyethylene glycol (GLYCOLAX) packet 17 g, Daily PRN        Objective:  /65   Pulse 86   Temp 98.7 °F (37.1 °C) (Oral)   Resp 16   Ht 5' 7\" (1.702 m)   Wt 223 lb 6.4 oz (101.3 kg)   SpO2 96%   BMI 34.99 kg/m²     Intake/Output Summary (Last 24 hours) at 9/5/2020 1857  Last data filed at 9/5/2020 0445  Gross per 24 hour   Intake 1467 ml   Output 700 ml   Net 767 ml      Wt Readings from Last 3 Encounters:   09/05/20 223 lb 6.4 oz (101.3 kg)       General appearance: Ill-looking  Eyes: Sclera clear. Pupils equal.  ENT: Moist oral mucosa. Trachea midline, no adenopathy. Cardiovascular: Regular rhythm, normal S1, S2. No murmur. No edema in lower extremities  Respiratory: Not using accessory muscles. Good inspiratory effort. Clear to auscultation bilaterally, no wheeze or crackles.    GI: Abdomen soft, surgical dressing in place; drain by the side  Musculoskeletal: No cyanosis in digits, neck supple  Neurology: CN 2-12 grossly intact. No speech or motor deficits  Psych: Normal affect. Alert and oriented in time, place and person  Skin: Warm, dry, normal turgor  Extremity exam shows brisk capillary refill. Peripheral pulses are palpable in lower extremities     Labs and Tests:  CBC:   Recent Labs     09/03/20 0426 09/04/20 0439 09/05/20 0437 09/05/20  1420   WBC 9.9 8.9 8.1  --    HGB 8.6* 8.3* 7.1* 7.6*   PLT 85* 89* 73*  --      BMP:    Recent Labs     09/03/20 0426 09/04/20 0439 09/05/20 0437    138 138   K 4.6 4.6 4.5   * 111* 110   CO2 21 21 22   BUN 31* 30* 29*   CREATININE 0.9 0.7 0.6   GLUCOSE 88 109* 113*     Hepatic:   Recent Labs     09/03/20 0426 09/04/20 0439 09/05/20  0437   AST 44* 56* 33   ALT 13 20 16   BILITOT 0.7 0.5 0.6   ALKPHOS 146* 191* 174*     IR US GUIDED PARACENTESIS   Final Result   Successful ultrasound guided paracentesis. CT CHEST ABDOMEN PELVIS W CONTRAST   Final Result   1. Multifocal bilateral pulmonary ground-glass opacities. The differential   includes atypical infectious/inflammatory pneumonitis. 2. Multiple bilateral pulmonary nodules concerning for metastatic disease. 3. Cirrhosis with increasing abdominopelvic ascites. 4. Improving 7 cm recto uterine abscess. XR ABDOMEN (KUB) (SINGLE AP VIEW)   Final Result   Multiple distended small bowel loops, similar to the previous study. No   significant colonic gas. Findings are concerning for a SBO. XR CHEST PORTABLE   Final Result   Nasogastric tube is seen extending below the diaphragm. Dedicated abdominal   radiograph could be performed for better visualization as warranted. Bilateral airspace disease, increased on the right as compared to prior, for   which pneumonia or edema are considerations.          XR ABDOMEN (2 VIEWS)   Final Result   Scattered dilated small bowel loops throughout the abdomen, increased   compared to prior CT scan, either due to ileus or early partial small bowel   obstruction         XR CHEST PORTABLE   Final Result   Lucency beneath the right hemidiaphragm compatible with free intraperitoneal   air. Apparently the patient has had recent abdominal surgery. This finding   was discussed with Dr. Kalyn Matos  At 6:48 pm on 8/24/2020. Status post placement of right jugular central venous catheter. No   pneumothorax. Patchy mid and lower lung ground-glass opacities suspicious for pneumonia,   increased since the prior CT. Correlation for pneumonia is recommended. CT ABSCESS DRAINAGE W CATH PLACEMENT S&I   Final Result   Successful CT guided placement of a drainage catheter in the pelvic abscess. CT ABDOMEN PELVIS W IV CONTRAST Additional Contrast? Oral   Final Result   1. Pelvic air/fluid collection almost certainly reflects an abscess or giant   colonic diverticula. This may be the result of acute diverticulitis. 2. Mass lesion ascending colon almost certainly reflecting primary   adenocarcinoma of the colon. 3. Diffuse hepatic lesions with newly visualized (contrast enhancement) mass   at the hepatic dome. Hepatic metastatic disease is a consideration as is   cirrhosis with regenerative nodules and hepatocellular carcinoma. 4. Bilateral lower lobe pulmonary nodules suspicious for metastatic disease. 5. Abdominal and pelvic ascites. 6. Sequela from portosystemic shunting with numerous collateral vessels about   the stomach, left upper quadrant and distal esophagus. Relatively small   esophageal and gastric varices. 7.  Calcific atherosclerotic disease aorta. CT ABDOMEN PELVIS WO CONTRAST Additional Contrast? None   Final Result   1. Non loculated fluid and gas collection in the pelvis concerning for   perforated viscus/possible abscess formation.    2. Prominent thickening of the ascending colon which may be related to   infectious or inflammatory process or underlying neoplastic process. 3. Bowel-gas pattern typical of adynamic ileus. 4. Hepatic cirrhosis with numerous hepatic nodules suggestive of   regenerative/siderotic nodules. MRI abdomen without and with IV contrast   correlation recommended. Metastatic disease is a consideration. 5. Hepatosplenomegaly and multifocal collateral vessels about the stomach   esophagus concerning for varices, all suggestive of portal venous   hypertension. 6. Abdominal and pelvic ascites. 7.  Diverticulosis coli without CT evidence of acute diverticulitis. Critical results were called by Dr. Evan Danielle to [address] [place] [address]   on 8/16/2020 at 18:09. CT Cervical Spine WO Contrast   Final Result   No acute abnormality of the cervical spine. CT Head WO Contrast   Final Result   No acute intracranial abnormality. XR CHEST PORTABLE   Final Result   No acute process. XR RADIUS ULNA LEFT (2 VIEWS)   Final Result   No acute osseous abnormality.          CT CHEST W CONTRAST    (Results Pending)       Discussed care with family    I viewed CXR images and EKG tracings   Discussed film/CT with reading physician      Spent 30 minutes with patient and family at bedside and on unit reviewing medical records and labs, spent greater than 50% time counseling patient and/or coordinating care with RN    Problem List  Active Problems:    Pelvic abscess in female    Septicemia ([address]    Colonic mass    Cirrhosis of liver with ascites ([address]    Intra-abdominal free air of unknown etiology    PAPITO (acute kidney injury) ([address]    Lung nodules    Bandemia    Intra-abdominal abscess ([address]    E coli infection    Elevated CEA    Ex-smoker    Class 2 obesity due to excess calories with body mass index (BMI) of 36.0 to 36.9 in adult    Portal hypertension ([address]    Mild malnutrition ([address]    Tobacco abuse counseling    Open abdominal wall wound    Receiving intravenous antibiotic treatment as outpatient    Complex care coordination  Resolved Problems:    * No resolved hospital problems. *       Assessment & Plan:     Pelvic abscess. Status post IR drainage 8/18, pelvic fluid cytology negative for malignant cells. Colonoscopy 8/20 with large necrotic ascending colon mass, likely source of abscess. Status post exp lap right transverse colectomy 8/24. Status post secondary wound closure 8/28. Initially on Zosyn, transitioned to IV cipro (8/20). IV Flagyl added 8/25. Now on meropenem and flucanazole. Remains afebrile, WBC trending down. Blood culture results (8/25) with no growth to date. Surgery and ID on on board    Liver cirrhosis / portal HTN. Biopsy consistent with WINKLER cirrhosis, negative for adenocarcinoma involvement. Started on lactulose for ammonia level 66 and confusion, ammonia level 23 (8/28). GI following. S/P paracentesis; 10 L removed 9/1/2020 ,fluid for cx. Colon cancer. CT scan showed thickening of ascending colon suspicious for malignancy, lesions liver and lung concerning for metastasis. CEA 83.3, AFP 2.4. Colon biopsy (8/24) showed poorly differentiate adenocarcinoma, metastatic adenocarcinoma in 3 of 232 lymph nodes. Patient will need CT chest for staging once renal function improves. Plan is for chemotherapy once she recovers from surgery. Oncology on board. Grade 1 endometrial cancer: Poor candidate for any surgery at this time per oncology    PAPITO. Improved ; urology on board appreciate their recs  Creatinine 1.6 on admission suspected pre-renal, hypovolemia. Abnormal CXR (8/27). Bilateral airspace disease, increased on right as compared to prior, pneumonia or edema are considerations. Possible 3rd spacing, received albumin. COVID-19 (8/22) negative. Remains afebrile, no cough. Encouraged IS use. Acute anemia. Hgb 10.9 on admission, no previous studies for comparison.  Suspect she likely had chronic iron deficiency anemia secondary to colon cancer and post menopausal bleeding. She received 1 unit PRBCs 8/27, Hgb/Hct stable      DC planning: Plan is for SNF on DC. Case management is assisting, family preference is [facility] or [address] She is likely inpatient for the next 3 to 4 days        Diet: Dietary Nutrition Supplements: Standard High Calorie Oral Supplement  DIET GENERAL;  Code:Full Code  DVT PPX: On Lovenox  Disposition  Plan is for SNF on DC. Case management is assisting, family preference is [facility] or [address]  She is likely inpatient for the next 2-3 days           Salma Gayle MD   9/5/2020 6:57 PM

## 2020-09-05 NOTE — PROGRESS NOTES
[facility]  Gastroenterology Progress Note    Mishel Hanley is a 76 y.o. female patient. 1. Septicemia ([facility]    2. PAPITO (acute kidney injury) ([facility]    3. Intra-abdominal free air of unknown etiology    4. Fall, initial encounter        SUBJECTIVE:  Denies abdominal pain. Had 2 BMs yesterday per chart. Pt oriented this am. Denies any melena       ROS:  Cardiovascular ROS: no chest pain or dyspnea on exertion  Gastrointestinal ROS: no abdominal pain, N/V   Respiratory ROS: no cough, shortness of breath, or wheezing    Physical    VITALS:  /76   Pulse 81   Temp 97.4 °F (36.3 °C) (Oral)   Resp 16   Ht 5' 7\" (1.702 m)   Wt 223 lb 6.4 oz (101.3 kg)   SpO2 98%   BMI 34.99 kg/m²   TEMPERATURE:  Current - Temp: 97.4 °F (36.3 °C); Max - Temp  Av.6 °F (36.4 °C)  Min: 97.4 °F (36.3 °C)  Max: 97.8 °F (36.6 °C)    NAD  RRR, Nl s1s2  Lungs CTA Bilaterally, normal effort  Abdomen soft, nontender, + dressing, NT, no HSM, Bowel sounds present  AAOx3, No asterixis   BLE edema     Data      CBC:   Recent Labs     20   WBC 9.9 8.9 8.1   RBC 2.90* 2.81* 2.46*   HGB 8.6* 8.3* 7.1*   HCT 26.7* 25.5* 22.3*   PLT 85* 89* 73*   MCV 92.0 90.6 90.8   MCH 29.7 29.4 29.0   MCHC 32.3 32.5 32.0   RDW 19.9* 19.4* 19.9*        BMP:  Recent Labs     20    138 138   K 4.6 4.6 4.5   * 111* 110   CO2 21 21 22   BUN 31* 30* 29*   CREATININE 0.9 0.7 0.6   CALCIUM 8.9 8.6 8.8   GLUCOSE 88 109* 113*        Hepatic Function Panel:   Recent Labs     20  0426 20  0439 20   AST 44* 56* 33   ALT 13 20 16   BILITOT 0.7 0.5 0.6   ALKPHOS 146* 191* 174*       No results for input(s): LIPASE, AMYLASE in the last 72 hours. Recent Labs     20  0426 20   PROTIME 12.5 13.0 14.0*   INR 1.08 1.12 1.20*     No results for input(s): PTT in the last 72 hours.   No results for input(s): OCCULTBLD in the last 72 hours. Radiology Review:    IR US GUIDED PARACENTESIS   Final Result   Successful ultrasound guided paracentesis. CT CHEST ABDOMEN PELVIS W CONTRAST   Final Result   1. Multifocal bilateral pulmonary ground-glass opacities. The differential   includes atypical infectious/inflammatory pneumonitis. 2. Multiple bilateral pulmonary nodules concerning for metastatic disease. 3. Cirrhosis with increasing abdominopelvic ascites. 4. Improving 7 cm recto uterine abscess. XR ABDOMEN (KUB) (SINGLE AP VIEW)   Final Result   Multiple distended small bowel loops, similar to the previous study. No   significant colonic gas. Findings are concerning for a SBO. XR CHEST PORTABLE   Final Result   Nasogastric tube is seen extending below the diaphragm. Dedicated abdominal   radiograph could be performed for better visualization as warranted. Bilateral airspace disease, increased on the right as compared to prior, for   which pneumonia or edema are considerations. XR ABDOMEN (2 VIEWS)   Final Result   Scattered dilated small bowel loops throughout the abdomen, increased   compared to prior CT scan, either due to ileus or early partial small bowel   obstruction         XR CHEST PORTABLE   Final Result   Lucency beneath the right hemidiaphragm compatible with free intraperitoneal   air. Apparently the patient has had recent abdominal surgery. This finding   was discussed with Dr. Ijeoma Roberts  At 6:48 pm on 8/24/2020. Status post placement of right jugular central venous catheter. No   pneumothorax. Patchy mid and lower lung ground-glass opacities suspicious for pneumonia,   increased since the prior CT. Correlation for pneumonia is recommended. CT ABSCESS DRAINAGE W CATH PLACEMENT S&I   Final Result   Successful CT guided placement of a drainage catheter in the pelvic abscess. CT ABDOMEN PELVIS W IV CONTRAST Additional Contrast? Oral   Final Result   1. Pelvic air/fluid collection almost certainly reflects an abscess or giant   colonic diverticula. This may be the result of acute diverticulitis. 2. Mass lesion ascending colon almost certainly reflecting primary   adenocarcinoma of the colon. 3. Diffuse hepatic lesions with newly visualized (contrast enhancement) mass   at the hepatic dome. Hepatic metastatic disease is a consideration as is   cirrhosis with regenerative nodules and hepatocellular carcinoma. 4. Bilateral lower lobe pulmonary nodules suspicious for metastatic disease. 5. Abdominal and pelvic ascites. 6. Sequela from portosystemic shunting with numerous collateral vessels about   the stomach, left upper quadrant and distal esophagus. Relatively small   esophageal and gastric varices. 7.  Calcific atherosclerotic disease aorta. CT ABDOMEN PELVIS WO CONTRAST Additional Contrast? None   Final Result   1. Non loculated fluid and gas collection in the pelvis concerning for   perforated viscus/possible abscess formation. 2. Prominent thickening of the ascending colon which may be related to   infectious or inflammatory process or underlying neoplastic process. 3. Bowel-gas pattern typical of adynamic ileus. 4. Hepatic cirrhosis with numerous hepatic nodules suggestive of   regenerative/siderotic nodules. MRI abdomen without and with IV contrast   correlation recommended. Metastatic disease is a consideration. 5. Hepatosplenomegaly and multifocal collateral vessels about the stomach   esophagus concerning for varices, all suggestive of portal venous   hypertension. 6. Abdominal and pelvic ascites. 7.  Diverticulosis coli without CT evidence of acute diverticulitis. Critical results were called by Dr. Vernon Malhotra to [address] [place] [address]   on 8/16/2020 at 18:09. CT Cervical Spine WO Contrast   Final Result   No acute abnormality of the cervical spine.          CT Head WO Contrast   Final Result   No acute intracranial abnormality. XR CHEST PORTABLE   Final Result   No acute process. XR RADIUS ULNA LEFT (2 VIEWS)   Final Result   No acute osseous abnormality. CT CHEST W CONTRAST    (Results Pending)       ASSESSMENT :  Cirrhosis - new diagnosis per CT. bx c/w WINKLER cirrhosis. No alcohol history. Likely from fatty liver. Some ascites on CT. Seemed mildly confused at admission (baseline per her daughter) but ammonia was normal. repeat ammonia is elevated at 66 so lactulose started. Negative: AMA, hepatitis panel, A1AT phenotype. f-actin weak positive at 25. AFP normal. CEA 83. Labs stable. Large volume ascites on CT 8/31. S/p paracentesis 9/1 with 10 L fluid removed. High nucleated cells c/w peritonitis from perforated colon ca. Cx negative. SAAG elevated c/w portal HTN. Cytology negative. Ammonia yest was 25     Abnormal CT of the colon and liver, colon mass - CT with thickening of the ascending colon concerning for malignancy - path c/w AdenoCa. Also with liver and lung lesions concerning for metastatic disease.  Colonoscopy 8/20 with large necrotic ascending colon mass (carcinoma), likely the source of abscess. s/p exploratory laparotomy, evacuation of ascites, drainage of pelvic abscess, right hemicolectomy with primary ileotransverse colostomy anastomosis, and Abdiaziz-Cut liver biopsy on 8/24/2020    N/V - AXR 8/30 with multiple distended small bowel loops. No bowel distention on CT 8/31. Resolved.      Pelvic abscess - from perforated colon ca     Esophagitis - EGD 8/20 with LA class C erosive esophagitis and nodularity at [address] (path mild chronic inflammation).     Bulky uterus with bleeding in vault, suspected to be uterine cancer. Gyn oncology on board. path c/w uterine adenocarcinoma.      Anemia - hgb improved on repeat. PAPITO - resolved.         PLAN:  - lactulose TID and titreat for goal of 3 BMs daily  - continue xifaxan   - started on lasix per nephrology.  Add aldactone 50 mg daily.   - daily weights   - PPI  - Will need Hep A and B vaccines outpatient.  - Hg drifted down. No signs of GI blood loss.  Will repeat later today and transfuse if lower       Jacob Weiss MD  [address]

## 2020-09-05 NOTE — PROGRESS NOTES
0777: Patient requested to use the bathroom. Used stedy x1 with gait belt to transfer patient to the bathroom and up to the chair. Patient did not have trouble this AM with stedy. 1049: Patient requested to go back to bed at this time. Patient was unable to use the stedy to get out of the chair. Patient stated that her arms and legs felt weak. Had to use lift to get patient from chair to bed. Patient denies any needs at this time. Notified RN that patient did not do well transferring from chair to bed.

## 2020-09-06 NOTE — PROGRESS NOTES
Lloyd Mejia is a 76 y.o. female patient.     Current Facility-Administered Medications   Medication Dose Route Frequency Provider Last Rate Last Dose    furosemide (LASIX) 100 mg in dextrose 5 % 100 mL infusion  3 mg/hr Intravenous Continuous Sandy Ferrera MD 3 mL/hr at 09/05/20 1505 3 mg/hr at 09/05/20 1505    albumin human 5 % IV solution 12.5 g  12.5 g Intravenous Q6H Sandy Ferrera MD   Stopped at 09/06/20 1045    bisacodyl (DULCOLAX) suppository 10 mg  10 mg Rectal Once Moi Diallo MD        lidocaine PF 1 % injection 5 mL  5 mL Intradermal Once Bunny Oropeza MD        sodium chloride flush 0.9 % injection 10 mL  10 mL Intravenous 2 times per day Bunny Oropeza MD   10 mL at 09/06/20 0955    sodium chloride flush 0.9 % injection 10 mL  10 mL Intravenous PRN Bunny Oropeza MD   10 mL at 09/04/20 0354    lactobacillus (CULTURELLE) capsule 1 capsule  1 capsule Oral BID  Gopal Guidry MD   1 capsule at 09/06/20 0954    insulin lispro (1 Unit Dial) 0-6 Units  0-6 Units Subcutaneous Q4H Moi Diallo MD   1 Units at 09/05/20 1121    rifaximin (XIFAXAN) tablet 550 mg  751 mg Oral BID Gage Allison PA-C   550 mg at 09/06/20 0950    enoxaparin (LOVENOX) injection 30 mg  30 mg Subcutaneous BID VIN Hoff - CNP   30 mg at 09/06/20 0944    fat emulsion 20 % infusion 250 mL  250 mL Intravenous Once per day on Mon Thu Moi Diallo MD        acetaminophen (TYLENOL) tablet 1,000 mg  1,000 mg Oral Q6H PRN Moi Diallo MD   1,000 mg at 09/04/20 2110    lidocaine (XYLOCAINE) 2 % jelly   Topical Once Moi Diallo MD        phenol 1.4 % mouth spray 1 spray  1 spray Mouth/Throat Q2H PRN Moi Diallo MD   1 spray at 08/27/20 1229    glucose (GLUTOSE) 40 % oral gel 15 g  15 g Oral PRN Moi Diallo MD        dextrose 50 % IV solution  12.5 g Intravenous PRN Moi Diallo MD   12.5 g at 08/28/20 1025    glucagon (rDNA) injection 1 mg  1 mg Intramuscular PRN Kelsie Mcarthur MD        dextrose 5 % solution  100 mL/hr Intravenous PRN Kelsie Mcarthur  mL/hr at 08/27/20 1300 100 mL/hr at 84/38/28 6269    folic acid (FOLVITE) tablet 1 mg  1 mg Oral Daily Kelsie Mcarthur MD   1 mg at 09/06/20 0950    ondansetron (ZOFRAN) injection 4 mg  4 mg Intravenous Q4H PRN Kelsie Mcarthur MD   4 mg at 09/02/20 1534    promethazine (PHENERGAN) injection 12.5 mg  12.5 mg Intravenous Q6H PRN Kelsie Mcarthur MD   12.5 mg at 08/29/20 2115    fluconazole (DIFLUCAN) in 0.9 % sodium chloride IVPB 200 mg  200 mg Intravenous Q24H Kelsie Mcarthur  mL/hr at 09/05/20 1505 200 mg at 09/05/20 1505    meropenem (MERREM) 1 g in sodium chloride 0.9 % 100 mL IVPB (mini-bag)  1 g Intravenous Q12H Kelsie Mcarthur MD   Stopped at 09/06/20 0031    pantoprazole (PROTONIX) injection 40 mg  40 mg Intravenous Daily Kelsie Mcarthur MD   40 mg at 09/06/20 0950    potassium chloride (KLOR-CON M) extended release tablet 40 mEq  40 mEq Oral PRN Kelsie Mcarthur MD        Or    potassium bicarb-citric acid (EFFER-K) effervescent tablet 40 mEq  40 mEq Oral PRN Kelsie Mcarthur MD        Or    potassium chloride 10 mEq/100 mL IVPB (Peripheral Line)  10 mEq Intravenous PRN Kelsie Mcarthur  mL/hr at 08/21/20 1716 10 mEq at 08/21/20 1716    lactulose (CHRONULAC) 10 GM/15ML solution 20 g  20 g Oral TID Kelsie Mcarthur MD   20 g at 09/06/20 0950    lip balm petroleum free (PHYTOPLEX) stick   Topical PRN Kelsie Mcarthur MD        sodium chloride flush 0.9 % injection 10 mL  10 mL Intravenous 2 times per day Kelsie Mcarthur MD   10 mL at 09/06/20 0955    sodium chloride flush 0.9 % injection 10 mL  10 mL Intravenous PRN Kelsie Mcarthur MD   10 mL at 08/27/20 0538    polyethylene glycol (GLYCOLAX) packet 17 g  17 g Oral Daily PRN Tony Valdez MD         Allergies   Allergen Reactions    Penicillins Other (See Comments)     Unknown reaction per pt      Active Problems:    Pelvic abscess in female    Septicemia [facility])    Colonic mass    Cirrhosis of liver with ascites ([facility] [address]    Intra-abdominal free air of unknown etiology    PAPITO (acute kidney injury) ([facility] [address]    Lung nodules    Bandemia    Intra-abdominal abscess (HCC)    E coli infection    Elevated CEA    Ex-smoker    Class 2 obesity due to excess calories with body mass index (BMI) of 36.0 to 36.9 in adult    Portal hypertension (HCC)    Mild malnutrition (HCC)    Tobacco abuse counseling    Open abdominal wall wound    Receiving intravenous antibiotic treatment as outpatient    Complex care coordination  Resolved Problems:    * No resolved hospital problems. *    Blood pressure (!) 101/52, pulse 77, temperature 97.6 °F (36.4 °C), temperature source Oral, resp. rate 16, height 5' 7\" (1.702 m), weight 226 lb 12.8 oz (102.9 kg), SpO2 96 %. Subjective:  Symptoms:  Stable. Pain:  She complains of pain that is mild. Objective:  General Appearance:  Comfortable. Vital signs: (most recent): Blood pressure (!) 101/52, pulse 77, temperature 97.6 °F (36.4 °C), temperature source Oral, resp. rate 16, height 5' 7\" (1.702 m), weight 226 lb 12.8 oz (102.9 kg), SpO2 96 %. Output: Producing urine. Lungs:  Normal effort and normal respiratory rate. Abdomen: Abdomen is soft and non-distended. (Incision approximated without evidence of infection. Minimal serosanguineous drainage from the wound). Neurological: Patient is alert. Skin:  Warm and dry. Assessment & Plan postop day #13 status post right colon resection and postop day #9 status post secondary wound closure. The patient is clinically stable. Continue general diet. Awaiting disposition to [facility].     Katie Pearson MD  9/6/2020

## 2020-09-06 NOTE — PROGRESS NOTES
emulsion 20 % infusion 250 mL, Once per day on Mon Thu  acetaminophen (TYLENOL) tablet 1,000 mg, Q6H PRN  lidocaine (XYLOCAINE) 2 % jelly, Once  phenol 1.4 % mouth spray 1 spray, Q2H PRN  glucose (GLUTOSE) 40 % oral gel 15 g, PRN  dextrose 50 % IV solution, PRN  glucagon (rDNA) injection 1 mg, PRN  dextrose 5 % solution, PRN  folic acid (FOLVITE) tablet 1 mg, Daily  ondansetron (ZOFRAN) injection 4 mg, Q4H PRN  promethazine (PHENERGAN) injection 12.5 mg, Q6H PRN  fluconazole (DIFLUCAN) in 0.9 % sodium chloride IVPB 200 mg, Q24H  meropenem (MERREM) 1 g in sodium chloride 0.9 % 100 mL IVPB (mini-bag), Q12H  pantoprazole (PROTONIX) injection 40 mg, Daily  potassium chloride (KLOR-CON M) extended release tablet 40 mEq, PRN    Or  potassium bicarb-citric acid (EFFER-K) effervescent tablet 40 mEq, PRN    Or  potassium chloride 10 mEq/100 mL IVPB (Peripheral Line), PRN  lactulose (CHRONULAC) 10 GM/15ML solution 20 g, TID  lip balm petroleum free (PHYTOPLEX) stick, PRN  sodium chloride flush 0.9 % injection 10 mL, 2 times per day  sodium chloride flush 0.9 % injection 10 mL, PRN  polyethylene glycol (GLYCOLAX) packet 17 g, Daily PRN        Objective:  /67   Pulse 80   Temp 97.7 °F (36.5 °C) (Oral)   Resp 16   Ht 5' 7\" (1.702 m)   Wt 226 lb 12.8 oz (102.9 kg)   SpO2 96%   BMI 35.52 kg/m²     Intake/Output Summary (Last 24 hours) at 9/6/2020 1900  Last data filed at 9/6/2020 1017  Gross per 24 hour   Intake 240 ml   Output 1602 ml   Net -1362 ml      Wt Readings from Last 3 Encounters:   09/06/20 226 lb 12.8 oz (102.9 kg)       General appearance: Ill-looking  Eyes: Sclera clear. Pupils equal.  ENT: Moist oral mucosa. Trachea midline, no adenopathy. Cardiovascular: Regular rhythm, normal S1, S2. No murmur. No edema in lower extremities  Respiratory: Not using accessory muscles. Good inspiratory effort. Clear to auscultation bilaterally, no wheeze or crackles.    GI: Abdomen soft, surgical dressing in place; drain by the side  Musculoskeletal: No cyanosis in digits, neck supple  Neurology: CN 2-12 grossly intact. No speech or motor deficits  Psych: Normal affect. Alert and oriented in time, place and person  Skin: Warm, dry, normal turgor  Extremity exam shows brisk capillary refill. Peripheral pulses are palpable in lower extremities     Labs and Tests:  CBC:   Recent Labs     09/04/20  0439 09/05/20  0437 09/05/20  1420 09/06/20  0400   WBC 8.9 8.1  --  7.8   HGB 8.3* 7.1* 7.6* 7.1*   PLT 89* 73*  --  76*     BMP:    Recent Labs     09/04/20  0439 09/05/20  0437 09/06/20  0400    138 136   K 4.6 4.5 3.6   * 110 106   CO2 21 22 22   BUN 30* 29* 25*   CREATININE 0.7 0.6 0.7   GLUCOSE 109* 113* 82     Hepatic:   Recent Labs     09/04/20  0439 09/05/20  0437 09/06/20  0400   AST 56* 33 41*   ALT 20 16 17   BILITOT 0.5 0.6 0.8   ALKPHOS 191* 174* 211*     IR US GUIDED PARACENTESIS   Final Result   Successful ultrasound guided paracentesis. CT CHEST ABDOMEN PELVIS W CONTRAST   Final Result   1. Multifocal bilateral pulmonary ground-glass opacities. The differential   includes atypical infectious/inflammatory pneumonitis. 2. Multiple bilateral pulmonary nodules concerning for metastatic disease. 3. Cirrhosis with increasing abdominopelvic ascites. 4. Improving 7 cm recto uterine abscess. XR ABDOMEN (KUB) (SINGLE AP VIEW)   Final Result   Multiple distended small bowel loops, similar to the previous study. No   significant colonic gas. Findings are concerning for a SBO. XR CHEST PORTABLE   Final Result   Nasogastric tube is seen extending below the diaphragm. Dedicated abdominal   radiograph could be performed for better visualization as warranted. Bilateral airspace disease, increased on the right as compared to prior, for   which pneumonia or edema are considerations.          XR ABDOMEN (2 VIEWS)   Final Result   Scattered dilated small bowel loops throughout the abdomen, increased   compared to prior CT scan, either due to ileus or early partial small bowel   obstruction         XR CHEST PORTABLE   Final Result   Lucency beneath the right hemidiaphragm compatible with free intraperitoneal   air. Apparently the patient has had recent abdominal surgery. This finding   was discussed with Dr. Ananya Barba  At 6:48 pm on 8/24/2020. Status post placement of right jugular central venous catheter. No   pneumothorax. Patchy mid and lower lung ground-glass opacities suspicious for pneumonia,   increased since the prior CT. Correlation for pneumonia is recommended. CT ABSCESS DRAINAGE W CATH PLACEMENT S&I   Final Result   Successful CT guided placement of a drainage catheter in the pelvic abscess. CT ABDOMEN PELVIS W IV CONTRAST Additional Contrast? Oral   Final Result   1. Pelvic air/fluid collection almost certainly reflects an abscess or giant   colonic diverticula. This may be the result of acute diverticulitis. 2. Mass lesion ascending colon almost certainly reflecting primary   adenocarcinoma of the colon. 3. Diffuse hepatic lesions with newly visualized (contrast enhancement) mass   at the hepatic dome. Hepatic metastatic disease is a consideration as is   cirrhosis with regenerative nodules and hepatocellular carcinoma. 4. Bilateral lower lobe pulmonary nodules suspicious for metastatic disease. 5. Abdominal and pelvic ascites. 6. Sequela from portosystemic shunting with numerous collateral vessels about   the stomach, left upper quadrant and distal esophagus. Relatively small   esophageal and gastric varices. 7.  Calcific atherosclerotic disease aorta. CT ABDOMEN PELVIS WO CONTRAST Additional Contrast? None   Final Result   1. Non loculated fluid and gas collection in the pelvis concerning for   perforated viscus/possible abscess formation.    2. Prominent thickening of the ascending colon which may be related to   infectious or

## 2020-09-06 NOTE — PROGRESS NOTES
1574: Shift assessment complete. VSS. Alert to person and place. Disoriented to time and situation. See flowsheets. Morning medications given per MAR. The care plan and education has been reviewed and mutually agreed upon with the patient. Pt needs expressed, call light within reach, will continue to monitor.

## 2020-09-06 NOTE — PLAN OF CARE
Problem: Falls - Risk of:  Goal: Will remain free from falls  Description: Will remain free from falls  9/6/2020 1057 by Adry Barragan RN  Outcome: Ongoing  9/5/2020 2125 by Nic Navarro RN  Outcome: Ongoing  Goal: Absence of physical injury  Description: Absence of physical injury  9/6/2020 1057 by Adry Barragan RN  Outcome: Ongoing  9/5/2020 2125 by Nic Navarro RN  Outcome: Ongoing     Problem: Skin Integrity:  Goal: Will show no infection signs and symptoms  Description: Will show no infection signs and symptoms  9/6/2020 1057 by Adry Barragan RN  Outcome: Ongoing  9/5/2020 2125 by Nic Navarro RN  Outcome: Ongoing  Goal: Absence of new skin breakdown  Description: Absence of new skin breakdown  9/6/2020 1057 by Adry Barragan RN  Outcome: Ongoing  9/5/2020 2125 by Nic Navarro RN  Outcome: Ongoing     Problem: Infection:  Goal: Will remain free from infection  Description: Will remain free from infection  9/6/2020 1057 by Adry Barragan RN  Outcome: Ongoing  9/5/2020 2125 by Nic Navarro RN  Outcome: Ongoing     Problem: Safety:  Goal: Free from accidental physical injury  Description: Free from accidental physical injury  9/6/2020 1057 by Adry Barragan RN  Outcome: Ongoing  9/5/2020 2125 by Nic Navarro RN  Outcome: Ongoing  Goal: Free from intentional harm  Description: Free from intentional harm  9/6/2020 1057 by Adry Barragan RN  Outcome: Ongoing  9/5/2020 2125 by Nic Navarro RN  Outcome: Ongoing     Problem: Daily Care:  Goal: Daily care needs are met  Description: Daily care needs are met  9/6/2020 1057 by Adry Barragan RN  Outcome: Ongoing  9/5/2020 2125 by Nic Navarro RN  Outcome: Ongoing     Problem: Pain:  Goal: Patient's pain/discomfort is manageable  Description: Patient's pain/discomfort is manageable  9/6/2020 1057 by Adry Barragan RN  Outcome: Ongoing  9/5/2020 2125 by Nic Navarro RN  Outcome: Ongoing  Goal: Pain level will decrease  Description: Pain level will decrease  9/6/2020 1057 by Edward Dhaliwal RN  Outcome: Ongoing  9/5/2020 2125 by Salazar Sparrow RN  Outcome: Ongoing  Goal: Control of acute pain  Description: Control of acute pain  9/6/2020 1057 by Edward Dhaliwal RN  Outcome: Ongoing  9/5/2020 2125 by Salazar Sparrow RN  Outcome: Ongoing  Goal: Control of chronic pain  Description: Control of chronic pain  9/6/2020 1057 by Edward Dhaliwal RN  Outcome: Ongoing  9/5/2020 2125 by Salazar Sparrow RN  Outcome: Ongoing     Problem: Skin Integrity:  Goal: Skin integrity will stabilize  Description: Skin integrity will stabilize  9/6/2020 1057 by Edward Dhaliwal RN  Outcome: Ongoing  9/5/2020 2125 by Salazar Sparrow RN  Outcome: Ongoing     Problem: Discharge Planning:  Goal: Patients continuum of care needs are met  Description: Patients continuum of care needs are met  9/6/2020 1057 by Edward Dhaliwal RN  Outcome: Ongoing  9/5/2020 2125 by Salazar Sparrow RN  Outcome: Ongoing     Problem: Nutrition  Goal: Optimal nutrition therapy  9/6/2020 1057 by Edward Dhaliwal RN  Outcome: Ongoing  9/5/2020 2125 by Salazar Sparrow RN  Outcome: Ongoing     Problem: Confusion - Acute:  Goal: Absence of continued neurological deterioration signs and symptoms  Description: Absence of continued neurological deterioration signs and symptoms  9/6/2020 1057 by Edward Dhaliwal RN  Outcome: Ongoing  9/5/2020 2125 by Salazar Sparrow RN  Outcome: Ongoing  Goal: Mental status will be restored to baseline  Description: Mental status will be restored to baseline  9/6/2020 1057 by Edward Dhaliwal RN  Outcome: Ongoing  9/5/2020 2125 by Salazar Sparrow RN  Outcome: Ongoing     Problem: Discharge Planning:  Goal: Ability to perform activities of daily living will improve  Description: Ability to perform activities of daily living will improve  9/6/2020 1057 by Edward Dhaliwal RN  Outcome: Ongoing  9/5/2020 2125 by Salazar Sparrow RN  Outcome: Ongoing  Goal: Participates in care planning  Description: Participates in care planning  9/6/2020 1057 by Greg Bernheim, RN  Outcome: Ongoing  9/5/2020 2125 by Jazmine Houser RN  Outcome: Ongoing     Problem: Injury - Risk of, Physical Injury:  Goal: Will remain free from falls  Description: Will remain free from falls  9/6/2020 1057 by Greg Bernheim, RN  Outcome: Ongoing  9/5/2020 2125 by Jazmine Houser RN  Outcome: Ongoing  Goal: Absence of physical injury  Description: Absence of physical injury  9/6/2020 1057 by Greg Bernheim, RN  Outcome: Ongoing  9/5/2020 2125 by Jazmine Houser RN  Outcome: Ongoing     Problem: Mood - Altered:  Goal: Mood stable  Description: Mood stable  9/6/2020 1057 by Greg Bernheim, RN  Outcome: Ongoing  9/5/2020 2125 by Jazmine Houser RN  Outcome: Ongoing  Goal: Absence of abusive behavior  Description: Absence of abusive behavior  9/6/2020 1057 by Greg Bernheim, RN  Outcome: Ongoing  9/5/2020 2125 by Jazmine Houser RN  Outcome: Ongoing  Goal: Verbalizations of feeling emotionally comfortable while being cared for will increase  Description: Verbalizations of feeling emotionally comfortable while being cared for will increase  9/6/2020 1057 by Greg Bernheim, RN  Outcome: Ongoing  9/5/2020 2125 by Jazmine Houser RN  Outcome: Ongoing     Problem: Psychomotor Activity - Altered:  Goal: Absence of psychomotor disturbance signs and symptoms  Description: Absence of psychomotor disturbance signs and symptoms  9/6/2020 1057 by Greg Bernheim, RN  Outcome: Ongoing  9/5/2020 2125 by Jazmine Houser RN  Outcome: Ongoing     Problem: Sensory Perception - Impaired:  Goal: Demonstrations of improved sensory functioning will increase  Description: Demonstrations of improved sensory functioning will increase  9/6/2020 1057 by Greg Bernheim, RN  Outcome: Ongoing  9/5/2020 2125 by Jazmine Houser RN  Outcome: Ongoing  Goal: Decrease in sensory misperception frequency  Description: Decrease in sensory misperception frequency  9/6/2020 1057 by Greg Bernheim, RN  Outcome: Ongoing  9/5/2020 2125 by Jazmine Houser RN  Outcome: Laura Douglas RN  Outcome: Ongoing  9/5/2020 2125 by Gonzalo Srinivasan RN  Outcome: Ongoing

## 2020-09-06 NOTE — PROGRESS NOTES
2030: Head to toe completed at this time. Patient alert and oriented x3, disoriented to time, easily reoriented. Dressing remains CDI to ABD, bowel sounds active x4. Denies pain, nausea or SOB at this time. Plan of care reviewed and agreed upon with patient at this time. Fall precautions in place and effective at this time. All needs addressed at this time, VSS, bed in lowest position with alarm and camera in place, call light in reach.

## 2020-09-06 NOTE — PROGRESS NOTES
[facility] Note    Patient Active Problem List   Diagnosis    Pelvic abscess in female    Septicemia ([facility] [address]    Colonic mass    Cirrhosis of liver with ascites ([facility] [address]    Intra-abdominal free air of unknown etiology    PAPITO (acute kidney injury) ([facility] [address]    Lung nodules    Bandemia    Intra-abdominal abscess ([facility] [address]    E coli infection    Elevated CEA    Ex-smoker    Class 2 obesity due to excess calories with body mass index (BMI) of 36.0 to 36.9 in adult    Portal hypertension (HCC)    Mild malnutrition ([facility] [address]    Tobacco abuse counseling    Open abdominal wall wound    Receiving intravenous antibiotic treatment as outpatient    Complex care coordination       Past Medical History:   has no past medical history on file. Past Social History:   reports that she has been smoking cigarettes. She has a 25.00 pack-year smoking history. She does not have any smokeless tobacco history on file. She reports that she does not drink alcohol or use drugs. Subjective:  No complaints. Urinating more. Review of Systems   Constitutional: Positive for fatigue. Negative for activity change, appetite change, chills, fever and unexpected weight change. HENT: Negative for congestion and facial swelling. Eyes: Negative for photophobia, discharge and redness. Respiratory: Negative for cough, chest tightness and shortness of breath. Cardiovascular: Positive for leg swelling. Negative for chest pain and palpitations. Gastrointestinal: Negative for abdominal distention, abdominal pain, blood in stool, constipation, diarrhea, nausea and vomiting. Endocrine: Negative for cold intolerance, heat intolerance and polyuria. Genitourinary: Negative for decreased urine volume, difficulty urinating, flank pain and hematuria. Musculoskeletal: Negative for joint swelling and neck pain.    Neurological: Negative for dizziness, seizures, syncope, speech difficulty, light-headedness and headaches. Hematological: Does not bruise/bleed easily. Psychiatric/Behavioral: Negative for agitation, confusion and hallucinations. Objective:  +8.3L. Lowest weight 215lbs, currently at 226    BP (!) 101/52   Pulse 77   Temp 97.6 °F (36.4 °C) (Oral)   Resp 16   Ht 5' 7\" (1.702 m)   Wt 226 lb 12.8 oz (102.9 kg)   SpO2 96%   BMI 35.52 kg/m²     Wt Readings from Last 3 Encounters:   09/06/20 226 lb 12.8 oz (102.9 kg)       BP Readings from Last 3 Encounters:   09/06/20 (!) 101/52   08/28/20 (!) 149/66   08/24/20 (!) 155/68     Chest- rhonchi  Heart-regular  Abd-soft  Ext- 2+ edema    Labs  Hemoglobin   Date Value Ref Range Status   09/06/2020 7.1 (L) 12.0 - 16.0 g/dL Final     Hematocrit   Date Value Ref Range Status   09/06/2020 21.9 (L) 36.0 - 48.0 % Final     WBC   Date Value Ref Range Status   09/06/2020 7.8 4.0 - 11.0 K/uL Final     Platelets   Date Value Ref Range Status   09/06/2020 76 (L) 135 - 450 K/uL Final     Lab Results   Component Value Date    CREATININE 0.7 09/06/2020    BUN 25 (H) 09/06/2020     09/06/2020    K 3.6 09/06/2020     09/06/2020    CO2 22 09/06/2020     RECOMMENDATIONS:   -continue Lasix infusion and albumin IV. Volume negative. - stopped SPNL    IMPRESSION:        PAPITO - improved  : Oligouric - now non-oliguric.    : Etiology of current PAPITO - ATN vs. Decreased renal perfusion with hemodynamic status with postop. - Renal imaging: w/ CT - kidney unremarkable   - CK - 310 on admission then improved. - Avoid contrast exposure at this time. Associated problems:    - Electrolytes: Hyponatremia-resolved  - Hypokalemia - from needing repletion GI loss -better  : BP: follow with diuresis  - Acid-Base: Acidosis - - non-AGMA - better  - Anemia:  - Heme  Following.   Follow Hgb.      #WINKLER cirrhosis, Portal HTN : new diagnosis, no h/o EtOH     # Sepsis, intra-abdominal abscess   - S/P CT-guided drain placement on 8/18/2020  - S/P right hemicolectomy 8/24  -better  [facility] Problems            Last Modified POA     Pelvic abscess in female 8/16/2020 Yes     Septicemia ([address] 8/17/2020 Yes     Colonic mass 8/25/2020 Yes     Cirrhosis of liver with ascites ([address] 8/25/2020 Yes     Intra-abdominal free air of unknown etiology 8/25/2020 Yes     PAPITO (acute kidney injury) ([address] 8/25/2020 Yes     Lung nodules 8/25/2020 Yes     Bandemia 8/25/2020 Yes     Intra-abdominal abscess ([address] 8/25/2020 Yes     E coli infection 8/25/2020 Yes     Elevated CEA 8/25/2020 Yes     Ex-smoker 8/25/2020 Yes     Class 2 obesity due to excess calories with body mass index (BMI) of 36.0 to 36.9 in adult 8/25/2020 Yes     Portal hypertension ([address] 8/25/2020 Yes     Mild malnutrition ([address] (Chronic) 8/25/2020 Yes     Tobacco abuse counseling 8/27/2020 Yes     Open abdominal wall wound 8/28/2020 Yes     Receiving intravenous antibiotic treatment as outpatient 9/2/2020 Yes     Complex care coordination 9/2/2020 Yes         Javy Muñoz MD

## 2020-09-07 NOTE — PROGRESS NOTES
Shift assessment see flow sheets, meds per orders see eMAR. Patient alert to self, situation,  Voiding in both toilet and on bed pan. 2 instances of urine missed the hat in the toilet. Patient using call light appropriately through shift, no confusion noted. The care plan and education has been reviewed and mutually agreed upon with the patient. Patient remains free from falls. All fall precautions in place. Yellow blanket at bedside, yellow bracelet on patient. SAFE sign on door. Bed and chair alarms being used. Bed in lowest position. Will monitor.      Electronically signed by Markus Medina RN on 9/7/2020 at 4:28 AM

## 2020-09-07 NOTE — PLAN OF CARE
Problem: Falls - Risk of:  Goal: Will remain free from falls  Description: Will remain free from falls  9/7/2020 0428 by Tony Casanova RN  Outcome: Ongoing  9/7/2020 0428 by Tony Casanova RN  Outcome: Ongoing  Goal: Absence of physical injury  Description: Absence of physical injury  9/7/2020 0428 by Tony Casanova RN  Outcome: Ongoing  9/7/2020 0428 by Tony Casanova RN  Outcome: Ongoing     Problem: Skin Integrity:  Goal: Will show no infection signs and symptoms  Description: Will show no infection signs and symptoms  9/7/2020 0428 by Tony Casanova RN  Outcome: Ongoing  9/7/2020 0428 by Tony Casanova RN  Outcome: Ongoing  Goal: Absence of new skin breakdown  Description: Absence of new skin breakdown  9/7/2020 0428 by Tony Casanova RN  Outcome: Ongoing  9/7/2020 0428 by Tony Casanova RN  Outcome: Ongoing     Problem: Infection:  Goal: Will remain free from infection  Description: Will remain free from infection  9/7/2020 0428 by Tony Casanova RN  Outcome: Ongoing  9/7/2020 0428 by Tony Casanova RN  Outcome: Ongoing     Problem: Safety:  Goal: Free from accidental physical injury  Description: Free from accidental physical injury  9/7/2020 0428 by Tony Casanova RN  Outcome: Ongoing  9/7/2020 0428 by Tony Casanova RN  Outcome: Ongoing  Goal: Free from intentional harm  Description: Free from intentional harm  9/7/2020 0428 by Tony Casanova RN  Outcome: Ongoing  9/7/2020 0428 by Tony Casanova RN  Outcome: Ongoing     Problem: Daily Care:  Goal: Daily care needs are met  Description: Daily care needs are met  9/7/2020 0428 by Tony Casanova RN  Outcome: Ongoing  9/7/2020 0428 by Tony Casanova RN  Outcome: Ongoing     Problem: Pain:  Description: Pain management should include both nonpharmacologic and pharmacologic interventions.   Goal: Patient's pain/discomfort is manageable  Description: Patient's pain/discomfort is manageable  9/7/2020 0428 by Tony Casanova RN  Outcome: Ongoing  9/7/2020 0428 by Talib Billings RN  Outcome: Ongoing  Goal: Pain level will decrease  Description: Pain level will decrease  9/7/2020 0428 by Talib Billings RN  Outcome: Ongoing  9/7/2020 0428 by Talib Billings RN  Outcome: Ongoing  Goal: Control of acute pain  Description: Control of acute pain  9/7/2020 0428 by Talib Billings RN  Outcome: Ongoing  9/7/2020 0428 by Talib Billings RN  Outcome: Ongoing  Goal: Control of chronic pain  Description: Control of chronic pain  9/7/2020 0428 by Talib Billings RN  Outcome: Ongoing  9/7/2020 0428 by Talib Billings RN  Outcome: Ongoing     Problem: Skin Integrity:  Goal: Skin integrity will stabilize  Description: Skin integrity will stabilize  9/7/2020 0428 by Talib Billings RN  Outcome: Ongoing  9/7/2020 0428 by Talib Billings RN  Outcome: Ongoing     Problem: Discharge Planning:  Goal: Patients continuum of care needs are met  Description: Patients continuum of care needs are met  9/7/2020 0428 by Talib Billings RN  Outcome: Ongoing  9/7/2020 0428 by Talbi Billings RN  Outcome: Ongoing     Problem: Nutrition  Goal: Optimal nutrition therapy  9/7/2020 0428 by Talib Billings RN  Outcome: Ongoing  9/7/2020 0428 by Talib Billings RN  Outcome: Ongoing     Problem: Confusion - Acute:  Goal: Absence of continued neurological deterioration signs and symptoms  Description: Absence of continued neurological deterioration signs and symptoms  9/7/2020 0428 by Talib Billings RN  Outcome: Ongoing  9/7/2020 0428 by Talib Billings RN  Outcome: Ongoing  Goal: Mental status will be restored to baseline  Description: Mental status will be restored to baseline  9/7/2020 0428 by Talib Billings RN  Outcome: Ongoing  9/7/2020 0428 by Talib Billings RN  Outcome: Ongoing     Problem: Discharge Planning:  Goal: Ability to perform activities of daily living will improve  Description: Ability to perform activities of daily living will improve  9/7/2020 0428 by Talib Billings RN  Outcome: Ongoing  9/7/2020 0428 by Sandrita Crandall RN  Outcome: Ongoing  Goal: Participates in care planning  Description: Participates in care planning  9/7/2020 0428 by Sandrita Crandall RN  Outcome: Ongoing  9/7/2020 0428 by Sandrita Crandall RN  Outcome: Ongoing     Problem: Injury - Risk of, Physical Injury:  Goal: Will remain free from falls  Description: Will remain free from falls  9/7/2020 0428 by Sandrita Crandall RN  Outcome: Ongoing  9/7/2020 0428 by Sandrita Crandall RN  Outcome: Ongoing  Goal: Absence of physical injury  Description: Absence of physical injury  9/7/2020 0428 by Sandrita Crandall RN  Outcome: Ongoing  9/7/2020 0428 by Sandrita Crandall RN  Outcome: Ongoing     Problem: Mood - Altered:  Goal: Mood stable  Description: Mood stable  9/7/2020 0428 by Sandrita Crandall RN  Outcome: Ongoing  9/7/2020 0428 by Sandrita Crandall RN  Outcome: Ongoing  Goal: Absence of abusive behavior  Description: Absence of abusive behavior  9/7/2020 0428 by Sandrita Crandall RN  Outcome: Ongoing  9/7/2020 0428 by Sandrita Crandall RN  Outcome: Ongoing  Goal: Verbalizations of feeling emotionally comfortable while being cared for will increase  Description: Verbalizations of feeling emotionally comfortable while being cared for will increase  9/7/2020 0428 by Sandrita Crandall RN  Outcome: Ongoing  9/7/2020 0428 by Sandrita Crandall RN  Outcome: Ongoing     Problem: Psychomotor Activity - Altered:  Goal: Absence of psychomotor disturbance signs and symptoms  Description: Absence of psychomotor disturbance signs and symptoms  9/7/2020 0428 by Sandrita Crandall RN  Outcome: Ongoing  9/7/2020 0428 by Sandrita Crandall RN  Outcome: Ongoing     Problem: Sensory Perception - Impaired:  Goal: Demonstrations of improved sensory functioning will increase  Description: Demonstrations of improved sensory functioning will increase  9/7/2020 0428 by Sandrita Crandall RN  Outcome: Ongoing  9/7/2020 0428 by Sandrita Crandall RN  Outcome: Ongoing  Goal: Decrease in sensory misperception frequency  Description: Decrease in sensory misperception frequency  9/7/2020 0428 by Singh Carpenter RN  Outcome: Ongoing  9/7/2020 0428 by Singh Carpenter RN  Outcome: Ongoing  Goal: Able to refrain from responding to false sensory perceptions  Description: Able to refrain from responding to false sensory perceptions  9/7/2020 0428 by Singh Carpenter RN  Outcome: Ongoing  9/7/2020 0428 by Singh Carpenter RN  Outcome: Ongoing  Goal: Demonstrates accurate environmental perceptions  Description: Demonstrates accurate environmental perceptions  9/7/2020 0428 by Singh Carpenter RN  Outcome: Ongoing  9/7/2020 0428 by Singh Carpenter RN  Outcome: Ongoing  Goal: Able to distinguish between reality-based and nonreality-based thinking  Description: Able to distinguish between reality-based and nonreality-based thinking  9/7/2020 0428 by Singh Carpenter RN  Outcome: Ongoing  9/7/2020 0428 by Singh Carpenter RN  Outcome: Ongoing  Goal: Able to interrupt nonreality-based thinking  Description: Able to interrupt nonreality-based thinking  9/7/2020 0428 by Singh Carpenter RN  Outcome: Ongoing  9/7/2020 0428 by Singh Carpenter RN  Outcome: Ongoing     Problem: Sleep Pattern Disturbance:  Goal: Appears well-rested  Description: Appears well-rested  9/7/2020 0428 by Singh Carpenter RN  Outcome: Ongoing  9/7/2020 0428 by Singh Carpenter RN  Outcome: Ongoing     Problem: HEMODYNAMIC STATUS  Goal: Patient has stable vital signs and fluid balance  9/7/2020 0428 by Singh Carpenter RN  Outcome: Ongoing  9/7/2020 0428 by Singh Carpenter RN  Outcome: Ongoing     Problem: OXYGENATION/RESPIRATORY FUNCTION  Goal: Patient will achieve/maintain normal respiratory rate/effort  9/7/2020 0428 by Singh Carpenter RN  Outcome: Ongoing  9/7/2020 0428 by Singh Carpenter RN  Outcome: Ongoing     Problem: ELIMINATION  Goal: Elimination patterns are normal or improving  Description: Elimination patterns return to pre-surgery normal patterns  9/7/2020 0428 by Talib Billings RN  Outcome: Ongoing  9/7/2020 0428 by Talib Billings RN  Outcome: Ongoing     Problem: SKIN INTEGRITY  Goal: Skin integrity is maintained or improved  9/7/2020 0428 by Talib Billings RN  Outcome: Ongoing  9/7/2020 0428 by Talib Billings RN  Outcome: Ongoing

## 2020-09-07 NOTE — PROGRESS NOTES
the last 72 hours. Radiology Review:    IR US GUIDED PARACENTESIS   Final Result   Successful ultrasound guided paracentesis. CT CHEST ABDOMEN PELVIS W CONTRAST   Final Result   1. Multifocal bilateral pulmonary ground-glass opacities. The differential   includes atypical infectious/inflammatory pneumonitis. 2. Multiple bilateral pulmonary nodules concerning for metastatic disease. 3. Cirrhosis with increasing abdominopelvic ascites. 4. Improving 7 cm recto uterine abscess. XR ABDOMEN (KUB) (SINGLE AP VIEW)   Final Result   Multiple distended small bowel loops, similar to the previous study. No   significant colonic gas. Findings are concerning for a SBO. XR CHEST PORTABLE   Final Result   Nasogastric tube is seen extending below the diaphragm. Dedicated abdominal   radiograph could be performed for better visualization as warranted. Bilateral airspace disease, increased on the right as compared to prior, for   which pneumonia or edema are considerations. XR ABDOMEN (2 VIEWS)   Final Result   Scattered dilated small bowel loops throughout the abdomen, increased   compared to prior CT scan, either due to ileus or early partial small bowel   obstruction         XR CHEST PORTABLE   Final Result   Lucency beneath the right hemidiaphragm compatible with free intraperitoneal   air. Apparently the patient has had recent abdominal surgery. This finding   was discussed with Dr. Efren Gill  At 6:48 pm on 8/24/2020. Status post placement of right jugular central venous catheter. No   pneumothorax. Patchy mid and lower lung ground-glass opacities suspicious for pneumonia,   increased since the prior CT. Correlation for pneumonia is recommended. CT ABSCESS DRAINAGE W CATH PLACEMENT S&I   Final Result   Successful CT guided placement of a drainage catheter in the pelvic abscess.          CT ABDOMEN PELVIS W IV CONTRAST Additional Contrast? Oral   Final Result 1. Pelvic air/fluid collection almost certainly reflects an abscess or giant   colonic diverticula. This may be the result of acute diverticulitis. 2. Mass lesion ascending colon almost certainly reflecting primary   adenocarcinoma of the colon. 3. Diffuse hepatic lesions with newly visualized (contrast enhancement) mass   at the hepatic dome. Hepatic metastatic disease is a consideration as is   cirrhosis with regenerative nodules and hepatocellular carcinoma. 4. Bilateral lower lobe pulmonary nodules suspicious for metastatic disease. 5. Abdominal and pelvic ascites. 6. Sequela from portosystemic shunting with numerous collateral vessels about   the stomach, left upper quadrant and distal esophagus. Relatively small   esophageal and gastric varices. 7.  Calcific atherosclerotic disease aorta. CT ABDOMEN PELVIS WO CONTRAST Additional Contrast? None   Final Result   1. Non loculated fluid and gas collection in the pelvis concerning for   perforated viscus/possible abscess formation. 2. Prominent thickening of the ascending colon which may be related to   infectious or inflammatory process or underlying neoplastic process. 3. Bowel-gas pattern typical of adynamic ileus. 4. Hepatic cirrhosis with numerous hepatic nodules suggestive of   regenerative/siderotic nodules. MRI abdomen without and with IV contrast   correlation recommended. Metastatic disease is a consideration. 5. Hepatosplenomegaly and multifocal collateral vessels about the stomach   esophagus concerning for varices, all suggestive of portal venous   hypertension. 6. Abdominal and pelvic ascites. 7.  Diverticulosis coli without CT evidence of acute diverticulitis. Critical results were called by Dr. Johnathan Metz to [address] [place] [address]   on 8/16/2020 at 18:09. CT Cervical Spine WO Contrast   Final Result   No acute abnormality of the cervical spine.          CT Head WO Contrast   Final Result   No acute intracranial abnormality. XR CHEST PORTABLE   Final Result   No acute process. XR RADIUS ULNA LEFT (2 VIEWS)   Final Result   No acute osseous abnormality. CT CHEST W CONTRAST    (Results Pending)       ASSESSMENT :  Cirrhosis - new diagnosis per CT. bx c/w WINKLER cirrhosis. No alcohol history. Likely from fatty liver. Some ascites on CT. Seemed mildly confused at admission (baseline per her daughter) but ammonia was normal. repeat ammonia is elevated at 66 so lactulose started. Negative: AMA, hepatitis panel, A1AT phenotype. f-actin weak positive at 25. AFP normal. CEA 83. Labs stable. Large volume ascites on CT 8/31. S/p paracentesis 9/1 with 10 L fluid removed. High nucleated cells c/w peritonitis from perforated colon ca. Cx negative. SAAG elevated c/w portal HTN. Cytology negative. Ammonia yest was 25     Abnormal CT of the colon and liver, colon mass - CT with thickening of the ascending colon concerning for malignancy - path c/w AdenoCa. Also with liver and lung lesions concerning for metastatic disease.  Colonoscopy 8/20 with large necrotic ascending colon mass (carcinoma), likely the source of abscess. s/p exploratory laparotomy, evacuation of ascites, drainage of pelvic abscess, right hemicolectomy with primary ileotransverse colostomy anastomosis, and Abdiaziz-Cut liver biopsy on 8/24/2020     Esophagitis - EGD 8/20 with LA class C erosive esophagitis and nodularity at [address] (path mild chronic inflammation).     Bulky uterus with bleeding in vault, suspected to be uterine cancer. Gyn oncology on board. path c/w uterine adenocarcinoma.      Anemia - hgb gradually drifted down. To receive 1U today. No signs of GI blood loss    PAPITO - resolved.         PLAN:  - lactulose TID and titreat for goal of 3 BMs daily  - continue xifaxan   - started on lasix per nephrology. Add aldactone 50 mg daily. - daily weights   - PPI  - Will need Hep A and B vaccines outpatient.   - suppl K+ and

## 2020-09-07 NOTE — PROGRESS NOTES
[facility] Note    Patient Active Problem List   Diagnosis    Pelvic abscess in female    [facility] ([facility]    Colonic mass    Cirrhosis of liver with ascites ([facility]    Intra-abdominal free air of unknown etiology    PAPITO (acute kidney injury) ([facility]    Lung nodules    Bandemia    Intra-abdominal abscess ([facility]    E coli infection    Elevated CEA    Ex-smoker    Class 2 obesity due to excess calories with body mass index (BMI) of 36.0 to 36.9 in adult    Portal hypertension (HCC)    Mild malnutrition ([facility]    Tobacco abuse counseling    Open abdominal wall wound    Receiving intravenous antibiotic treatment as outpatient    Complex care coordination       Past Medical History:   has no past medical history on file. Past Social History:   reports that she has been smoking cigarettes. She has a 25.00 pack-year smoking history. She does not have any smokeless tobacco history on file. She reports that she does not drink alcohol or use drugs. Subjective:  No complaints. Hgb lower. Review of Systems   Constitutional: Positive for fatigue. Negative for activity change, appetite change, chills, fever and unexpected weight change. HENT: Negative for congestion and facial swelling. Eyes: Negative for photophobia, discharge and redness. Respiratory: Negative for cough, chest tightness and shortness of breath. Cardiovascular: Positive for leg swelling. Negative for chest pain and palpitations. Gastrointestinal: Negative for abdominal distention, abdominal pain, blood in stool, constipation, diarrhea, nausea and vomiting. Endocrine: Negative for cold intolerance, heat intolerance and polyuria. Genitourinary: Negative for decreased urine volume, difficulty urinating, flank pain and hematuria. Musculoskeletal: Negative for joint swelling and neck pain.    Neurological: Negative for dizziness, seizures, syncope, speech difficulty, light-headedness and headaches. Hematological: Does not bruise/bleed easily. Psychiatric/Behavioral: Negative for agitation, confusion and hallucinations. Objective:  +8.2L. Lowest weight 215lbs, currently at 226    /70   Pulse 78   Temp 97.7 °F (36.5 °C) (Oral)   Resp 16   Ht 5' 7\" (1.702 m)   Wt 226 lb 12.8 oz (102.9 kg)   SpO2 95%   BMI 35.52 kg/m²     Wt Readings from Last 3 Encounters:   09/06/20 226 lb 12.8 oz (102.9 kg)       BP Readings from Last 3 Encounters:   09/07/20 119/70   08/28/20 (!) 149/66   08/24/20 (!) 155/68     Chest- rhonchi  Heart-regular  Abd-soft  Ext- 2+ edema    Labs  Hemoglobin   Date Value Ref Range Status   09/07/2020 6.5 (LL) 12.0 - 16.0 g/dL Final     Hematocrit   Date Value Ref Range Status   09/07/2020 19.9 (LL) 36.0 - 48.0 % Final     WBC   Date Value Ref Range Status   09/07/2020 5.8 4.0 - 11.0 K/uL Final     Platelets   Date Value Ref Range Status   09/07/2020 79 (L) 135 - 450 K/uL Final     Lab Results   Component Value Date    CREATININE 0.7 09/07/2020    BUN 19 09/07/2020     09/07/2020    K 3.0 (L) 09/07/2020     09/07/2020    CO2 25 09/07/2020     RECOMMENDATIONS:   -D/C Lasix infusion and albumin IV with decreased Hgb. - stopped SPNL  - okay to give IV Fe    IMPRESSION:        PAPITO - improved  : follow urine output  : Etiology of current PAPITO - ATN vs. Decreased renal perfusion with hemodynamic status with postop. - Renal imaging: w/ CT - kidney unremarkable   - CK - 310 on admission then improved. - Avoid contrast exposure at this time. Anemia-follow Hgb closely.   Prbc transfusion    Associated problems:    - Electrolytes: Hyponatremia-resolved  - Hypokalemia - from needing repletion GI loss -better  : BP: follow with diuresis  - Acid-Base: Acidosis - - non-AGMA - better       #WINKLER cirrhosis, Portal HTN : new diagnosis, no h/o EtOH    AdenoCA with mets-s/p surgery 8/24     # Sepsis, intra-abdominal abscess   - S/P CT-guided drain placement on 8/18/2020  - S/P right hemicolectomy 8/24  -better  [facility] Problems            Last Modified POA     Pelvic abscess in female 8/16/2020 Yes     Septicemia ([address] 8/17/2020 Yes     Colonic mass 8/25/2020 Yes     Cirrhosis of liver with ascites ([address] 8/25/2020 Yes     Intra-abdominal free air of unknown etiology 8/25/2020 Yes     PAPITO (acute kidney injury) ([address] 8/25/2020 Yes     Lung nodules 8/25/2020 Yes     Bandemia 8/25/2020 Yes     Intra-abdominal abscess ([address] 8/25/2020 Yes     E coli infection 8/25/2020 Yes     Elevated CEA 8/25/2020 Yes     Ex-smoker 8/25/2020 Yes     Class 2 obesity due to excess calories with body mass index (BMI) of 36.0 to 36.9 in adult 8/25/2020 Yes     Portal hypertension ([address] 8/25/2020 Yes     Mild malnutrition ([address] (Chronic) 8/25/2020 Yes     Tobacco abuse counseling 8/27/2020 Yes     Open abdominal wall wound 8/28/2020 Yes     Receiving intravenous antibiotic treatment as outpatient 9/2/2020 Yes     Complex care coordination 9/2/2020 Yes         Christopher Sorensen MD

## 2020-09-07 NOTE — PROGRESS NOTES
Am assessment complete, vss, alert and oriented, 40K given, new order to infuse one unit of blood, surgical dressing clean, dry, and intact, patient is passing gas, tolerating diet, fall precautions in place, went over plan of care, no needs at this time, will continue to monitor.  Reese Meeks

## 2020-09-07 NOTE — PROGRESS NOTES
Started blood infusion, went over possible reactions, all questions answered, vitals obtained, will continue to monitor.  Paz Angel

## 2020-09-08 NOTE — PROGRESS NOTES
[facility]   Progress Note      9/8/2020 9:13 AM        Name: Oli Chaidez . Admitted: 8/16/2020    SUBJECTIVE:  She is feeling better, she remains weak, no pain, planning on [facility] for rehab.     Reviewed interval ancillary notes    Current Medications  magnesium sulfate 2 g in 50 mL IVPB premix, Once  magnesium oxide (MAG-OX) tablet 400 mg, BID  bisacodyl (DULCOLAX) suppository 10 mg, Once  lidocaine PF 1 % injection 5 mL, Once  sodium chloride flush 0.9 % injection 10 mL, 2 times per day  sodium chloride flush 0.9 % injection 10 mL, PRN  lactobacillus (CULTURELLE) capsule 1 capsule, BID WC  insulin lispro (1 Unit Dial) 0-6 Units, Q4H  rifaximin (XIFAXAN) tablet 550 mg, BID  enoxaparin (LOVENOX) injection 30 mg, BID  acetaminophen (TYLENOL) tablet 1,000 mg, Q6H PRN  lidocaine (XYLOCAINE) 2 % jelly, Once  phenol 1.4 % mouth spray 1 spray, Q2H PRN  glucose (GLUTOSE) 40 % oral gel 15 g, PRN  dextrose 50 % IV solution, PRN  glucagon (rDNA) injection 1 mg, PRN  dextrose 5 % solution, PRN  folic acid (FOLVITE) tablet 1 mg, Daily  ondansetron (ZOFRAN) injection 4 mg, Q4H PRN  promethazine (PHENERGAN) injection 12.5 mg, Q6H PRN  fluconazole (DIFLUCAN) in 0.9 % sodium chloride IVPB 200 mg, Q24H  meropenem (MERREM) 1 g in sodium chloride 0.9 % 100 mL IVPB (mini-bag), Q12H  pantoprazole (PROTONIX) injection 40 mg, Daily  lactulose (CHRONULAC) 10 GM/15ML solution 20 g, TID  lip balm petroleum free (PHYTOPLEX) stick, PRN  sodium chloride flush 0.9 % injection 10 mL, 2 times per day  sodium chloride flush 0.9 % injection 10 mL, PRN  polyethylene glycol (GLYCOLAX) packet 17 g, Daily PRN        Objective:  /67   Pulse 74   Temp 97.4 °F (36.3 °C) (Oral)   Resp 14   Ht 5' 7\" (1.702 m)   Wt 226 lb 12.8 oz (102.9 kg)   SpO2 96%   BMI 35.52 kg/m²     Intake/Output Summary (Last 24 hours) at 9/8/2020 0913  Last data filed at 9/8/2020 0537  Gross per 24 hour   Intake 420 ml   Output 400 ml   Net 20 ml      Wt Readings from Last 3 Encounters:   09/06/20 226 lb 12.8 oz (102.9 kg)       General appearance:  Appears comfortable  Eyes: Sclera clear. Pupils equal.  ENT: Moist oral mucosa. Trachea midline, no adenopathy. Cardiovascular: Regular rhythm, normal S1, S2. No murmur. No edema in lower extremities  Respiratory: Not using accessory muscles. Good inspiratory effort. Clear to auscultation bilaterally, no wheeze or crackles. GI: Abdomen soft, no tenderness, not distended  Musculoskeletal: No cyanosis in digits, neck supple  Neurology: CN 2-12 grossly intact. No speech or motor deficits  Psych: Normal affect. Alert and oriented in time, place and person  Skin: Warm, dry, normal turgor    Labs and Tests:  CBC:   Recent Labs     09/06/20  0400 09/07/20  0610 09/08/20  0615   WBC 7.8 5.8 5.3   HGB 7.1* 6.5* 7.8*   PLT 76* 79* 100*     BMP:    Recent Labs     09/06/20  0400 09/07/20  0610 09/08/20  0615    140 139   K 3.6 3.0* 3.6    106 107   CO2 22 25 24   BUN 25* 19 19   CREATININE 0.7 0.7 0.7   GLUCOSE 82 80 98     Hepatic:   Recent Labs     09/06/20  0400 09/07/20  0610 09/08/20  0615   AST 41* 41* 26   ALT 17 16 14   BILITOT 0.8 0.8 0.7   ALKPHOS 211* 222* 185*       ASSESSMENT AND PLAN    Active Problems:    Pelvic abscess in female    Septicemia (HCC)    Colonic mass    Cirrhosis of liver with ascites (HCC)    Intra-abdominal free air of unknown etiology    PAPITO (acute kidney injury) ([facility] [address]    Lung nodules    Bandemia    Intra-abdominal abscess (HCC)    E coli infection    Elevated CEA    Ex-smoker    Class 2 obesity due to excess calories with body mass index (BMI) of 36.0 to 36.9 in adult    Portal hypertension (HCC)    Mild malnutrition (HCC)    Tobacco abuse counseling    Open abdominal wall wound    Receiving intravenous antibiotic treatment as outpatient    Complex care coordination  Resolved Problems:    * No resolved hospital problems.  *      Colon cancer  - S/p exploratory laparotomy with right hemicolectomy on 8/24/2020. Anticipate secondary wound closure on Friday 8/28/2020.  - CEA is 83.3.  - Plan for chemotherapy as outpatient after healed from surgery.     Endometrial cancer  -  150.5  - s/p D&C by Dr. Charles Wood      Cirrhosis of the liver   - New diagnosis. - Management per GI.  - Ascites - s/p paracentesis 9/2/2020 for 10 liters     Intraabdominal/pelvic abscess  - CT-guided drain placement on 8/18/2020  - Antibiotics per ID.      Anemia  - Iron studies consistent with anemia of chronic disease.   - soluble transferrin receptor - wnl  - Start folic acid 1 mg daily for folate deficiency.   - hgb  7.8, s/p transfusion on 9/7/2020     Post-op ileus        Chaim Duenas, ALEX  [facility]    Patient continues gradual recovery will need chemotherapy in future Since she has colon and endometrial cancer will request msi mmr evaluation

## 2020-09-08 NOTE — PROGRESS NOTES
Shift assessment complete. Meds given per MAR. Patient refused evening lactulose. Was having frequent bowel movements throughout the day.  educated on purpose of lactulose. Pt still declined. Dressing: to abdomen clean dry and intact. VSS. Patient expressed no other needs at this time, will continue to monitor. Fall precautions in place, hourly rounding, call light and belongings in reach, bed in lowest position, wheels locked in place, side rails up x 2, walkways free of clutter     The care plan and education has been reviewed and mutually agreed upon with the patient.

## 2020-09-08 NOTE — PROGRESS NOTES
Speech Language Pathology  Dysphagia Treatment Note    Name:  Giselle Martinez  :   1955  Medical Diagnosis:  Pelvic abscess in female [N73.9]  Pelvic abscess in female [N73.9]  Treatment Diagnosis: Oropharyngeal Dysphagia  Pain level:  Pt did not report pain    Current Diet Level: regular texture diet/thin liquids--encourage pt to order softer menu items; meds as tolerated  Tolerance of Current Diet Level: Per chart review, no noted difficulty with current diet     Assessment of Texture Tolerance:  -Impressions: Pt seen sitting upright in bed, she reported sensation of salad sticking in her throat due to inability to masticate well. Education was provided regarding recommendation to avoid salad and other textures that require significant mastication. Trials of thin liquids and regular solids were provided. Thin liquids via cup revealed suspected premature bolus loss to pharynx, mildly delayed swallow initiation was noted. No overt clinical s/s of aspiration/penetration were assessed. Prolonged mastication with Pt \"moistening\" bolus was noted with regular solids, adequate oral clearance was achieved given extra time.   At this time, recommend continuation of current diet with ongoing use of aspiration precautions.     -Compensatory strategies:  90 degree positioning with all p.o. intake; small bites/sips; alternate textures through meal; reduce rate of intake    Diet and Treatment Recommendations:  Continue regular texture diet/thin liquids--encourage pt to order softer menu items; meds as tolerated    ST.) Pt will tolerate recommended diet without s/s of aspiration (ongoing, 2020)  2.) If clinical symptoms of penetration/aspiration continue to be noted,Pt will tolerate MBS to r/o aspiration and determine appropriate diet/liquid level. (ongoing, 2020)  3.) Pt will tolerate diet advance to least restricted diet, as clinically indicated, with no signs of aspiration (ongoing, 2020)    Plan: Continued daily Dysphagia treatment with goals per plan of care. Patient/Family Education:Education given to the Pt and nurse, who verbalized understanding    Discharge Recommendations:  Pt will benefit from continued skilled Speech Therapy for Dysphagia services, prior to returning home. Timed Code Treatment: 0 minutes    Total Treatment Time: 12 minutes    If patient discharges prior to next session this note will serve as a discharge summary.      Tristan Gregory M.A., [address] [place] [address]  Speech-Language Pathologist

## 2020-09-08 NOTE — PROGRESS NOTES
Attempted to get patient up overnight x2, pt dangled on the side of the bed. Felt weak and dizzy and did not attempt to stand.

## 2020-09-08 NOTE — PLAN OF CARE
Problem: Falls - Risk of:  Goal: Will remain free from falls  Description: Will remain free from falls  9/8/2020 0825 by Jaja Cabrera RN  Outcome: Ongoing  9/7/2020 2301 by Stephen Coppola RN  Outcome: Ongoing  Goal: Absence of physical injury  Description: Absence of physical injury  9/8/2020 0825 by Jaja Cabrera RN  Outcome: Ongoing  9/7/2020 2301 by Stephen Coppola RN  Outcome: Ongoing     Problem: Skin Integrity:  Goal: Will show no infection signs and symptoms  Description: Will show no infection signs and symptoms  9/8/2020 0825 by Jaja Cabrera RN  Outcome: Ongoing  9/7/2020 2301 by Stephen Coppola RN  Outcome: Ongoing  Goal: Absence of new skin breakdown  Description: Absence of new skin breakdown  9/8/2020 0825 by Jaja Cabrera RN  Outcome: Ongoing  9/7/2020 2301 by Stephen Coppola RN  Outcome: Ongoing     Problem: Infection:  Goal: Will remain free from infection  Description: Will remain free from infection  9/8/2020 0825 by Jaja Cabrera RN  Outcome: Ongoing  9/7/2020 2301 by Stephen Coppola RN  Outcome: Ongoing     Problem: Safety:  Goal: Free from accidental physical injury  Description: Free from accidental physical injury  9/8/2020 0825 by Jaja Cabrera RN  Outcome: Ongoing  9/7/2020 2301 by Stephen Coppola RN  Outcome: Ongoing  Goal: Free from intentional harm  Description: Free from intentional harm  9/8/2020 0825 by Jaja Cabrera RN  Outcome: Ongoing  9/7/2020 2301 by Stephen Coppola RN  Outcome: Ongoing     Problem: Daily Care:  Goal: Daily care needs are met  Description: Daily care needs are met  9/8/2020 0825 by Jaja Cabrera RN  Outcome: Ongoing  9/7/2020 2301 by Stephen Coppola RN  Outcome: Ongoing     Problem: Pain:  Goal: Patient's pain/discomfort is manageable  Description: Patient's pain/discomfort is manageable  9/8/2020 0825 by Jaja Cabrera RN  Outcome: Ongoing  9/7/2020 2301 by Stephen Coppola RN  Outcome: Ongoing  Goal: Pain level will decrease  Description: Pain level will decrease  9/8/2020 0825 by Ivonne Méndez RN  Outcome: Ongoing  9/7/2020 2301 by Antonia Mcmahon RN  Outcome: Ongoing  Goal: Control of acute pain  Description: Control of acute pain  9/8/2020 0825 by Ivonne Méndez RN  Outcome: Ongoing  9/7/2020 2301 by Antonia Mcmahon RN  Outcome: Ongoing  Goal: Control of chronic pain  Description: Control of chronic pain  9/8/2020 0825 by Ivonne Méndez RN  Outcome: Ongoing  9/7/2020 2301 by Antonia Mcmahon RN  Outcome: Ongoing     Problem: Skin Integrity:  Goal: Skin integrity will stabilize  Description: Skin integrity will stabilize  9/8/2020 0825 by Ivonne Méndez RN  Outcome: Ongoing  9/7/2020 2301 by Antonia Mcmahon RN  Outcome: Ongoing     Problem: Discharge Planning:  Goal: Patients continuum of care needs are met  Description: Patients continuum of care needs are met  9/8/2020 0825 by Ivonne Méndez RN  Outcome: Ongoing  9/7/2020 2301 by Antonia Mcmahon RN  Outcome: Ongoing     Problem: Nutrition  Goal: Optimal nutrition therapy  9/8/2020 0825 by Ivonne Méndez RN  Outcome: Ongoing  9/7/2020 2301 by Antonia Mcmahon RN  Outcome: Ongoing     Problem: Confusion - Acute:  Goal: Absence of continued neurological deterioration signs and symptoms  Description: Absence of continued neurological deterioration signs and symptoms  9/8/2020 0825 by Ivonne Méndez RN  Outcome: Ongoing  9/7/2020 2301 by Antonia Mcmahon RN  Outcome: Ongoing  Goal: Mental status will be restored to baseline  Description: Mental status will be restored to baseline  9/8/2020 0825 by Ivonne Méndez RN  Outcome: Ongoing  9/7/2020 2301 by Antonia Mcmahon RN  Outcome: Ongoing     Problem: Confusion - Acute:  Goal: Mental status will be restored to baseline  Description: Mental status will be restored to baseline  9/8/2020 0825 by Ivonne Méndez RN  Outcome: Ongoing  9/7/2020 2301 by Antonia Mcmahon RN  Outcome: Ongoing     Problem: Discharge Planning:  Goal: Ability to perform activities of daily living will improve  Description: Ability to perform activities of daily living will improve  9/8/2020 0825 by Lashell Swanson RN  Outcome: Ongoing  9/7/2020 2301 by Collin Cooper RN  Outcome: Ongoing  Goal: Participates in care planning  Description: Participates in care planning  9/8/2020 0825 by Lashell Swanson RN  Outcome: Ongoing  9/7/2020 2301 by Collin Cooper RN  Outcome: Ongoing     Problem: Injury - Risk of, Physical Injury:  Goal: Will remain free from falls  Description: Will remain free from falls  9/8/2020 0825 by Lashell Swanson RN  Outcome: Ongoing  9/7/2020 2301 by Collin Cooper RN  Outcome: Ongoing  Goal: Absence of physical injury  Description: Absence of physical injury  9/8/2020 0825 by Lashell Swanson RN  Outcome: Ongoing  9/7/2020 2301 by Collin Cooper RN  Outcome: Ongoing     Problem: Mood - Altered:  Goal: Mood stable  Description: Mood stable  9/8/2020 0825 by Lashell Swanson RN  Outcome: Ongoing  9/7/2020 2301 by Collin Cooper RN  Outcome: Ongoing  Goal: Absence of abusive behavior  Description: Absence of abusive behavior  9/8/2020 0825 by Lashell Swanson RN  Outcome: Ongoing  9/7/2020 2301 by Collin Cooper RN  Outcome: Ongoing  Goal: Verbalizations of feeling emotionally comfortable while being cared for will increase  Description: Verbalizations of feeling emotionally comfortable while being cared for will increase  9/8/2020 0825 by Lashell Swanson RN  Outcome: Ongoing  9/7/2020 2301 by Collin Cooper RN  Outcome: Ongoing     Problem: Psychomotor Activity - Altered:  Goal: Absence of psychomotor disturbance signs and symptoms  Description: Absence of psychomotor disturbance signs and symptoms  9/8/2020 0825 by Lashell Swanson RN  Outcome: Ongoing  9/7/2020 2301 by Collin Cooper RN  Outcome: Ongoing     Problem: Sensory Perception - Impaired:  Goal: Demonstrations of improved sensory functioning will increase  Description: Demonstrations of improved sensory functioning will increase  9/8/2020 0825 by Elli Esqueda RN  Outcome: Ongoing  9/7/2020 2301 by Romaine Self RN  Outcome: Ongoing  Goal: Decrease in sensory misperception frequency  Description: Decrease in sensory misperception frequency  9/8/2020 0825 by Elli Esqueda RN  Outcome: Ongoing  9/7/2020 2301 by Romaine Self RN  Outcome: Ongoing  Goal: Able to refrain from responding to false sensory perceptions  Description: Able to refrain from responding to false sensory perceptions  9/8/2020 0825 by Elli Esqueda RN  Outcome: Ongoing  9/7/2020 2301 by Romaine Self RN  Outcome: Ongoing  Goal: Demonstrates accurate environmental perceptions  Description: Demonstrates accurate environmental perceptions  9/8/2020 0825 by Elli Esqueda RN  Outcome: Ongoing  9/7/2020 2301 by Romaine Self RN  Outcome: Ongoing  Goal: Able to distinguish between reality-based and nonreality-based thinking  Description: Able to distinguish between reality-based and nonreality-based thinking  9/8/2020 0825 by Elli Esqueda RN  Outcome: Ongoing  9/7/2020 2301 by Romaine Self RN  Outcome: Ongoing  Goal: Able to interrupt nonreality-based thinking  Description: Able to interrupt nonreality-based thinking  9/8/2020 0825 by Elli Esqueda RN  Outcome: Ongoing  9/7/2020 2301 by Romaine Self RN  Outcome: Ongoing     Problem: Sleep Pattern Disturbance:  Goal: Appears well-rested  Description: Appears well-rested  9/8/2020 0825 by Elli Esqueda RN  Outcome: Ongoing  9/7/2020 2301 by Romaine Self RN  Outcome: Ongoing     Problem: HEMODYNAMIC STATUS  Goal: Patient has stable vital signs and fluid balance  9/8/2020 0825 by Elli Esqueda RN  Outcome: Ongoing  9/7/2020 2301 by Romaine Self RN  Outcome: Ongoing     Problem: OXYGENATION/RESPIRATORY FUNCTION  Goal: Patient will achieve/maintain normal respiratory rate/effort  9/8/2020 0825 by

## 2020-09-08 NOTE — PROGRESS NOTES
Morning shift assessment completed. Patient is alert and oriented x3. Disoriented to time, but is easily re-oriented. VSS. Chlorhexidine wipe used on PICC. Patient was aided with ambulation to the bathroom with a stedy x2. Dressing on coccyx changed. Patient was then set up in the chair. The care plan and education has been reviewed and mutually agreed upon with the patient. Patient remains free from falls or accidental injury. Room free from clutter. All fall precautions in place. Bed in lowest position with wheels locked and alarm on, and call light and belongings within reach. Will continue to monitor.

## 2020-09-08 NOTE — PROGRESS NOTES
[facility]        Progress Note    Patient Name: Giselle Martinez  MRN: [id number]  YOB: 1955  Date of Evaluation: 2020    Subjective:  No acute events overnight  Denies pain  Tolerating diet, no reports of nausea or vomiting  Passing stool, up to commode at this time    Postoperative Day #15, 11      Vital Signs:  Patient Vitals for the past 24 hrs:   BP Temp Temp src Pulse Resp SpO2   20 0835 110/67 97.4 °F (36.3 °C) Oral 74 14 96 %   20 0445 92/62 97.7 °F (36.5 °C) Oral 80 16 96 %   20 2300 (!) 103/58 97.9 °F (36.6 °C) Oral 80 16 96 %   20 1945 (!) 95/50 97.9 °F (36.6 °C) Oral 83 16 96 %   20 1354 99/60 98 °F (36.7 °C) Oral 82 18 95 %   20 1326 115/77 98.1 °F (36.7 °C) Oral 80 18 95 %   20 1226 119/70 97.7 °F (36.5 °C) Oral 78 16 95 %   20 1135 (!) 94/54 97.8 °F (36.6 °C) Oral 75 16 96 %   20 1105 94/60 97.6 °F (36.4 °C) Oral 76 16 98 %      TEMPERATURE HISTORY 24H: Temp (24hrs), Av.8 °F (36.6 °C), Min:97.4 °F (36.3 °C), Max:98.1 °F (36.7 °C)    BLOOD PRESSURE HISTORY: Systolic (27RAH), EBN:982 , Min:76 , HHW:788    Diastolic (87KSC), PHH:93, Min:42, Max:77      Intake/Output:  I/O last 3 completed shifts: In: 5 [P.O.:120; Blood:300]  Out: 400 [Urine:400]  No intake/output data recorded.   Drain/tube Output:  Closed/Suction Drain Left Back Bulb 10 Telugu-Output (ml): 0 ml    Physical Exam:  General: awake, alert, oriented to person, place  Lungs: unlabored respirations  Abdomen: soft, nondistended, no tenderness  Skin/Wound: wound incision is clean, no drainage, no cellulitis    Labs:  CBC:    Recent Labs     20  0400 20  0610 20  0615   WBC 7.8 5.8 5.3   HGB 7.1* 6.5* 7.8*   HCT 21.9* 19.9* 23.6*   PLT 76* 79* 100*     BMP:    Recent Labs     20  0400 20  0610 20  0615    140 139   K 3.6 3.0* 3.6    106 107   CO2 22 25 24   BUN 25* 19 19 Fecal occult  No results found for requested labs within last 30 days. GI bacterial pathogens by PCR  No results found for requested labs within last 30 days. C. difficile  No results found for requested labs within last 30 days. Urine culture  No results found for: LABURIN    Pathology:   OR 8/24/2020--FINAL DIAGNOSIS:        A. Right colon, hemicolectomy:        - Invasive poorly-differentiated adenocarcinoma with extensive          necrosis and full-thickness defect - SEE SYNOPTIC REPORT.        - Surgical resection margins negative for involvement.        - Metastatic adenocarcinoma in three of twenty-two lymph nodes          (3/22).      - One pericolonic tumor deposit.        B. Liver, biopsy:        - Cirrhotic liver with moderate steatosis and associated features          suggestive for steatohepatitis; negative for adenocarcinoma          involvement - see comment. SYNOPTIC REPORT   COLON: Resection     Procedure: Right hemicolectomy   Tumor Site: Cecum, ileocecal valve, distal ileum   Tumor Size: 12.0 x 10.0 x 3.4 cm   Macroscopic Tumor Perforation: Present     Histologic Type: Adenocarcinoma   Histologic Grade: G3, Poorly-differentiated   Tumor Extension: Tumor invades the visceral peritoneum (including tumor   continuous with serosal surface through area of inflammation)     Margins:  All margins are uninvolved by invasive carcinoma, high-grade   dysplasia and adenoma      Margins Examined: Proximal margin, distal margin and mesenteric margin      Distance of Invasive Carcinoma from Closest Margin: 0.5 cm   (mesenteric)     Treatment Effect: No known presurgical therapy   Lymph-Vascular Invasion: Present, small vessel lymphovascular invasion   Perineural Invasion: Present   Tumor Budding: Number of tumor buds in 1 \"hotspot\" field: High score (10   or more)   Type of Polyp in Which Invasive Carcinoma Arose: None identified   Tumor Deposits: Present (1)     Regional Lymph Nodes:      Number of Lymph Nodes Examined: 25      Number of Lymph Nodes Involved: 3   Pathologic Staging (pTNM, AJCC 8th Edition)      Primary Tumor (pT): pT4a, Tumor invades through the visceral   peritoneum (including gross perforation of the bowel through tumor and   continuous invasion of tumor through areas of inflammation to the surface   of the visceral peritoneum)      Regional Lymph Nodes (pN): pN1b, Two or three regional lymph nodes are   positive   Additional Pathologic Findings: Acute serositis, serosal adhesions     Ancillary Studies: Given the poor differentiation of the neoplasm,   immunohistochemical staining was performed on the previous biopsy   ([facility]-; 8/20/2020) showing positive expression of the neoplastic   cells for cytokeratin AE1/AE3/CAM5.2 and CDX-2, supporting a   poorly-differentiated colonic adenocarcinoma.  Immunohistochemical   staining for Calretinin was performed on block A3 of the resection   specimen (a marker often expressed in medullary carcinomas of the colon),   which shows no expression.  Given the lack of significant definitive   features of medullary carcinoma, lack of significant population of   intraepithelial lymphocytes and immunohistochemical profile, this   neoplasm is felt to be best characterized as a poorly-differentiated   adenocarcinoma rather than a medullary carcinoma.  Immunohistochemical   staining for mismatch repair proteins can be performed on this specimen   if clinically requested. COMMENT:  B. Evaluation of the liver needle biopsy is significantly   hindered by a paucity of portal areas and significant crush artifact. The hepatic architecture is effaced by bands of fibrosis forming nodules   of hepatocytes.  The portal areas, where present for evaluation, are   expanded by a minimal to mild mixed inflammatory infiltrate with limited   to no interface activity.  The hepatic artery and portal vein appear   present and intact.  Bile duct branches are and gastric type columnar mucosa with mild   chronic inflammation.   - Negative for intestinal metaplasia. B. Ascending colon mass, biopsy:   - Poorly differentiated carcinoma, most consistent with adenocarcinoma. See comment. C. Colon polyp, transverse:   - Tubular adenoma. - Negative for high-grade dysplasia or malignancy. D. Colon polyp, descending:   - Tubular adenoma. - Negative for high-grade dysplasia or malignancy. E. Colon polyp, sigmoid:   - Tubular adenoma (multiple fragments). - Negative for high-grade dysplasia or malignancy. COMMENT:  The morphologic findings of ascending colon mass raises a   differential diagnosis of neuroendocrine carcinoma.  However, the   malignant cells are negative for neuroendocrine markers (synaptophysin   and chromogranin) .  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  Clinical correlation is recommended. 8/28/2020--FINAL DIAGNOSIS:     A. Endocervical curettings:   - Endometrioid adenocarcinoma, FIGO grade 1.     B. Endometrial curettings:   - Endometrioid adenocarcinoma, most likely FIGO grade 1. COMMENT: Both specimens show well-differentiated adenocarcinoma with   villoglandular pattern in some areas.  In light of patient's history of   colonic adenocarcinoma and involvement of both endocervix and   endometrium, a panel of immunostain is performed to further classified   this lesion.  The tumor cells are positive for ER, vimentin, P 16   (patchy) and negative for CEA and CDX2.  The findings support a diagnosis   of endometrioid adenocarcinoma.  Minimal solid area is seen in A. This is   most consistent with FIGO grade 1 endometrioid adenocarcinoma. Correlation with excised specimen is recommended for final grading. Imaging:  I have personally reviewed the following films:    No results found.     Scheduled Meds:   magnesium sulfate  2 g Intravenous Once    magnesium oxide  400 mg Oral BID    antiemetics--minimizing narcotics as tolerated  6. DVT prophylaxis with Lovenox and SCD's  7. Management of medical comorbid etiologies per primary team and consulting services  8. Disposition: Discharge to ECF once arrangements have been made    EDUCATION:  Educated patient on plan of care and disease process--all questions answered. Plans discussed with patient and nursing. Reviewed and discussed with Dr. Gillian Moore. Signed:  VIN Gloria - CNP  9/8/2020 10:05 AM     Surg Staff:   Pt seen and examined with NP  Se full note above  Nothing new to add  Diet as denis drain and perkins out many days, wound clean  D/C planning  Still getting a lot of daily labs. . Maybe some can stop?     Moi Diallo

## 2020-09-08 NOTE — PROGRESS NOTES
PARACENTESIS   Final Result   Successful ultrasound guided paracentesis. CT CHEST ABDOMEN PELVIS W CONTRAST   Final Result   1. Multifocal bilateral pulmonary ground-glass opacities. The differential   includes atypical infectious/inflammatory pneumonitis. 2. Multiple bilateral pulmonary nodules concerning for metastatic disease. 3. Cirrhosis with increasing abdominopelvic ascites. 4. Improving 7 cm recto uterine abscess. XR ABDOMEN (KUB) (SINGLE AP VIEW)   Final Result   Multiple distended small bowel loops, similar to the previous study. No   significant colonic gas. Findings are concerning for a SBO. XR CHEST PORTABLE   Final Result   Nasogastric tube is seen extending below the diaphragm. Dedicated abdominal   radiograph could be performed for better visualization as warranted. Bilateral airspace disease, increased on the right as compared to prior, for   which pneumonia or edema are considerations. XR ABDOMEN (2 VIEWS)   Final Result   Scattered dilated small bowel loops throughout the abdomen, increased   compared to prior CT scan, either due to ileus or early partial small bowel   obstruction         XR CHEST PORTABLE   Final Result   Lucency beneath the right hemidiaphragm compatible with free intraperitoneal   air. Apparently the patient has had recent abdominal surgery. This finding   was discussed with Dr. Marco A Bagley  At 6:48 pm on 8/24/2020. Status post placement of right jugular central venous catheter. No   pneumothorax. Patchy mid and lower lung ground-glass opacities suspicious for pneumonia,   increased since the prior CT. Correlation for pneumonia is recommended. CT ABSCESS DRAINAGE W CATH PLACEMENT S&I   Final Result   Successful CT guided placement of a drainage catheter in the pelvic abscess. CT ABDOMEN PELVIS W IV CONTRAST Additional Contrast? Oral   Final Result   1.  Pelvic air/fluid collection almost certainly reflects an abscess or giant   colonic diverticula. This may be the result of acute diverticulitis. 2. Mass lesion ascending colon almost certainly reflecting primary   adenocarcinoma of the colon. 3. Diffuse hepatic lesions with newly visualized (contrast enhancement) mass   at the hepatic dome. Hepatic metastatic disease is a consideration as is   cirrhosis with regenerative nodules and hepatocellular carcinoma. 4. Bilateral lower lobe pulmonary nodules suspicious for metastatic disease. 5. Abdominal and pelvic ascites. 6. Sequela from portosystemic shunting with numerous collateral vessels about   the stomach, left upper quadrant and distal esophagus. Relatively small   esophageal and gastric varices. 7.  Calcific atherosclerotic disease aorta. CT ABDOMEN PELVIS WO CONTRAST Additional Contrast? None   Final Result   1. Non loculated fluid and gas collection in the pelvis concerning for   perforated viscus/possible abscess formation. 2. Prominent thickening of the ascending colon which may be related to   infectious or inflammatory process or underlying neoplastic process. 3. Bowel-gas pattern typical of adynamic ileus. 4. Hepatic cirrhosis with numerous hepatic nodules suggestive of   regenerative/siderotic nodules. MRI abdomen without and with IV contrast   correlation recommended. Metastatic disease is a consideration. 5. Hepatosplenomegaly and multifocal collateral vessels about the stomach   esophagus concerning for varices, all suggestive of portal venous   hypertension. 6. Abdominal and pelvic ascites. 7.  Diverticulosis coli without CT evidence of acute diverticulitis. Critical results were called by Dr. Julio Osorio to [address] [place] [address]   on 8/16/2020 at 18:09. CT Cervical Spine WO Contrast   Final Result   No acute abnormality of the cervical spine. CT Head WO Contrast   Final Result   No acute intracranial abnormality.          XR CHEST PORTABLE   Final Result   No acute process. XR RADIUS ULNA LEFT (2 VIEWS)   Final Result   No acute osseous abnormality. CT CHEST W CONTRAST    (Results Pending)       ASSESSMENT :  Cirrhosis - new diagnosis per CT. bx c/w WINKLER cirrhosis. No alcohol history. Likely from fatty liver. Some ascites on CT. Seemed mildly confused at admission (baseline per her daughter) but ammonia was normal. repeat ammonia is elevated at 66 so lactulose started. Negative: AMA, hepatitis panel, A1AT phenotype. f-actin weak positive at 25. AFP normal. CEA 83. Labs stable. Large volume ascites on CT 8/31. S/p paracentesis 9/1 with 10 L fluid removed. High nucleated cells c/w peritonitis from perforated colon ca. Cx negative. SAAG elevated c/w portal HTN. Cytology negative.       Abnormal CT of the colon and liver, colon mass - CT with thickening of the ascending colon concerning for malignancy - path c/w AdenoCa. Also with liver and lung lesions concerning for metastatic disease.  Colonoscopy 8/20 with large necrotic ascending colon mass (carcinoma), likely the source of abscess. s/p exploratory laparotomy, evacuation of ascites, drainage of pelvic abscess, right hemicolectomy with primary ileotransverse colostomy anastomosis, and Abdiaziz-Cut liver biopsy on 8/24/2020     Esophagitis - EGD 8/20 with LA class C erosive esophagitis and nodularity at [address] (path mild chronic inflammation).     Bulky uterus with bleeding in vault, suspected to be uterine cancer. Gyn oncology on board. path c/w uterine adenocarcinoma.      Anemia - hgb gradually drifted down to 6. 5. hgb up to 7.8 s/p 1 U pRBC on 9/7. No signs of GI blood loss    PAPITO - improved.         PLAN:  - lactulose TID and titreat for goal of 3 BMs daily  - continue xifaxan   - diuretics per nephrology   - daily weights   - PPI  - Will need Hep A and B vaccines outpatient. - f/u with Dr Tri Avila or Dr Fuad Matute outpatient in 1-2 months for cirrhosis     Will sign off.  Please call if questions.   Discussed with Dr. Veilka Heredia, [address] and [address] [facility]

## 2020-09-08 NOTE — PROGRESS NOTES
[facility] Note    Treatment plan. Renal functions are stable. Electrolytes are stable. Improved urine output. Medications reviewed and appropriate. Patient Active Problem List   Diagnosis    Pelvic abscess in female    [facility]icemia ([facility]    Colonic mass    Cirrhosis of liver with ascites ([facility]    Intra-abdominal free air of unknown etiology    PAPITO (acute kidney injury) ([facility]    Lung nodules    Bandemia    Intra-abdominal abscess ([facility]    E coli infection    Elevated CEA    Ex-smoker    Class 2 obesity due to excess calories with body mass index (BMI) of 36.0 to 36.9 in adult    Portal hypertension (HCC)    Mild malnutrition ([facility]    Tobacco abuse counseling    Open abdominal wall wound    Receiving intravenous antibiotic treatment as outpatient    Complex care coordination       Past Medical History:   has no past medical history on file. Past Social History:   reports that she has been smoking cigarettes. She has a 25.00 pack-year smoking history. She does not have any smokeless tobacco history on file. She reports that she does not drink alcohol or use drugs. Subjective:  No complaints. Hgb lower. Review of Systems   Constitutional: Positive for fatigue. Negative for activity change, appetite change, chills, fever and unexpected weight change. HENT: Negative for congestion and facial swelling. Eyes: Negative for photophobia, discharge and redness. Respiratory: Negative for cough, chest tightness and shortness of breath. Cardiovascular: Positive for leg swelling. Negative for chest pain and palpitations. Gastrointestinal: Negative for abdominal distention, abdominal pain, blood in stool, constipation, diarrhea, nausea and vomiting. Endocrine: Negative for cold intolerance, heat intolerance and polyuria.    Genitourinary: Negative for decreased urine volume, difficulty urinating, flank pain and hematuria. Musculoskeletal: Negative for joint swelling and neck pain. Neurological: Negative for dizziness, seizures, syncope, speech difficulty, light-headedness and headaches. Hematological: Does not bruise/bleed easily. Psychiatric/Behavioral: Negative for agitation, confusion and hallucinations. Objective:  +8.2L. Lowest weight 215lbs, currently at 226    /79   Pulse 83   Temp 97.8 °F (36.6 °C) (Oral)   Resp 16   Ht 5' 7\" (1.702 m)   Wt 226 lb 12.8 oz (102.9 kg)   SpO2 96%   BMI 35.52 kg/m²     Wt Readings from Last 3 Encounters:   09/06/20 226 lb 12.8 oz (102.9 kg)       BP Readings from Last 3 Encounters:   09/08/20 131/79   08/28/20 (!) 149/66   08/24/20 (!) 155/68     Chest- rhonchi  Heart-regular  Abd-soft  Ext- 2+ edema    Labs  Hemoglobin   Date Value Ref Range Status   09/08/2020 7.8 (L) 12.0 - 16.0 g/dL Final     Hematocrit   Date Value Ref Range Status   09/08/2020 23.6 (L) 36.0 - 48.0 % Final     WBC   Date Value Ref Range Status   09/08/2020 5.3 4.0 - 11.0 K/uL Final     Platelets   Date Value Ref Range Status   09/08/2020 100 (L) 135 - 450 K/uL Final     Lab Results   Component Value Date    CREATININE 0.7 09/08/2020    BUN 19 09/08/2020     09/08/2020    K 3.6 09/08/2020     09/08/2020    CO2 24 09/08/2020     RECOMMENDATIONS:   -D/C Lasix infusion and albumin IV with decreased Hgb. - stopped SPNL  - okay to give IV Fe    IMPRESSION:        PAPITO - improved  : follow urine output  : Etiology of current PAPITO - ATN vs. Decreased renal perfusion with hemodynamic status with postop. - Renal imaging: w/ CT - kidney unremarkable   - CK - 310 on admission then improved. - Avoid contrast exposure at this time. Anemia-follow Hgb closely.   Prbc transfusion    Associated problems:    - Electrolytes: Hyponatremia-resolved  - Hypokalemia - from needing repletion GI loss -better  : BP: follow with diuresis  - Acid-Base: Acidosis - - non-AGMA - better       #WINKLER cirrhosis, Portal HTN : new diagnosis, no h/o EtOH    AdenoCA with mets-s/p surgery 8/24     # Sepsis, intra-abdominal abscess   - S/P CT-guided drain placement on 8/18/2020  - S/P right hemicolectomy 8/24  -better  [facility] Problems            Last Modified POA     Pelvic abscess in female 8/16/2020 Yes     Septicemia ([address] 8/17/2020 Yes     Colonic mass 8/25/2020 Yes     Cirrhosis of liver with ascites ([address] 8/25/2020 Yes     Intra-abdominal free air of unknown etiology 8/25/2020 Yes     PAPITO (acute kidney injury) ([address] 8/25/2020 Yes     Lung nodules 8/25/2020 Yes     Bandemia 8/25/2020 Yes     Intra-abdominal abscess ([address] 8/25/2020 Yes     E coli infection 8/25/2020 Yes     Elevated CEA 8/25/2020 Yes     Ex-smoker 8/25/2020 Yes     Class 2 obesity due to excess calories with body mass index (BMI) of 36.0 to 36.9 in adult 8/25/2020 Yes     Portal hypertension ([address] 8/25/2020 Yes     Mild malnutrition ([address] (Chronic) 8/25/2020 Yes     Tobacco abuse counseling 8/27/2020 Yes     Open abdominal wall wound 8/28/2020 Yes     Receiving intravenous antibiotic treatment as outpatient 9/2/2020 Yes     Complex care coordination 9/2/2020 Yes   Olesya Candelario MD

## 2020-09-08 NOTE — PROGRESS NOTES
Physical Therapy  Facility/Department: [address] ORTHO/NEURO NURSING  Daily Treatment Note  NAME: Natacha Albert  : 1955  MRN: [id number]    Date of Service: 2020    Discharge Recommendations:  Natacha Albert scored a  on the AM-PAC short mobility form. Current research shows that an AM-PAC score of 17 or less is typically not associated with a discharge to the patient's home setting. Based on the patient's AM-PAC score and their current functional mobility deficits, it is recommended that the patient have 3-5 sessions per week of Physical Therapy at d/c to increase the patient's independence. Please see assessment section for further patient specific details. If patient discharges prior to next session this note will serve as a discharge summary. Please see below for the latest assessment towards goals. PT Equipment Recommendations  Equipment Needed: No(defer)    Assessment   Body structures, Functions, Activity limitations: Decreased functional mobility ; Decreased ADL status; Decreased ROM; Decreased strength;Decreased safe awareness;Decreased cognition;Decreased endurance;Decreased balance; Increased pain;Decreased posture  Assessment: Pt presents with the above deficits well below her functional baseline. Currently, pt is unable to ambulate d/t decreased endurance and overall functional strength. Pt fatigues very quickly with all mobility and is hopeful to d/c to a rehab facility. Recommend continued skilled therapy to facilitate return to PLOF. Treatment Diagnosis: weakness, difficulty with gait. Prognosis: Good  Decision Making: Medium Complexity  PT Education: Goals; General Safety; Energy Conservation;Transfer Training;Plan of Care  Patient Education: Importance of mobility and OOB activity. Pt verbalizing understanding.   Barriers to Learning: cognition  REQUIRES PT FOLLOW UP: Yes  Activity Tolerance  Activity Tolerance: Patient limited by fatigue;Patient limited by endurance Comment: Pt very self limiting. Patient Diagnosis(es): The primary encounter diagnosis was Septicemia ([facility] Diagnoses of PAPITO (acute kidney injury) ([facility] [address] Intra-abdominal free air of unknown etiology, and Fall, initial encounter were also pertinent to this visit. has no past medical history on file. has a past surgical history that includes Tonsillectomy; Eye surgery; Upper gastrointestinal endoscopy (N/A, 8/20/2020); Colonoscopy (N/A, 8/20/2020); Colonoscopy (8/20/2020); hemicolectomy (Right, 8/24/2020); liver biopsy (8/24/2020); Abdomen surgery (N/A, 8/28/2020); and Dilation and curettage of uterus (N/A, 8/28/2020). Restrictions  Restrictions/Precautions  Restrictions/Precautions: Fall Risk(high fall risk)  Required Braces or Orthoses?: No  Position Activity Restriction  Other position/activity restrictions: This is a 69-year-old female with apparent history of frequent falls who presents after being found on the ground by her family. When the patient arrived she was disheveled, incontinent. She denies any specific complaints. The patient's work-up today was extensive. Her work-up included a CT of the abdomen that shows findings worrisome for possible abscess versus perforation. Questionable colon mass with metastatic lesions to liver and lung. Diagnosed with sepsis. Status-post exploratory laparotomy, evacuation of ascites, drainage of pelvic abscess, right hemicolectomy with primary ileotransverse colostomy anastomosis, and Abdiaziz-Cut liver biopsy  on 8/24/2020 for colon mass, right colon and hepatic flexure with necrotic cancer at hepatic flexure region with perforation, cirrhosis, pelvic abscess, ascites. 8/28/2020: secondary abdominal wound closure, dilatation and curettage  Subjective   General  Chart Reviewed: Yes  Family / Caregiver Present: No  Subjective  Subjective: \"I get to go home today! \" RN in room upon arrival administering medication. Agreeable to therapy.   General Comment  Comments: Pt seated in recliner upon arrival.  Pain Screening  Patient Currently in Pain: Denies  Vital Signs  Patient Currently in Pain: Denies       Orientation  Orientation  Overall Orientation Status: Within Functional Limits  Cognition      Objective      Transfers  Sit to Stand: 2 Person Assistance; Moderate Assistance(Initial attempt with RW, pt refusing stating \"I think I used up all my energy for today. \" On first attempt in [place], pt unable to clear buttock. Pt reporting, \"They usually help and pull me up. \" ModAx2 from recliner to  [place].)  Stand to sit: Minimal Assistance(Duy for eccentric control.)  Comment: Transferred from recliner<>BR via Madonna Richardson. Ambulation  Ambulation?: No  Stairs/Curb  Stairs?: No     Balance  Posture: Fair  Sitting - Static: Fair;+  Sitting - Dynamic: Fair;-  Standing - Static: Fair;-(in [place])  Comments: Pt sat in [place] ~8 minutes SBA in BR for ADLs at sink. Pt attempting to stand to complete ADLs, however unable d/t fatigue. Pt completed x2 static stands in [place] ~20 seconds and 1 minute each CGAx2. Pt becoming shakey and verbalizing need to sit. No LOB. G-Code     OutComes Score                                                     AM-PAC Score  AM-PAC Inpatient Mobility Raw Score : 9 (09/08/20 1136)  AM-PAC Inpatient T-Scale Score : 30.55 (09/08/20 1136)  Mobility Inpatient CMS 0-100% Score: 81.38 (09/08/20 1136)  Mobility Inpatient CMS G-Code Modifier : CM (09/08/20 1136)          Goals  Short term goals  Time Frame for Short term goals: To be met by DC. Short term goal 1: Pt to perform bed mob with min A  Short term goal 2: Pt to perform transfers with min A  Short term goal 3: Ambulate 22' with RW and CGA  Short term goal 4: Bed to/from chair with min A  Long term goals  Time Frame for Long term goals : LTGs=STGs  Patient Goals   Patient goals : Did not state. No goals met this treatment.   Plan    Plan  Times per week:

## 2020-09-08 NOTE — PROGRESS NOTES
Occupational Therapy  Facility/Department: [address] ORTHO/NEURO NURSING  Daily Treatment Note  NAME: Mishel Hanley  : 1955  MRN: [id number]    Date of Service: 2020    Discharge Recommendations:      Mishel Hanley scored a  on the AM-PAC ADL Inpatient form. Current research shows that an AM-PAC score of 17 or less is typically not associated with a discharge to the patient's home setting. Based on the patient's AM-PAC score and their current ADL deficits, it is recommended that the patient have 3-5 sessions per week of Occupational Therapy at d/c to increase the patient's independence. Please see assessment section for further patient specific details. If patient discharges prior to next session this note will serve as a discharge summary. Please see below for the latest assessment towards goals. OT Equipment Recommendations  Equipment Needed: No  Other: TBD next level of care    Assessment   Performance deficits / Impairments: Decreased functional mobility ; Decreased ADL status; Decreased cognition;Decreased endurance;Decreased balance;Decreased safe awareness;Decreased high-level IADLs  Assessment: Pt is not at her baseline level of occupational function, based on the above deficits associated wtih pelvic abscess. Pt would benefit from continued skilled acute OT services to address these deficits. Treatment Diagnosis: Decreased ADL/IADL status, functional mobility, endurance, cognition, balance and safety awareness associated with pelvic abscess  Prognosis: Fair;Good  Decision Making: Medium Complexity  OT Education: OT Role;Plan of Care;Transfer Training;Orientation; ADL Adaptive Strategies  Patient Education: Pt is not independent with education at this time-will require reinforcement for carryover  Barriers to Learning: Cognition  REQUIRES OT FOLLOW UP: Yes  Activity Tolerance  Activity Tolerance: Treatment limited secondary to decreased cognition;Patient limited by fatigue  Activity Tolerance: patient displays self limiting behaviors  Safety Devices  Safety Devices in place: Yes  Type of devices: All fall risk precautions in place;Call light within reach; Chair alarm in place;Gait belt;Patient at risk for falls; Left in chair;Nurse notified         Patient Diagnosis(es): The primary encounter diagnosis was Septicemia ([facility] [address] Diagnoses of PAPITO (acute kidney injury) ([facility] [address] Intra-abdominal free air of unknown etiology, and Fall, initial encounter were also pertinent to this visit. has no past medical history on file. has a past surgical history that includes Tonsillectomy; Eye surgery; Upper gastrointestinal endoscopy (N/A, 8/20/2020); Colonoscopy (N/A, 8/20/2020); Colonoscopy (8/20/2020); hemicolectomy (Right, 8/24/2020); liver biopsy (8/24/2020); Abdomen surgery (N/A, 8/28/2020); and Dilation and curettage of uterus (N/A, 8/28/2020). Restrictions  Restrictions/Precautions  Restrictions/Precautions: Fall Risk(high fall risk)  Required Braces or Orthoses?: No  Position Activity Restriction  Other position/activity restrictions: This is a 69-year-old female with apparent history of frequent falls who presents after being found on the ground by her family. When the patient arrived she was disheveled, incontinent. She denies any specific complaints. The patient's work-up today was extensive. Her work-up included a CT of the abdomen that shows findings worrisome for possible abscess versus perforation. Questionable colon mass with metastatic lesions to liver and lung. Diagnosed with sepsis. Status-post exploratory laparotomy, evacuation of ascites, drainage of pelvic abscess, right hemicolectomy with primary ileotransverse colostomy anastomosis, and Abdiaziz-Cut liver biopsy  on 8/24/2020 for colon mass, right colon and hepatic flexure with necrotic cancer at hepatic flexure region with perforation, cirrhosis, pelvic abscess, ascites.  8/28/2020: secondary abdominal wound closure, dilatation and curettage  Subjective   General  Chart Reviewed: Yes  Response to previous treatment: Patient reporting fatigue but able to participate  Family / Caregiver Present: No  Referring Practitioner: Roberto Nicole MD , for d/c planning  Diagnosis: Pelvic abscess, s/p Exploratory laparotomy, evacuation of ascites, drainage of pelvic abscess, right hemicolectomy  Subjective  Subjective: Pt seated in recliner chair upon arrival and agreeable to therapy session with encouragement. General Comment  Comments: Pt goes by \"Chantelle\"  Vital Signs  Patient Currently in Pain: Denies   Orientation  Orientation  Overall Orientation Status: Impaired  Orientation Level: Oriented to person;Oriented to situation;Disoriented to time;Disoriented to place  Objective    ADL  Grooming: Setup;Contact guard assistance; Moderate assistance(in stance with use of mindy STEDY at sink to perform oral care and wash face, A to comb hair thoroughly)  Additional Comments: Pt completed grooming tasks with encouragement intermittently in stance in a seated position in the mindy STEDY. Pt declined further ADLs        Balance  Sitting Balance: Stand by assistance  Standing Balance: Dependent/Total(CGA in mindy STEDY)  Standing Balance  Time: ~2 minutes  Activity: static stance and stance for ADL completion in mindy STEDY  Comment: decreased endurance, max encouragement to participate  Functional Mobility  Functional Mobility Comments: attempted sit to stand from EOB to rw, patient with significant time to sequence scooting towards edge of recliner to perform sit to stand, pt stating, \"I don't want to do this. \" Use of mindy STEDY for further transfers     Transfers  Sit to stand: Dependent/Total;2 Person assistance(mod A of 2)  Stand to sit: 2 Person assistance;Dependent/Total(mod A of 2)     Cognition  Overall Cognitive Status: Exceptions  Arousal/Alertness: Delayed responses to stimuli  Following Commands:  Follows one step commands with repetition; Follows one step commands with increased time  Attention Span: Difficulty dividing attention; Difficulty attending to directions  Memory: Decreased recall of recent events;Decreased short term memory  Safety Judgement: Decreased awareness of need for safety  Problem Solving: Decreased awareness of errors;Assistance required to correct errors made;Assistance required to implement solutions;Assistance required to generate solutions  Insights: Decreased awareness of deficits  Initiation: Requires cues for some  Sequencing: Requires cues for some  Cognition Comment: Pt requires significant time to process commands     Perception  Overall Perceptual Status: [facility]       Plan   Plan  Times per week: 3-5  Times per day: Daily  Specific instructions for Next Treatment: cotx  Current Treatment Recommendations: Patient/Caregiver Education & Training, Functional Mobility Training, Safety Education & Training, Self-Care / ADL, Endurance Training, Cognitive Reorientation    AM-PAC Score        AM-PAC Inpatient Daily Activity Raw Score: 11 (09/08/20 1118)  AM-PAC Inpatient ADL T-Scale Score : 29.04 (09/08/20 1118)  ADL Inpatient CMS 0-100% Score: 70.42 (09/08/20 1118)  ADL Inpatient CMS G-Code Modifier : CL (09/08/20 1118)    Goals  Short term goals  Time Frame for Short term goals: Discharge  Short term goal 1: SBA for bed mobility - discontinue goal 8/25  Short term goal 2: S/U for feeding/grooming - continue goal 9/4  Short term goal 3: Supervision for UB bathing/Min A for UB dressing -discontinue goal 8/25  Short term goal 4: Mod A for LB bathing/dressing - discontinue goal 8/25  Short term goal 5: CGA for functional transfers to ADL surfaces w/RW - discontinue goal 8/25  Long term goals  Time Frame for Long term goals : LTG=STG  Long term goal 1: CGA for functional  mobiltiy w/RW for ADL activity - Dependent for transfers with STEADY 9/8  Long term goal 2: New goal: Min A for UB bathing and dressing. - Mod A 9/4, ongoing 9/8  Long term goal 3: New goal: Max A for LB bathing/dressing. - Dependent 9/4, ongoing 9/8  Long term goal 4: New goal: Min A for functional transfers to ADL surfaces with RW. - STEADY 9/4, use of mindy STEDY 9/8       Therapy Time   Individual Concurrent Group Co-treatment   Time In       0922   Time Out       1003   Minutes       41      Timed Code Treatment Minutes:  41 Minutes  Total Treatment Minutes:  [employer] [address], [address] [place] [address] Drive

## 2020-09-08 NOTE — PROGRESS NOTES
oral gel 15 g, PRN  dextrose 50 % IV solution, PRN  glucagon (rDNA) injection 1 mg, PRN  dextrose 5 % solution, PRN  folic acid (FOLVITE) tablet 1 mg, Daily  ondansetron (ZOFRAN) injection 4 mg, Q4H PRN  promethazine (PHENERGAN) injection 12.5 mg, Q6H PRN  fluconazole (DIFLUCAN) in 0.9 % sodium chloride IVPB 200 mg, Q24H  meropenem (MERREM) 1 g in sodium chloride 0.9 % 100 mL IVPB (mini-bag), Q12H  pantoprazole (PROTONIX) injection 40 mg, Daily  lactulose (CHRONULAC) 10 GM/15ML solution 20 g, TID  lip balm petroleum free (PHYTOPLEX) stick, PRN  sodium chloride flush 0.9 % injection 10 mL, 2 times per day  sodium chloride flush 0.9 % injection 10 mL, PRN  polyethylene glycol (GLYCOLAX) packet 17 g, Daily PRN        Objective:  /79   Pulse 83   Temp 97.8 °F (36.6 °C) (Oral)   Resp 16   Ht 5' 7\" (1.702 m)   Wt 226 lb 12.8 oz (102.9 kg)   SpO2 96%   BMI 35.52 kg/m²     Intake/Output Summary (Last 24 hours) at 9/8/2020 1618  Last data filed at 9/8/2020 1344  Gross per 24 hour   Intake 480 ml   Output 600 ml   Net -120 ml      Wt Readings from Last 3 Encounters:   09/06/20 226 lb 12.8 oz (102.9 kg)       General appearance: Ill-looking  Eyes: Sclera clear. Pupils equal.  ENT: Moist oral mucosa. Trachea midline, no adenopathy. Cardiovascular: Regular rhythm, normal S1, S2. No murmur. No edema in lower extremities  Respiratory: Not using accessory muscles. Good inspiratory effort. Clear to auscultation bilaterally, no wheeze or crackles. GI: Abdomen soft, surgical dressing in place; drain by the side  Musculoskeletal: No cyanosis in digits, neck supple  Neurology: CN 2-12 grossly intact. No speech or motor deficits  Psych: Normal affect. Alert and oriented in time, place and person  Skin: Warm, dry, normal turgor  Extremity exam shows brisk capillary refill.   Peripheral pulses are palpable in lower extremities     Labs and Tests:  CBC:   Recent Labs     09/06/20  0400 09/07/20  0610 09/08/20  0615   WBC 7.8 5.8 5.3   HGB 7.1* 6.5* 7.8*   PLT 76* 79* 100*     BMP:    Recent Labs     09/06/20  0400 09/07/20  0610 09/08/20  0615    140 139   K 3.6 3.0* 3.6    106 107   CO2 22 25 24   BUN 25* 19 19   CREATININE 0.7 0.7 0.7   GLUCOSE 82 80 98     Hepatic:   Recent Labs     09/06/20  0400 09/07/20  0610 09/08/20  0615   AST 41* 41* 26   ALT 17 16 14   BILITOT 0.8 0.8 0.7   ALKPHOS 211* 222* 185*     IR US GUIDED PARACENTESIS   Final Result   Successful ultrasound guided paracentesis. CT CHEST ABDOMEN PELVIS W CONTRAST   Final Result   1. Multifocal bilateral pulmonary ground-glass opacities. The differential   includes atypical infectious/inflammatory pneumonitis. 2. Multiple bilateral pulmonary nodules concerning for metastatic disease. 3. Cirrhosis with increasing abdominopelvic ascites. 4. Improving 7 cm recto uterine abscess. XR ABDOMEN (KUB) (SINGLE AP VIEW)   Final Result   Multiple distended small bowel loops, similar to the previous study. No   significant colonic gas. Findings are concerning for a SBO. XR CHEST PORTABLE   Final Result   Nasogastric tube is seen extending below the diaphragm. Dedicated abdominal   radiograph could be performed for better visualization as warranted. Bilateral airspace disease, increased on the right as compared to prior, for   which pneumonia or edema are considerations. XR ABDOMEN (2 VIEWS)   Final Result   Scattered dilated small bowel loops throughout the abdomen, increased   compared to prior CT scan, either due to ileus or early partial small bowel   obstruction         XR CHEST PORTABLE   Final Result   Lucency beneath the right hemidiaphragm compatible with free intraperitoneal   air. Apparently the patient has had recent abdominal surgery. This finding   was discussed with Dr. Baylee Santillan  At 6:48 pm on 8/24/2020. Status post placement of right jugular central venous catheter. No   pneumothorax.       Patchy mid and lower lung ground-glass opacities suspicious for pneumonia,   increased since the prior CT. Correlation for pneumonia is recommended. CT ABSCESS DRAINAGE W CATH PLACEMENT S&I   Final Result   Successful CT guided placement of a drainage catheter in the pelvic abscess. CT ABDOMEN PELVIS W IV CONTRAST Additional Contrast? Oral   Final Result   1. Pelvic air/fluid collection almost certainly reflects an abscess or giant   colonic diverticula. This may be the result of acute diverticulitis. 2. Mass lesion ascending colon almost certainly reflecting primary   adenocarcinoma of the colon. 3. Diffuse hepatic lesions with newly visualized (contrast enhancement) mass   at the hepatic dome. Hepatic metastatic disease is a consideration as is   cirrhosis with regenerative nodules and hepatocellular carcinoma. 4. Bilateral lower lobe pulmonary nodules suspicious for metastatic disease. 5. Abdominal and pelvic ascites. 6. Sequela from portosystemic shunting with numerous collateral vessels about   the stomach, left upper quadrant and distal esophagus. Relatively small   esophageal and gastric varices. 7.  Calcific atherosclerotic disease aorta. CT ABDOMEN PELVIS WO CONTRAST Additional Contrast? None   Final Result   1. Non loculated fluid and gas collection in the pelvis concerning for   perforated viscus/possible abscess formation. 2. Prominent thickening of the ascending colon which may be related to   infectious or inflammatory process or underlying neoplastic process. 3. Bowel-gas pattern typical of adynamic ileus. 4. Hepatic cirrhosis with numerous hepatic nodules suggestive of   regenerative/siderotic nodules. MRI abdomen without and with IV contrast   correlation recommended. Metastatic disease is a consideration. 5. Hepatosplenomegaly and multifocal collateral vessels about the stomach   esophagus concerning for varices, all suggestive of portal venous   hypertension. 6. Abdominal and pelvic ascites. 7.  Diverticulosis coli without CT evidence of acute diverticulitis. Critical results were called by Dr. Tello Dunne to [address] [place] [address]   on 8/16/2020 at 18:09. CT Cervical Spine WO Contrast   Final Result   No acute abnormality of the cervical spine. CT Head WO Contrast   Final Result   No acute intracranial abnormality. XR CHEST PORTABLE   Final Result   No acute process. XR RADIUS ULNA LEFT (2 VIEWS)   Final Result   No acute osseous abnormality. CT CHEST W CONTRAST    (Results Pending)       Spent 30 minutes with patient and family at bedside and on unit reviewing medical records and labs, spent greater than 50% time counseling patient and/or coordinating care with RN    Problem List  Active Problems:    Pelvic abscess in female    Septicemia ([address]    Colonic mass    Cirrhosis of liver with ascites ([address]    Intra-abdominal free air of unknown etiology    PAPITO (acute kidney injury) ([address]    Lung nodules    Bandemia    Intra-abdominal abscess ([address]    E coli infection    Elevated CEA    Ex-smoker    Class 2 obesity due to excess calories with body mass index (BMI) of 36.0 to 36.9 in adult    Portal hypertension (HCC)    Mild malnutrition ([address]    Tobacco abuse counseling    Open abdominal wall wound    Receiving intravenous antibiotic treatment as outpatient    Complex care coordination  Resolved Problems:    * No resolved hospital problems. *       Assessment & Plan:     Pelvic abscess. Status post IR drainage 8/18, pelvic fluid cytology negative for malignant cells. Colonoscopy 8/20 with large necrotic ascending colon mass, likely source of abscess. Status post exp lap right transverse colectomy 8/24. Status post secondary wound closure 8/28. Initially on Zosyn, transitioned to IV cipro (8/20). IV Flagyl added 8/25. Now on meropenem and flucanazole. Remains afebrile, no leukocytosis.  Blood culture results (8/25) with no growth to date. Surgery and ID on on board; awaiting ECF    Liver cirrhosis / portal HTN. Biopsy consistent with WINKLER cirrhosis, negative for adenocarcinoma involvement. Started on lactulose for ammonia level 66 and confusion, ammonia level 23 (8/28). GI following. S/P paracentesis; 10 L removed 9/1/2020 ,fluid for cx. Colon cancer. CT scan showed thickening of ascending colon suspicious for malignancy, lesions liver and lung concerning for metastasis. CEA 83.3, AFP 2.4. Colon biopsy (8/24) showed poorly differentiate adenocarcinoma, metastatic adenocarcinoma in 3 of 232 lymph nodes. Patient will need CT chest for staging once renal function improves. Plan is for chemotherapy once she recovers from surgery. Oncology on board. Grade 1 endometrial cancer: Poor candidate for any surgery at this time per oncology    PAPITO. Resolved;  nephrology on board appreciate their recs  Creatinine 1.6 on admission suspected pre-renal, hypovolemia. Hypoalbuminemia with + volume status: On Lasix drip and albumin per nephro recs    Abnormal CXR (8/27). Bilateral airspace disease, increased on right as compared to prior, pneumonia or edema are considerations. Possible 3rd spacing, received albumin. COVID-19 (8/22) negative. Remains afebrile, no cough. Encouraged IS use. Acute anemia. Hgb 10.9 on admission, no previous studies for comparison. Suspect she likely had chronic iron deficiency anemia secondary to colon cancer and post menopausal bleeding. She received 1 unit PRBCs 8/27, Hgb/Hct stable  No active bleeding.  Having brown BM   Will give another unit of PRBC today  Consider IV iron tomorrow      DC planning: patient is medically stable for discharge, awaiting placement to skilled nursing facility.        Diet: DIET GENERAL;  Dietary Nutrition Supplements: Clear Liquid Oral Supplement  Code:Full Code  DVT PPX: On Lovenox  Disposition   patient is medically stable for discharge, awaiting placement to skilled nursing facility.  Marck Luke MD   9/8/2020 4:18 PM

## 2020-09-08 NOTE — PROGRESS NOTES
Comprehensive Nutrition Assessment    Type and Reason for Visit:  Reassess    Nutrition Recommendations/Plan:   1. Trial Ensure Clear BID  2. Encourage PO intake  3. Document all PO intake in flow sheets    Nutrition Assessment:  Pt's nutrition status is fair. TPN has been d/c'd. Pt has had poor PO intake on general diet, consuming less than 25% of meals. However this seems improved today with pt consuming at least 50% of meals. Pt states her appetite is improving. Pt does not like Ensure Enlive but does like juice; agreeable to trial Ensure Clear BID to promote healing of wounds and s/p surgery. Malnutrition Assessment:  Malnutrition Status:  Mild malnutrition(as diagnosed upon previous assessment)    Context:  Acute Illness     Findings of the 6 clinical characteristics of malnutrition:  Energy Intake:  7 - 50% or less of estimated energy requirements for 5 or more days  Weight Loss:  Unable to assess     Body Fat Loss:  Unable to assess(droplet plus precautions)     Muscle Mass Loss:  Unable to assess(droplet plus precautions)    Fluid Accumulation:  7 - Moderate to Severe Extremities   Strength:  Not Performed    Estimated Daily Nutrient Needs:  Energy (kcal):  5227-1792; Weight Used for Energy Requirements:  Admission(98 kg)     Protein (g):  92 - 122 (1.5 - 2.0g/61kg); Weight Used for Protein Requirements:  Ideal        Fluid (ml/day):  1 mL/kcal; Weight Used for Fluid Requirements:  Current      Nutrition Related Findings:  Generalized pitting edema. +3 BLE edema. Mg+ 1.6. +8.2 liters. Wounds:  Surgical Wound, Stage I, Stage II, Pressure Injury, Skin Tears       Current Nutrition Therapies:    Dietary Nutrition Supplements: Standard High Calorie Oral Supplement  DIET GENERAL;     Anthropometric Measures:  · Height: 5' 7\" (170.2 cm)  · Current Body Weight: 226 lb (102.5 kg)   · Admission Body Weight: 215 lb (97.5 kg)    · Ideal Body Weight: 135 lbs; % Ideal Body Weight 174.8 %   · BMI: 35.4  · BMI Categories: Obese Class 2 (BMI 35.0 -39.9)       Nutrition Diagnosis:   · Inadequate protein-energy intake related to inadequate protein-energy intake as evidenced by intake 0-25%    · Increased nutrient needs related to increase demand for energy/nutrients as evidenced by wounds      Nutrition Interventions:   Food and/or Nutrient Delivery:  Continue Current Diet, Modify Oral Nutrition Supplement  Nutrition Education/Counseling:  Education not indicated   Coordination of Nutrition Care:  Continued Inpatient Monitoring    Goals:  New goal: Pt will consume at least 50% of meals and supplements       Nutrition Monitoring and Evaluation:   Food/Nutrient Intake Outcomes:  Food and Nutrient Intake, Supplement Intake  Physical Signs/Symptoms Outcomes:  Biochemical Data     Discharge Planning:    Continue Oral Nutrition Supplement, Continue current diet     Electronically signed by Moy Nix RD, LD on 9/8/20 at 1:20 PM EDT    Contact: [phone]

## 2020-09-09 NOTE — DISCHARGE SUMMARY
cirrhosis portal hypertension. She had Ct-guided drain placement 8/18 for pelvic abscess 8/18. Subsequently found to have large ascending colon mass on colonoscopy 8/20, likely the source of abscess.       8/18/2020: Successful CT guided placement of a drainage catheter in the pelvic abscess.      8/24/2020: Exploratory laparotomy, evacuation of ascites, drainage of  pelvic abscess, right hemicolectomy with primary ileotransverse  colostomy anastomosis, and Abdiaziz-Cut liver biopsy.     8/28/2020: SECONDARY ABDOMINAL WOUND CLOSURE  DILATATION AND CURETTAGE  8/31/2020: NGT d/c'd   9/1/2020    Pelvic abscess. Status post IR drainage 8/18, pelvic fluid cytology negative for malignant cells. Colonoscopy 8/20 with large necrotic ascending colon mass, likely source of abscess. Status post exp lap right transverse colectomy 8/24. Status post secondary wound closure 8/28. Initially on Zosyn, transitioned to IV cipro (8/20). IV Flagyl added 8/25. Now on meropenem and flucanazole. Remains afebrile, no leukocytosis. Blood culture results (8/25) with no growth to date. Surgery and ID on on board; awaiting ECF     Liver cirrhosis / portal HTN. Biopsy consistent with WINKLER cirrhosis, negative for adenocarcinoma involvement. Started on lactulose for ammonia level 66 and confusion, ammonia level 23 (8/28). GI following. S/P paracentesis; 10 L removed 9/1/2020 ,fluid for cx.      Colon cancer. CT scan showed thickening of ascending colon suspicious for malignancy, lesions liver and lung concerning for metastasis. CEA 83.3, AFP 2.4. Colon biopsy (8/24) showed poorly differentiate adenocarcinoma, metastatic adenocarcinoma in 3 of 232 lymph nodes. Patient will need CT chest for staging once renal function improves. Plan is for chemotherapy once she recovers from surgery. Oncology on board.       Grade 1 endometrial cancer: Poor candidate for any surgery at this time per oncology     PAPITO.   Resolved;  nephrology on board appreciate their recs  Creatinine 1.6 on admission suspected pre-renal, hypovolemia.      Hypoalbuminemia with + volume status: On Lasix drip and albumin per nephro recs     Abnormal CXR (8/27). Bilateral airspace disease, increased on right as compared to prior, pneumonia or edema are considerations. Possible 3rd spacing, received albumin. COVID-19 (8/22) negative.  Remains afebrile, no cough.  Encouraged IS use.     Acute anemia. Hgb 10.9 on admission, no previous studies for comparison. Suspect she likely had chronic iron deficiency anemia secondary to colon cancer and post menopausal bleeding. She received 1 unit PRBCs 8/27, Hgb/Hct stable  No active bleeding. Having brown BM         Discharge Diagnoses: Active Problems:    Pelvic abscess in female    Septicemia ([address]    Colonic mass    Cirrhosis of liver with ascites (HCC)    Intra-abdominal free air of unknown etiology    PAPITO (acute kidney injury) ([facility] [address]    Lung nodules    Bandemia    Intra-abdominal abscess (HCC)    E coli infection    Elevated CEA    Ex-smoker    Class 2 obesity due to excess calories with body mass index (BMI) of 36.0 to 36.9 in adult    Portal hypertension (HCC)    Mild malnutrition (HCC)    Tobacco abuse counseling    Open abdominal wall wound    Receiving intravenous antibiotic treatment as outpatient    Complex care coordination  Resolved Problems:    * No resolved hospital problems. *      Physical Exam: BP (!) 122/92   Pulse 87   Temp 97.5 °F (36.4 °C) (Oral)   Resp 18   Ht 5' 7\" (1.702 m)   Wt 226 lb 12.8 oz (102.9 kg)   SpO2 98%   BMI 35.52 kg/m²   General appearance: Ill-looking  Eyes: Sclera clear. Pupils equal.  ENT: Moist oral mucosa. Trachea midline, no adenopathy. Cardiovascular: Regular rhythm, normal S1, S2. No murmur. No edema in lower extremities  Respiratory: Not using accessory muscles. Good inspiratory effort. Clear to auscultation bilaterally, no wheeze or crackles.    GI: Abdomen soft, surgical dressing in place; drain by the typical of adynamic ileus. There are diverticula involving the descending and sigmoid colon. Pelvis: Fluid collection within the pelvis without definite margins has air bubbles within it concerning for sequela from perforated bowel/abscess formation. Uterus and adnexal structures appear unremarkable. Peritoneum/Retroperitoneum: Abdominal ascites. Calcific atherosclerosis aorta. Bones/Soft Tissues: No acute superficial soft tissue or osseous structure abnormality evident. 1. Non loculated fluid and gas collection in the pelvis concerning for perforated viscus/possible abscess formation. 2. Prominent thickening of the ascending colon which may be related to infectious or inflammatory process or underlying neoplastic process. 3. Bowel-gas pattern typical of adynamic ileus. 4. Hepatic cirrhosis with numerous hepatic nodules suggestive of regenerative/siderotic nodules. MRI abdomen without and with IV contrast correlation recommended. Metastatic disease is a consideration. 5. Hepatosplenomegaly and multifocal collateral vessels about the stomach esophagus concerning for varices, all suggestive of portal venous hypertension. 6. Abdominal and pelvic ascites. 7.  Diverticulosis coli without CT evidence of acute diverticulitis. Critical results were called by Dr. Tien Gabriel to [address] [place] [address] on 8/16/2020 at 18:09. Xr Radius Ulna Left (2 Views)    Result Date: 8/16/2020  EXAMINATION: TWO XRAY VIEWS OF THE LEFT FOREARM 8/16/2020 2:55 pm COMPARISON: None. HISTORY: ORDERING SYSTEM PROVIDED HISTORY: Fall TECHNOLOGIST PROVIDED HISTORY: Reason for exam:->Fall Reason for Exam: Frequent falls at home. Bruising mid shaft L forearm Acuity: Acute Type of Exam: Initial FINDINGS: There is no evidence of acute fracture. There is normal alignment. No acute joint abnormality. No focal osseous lesion. No focal soft tissue abnormality. No acute osseous abnormality.      Xr Abdomen (kub) (single Ap View)    Result Date: 8/30/2020  EXAMINATION: ONE SUPINE XRAY VIEW(S) OF THE ABDOMEN 8/30/2020 2:03 pm COMPARISON: 08/26/2020. HISTORY: ORDERING SYSTEM PROVIDED HISTORY: portable, N/V s/p NGT TECHNOLOGIST PROVIDED HISTORY: Reason for exam:->portable, N/V s/p NGT Reason for Exam: portable, N/V s/p NGT Acuity: Unknown Type of Exam: Unknown FINDINGS: Multiple distended small bowel loops in the mid abdomen without significant interval change. No significant colonic gas is identified. Enteric catheter in the stomach. Pigtail catheter in the mid pelvis. Scattered vascular calcification. Multiple distended small bowel loops, similar to the previous study. No significant colonic gas. Findings are concerning for a SBO. Ct Head Wo Contrast    Result Date: 8/16/2020  EXAMINATION: CT OF THE HEAD WITHOUT CONTRAST  8/16/2020 3:15 pm TECHNIQUE: CT of the head was performed without the administration of intravenous contrast. Dose modulation, iterative reconstruction, and/or weight based adjustment of the mA/kV was utilized to reduce the radiation dose to as low as reasonably achievable. COMPARISON: None. HISTORY: ORDERING SYSTEM PROVIDED HISTORY: Fall TECHNOLOGIST PROVIDED HISTORY: Reason for exam:->Fall Has a \"code stroke\" or \"stroke alert\" been called? ->No Reason for Exam: Fall Acuity: Acute Type of Exam: Initial FINDINGS: BRAIN/VENTRICLES: The ventricles and sulci are diffusely enlarged. Low attenuation is seen in the periventricular and subcortical white matter. No acute intracranial hemorrhage or acute infarct is identified. ORBITS: The visualized portion of the orbits demonstrate no acute abnormality. SINUSES: The visualized paranasal sinuses and mastoid air cells demonstrate no acute abnormality. SOFT TISSUES/SKULL:  No acute abnormality of the visualized skull or soft tissues. No acute intracranial abnormality.      Ct Cervical Spine Wo Contrast    Result Date: 8/16/2020  EXAMINATION: CT OF THE CERVICAL SPINE WITHOUT CONTRAST 8/16/2020 3:15 pm TECHNIQUE: CT of the cervical spine was performed without the administration of intravenous contrast. Multiplanar reformatted images are provided for review. Dose modulation, iterative reconstruction, and/or weight based adjustment of the mA/kV was utilized to reduce the radiation dose to as low as reasonably achievable. COMPARISON: None. HISTORY: ORDERING SYSTEM PROVIDED HISTORY: Fall TECHNOLOGIST PROVIDED HISTORY: Reason for exam:->Fall Reason for Exam: Fall Acuity: Acute Type of Exam: Initial FINDINGS: BONES/ALIGNMENT: There is no acute fracture or traumatic malalignment. DEGENERATIVE CHANGES: Multilevel degenerative changes. SOFT TISSUES: There is no prevertebral soft tissue swelling. No acute abnormality of the cervical spine. Ct Abscess Drainage W Cath Placement S&i    Result Date: 8/18/2020  PROCEDURE: CT GUIDEDPELVICABSCESS DRAINAGE CATHETER PLACEMENT MODERATE CONSCIOUS SEDATION <Completed Date> HISTORY: ORDERING SYSTEM PROVIDED HISTORY: Pelvic abscess possibly diverticular in origin TECHNOLOGIST PROVIDED HISTORY: Reason for exam:->Pelvic abscess possibly diverticular in origin Reason for Exam: Pelvic Abscess Acuity: Unknown Type of Exam: Unknown SEDATION: 1 mgversed and 50 mcg fentanyl were titrated intravenously for moderate sedation monitored under my direction. Total intraservice time of sedation was 21 minutes. The patient's vital signs were monitored throughout the procedure and recorded in the patient's medical record by the nurse. TECHNIQUE: Informed consent was obtained after a detailed explanation of the procedure including risks, benefits, and alternatives. Universal protocol was followed. The patient was placed on the CT table in the prone position. A suitable skin site was prepped and draped in sterile fashion following CT localization.   An 18 gauge needle was advanced under CT guidance into the pelvic fluid collection and a 0.035 guidewire was used to place a 10 English abscess drainage catheter after the fashion tract was dilated. The catheter was sutured to the skin and the patient tolerated the procedure well. The catheter was attached to CAROLINE suction drainage. Dose modulation, iterative reconstruction, and/or weight based adjustment of the mA/kV was utilized to reduce the radiation dose to as low as reasonably achievable. DLP: 201.39 mGy-cm Estimated blood loss: Less than 5 cc FINDINGS: A total of 80 mL of purulent fluid was removed and sent for diagnostic tests. Successful CT guided placement of a drainage catheter in the pelvic abscess. Ct Abdomen Pelvis W Iv Contrast Additional Contrast? Oral    Result Date: 8/17/2020  EXAMINATION: CT OF THE ABDOMEN AND PELVIS WITH CONTRAST 8/17/2020 5:35 pm TECHNIQUE: CT of the abdomen and pelvis was performed with the administration of intravenous contrast. Multiplanar reformatted images are provided for review. Dose modulation, iterative reconstruction, and/or weight based adjustment of the mA/kV was utilized to reduce the radiation dose to as low as reasonably achievable. COMPARISON: CT abdomen and pelvis 08/16/2020 HISTORY: ORDERING SYSTEM PROVIDED HISTORY: Pelvic abscess on non contrast CT Reason for Exam: follow up scan from yesterday Acuity: Acute Type of Exam: Subsequent/Follow-up FINDINGS: Lower Chest: Minimal subsegmental atelectasis posterior both lung bases. Pulmonary nodules right and left lower lobes. Cardiac and posterior mediastinal structures visualized are unremarkable. Liver: Hepatic morphologic features of cirrhosis with diffuse tiny nodular densities throughout the liver. Not evident on the CT performed yesterday's an area of masslike density posterior at the hepatic dome axial image 31 measuring 2.8 cm. Other organs: The spleen, pancreas, adrenal glands, gallbladder and kidneys appear unremarkable.  GI/Bowel: Multifocal diverticula involve the descending and sigmoid colon without evidence of acute inflammatory change. Inflammatory changes may be present in the pelvis adjacent to apparent abscess, however evaluation the bowel is significantly limited because of the presence of ascites as well. There is prominent masslike thickening involving the ascending colon concerning for malignancy, conglomerate member measurement 9 x 12 x 10 cm axial image 114, coronal image 81. Pelvis: As demonstrated on yesterday's study there is a prominent air-fluid level collection, following contrast showing a thin rim of enhancement, separate from the bowel, 5.5 x 10 cm axial series 2, image 164, craniocaudal length 7 cm on coronal image 119. Prominent hypoattenuation central uterus, 5 cm axial series 2, image 158. Ascites is noted. Urinary bladder is decompressed by Amado catheter. Peritoneum/Retroperitoneum: Ascites is present. 12 mm ann hepatis lymph node axial image 70.  13 mm portacaval lymph node axial image 79. Multifocal collateral vessels are seen about the spleen, stomach and distal esophagus. Bones/Soft Tissues: No acute superficial soft tissue or osseous structure abnormality evident. 1. Pelvic air/fluid collection almost certainly reflects an abscess or giant colonic diverticula. This may be the result of acute diverticulitis. 2. Mass lesion ascending colon almost certainly reflecting primary adenocarcinoma of the colon. 3. Diffuse hepatic lesions with newly visualized (contrast enhancement) mass at the hepatic dome. Hepatic metastatic disease is a consideration as is cirrhosis with regenerative nodules and hepatocellular carcinoma. 4. Bilateral lower lobe pulmonary nodules suspicious for metastatic disease. 5. Abdominal and pelvic ascites. 6. Sequela from portosystemic shunting with numerous collateral vessels about the stomach, left upper quadrant and distal esophagus. Relatively small esophageal and gastric varices. 7.  Calcific atherosclerotic disease aorta.      Xr Chest Portable    Result abdominal surgery. This finding was discussed with Dr. Ovidio Castellon  At 6:48 pm on 8/24/2020. Status post placement of right jugular central venous catheter. No pneumothorax. Patchy mid and lower lung ground-glass opacities suspicious for pneumonia, increased since the prior CT. Correlation for pneumonia is recommended. Xr Chest Portable    Result Date: 8/16/2020  EXAMINATION: ONE XRAY VIEW OF THE CHEST 8/16/2020 2:55 pm COMPARISON: None. HISTORY: ORDERING SYSTEM PROVIDED HISTORY: Fall TECHNOLOGIST PROVIDED HISTORY: Reason for exam:->Fall Reason for Exam: Frequent falls at home. Bruising mid shaft L forearm Acuity: Acute Type of Exam: Initial FINDINGS: The lungs are without acute focal process. There is no effusion or pneumothorax. The cardiomediastinal silhouette is without acute process. The osseous structures are without acute process. No acute process. Ir Us Guided Paracentesis    Result Date: 9/1/2020  PROCEDURE: ULTRASOUND GUIDED PARACENTESIS 9/1/2020 HISTORY: ORDERING SYSTEM PROVIDED HISTORY: Ascites TECHNOLOGIST PROVIDED HISTORY: Reason for exam:->Ascites TECHNIQUE: Informed consent was obtained after a detailed explanation of the procedure including risks, benefits, and alternatives. Universal protocol was followed. The right abdomen was prepped and draped in sterile fashion and local anesthesia was achieved with lidocaine. An 5 Azeri needle sheath was advanced under ultrasound guidance into ascites and paracentesis was performed. The patient tolerated the procedure well. 10.35 L removed. FINDINGS: A total of 10.35 L removed. Successful ultrasound guided paracentesis. Ct Chest Abdomen Pelvis W Contrast    Result Date: 8/31/2020  EXAMINATION: CT OF THE CHEST, ABDOMEN, AND PELVIS WITH CONTRAST 8/31/2020 1:50 pm TECHNIQUE: CT of the chest, abdomen and pelvis was performed with the administration of intravenous contrast. Multiplanar reformatted images are provided for review.  Dose modulation, iterative reconstruction, and/or weight based adjustment of the mA/kV was utilized to reduce the radiation dose to as low as reasonably achievable. COMPARISON: 08/17/2020 HISTORY: ORDERING SYSTEM PROVIDED HISTORY: colon cancer TECHNOLOGIST PROVIDED HISTORY: Reason for exam:->colon cancer Additional Contrast?->Oral Reason for Exam: colom cancer Acuity: Unknown Type of Exam: Unknown FINDINGS: Chest: Mediastinum: The central airways are patent. There is no hilar or mediastinal adenopathy. Lungs/pleura: There are patchy multifocal bilateral ground-glass opacities scattered throughout each lung with an upper lobe predominance. Numerous small noncalcified bilateral pulmonary nodules are present with the largest centered in the right lung base with diameter of 11 mm. The pleural spaces are clear. Soft Tissues/Bones: There is no fracture or aggressive osseous lesion. Abdomen/Pelvis: Organs: The liver is cirrhotic in appearance. Lesions previously described within the liver are not visible on the current exam.  The remainder of the solid abdominal organs are unremarkable. The remainder of the solid abdominal organs are unremarkable. GI/Bowel: Postsurgical changes of right hemicolectomy are present. There is no bowel dilatation, wall thickening or obstruction. Pelvis: The pelvic organs are unremarkable. The bladder is decompressed by Amado catheter and thus not evaluated. Peritoneum/Retroperitoneum: There has been interval increase in large volume ascites. There is no free air or intraperitoneal inflammatory change. Extensive portal venous collaterals are demonstrated throughout the upper abdomen/left upper quadrant. There is no adenopathy. Transgluteal pigtail drainage catheter remains coiled in the recto uterine space centered within a 7.7 x 3.8 cm rim enhancing fluid collection. Bones/Soft Tissues: There is mild diffuse soft tissue anasarca.      1. Multifocal bilateral pulmonary ground-glass opacities. The differential includes atypical infectious/inflammatory pneumonitis. 2. Multiple bilateral pulmonary nodules concerning for metastatic disease. 3. Cirrhosis with increasing abdominopelvic ascites. 4. Improving 7 cm recto uterine abscess. Xr Abdomen (2 Views)    Result Date: 8/26/2020  EXAMINATION: TWO XRAY VIEWS OF THE ABDOMEN 8/26/2020 9:38 am COMPARISON: CT scan 08/17/2020 HISTORY: ORDERING SYSTEM PROVIDED HISTORY: Abdominal Pain TECHNOLOGIST PROVIDED HISTORY: Reason for exam:->Abdominal Pain Reason for Exam: abdominal pain Acuity: Acute Type of Exam: Initial FINDINGS: Scattered dilated loops small bowel are seen throughout the abdomen. No significant colonic gas is seen Drainage catheter projects in region the pelvis. Amado catheter is seen. Scattered dilated small bowel loops throughout the abdomen, increased compared to prior CT scan, either due to ileus or early partial small bowel obstruction             Treatments: As above.       Discharge Medications:     Medication List      START taking these medications    folic acid 1 MG tablet  Commonly known as:  FOLVITE  Take 1 tablet by mouth daily  Start taking on:  September 10, 2020     lactobacillus capsule  Take 1 capsule by mouth 2 times daily (with meals)     lactulose 10 GM/15ML solution  Commonly known as:  CHRONULAC  Take 30 mLs by mouth 3 times daily     magnesium oxide 400 (241.3 Mg) MG Tabs tablet  Commonly known as:  MAG-OX  Take 1 tablet by mouth 2 times daily     pantoprazole 40 MG tablet  Commonly known as:  PROTONIX  Take 1 tablet by mouth every morning (before breakfast)     rifaximin 550 MG tablet  Commonly known as:  XIFAXAN  Take 1 tablet by mouth 2 times daily           Where to Get Your Medications      These medications were sent to [address], [address] [place] [id number]    Hours:  24-hours Phone:  [phone] · folic acid 1 MG tablet  · lactobacillus capsule  · lactulose 10 GM/15ML solution  · magnesium oxide 400 (241.3 Mg) MG Tabs tablet  · pantoprazole 40 MG tablet  · rifaximin 550 MG tablet         Activity: activity as tolerated  Diet: DIET GENERAL;  Dietary Nutrition Supplements: Clear Liquid Oral Supplement      Disposition: SNF  Discharged Condition: Stable  Follow Up:   No follow-up provider specified. Code status:  Full Code         Total time spent on discharge, finalizing medications, referrals and arranging outpatient follow up was more than 1 hour      Thank you Dr. Truong primary care provider on file. for the opportunity to be involved in this patients care.

## 2020-09-09 NOTE — PROGRESS NOTES
[facility]        Progress Note    Patient Name: Flaca Adan  MRN: [id number]  YOB: 1955  Date of Evaluation: 2020    Subjective:  No acute events overnight  Denies pain  Tolerating diet, no reports of nausea or vomiting  Passing stool  Up to chair at this time    Postoperative Day #16, 12      Vital Signs:  Patient Vitals for the past 24 hrs:   BP Temp Temp src Pulse Resp SpO2   20 0945 (!) 122/92 97.5 °F (36.4 °C) Oral 87 18 98 %   20 0430 109/75 97 °F (36.1 °C) Oral 74 16 93 %   20 2308 (!) 124/91 98 °F (36.7 °C) Oral 80 16 96 %   20 2100 125/76 98.2 °F (36.8 °C) Oral 86 16 96 %   20 1730 122/77 97.9 °F (36.6 °C) Oral 75 16 95 %      TEMPERATURE HISTORY 24H: Temp (24hrs), Av.7 °F (36.5 °C), Min:97 °F (36.1 °C), Max:98.2 °F (36.8 °C)    BLOOD PRESSURE HISTORY: Systolic (79UVX), HSY:514 , Min:92 , ZKJ:960    Diastolic (33FZK), WLA:25, Min:62, Max:92      Intake/Output:  I/O last 3 completed shifts: In: 600 [P.O.:600]  Out: 900 [Urine:900]  No intake/output data recorded.   Drain/tube Output:  Closed/Suction Drain Left Back Bulb 10 [place]-Output (ml): 0 ml    Physical Exam:  General: awake, alert, oriented to person, place  Lungs: unlabored respirations  Abdomen: soft, nondistended, no tenderness  Skin/Wound: wound incision is clean, no drainage, no cellulitis    Labs:  CBC:    Recent Labs     20  0610 20  0615 20  0639   WBC 5.8 5.3 6.0   HGB 6.5* 7.8* 7.8*   HCT 19.9* 23.6* 23.8*   PLT 79* 100* 117*     BMP:    Recent Labs     20  0610 20  0615 20  0639    139 139   K 3.0* 3.6 3.7    107 107   CO2 25 24 25   BUN 19 19 19   CREATININE 0.7 0.7 0.7   GLUCOSE 80 98 101*     Hepatic:    Recent Labs     20  0610 20  0615 20  0639   AST 41* 26 28   ALT 16 14 15   BILITOT 0.8 0.7 0.6   ALKPHOS 222* 185* 193*     Amylase:  No results found for: AMYLASE  Lipase:  No results found for: LIPASE   Mag:    Lab Results   Component Value Date    MG 1.90 09/09/2020    MG 1.60 09/08/2020     Phos:     Lab Results   Component Value Date    PHOS 4.3 09/09/2020    PHOS 4.2 09/08/2020      Coags:   Lab Results   Component Value Date    PROTIME 12.9 09/09/2020    INR 1.11 09/09/2020    APTT 37.5 09/09/2020       Cultures:  Anaerobic culture  Lab Results   Component Value Date    LABANAE No anaerobes isolated 08/24/2020     Fungus stain  Results in Past 30 Days  Result Component Current Result Ref Range Previous Result Ref Range   Fungus Stain No Fungal elements seen (8/24/2020)  Not in Time Range      Gram stain  Results in Past 30 Days  Result Component Current Result Ref Range Previous Result Ref Range   Gram Stain Result No organisms seen  4+ WBC's (Polymorphonuclear)   (9/1/2020)  1+ WBC's (Polymorphonuclear)  No organisms seen   (8/24/2020)      Organism  Lab Results   Component Value Date/Time    ORG Yeast (A) 08/24/2020 02:10 PM    ORG Lactobacillus species (A) 08/24/2020 02:10 PM     Surgical culture  Lab Results   Component Value Date/Time    CXSURG Rare growth  No further workup   08/24/2020 02:10 PM    CXSURG Rare growth  No further workup   08/24/2020 02:10 PM     Blood culture 1  Results in Past 30 Days  Result Component Current Result Ref Range Previous Result Ref Range   Blood Culture, Routine No Growth after 4 days of incubation. (8/25/2020)  No Growth after 4 days of incubation. (8/16/2020)      Blood culture 2  Results in Past 30 Days  Result Component Current Result Ref Range Previous Result Ref Range   Culture, Blood 2 No Growth after 4 days of incubation. (8/25/2020)  No Growth after 4 days of incubation. (8/16/2020)      Fecal occult  No results found for requested labs within last 30 days. GI bacterial pathogens by PCR  No results found for requested labs within last 30 days.      C. difficile  No results found for requested labs within last 27 days.     Urine culture  No results found for: LABURIN    Pathology:   OR 8/24/2020--FINAL DIAGNOSIS:        A. Right colon, hemicolectomy:        - Invasive poorly-differentiated adenocarcinoma with extensive          necrosis and full-thickness defect - SEE SYNOPTIC REPORT.        - Surgical resection margins negative for involvement.        - Metastatic adenocarcinoma in three of twenty-two lymph nodes          (3/22).      - One pericolonic tumor deposit.        B. Liver, biopsy:        - Cirrhotic liver with moderate steatosis and associated features          suggestive for steatohepatitis; negative for adenocarcinoma          involvement - see comment. SYNOPTIC REPORT   COLON: Resection     Procedure: Right hemicolectomy   Tumor Site: Cecum, ileocecal valve, distal ileum   Tumor Size: 12.0 x 10.0 x 3.4 cm   Macroscopic Tumor Perforation: Present     Histologic Type: Adenocarcinoma   Histologic Grade: G3, Poorly-differentiated   Tumor Extension: Tumor invades the visceral peritoneum (including tumor   continuous with serosal surface through area of inflammation)     Margins:  All margins are uninvolved by invasive carcinoma, high-grade   dysplasia and adenoma      Margins Examined: Proximal margin, distal margin and mesenteric margin      Distance of Invasive Carcinoma from Closest Margin: 0.5 cm   (mesenteric)     Treatment Effect: No known presurgical therapy   Lymph-Vascular Invasion: Present, small vessel lymphovascular invasion   Perineural Invasion: Present   Tumor Budding: Number of tumor buds in 1 \"hotspot\" field: High score (10   or more)   Type of Polyp in Which Invasive Carcinoma Arose: None identified   Tumor Deposits: Present (1)     Regional Lymph Nodes:      Number of Lymph Nodes Examined: 25      Number of Lymph Nodes Involved: 3   Pathologic Staging (pTNM, AJCC 8th Edition)      Primary Tumor (pT): pT4a, Tumor invades through the visceral   peritoneum (including gross perforation of the droplet   steatosis involving all of the nodules of hepatocytes to varying degrees.    Inflammation associated with the foci of steatosis is limited/patchy. Occasional hepatocytes with ballooning degeneration (patchy, where   present relatively numerous) and rare dead hepatocytes are seen. Well-formed Annalee-Denk bodies are not readily identified in   hepatocytes.  Cholestasis is not present.  Central areas and vessels,   where present, appear unremarkable.     A trichrome stain (performed to evaluate for fibrosis) highlights   cirrhotic bands of fibrosis (Stage 4 of 4) as well as focal areas of   pericellular/perisinusoidal fibrosis associated with steatosis.  An iron   stain (performed to evaluate for increased iron deposition) shows 2+ iron   deposition predominantly within Kupffer cells.   No cytoplasmic globules   are seen within periportal hepatocytes on H&E or PAS-D stained sections   (performed to evaluate for a1-antitrypsin globules).  No polarizable   foreign material is identified under polarized light.     Overall, the submitted liver biopsy is notable for cirrhosis and   steatosis with associated findings suggestive for steatohepatitis (WINKLER   Activity Grade [SARAN Score] = 5, based on Steatosis [S] score 2, Lobular   Inflammation [L] score 2 and Hepatocyte Ballooning [B] score 1).  These   findings are likely related to a dietary/metabolic condition, though   similar findings could be seen in the setting of alcohol or   drug/medication/herbal-induced hepatic injury, among other less likely   etiologies.  Please correlate with clinical and laboratory findings. Endoscopy Pathology 8/20/2020--FINAL DIAGNOSIS:     A. Esophagus, biopsy:   - Fragments of squamous and gastric type columnar mucosa with mild   chronic inflammation.   - Negative for intestinal metaplasia. B. Ascending colon mass, biopsy:   - Poorly differentiated carcinoma, most consistent with adenocarcinoma. See comment.      C. Colon polyp, transverse:   - Tubular adenoma. - Negative for high-grade dysplasia or malignancy. D. Colon polyp, descending:   - Tubular adenoma. - Negative for high-grade dysplasia or malignancy. E. Colon polyp, sigmoid:   - Tubular adenoma (multiple fragments). - Negative for high-grade dysplasia or malignancy. COMMENT:  The morphologic findings of ascending colon mass raises a   differential diagnosis of neuroendocrine carcinoma.  However, the   malignant cells are negative for neuroendocrine markers (synaptophysin   and chromogranin) .  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  Clinical correlation is recommended. 8/28/2020--FINAL DIAGNOSIS:     A. Endocervical curettings:   - Endometrioid adenocarcinoma, FIGO grade 1.     B. Endometrial curettings:   - Endometrioid adenocarcinoma, most likely FIGO grade 1. COMMENT: Both specimens show well-differentiated adenocarcinoma with   villoglandular pattern in some areas.  In light of patient's history of   colonic adenocarcinoma and involvement of both endocervix and   endometrium, a panel of immunostain is performed to further classified   this lesion.  The tumor cells are positive for ER, vimentin, P 16   (patchy) and negative for CEA and CDX2.  The findings support a diagnosis   of endometrioid adenocarcinoma.  Minimal solid area is seen in A. This is   most consistent with FIGO grade 1 endometrioid adenocarcinoma. Correlation with excised specimen is recommended for final grading. Imaging:  I have personally reviewed the following films:    No results found.     Scheduled Meds:   magnesium oxide  400 mg Oral BID    bisacodyl  10 mg Rectal Once    lidocaine 1 % injection  5 mL Intradermal Once    sodium chloride flush  10 mL Intravenous 2 times per day    lactobacillus  1 capsule Oral BID WC    rifaximin  550 mg Oral BID    enoxaparin  30 mg Subcutaneous BID    lidocaine   Topical Once    folic acid  1 mg Oral Daily    fluconazole  200 mg Intravenous Q24H    meropenem  1 g Intravenous Q12H    pantoprazole  40 mg Intravenous Daily    lactulose  20 g Oral TID    sodium chloride flush  10 mL Intravenous 2 times per day     Continuous Infusions:   dextrose 100 mL/hr (08/27/20 1300)     PRN Meds:.sodium chloride flush, acetaminophen, phenol, glucose, dextrose, glucagon (rDNA), dextrose, [DISCONTINUED] promethazine **OR** ondansetron, promethazine, lip balm petroleum free, sodium chloride flush, polyethylene glycol      Assessment:  76 y.o. female admitted with   1. Septicemia ([facility] [address]    2. PAPITO (acute kidney injury) ([facility] [address]    3. Intra-abdominal free air of unknown etiology    4. Fall, initial encounter        Status-post exploratory laparotomy, evacuation of ascites, drainage of pelvic abscess, right hemicolectomy with primary ileotransverse colostomy anastomosis, and Abdiaziz-Cut liver biopsy  on 8/24/2020 for colon mass, right colon and hepatic flexure with necrotic cancer at hepatic flexure region with perforation, cirrhosis, pelvic abscess, ascites  Status-post secondary closure of abdominal wound on 8/28/2020  Dilatation and curettage 8/28/2020, positive for endometrial cancer  Intraabdominal/pelvic abscess, status-post CT-guided drain placement on 8/18/2020  Sepsis   Cirrhosis  Acute kidney injury  Hyponatremia  COVID screening, negative  Anemia  Status-post paracentesis      Plan:  1. General diet as tolerated  2. Antibiotics  3. Activity as tolerated, ambulate TID, up to chair for all meals--PT/OT following  4. Pulmonary toilet, incentive spirometry  5. PRN analgesics and antiemetics--minimizing narcotics as tolerated  6. DVT prophylaxis with Lovenox and SCD's  7. Management of medical comorbid etiologies per primary team and consulting services  8.  Disposition: Discharge to ECF, anticipate later today; follow up with Dr. Kendra Powell in 8 days for suture removal unless nursing home able to do so--if that is the case, may follow up PRN only    EDUCATION:  Educated patient on plan of care and disease process--all questions answered. Plans discussed with patient and nursing. Reviewed and discussed with Dr. Matias Ashton.       Signed:  VIN Chambers CNP  9/9/2020 1:47 PM

## 2020-09-09 NOTE — TELEPHONE ENCOUNTER
Can you please clarify order for Fluconazole? In the notes it is stated as Fluconazole 200 mg Q24, but on the SKYLER to facility it was written as Fluconazole 20 mg Q24.

## 2020-09-09 NOTE — PLAN OF CARE
Problem: Falls - Risk of:  Goal: Will remain free from falls  Description: Will remain free from falls  9/9/2020 1237 by Marleny Jasso RN  Outcome: Completed  9/9/2020 0110 by Klever Batista RN  Outcome: Ongoing  Goal: Absence of physical injury  Description: Absence of physical injury  9/9/2020 1237 by Marleny Jasso RN  Outcome: Completed  9/9/2020 0110 by Klever Batista RN  Outcome: Ongoing     Problem: Skin Integrity:  Goal: Will show no infection signs and symptoms  Description: Will show no infection signs and symptoms  9/9/2020 1237 by Marleny Jasso RN  Outcome: Completed  9/9/2020 0110 by Klever Batista RN  Outcome: Ongoing  Goal: Absence of new skin breakdown  Description: Absence of new skin breakdown  9/9/2020 1237 by Marleny Jasso RN  Outcome: Completed  9/9/2020 0110 by Klever Batista RN  Outcome: Ongoing     Problem: Infection:  Goal: Will remain free from infection  Description: Will remain free from infection  9/9/2020 1237 by Marleny Jasso RN  Outcome: Completed  9/9/2020 0110 by Klever Batista RN  Outcome: Ongoing     Problem: Safety:  Goal: Free from accidental physical injury  Description: Free from accidental physical injury  9/9/2020 1237 by Marleny Jasso RN  Outcome: Completed  9/9/2020 0110 by Klever Batista RN  Outcome: Ongoing  Goal: Free from intentional harm  Description: Free from intentional harm  9/9/2020 1237 by Marleny Jasso RN  Outcome: Completed  9/9/2020 0110 by Klever Batista RN  Outcome: Ongoing     Problem: Daily Care:  Goal: Daily care needs are met  Description: Daily care needs are met  9/9/2020 1237 by Marleny Jasso RN  Outcome: Completed  9/9/2020 0110 by Klever Batista RN  Outcome: Ongoing     Problem: Pain:  Goal: Patient's pain/discomfort is manageable  Description: Patient's pain/discomfort is manageable  9/9/2020 1237 by Marleny Jasso RN  Outcome: Completed  9/9/2020 0110 by Klever Batista RN  Outcome: Ongoing  Goal: Pain level will decrease  Description: Pain level will decrease  9/9/2020 1237 by Marleny Jasso RN  Outcome: Completed  9/9/2020 0110 by Klever Batista RN  Outcome: Ongoing  Goal: Control of acute pain  Description: Control of acute pain  9/9/2020 1237 by Marleny Jasso RN  Outcome: Completed  9/9/2020 0110 by Klever Batista RN  Outcome: Ongoing  Goal: Control of chronic pain  Description: Control of chronic pain  9/9/2020 1237 by Marleny Jasso RN  Outcome: Completed  9/9/2020 0110 by Klever Batista RN  Outcome: Ongoing     Problem: Skin Integrity:  Goal: Skin integrity will stabilize  Description: Skin integrity will stabilize  9/9/2020 1237 by Marleny Jasso RN  Outcome: Completed  9/9/2020 0110 by Klever Batista RN  Outcome: Ongoing     Problem: Discharge Planning:  Goal: Patients continuum of care needs are met  Description: Patients continuum of care needs are met  9/9/2020 1237 by Marleny Jasso RN  Outcome: Completed  9/9/2020 0110 by Klever Batisat RN  Outcome: Ongoing     Problem: Nutrition  Goal: Optimal nutrition therapy  9/9/2020 1237 by Marleny Jasso RN  Outcome: Completed  9/9/2020 0110 by Klever Batista RN  Outcome: Ongoing     Problem: Confusion - Acute:  Goal: Absence of continued neurological deterioration signs and symptoms  Description: Absence of continued neurological deterioration signs and symptoms  9/9/2020 1237 by Marleny Jasso RN  Outcome: Completed  9/9/2020 0110 by Klever Batista RN  Outcome: Ongoing  Goal: Mental status will be restored to baseline  Description: Mental status will be restored to baseline  9/9/2020 1237 by Marleny Jasso RN  Outcome: Completed  9/9/2020 0110 by Klever Batista RN  Outcome: Ongoing     Problem: Discharge Planning:  Goal: Ability to perform activities of daily living will improve  Description: Ability to perform activities of daily living will improve  9/9/2020 1237 by Marleny Jasso RN  Outcome: Completed  9/9/2020 0110 by Klever Batista RN  Outcome: Ongoing  Goal: Participates in care planning  Description: Participates in care planning  9/9/2020 1237 by Chata Nowak RN  Outcome: Completed  9/9/2020 0110 by Meseret Campos RN  Outcome: Ongoing     Problem: Injury - Risk of, Physical Injury:  Goal: Will remain free from falls  Description: Will remain free from falls  9/9/2020 1237 by Chata Nowak RN  Outcome: Completed  9/9/2020 0110 by Meseret Campos RN  Outcome: Ongoing  Goal: Absence of physical injury  Description: Absence of physical injury  9/9/2020 1237 by Chata Nowak RN  Outcome: Completed  9/9/2020 0110 by Meseret Campos RN  Outcome: Ongoing     Problem: Mood - Altered:  Goal: Mood stable  Description: Mood stable  9/9/2020 1237 by Chata Nowak RN  Outcome: Completed  9/9/2020 0110 by Meseret Campos RN  Outcome: Ongoing  Goal: Absence of abusive behavior  Description: Absence of abusive behavior  9/9/2020 1237 by Chata Nowak RN  Outcome: Completed  9/9/2020 0110 by Meseret Campos RN  Outcome: Ongoing  Goal: Verbalizations of feeling emotionally comfortable while being cared for will increase  Description: Verbalizations of feeling emotionally comfortable while being cared for will increase  9/9/2020 1237 by Chata Nowak RN  Outcome: Completed  9/9/2020 0110 by Meseret Campos RN  Outcome: Ongoing     Problem: Psychomotor Activity - Altered:  Goal: Absence of psychomotor disturbance signs and symptoms  Description: Absence of psychomotor disturbance signs and symptoms  9/9/2020 1237 by Chata Nowak RN  Outcome: Completed  9/9/2020 0110 by Meseret Campos RN  Outcome: Ongoing     Problem: Sensory Perception - Impaired:  Goal: Demonstrations of improved sensory functioning will increase  Description: Demonstrations of improved sensory functioning will increase  9/9/2020 1237 by Chata Nowak RN  Outcome: Completed  9/9/2020 0110 by Meseret Campos RN  Outcome: Ongoing  Goal: Decrease in sensory misperception frequency  Description: Decrease in sensory misperception frequency  9/9/2020 1237 by Chata Nowak RN  Outcome: Completed  9/9/2020 0110 by Isauro Rosado RN  Outcome: Ongoing  Goal: Able to refrain from responding to false sensory perceptions  Description: Able to refrain from responding to false sensory perceptions  9/9/2020 1237 by Werner Mcburney, RN  Outcome: Completed  9/9/2020 0110 by Isauro Rosado RN  Outcome: Ongoing  Goal: Demonstrates accurate environmental perceptions  Description: Demonstrates accurate environmental perceptions  9/9/2020 1237 by Werner Mcburney, RN  Outcome: Completed  9/9/2020 0110 by Isauro Rosado RN  Outcome: Ongoing  Goal: Able to distinguish between reality-based and nonreality-based thinking  Description: Able to distinguish between reality-based and nonreality-based thinking  9/9/2020 1237 by Werner Mcburney, RN  Outcome: Completed  9/9/2020 0110 by Isauro Rosado RN  Outcome: Ongoing  Goal: Able to interrupt nonreality-based thinking  Description: Able to interrupt nonreality-based thinking  9/9/2020 1237 by Werner Mcburney, RN  Outcome: Completed  9/9/2020 0110 by Isauro Rosado RN  Outcome: Ongoing     Problem: Sleep Pattern Disturbance:  Goal: Appears well-rested  Description: Appears well-rested  9/9/2020 1237 by Werner Mcburney, RN  Outcome: Completed  9/9/2020 0110 by Isauro Rosado RN  Outcome: Ongoing     Problem: HEMODYNAMIC STATUS  Goal: Patient has stable vital signs and fluid balance  9/9/2020 1237 by Werner Mcburney, RN  Outcome: Completed  9/9/2020 0110 by Isauro Rosado RN  Outcome: Ongoing     Problem: OXYGENATION/RESPIRATORY FUNCTION  Goal: Patient will achieve/maintain normal respiratory rate/effort  9/9/2020 1237 by Werner Mcburney, RN  Outcome: Completed  9/9/2020 0110 by Isauro Rosado RN  Outcome: Ongoing     Problem: ELIMINATION  Goal: Elimination patterns are normal or improving  Description: Elimination patterns return to pre-surgery normal patterns  9/9/2020 1237 by Werner Mcburney, RN  Outcome: Completed  9/9/2020 0110 by Isauro Rosado RN  Outcome: Ongoing     Problem: SKIN INTEGRITY  Goal: Skin integrity is maintained or improved  9/9/2020 1237 by Perfecto Christianson RN  Outcome: Completed  9/9/2020 0110 by Alex Enamorado RN  Outcome: Ongoing

## 2020-09-09 NOTE — PROGRESS NOTES
Shift assessment complete. Meds given per MAR. Dressing: to abdomen clean dry and intact  VSS, pt confused at times. Needs to be oriented at times. Patient expressed no other needs at this time, will continue to monitor. Lispro sliding scale discontinued due to patient not being on TPN anymore. Fall precautions in place, hourly rounding, call light and belongings in reach, bed in lowest position, wheels locked in place, side rails up x 2, walkways free of clutter    The care plan and education has been reviewed and mutually agreed upon with the patient.

## 2020-09-09 NOTE — PROGRESS NOTES
Patient kicking off covers and calling family complaining of being cold and neglected. Patient has been given multiple blankets overnight. Will continue to monitor and check on patient more frequently.

## 2020-09-09 NOTE — PROGRESS NOTES
Patient ok to discharge to [facility]. PICC remains in place. Tele removed. Report called to Albino Samuel at facility.

## 2020-09-09 NOTE — CARE COORDINATION
DISCHARGE SUMMARY     DATE OF DISCHARGE: 09/09/20    DISCHARGE DESTINATION: [facility]    Level of Care: Skilled    Report Number: [phone]    Fax Number:  [phone]    Precert Obtained: N/A    Hens Completed: yes    PASARR: N/A    Notified: RN, Family and Facility/Agency-dtr and facility aware of transport time    Prescriptions Faxed:yes    TRANSPORTATION: [place] [address] Name: [address] [place] [address] up Time: 2PM    Phone Number: [phone]    Electronically signed by JAMILA Fernandes on 9/9/2020 at 12:38 PM

## 2020-09-09 NOTE — PLAN OF CARE
Problem: Falls - Risk of:  Goal: Will remain free from falls  Description: Will remain free from falls  Outcome: Ongoing  Goal: Absence of physical injury  Description: Absence of physical injury  Outcome: Ongoing     Problem: Skin Integrity:  Goal: Will show no infection signs and symptoms  Description: Will show no infection signs and symptoms  Outcome: Ongoing  Goal: Absence of new skin breakdown  Description: Absence of new skin breakdown  Outcome: Ongoing     Problem: Infection:  Goal: Will remain free from infection  Description: Will remain free from infection  Outcome: Ongoing     Problem: Safety:  Goal: Free from accidental physical injury  Description: Free from accidental physical injury  Outcome: Ongoing  Goal: Free from intentional harm  Description: Free from intentional harm  Outcome: Ongoing     Problem: Daily Care:  Goal: Daily care needs are met  Description: Daily care needs are met  Outcome: Ongoing     Problem: Pain:  Goal: Patient's pain/discomfort is manageable  Description: Patient's pain/discomfort is manageable  Outcome: Ongoing  Goal: Pain level will decrease  Description: Pain level will decrease  Outcome: Ongoing  Goal: Control of acute pain  Description: Control of acute pain  Outcome: Ongoing  Goal: Control of chronic pain  Description: Control of chronic pain  Outcome: Ongoing     Problem: Skin Integrity:  Goal: Skin integrity will stabilize  Description: Skin integrity will stabilize  Outcome: Ongoing     Problem: Discharge Planning:  Goal: Patients continuum of care needs are met  Description: Patients continuum of care needs are met  Outcome: Ongoing     Problem: Nutrition  Goal: Optimal nutrition therapy  9/9/2020 0110 by Bayron Reveles RN  Outcome: Ongoing  9/8/2020 1327 by Hubert Hanley RD, LD  Outcome: Ongoing     Problem: Confusion - Acute:  Goal: Absence of continued neurological deterioration signs and symptoms  Description: Absence of continued neurological deterioration signs and symptoms  Outcome: Ongoing  Goal: Mental status will be restored to baseline  Description: Mental status will be restored to baseline  Outcome: Ongoing     Problem: Discharge Planning:  Goal: Ability to perform activities of daily living will improve  Description: Ability to perform activities of daily living will improve  Outcome: Ongoing  Goal: Participates in care planning  Description: Participates in care planning  Outcome: Ongoing     Problem: Injury - Risk of, Physical Injury:  Goal: Will remain free from falls  Description: Will remain free from falls  Outcome: Ongoing  Goal: Absence of physical injury  Description: Absence of physical injury  Outcome: Ongoing     Problem: Mood - Altered:  Goal: Mood stable  Description: Mood stable  Outcome: Ongoing  Goal: Absence of abusive behavior  Description: Absence of abusive behavior  Outcome: Ongoing  Goal: Verbalizations of feeling emotionally comfortable while being cared for will increase  Description: Verbalizations of feeling emotionally comfortable while being cared for will increase  Outcome: Ongoing     Problem: Psychomotor Activity - Altered:  Goal: Absence of psychomotor disturbance signs and symptoms  Description: Absence of psychomotor disturbance signs and symptoms  Outcome: Ongoing     Problem: Sensory Perception - Impaired:  Goal: Demonstrations of improved sensory functioning will increase  Description: Demonstrations of improved sensory functioning will increase  Outcome: Ongoing  Goal: Decrease in sensory misperception frequency  Description: Decrease in sensory misperception frequency  Outcome: Ongoing  Goal: Able to refrain from responding to false sensory perceptions  Description: Able to refrain from responding to false sensory perceptions  Outcome: Ongoing  Goal: Demonstrates accurate environmental perceptions  Description: Demonstrates accurate environmental perceptions  Outcome: Ongoing  Goal: Able to distinguish between reality-based and nonreality-based thinking  Description: Able to distinguish between reality-based and nonreality-based thinking  Outcome: Ongoing  Goal: Able to interrupt nonreality-based thinking  Description: Able to interrupt nonreality-based thinking  Outcome: Ongoing     Problem: Sleep Pattern Disturbance:  Goal: Appears well-rested  Description: Appears well-rested  Outcome: Ongoing     Problem: HEMODYNAMIC STATUS  Goal: Patient has stable vital signs and fluid balance  Outcome: Ongoing     Problem: OXYGENATION/RESPIRATORY FUNCTION  Goal: Patient will achieve/maintain normal respiratory rate/effort  Outcome: Ongoing     Problem: ELIMINATION  Goal: Elimination patterns are normal or improving  Description: Elimination patterns return to pre-surgery normal patterns  Outcome: Ongoing     Problem: SKIN INTEGRITY  Goal: Skin integrity is maintained or improved  Outcome: Ongoing

## 2020-09-15 NOTE — PROGRESS NOTES
[facility] GENERAL AND LAPAROSCOPIC SURGERY          PATIENT NAME: Colleen Burrows     TODAY'S DATE: 9/15/2020    SUBJECTIVE:    Pt ***. OBJECTIVE:  VITALS:  There were no vitals taken for this visit. CONSTITUTIONAL:  {AWAKE/FATIGUED:193668930} and {ALERTNESS:870626755}  LUNGS:  {AUSCULTATION:799826917}  ABDOMEN:  {BOWEL SOUNDS:767622756}, {CONSISTENCY:466720157}, {DISTENTION:893061130}, {TENDERNESS:633859483}     Data:  CBC: No results for input(s): WBC, HGB, HCT, PLT in the last 72 hours. BMP:  No results for input(s): NA, K, CL, CO2, BUN, CREATININE, GLUCOSE in the last 72 hours. Hepatic: No results for input(s): AST, ALT, ALB, BILITOT, ALKPHOS in the last 72 hours. Mag:    No results for input(s): MG in the last 72 hours. Phos:   No results for input(s): PHOS in the last 72 hours. INR: No results for input(s): INR in the last 72 hours.     Radiology Review:  ***      ASSESSMENT AND PLAN:  ***    Abdirashid Velázquez

## 2020-09-15 NOTE — PROGRESS NOTES
[facility] GENERAL AND LAPAROSCOPIC SURGERY          PATIENT NAME: Alondra Rahman     TODAY'S DATE: 9/15/2020    SUBJECTIVE:    Pt returns for hospital / colon cancer follow up. OBJECTIVE:  VITALS:  /60     CONSTITUTIONAL:  awake and alert, in wheelchair  LUNGS:  clear to auscultation  ABDOMEN:  normal bowel sounds, soft, mildly distended - ascites building back up, non-tender , incision intact, no S/S of infection    Data:    Radiology Review:  None    PATHOLOGY    FINAL DIAGNOSIS:        A. Right colon, hemicolectomy:        - Invasive poorly-differentiated adenocarcinoma with extensive          necrosis and full-thickness defect - SEE SYNOPTIC REPORT.        - Surgical resection margins negative for involvement.        - Metastatic adenocarcinoma in three of twenty-two lymph nodes          (3/22).      - One pericolonic tumor deposit.        B. Liver, biopsy:        - Cirrhotic liver with moderate steatosis and associated features          suggestive for steatohepatitis; negative for adenocarcinoma          involvement - see comment.      ASSESSMENT AND PLAN:  Colon cancer  Sutures removed  Path and plan to see Onc reviewed    Has cirrhosis - pt to continue to see GI for this issue, has appointment set up    Will see prn for port or any recurrent bowel problems as needed       Soham Pagan

## 2020-09-21 NOTE — TELEPHONE ENCOUNTER
NOTIFYING 1 [facility]. HAVING INCREASED DRAINAGE. INCISION IS FINE. CT TO BE DONE THIS Friday, ORDERED BY DR GENEVIEVE ST [facility].

## 2020-09-28 LAB
FUNGUS (MYCOLOGY) CULTURE: NORMAL
FUNGUS STAIN: NORMAL

## 2020-10-13 LAB
AFB CULTURE (MYCOBACTERIA): NORMAL
AFB SMEAR: NORMAL

## 2021-05-28 NOTE — PLAN OF CARE
Problem: Falls - Risk of:  Goal: Will remain free from falls  Description: Will remain free from falls  Outcome: Met This Shift  Goal: Absence of physical injury  Description: Absence of physical injury  Outcome: Met This Shift     Problem: Skin Integrity:  Goal: Absence of new skin breakdown  Description: Absence of new skin breakdown  Outcome: Met This Shift     Problem: Safety:  Goal: Free from accidental physical injury  Description: Free from accidental physical injury  Outcome: Met This Shift     Problem: Pain:  Goal: Patient's pain/discomfort is manageable  Description: Patient's pain/discomfort is manageable  Outcome: Met This Shift unknown

## 2021-11-14 NOTE — PROGRESS NOTES
Infectious Diseases   Progress Note      Admission Date: 8/16/2020  Hospital Day: Hospital Day: 11   Attending: Rene Esparza MD  Date of service: 8/26/2020     Chief complaint/ Reason for consult:     · Worsening bandemia secondary to intra-abdominal abscess  · Acute kidney injury  · Colon mass, right colon and hepatic flexure with necrotic cancer as well as perforation status post expiratory laparotomy and right hemicolectomy with primary ileotransverse colostomy and anastomosis on 8/24/2020  · Ascites, status post evacuation of ascites on 8/24/2020 , malignant ascites should be ruled out  · Intra-abdominal abscess status post abscess evacuation on 8/24/2020, CT-guided drainage of the abscess on 8/20/2020 had yielded 80 mL of purulent fluid with cultures positive for E. coli and mixed anaerobic growth    Microbiology:        I have reviewed allavailable micro lab data and cultures    · Blood culture (2/2) - collected on 8/16/2020: Negative  · Abdominal abscess fluid culture - collectedon 8/18/2020: E. Coli    Escherichia coli (1)     Antibiotic  Interpretation  ANABELLA  Status     ampicillin  Sensitive  4  mcg/mL      ceFAZolin  Sensitive  <=4  mcg/mL      cefepime  Sensitive  <=0.12  mcg/mL      cefTRIAXone  Sensitive  <=0.25  mcg/mL      ciprofloxacin  Sensitive  <=0.25  mcg/mL      ertapenem  Sensitive  <=0.12  mcg/mL      gentamicin  Sensitive  <=1  mcg/mL      levofloxacin  Sensitive  <=0.12  mcg/mL      piperacillin-tazobactam  Sensitive  <=4  mcg/mL      trimethoprim-sulfamethoxazole  Sensitive  <=20  mcg/mL          · Abdominal surgical culture  - collected on 8/24/2020:  In process      Antibiotics and immunizations:       Current antibiotics: All antibiotics and their doses were reviewed by me    Recent Abx Admin                   metronidazole (FLAGYL) 500 mg in NaCl 100 mL IVPB premix (mg) 500 mg New Bag 08/26/20 0544     500 mg New Bag  0000    ciprofloxacin (CIPRO) IVPB 400 mg (mg) 400 mg New okay.    White cell count is 20,900 today. This is expected to improve slowly. Serum creatinine is 2.3 today. Ca1 25 is 150.5. X-ray abdomen from today reviewed. Shows possible ileus. Oncology note reviewed. Plan:     Diagnostic Workup:    · Follow-up on surgical cultures from 8/24/2020  · Continue to follow  fever curve, WBC count and blood cultures  · Follow up on liver and renal function    Antimicrobials:    · IV Cipro and Flagyl noted to be changed to IV meropenem by general surgery. · I will add IV fluconazole 200 mg every 24 hours  · Continue to monitor her vitals closely  · We will follow-up on the culture results and clinical progress and will make further recommendations accordingly  · Aspiration precaution  · Surgical site care  · Fall precaution  · Pain control  · DVT prophylaxis  · Discussed the above plan with patient and RN       Drug Monitoring:    · Continue monitoring for antibiotic toxicity as follows: CMP, CBC, QTc interval  · Continue to watch for following: new or worsening fever, new hypotension, hives, lip swelling and redness or purulence at vascular access sites. Current isolation precautions: There are no current isolations documented for this patient. I/v access Management:    · Continue to monitor i.v access sites for erythema, induration,discharge or tenderness. · As always, continue efforts to minimize tubes/ lines/ drains as clinically appropriate to reduce chances of line associated infections. Patient education and counseling:     · The patient was educated in detail about the side-effects of various antibiotics and things to watch for like new rashes, lip swelling, severe reaction, worsening diarrhea, break through fever etc.  · Discussed patient's condition and what to expect. All of the patient's questions were addressed in a satisfactory manner and patient verbalized understanding all instructions.      Level of complexity of consult: High Risk of Complications/Morbidity: High     · Illness(es)/ Infection present that pose threat to bodily function. · There is potential for severe exacerbation of infection/side effects of treatment. · Therapy requires intensive monitoring for antimicrobial agent toxicity. Thank you for involving me in the care of your patient. I will continue to follow. If you have any additional questions, please do not hesitate to contact me. Subjective: Interval history: Interval history was obtained from chart review and RN. The patient is afebrile. She is tolerating antibiotics okay. No diarrhea     REVIEW OF SYSTEMS:     Review of Systems   Constitutional: Positive for fatigue. Negative for chills, diaphoresis and unexpected weight change. HENT: Negative for congestion, ear discharge, ear pain, facial swelling, hearing loss, rhinorrhea and trouble swallowing. Eyes: Negative for photophobia, discharge, redness and visual disturbance. Respiratory: Negative for apnea, cough, choking, chest tightness, shortness of breath and stridor. Cardiovascular: Negative for chest pain and palpitations. Gastrointestinal: Negative for abdominal pain, blood in stool, diarrhea and nausea. Endocrine: Negative for polydipsia, polyphagia and polyuria. Genitourinary: Negative for difficulty urinating, dysuria, frequency, hematuria, menstrual problem and vaginal discharge. Musculoskeletal: Negative for arthralgias, joint swelling, myalgias and neck stiffness. Skin: Negative for color change and rash. Allergic/Immunologic: Negative for immunocompromised state. Neurological: Negative for dizziness, seizures, speech difficulty, light-headedness and headaches. Hematological: Negative for adenopathy. Psychiatric/Behavioral: Negative for agitation, hallucinations and suicidal ideas. Past Medical History: All past medical history reviewed today. History reviewed.  No pertinent past medical history. Past Surgical History: All past surgical history was reviewed today. Past Surgical History:   Procedure Laterality Date    COLONOSCOPY N/A 8/20/2020    COLONOSCOPY WITH BIOPSY performed by Crawford Runner, MD at [address]  8/20/2020    COLONOSCOPY POLYPECTOMY SNARE/COLD BIOPSY performed by Crawford Runner, MD at [address] Right 8/24/2020    EXPLORATORY LAPAROTOMY, RIGHT  HEMICOLECTOMY; DRAINAGE OF PELVIC ABSCESS performed by Zoe Huerta MD at [facility] LIVER BIOPSY  8/24/2020    LIVER BIOPSY performed by Zoe Huerta MD at [facility] N/A 8/20/2020    [facility] [place] [facility] [address] [facility]       Family History: All family history was reviewed today. History reviewed. No pertinent family history. Objective:       PHYSICAL EXAM:      Vitals:   Vitals:    08/26/20 0130 08/26/20 0530 08/26/20 0843 08/26/20 1104   BP: 113/76 (!) 128/56 117/74 101/69   Pulse: 78 80 76 84   Resp:  16 18 16   Temp:  97.4 °F (36.3 °C) 97.6 °F (36.4 °C) 97.7 °F (36.5 °C)   TempSrc:  Oral Oral Oral   SpO2: 96% 93% 94% 94%   Weight:       Height:           Physical Exam  Vitals signs and nursing note reviewed. Constitutional:       General: She is not in acute distress. Appearance: She is well-developed. She is not diaphoretic. HENT:      Head: Normocephalic. Right Ear: External ear normal.      Left Ear: External ear normal.      Nose: Nose normal.   Eyes:      General: No scleral icterus. Right eye: No discharge. Left eye: No discharge. Conjunctiva/sclera: Conjunctivae normal.      Pupils: Pupils are equal, round, and reactive to light. Neck:      Musculoskeletal: Normal range of motion and neck supple. Cardiovascular:      Rate and Rhythm: Normal rate and regular rhythm. Heart sounds: No murmur.  No friction rub. Pulmonary:      Effort: Pulmonary effort is normal.      Breath sounds: No stridor. No wheezing or rales. Chest:      Chest wall: No tenderness. Abdominal:      Palpations: Abdomen is soft. There is no mass. Tenderness: There is no abdominal tenderness. There is no guarding or rebound. Comments: Surgical site okay. Musculoskeletal:         General: No tenderness. Lymphadenopathy:      Cervical: No cervical adenopathy. Skin:     General: Skin is warm and dry. Findings: No erythema or rash. Neurological:      Mental Status: She is alert and oriented to person, place, and time. Motor: No abnormal muscle tone. Psychiatric:         Judgment: Judgment normal.           Lines: All vascular access sites are healthy with no local erythema, discharge or tenderness. Intake and output:    I/O last 3 completed shifts: In: 710 [P.O.:200; I.V.:510]  Out: 260 [Urine:250; Drains:10]    Lab Data:   All available labs and old records have been reviewed by me.     CBC:  Recent Labs     08/24/20 0434 08/25/20  0520 08/26/20  0630   WBC 18.2* 20.2* 20.9*   RBC 3.58* 3.20* 3.18*   HGB 10.1* 9.1* 9.0*   HCT 31.8* 28.5* 28.4*   * 118* 136   MCV 88.7 89.1 89.3   MCH 28.3 28.4 28.4   MCHC 31.9 31.8 31.8   RDW 18.0* 18.3* 18.1*        BMP:  Recent Labs     08/24/20 0434 08/25/20  0520 08/26/20  0630   * 137 131*   K 4.0 4.9 4.2    111* 105   CO2 18* 18* 18*   BUN 21* 27* 36*   CREATININE 1.0 1.5* 2.3*   CALCIUM 8.6 8.0* 8.5   GLUCOSE 99 166* 82        Hepatic Function Panel:   Lab Results   Component Value Date    ALKPHOS 96 08/25/2020    ALT 6 08/25/2020    AST 16 08/25/2020    PROT 5.2 08/25/2020    BILITOT 0.4 08/25/2020    BILIDIR 0.6 08/17/2020    IBILI 0.3 08/17/2020    LABALBU 1.8 08/26/2020       CPK:   Lab Results   Component Value Date    CKTOTAL 52 08/17/2020     ESR:   Lab Results   Component Value Date    SEDRATE 36 (H) 08/25/2020     CRP:   Lab Results CONTRAST Additional Contrast? None   Final Result   1. Non loculated fluid and gas collection in the pelvis concerning for   perforated viscus/possible abscess formation. 2. Prominent thickening of the ascending colon which may be related to   infectious or inflammatory process or underlying neoplastic process. 3. Bowel-gas pattern typical of adynamic ileus. 4. Hepatic cirrhosis with numerous hepatic nodules suggestive of   regenerative/siderotic nodules. MRI abdomen without and with IV contrast   correlation recommended. Metastatic disease is a consideration. 5. Hepatosplenomegaly and multifocal collateral vessels about the stomach   esophagus concerning for varices, all suggestive of portal venous   hypertension. 6. Abdominal and pelvic ascites. 7.  Diverticulosis coli without CT evidence of acute diverticulitis. Critical results were called by Dr. Kiko Springer to [address] [place] [address]   on 8/16/2020 at 18:09. CT Cervical Spine WO Contrast   Final Result   No acute abnormality of the cervical spine. CT Head WO Contrast   Final Result   No acute intracranial abnormality. XR CHEST PORTABLE   Final Result   No acute process. XR RADIUS ULNA LEFT (2 VIEWS)   Final Result   No acute osseous abnormality. CT CHEST W CONTRAST    (Results Pending)       Medications: All current and past medications were reviewed.      folic acid  1 mg Oral Daily    meropenem  1 g Intravenous Q12H    albumin human  12.5 g Intravenous Q6H    metoclopramide  5 mg Intravenous Q6H    bupivacaine liposome  5 mL Subcutaneous Once    pantoprazole  40 mg Intravenous Daily    lactulose  20 g Oral TID    sodium chloride flush  10 mL Intravenous 2 times per day    enoxaparin  40 mg Subcutaneous Daily           [DISCONTINUED] promethazine **OR** ondansetron, promethazine, acetaminophen, morphine **OR** morphine, potassium chloride **OR** potassium alternative oral replacement **OR** potassium chloride, lip balm petroleum free, sodium chloride flush, polyethylene glycol      Problem list:       Patient Active Problem List   Diagnosis Code    Pelvic abscess in female N73.9    Septicemia ([facility] A41.9    Colonic mass K63.89    Cirrhosis of liver with ascites ([address] K74.60, R18.8    Intra-abdominal free air of unknown etiology K66.8    PAPITO (acute kidney injury) ([facility] N17.9    Lung nodules R91.8    Bandemia D72.825    Intra-abdominal abscess ([facility] K65.1    E coli infection A49.8    Elevated CEA R97.0    Ex-smoker Z87.891    Class 2 obesity due to excess calories with body mass index (BMI) of 36.0 to 36.9 in adult E66.09, Z68.36    Portal hypertension ([facility] K76.6    Mild malnutrition ([facility] E44.1       Please note that this chart was generated using Dragon dictation software. Although every effort was made to ensure the accuracy of this automated transcription, some errors in transcription may have occurred inadvertently. If you may need any clarification, please do not hesitate to contact me through EPIC or at the phone number provided below with my electronic signature. Any pictures or media included in this note were obtained after taking informed verbal consent from the patient and with their approval to include those in the patient's medical record.     Ceferino Hopkins MD, MPH  8/26/2020 , 12:59 PM   [facility] Infectious Disease   Office: [phone]  Fax: [phone]  Tuesday AM clinic:   [address] [place] [address] 120  Thursday AM [id number] [facility]

## 2023-03-07 NOTE — PROGRESS NOTES
[facility] PROGRESS NOTE    9/7/2020 1:53 PM        Name: Mishel Hanley . Admitted: 8/16/2020  Primary Care Provider: No primary care provider on file. (Tel: None)      Brief Course:   Patient is a 75 yo female with hx nicotine use. The patient lives alone, she presented to hospital after being found on floor by her daughter. She has had multiple falls in past couple of months. CT abdomen and pelvis in ER showed pelvic abscess vs viscus perf. Colonic wall thickening, concerning for malignancy, liver cirrhosis portal hypertension. She had Ct-guided drain placement 8/18 for pelvic abscess 8/18. Subsequently found to have large ascending colon mass on colonoscopy 8/20, likely the source of abscess.       8/18/2020: Successful CT guided placement of a drainage catheter in the pelvic abscess.      8/24/2020: Exploratory laparotomy, evacuation of ascites, drainage of  pelvic abscess, right hemicolectomy with primary ileotransverse  colostomy anastomosis, and Abdiaziz-Cut liver biopsy.     8/28/2020: SECONDARY ABDOMINAL WOUND CLOSURE  DILATATION AND CURETTAGE  8/31/2020: NGT d/c'd   9/1/2020    CC: Pelvic abscess    Subjective: Patient seen and examined today. Patient hemodynamically stable, no acute events overnight. Patient tolerating general diet; awaiting disposition to ECF.      Reviewed interval ancillary notes    Current Medications  bisacodyl (DULCOLAX) suppository 10 mg, Once  lidocaine PF 1 % injection 5 mL, Once  sodium chloride flush 0.9 % injection 10 mL, 2 times per day  sodium chloride flush 0.9 % injection 10 mL, PRN  lactobacillus (CULTURELLE) capsule 1 capsule, BID WC  insulin lispro (1 Unit Dial) 0-6 Units, Q4H  rifaximin (XIFAXAN) tablet 550 mg, BID  enoxaparin (LOVENOX) injection 30 mg, BID  acetaminophen (TYLENOL) tablet 1,000 mg, Q6H PRN  lidocaine (XYLOCAINE) 2 % jelly, Once  phenol 1.4 % mouth spray 1 MAX 09/05/20  1420 09/06/20  0400 09/07/20  0610   WBC 8.1  --  7.8 5.8   HGB 7.1* 7.6* 7.1* 6.5*   PLT 73*  --  76* 79*     BMP:    Recent Labs     09/05/20  0437 09/06/20  0400 09/07/20  0610    136 140   K 4.5 3.6 3.0*    106 106   CO2 22 22 25   BUN 29* 25* 19   CREATININE 0.6 0.7 0.7   GLUCOSE 113* 82 80     Hepatic:   Recent Labs     09/05/20  0437 09/06/20  0400 09/07/20  0610   AST 33 41* 41*   ALT 16 17 16   BILITOT 0.6 0.8 0.8   ALKPHOS 174* 211* 222*     IR US GUIDED PARACENTESIS   Final Result   Successful ultrasound guided paracentesis. CT CHEST ABDOMEN PELVIS W CONTRAST   Final Result   1. Multifocal bilateral pulmonary ground-glass opacities. The differential   includes atypical infectious/inflammatory pneumonitis. 2. Multiple bilateral pulmonary nodules concerning for metastatic disease. 3. Cirrhosis with increasing abdominopelvic ascites. 4. Improving 7 cm recto uterine abscess. XR ABDOMEN (KUB) (SINGLE AP VIEW)   Final Result   Multiple distended small bowel loops, similar to the previous study. No   significant colonic gas. Findings are concerning for a SBO. XR CHEST PORTABLE   Final Result   Nasogastric tube is seen extending below the diaphragm. Dedicated abdominal   radiograph could be performed for better visualization as warranted. Bilateral airspace disease, increased on the right as compared to prior, for   which pneumonia or edema are considerations. XR ABDOMEN (2 VIEWS)   Final Result   Scattered dilated small bowel loops throughout the abdomen, increased   compared to prior CT scan, either due to ileus or early partial small bowel   obstruction         XR CHEST PORTABLE   Final Result   Lucency beneath the right hemidiaphragm compatible with free intraperitoneal   air. Apparently the patient has had recent abdominal surgery. This finding   was discussed with Dr. Mavis Swanson  At 6:48 pm on 8/24/2020.       Status post placement of right jugular central venous catheter. No   pneumothorax. Patchy mid and lower lung ground-glass opacities suspicious for pneumonia,   increased since the prior CT. Correlation for pneumonia is recommended. CT ABSCESS DRAINAGE W CATH PLACEMENT S&I   Final Result   Successful CT guided placement of a drainage catheter in the pelvic abscess. CT ABDOMEN PELVIS W IV CONTRAST Additional Contrast? Oral   Final Result   1. Pelvic air/fluid collection almost certainly reflects an abscess or giant   colonic diverticula. This may be the result of acute diverticulitis. 2. Mass lesion ascending colon almost certainly reflecting primary   adenocarcinoma of the colon. 3. Diffuse hepatic lesions with newly visualized (contrast enhancement) mass   at the hepatic dome. Hepatic metastatic disease is a consideration as is   cirrhosis with regenerative nodules and hepatocellular carcinoma. 4. Bilateral lower lobe pulmonary nodules suspicious for metastatic disease. 5. Abdominal and pelvic ascites. 6. Sequela from portosystemic shunting with numerous collateral vessels about   the stomach, left upper quadrant and distal esophagus. Relatively small   esophageal and gastric varices. 7.  Calcific atherosclerotic disease aorta. CT ABDOMEN PELVIS WO CONTRAST Additional Contrast? None   Final Result   1. Non loculated fluid and gas collection in the pelvis concerning for   perforated viscus/possible abscess formation. 2. Prominent thickening of the ascending colon which may be related to   infectious or inflammatory process or underlying neoplastic process. 3. Bowel-gas pattern typical of adynamic ileus. 4. Hepatic cirrhosis with numerous hepatic nodules suggestive of   regenerative/siderotic nodules. MRI abdomen without and with IV contrast   correlation recommended. Metastatic disease is a consideration.    5. Hepatosplenomegaly and multifocal collateral vessels about the stomach   esophagus concerning for varices, all suggestive of portal venous   hypertension. 6. Abdominal and pelvic ascites. 7.  Diverticulosis coli without CT evidence of acute diverticulitis. Critical results were called by Dr. Cuauhtemoc Perez to [address] [place] [address]   on 8/16/2020 at 18:09. CT Cervical Spine WO Contrast   Final Result   No acute abnormality of the cervical spine. CT Head WO Contrast   Final Result   No acute intracranial abnormality. XR CHEST PORTABLE   Final Result   No acute process. XR RADIUS ULNA LEFT (2 VIEWS)   Final Result   No acute osseous abnormality. CT CHEST W CONTRAST    (Results Pending)       Spent 30 minutes with patient and family at bedside and on unit reviewing medical records and labs, spent greater than 50% time counseling patient and/or coordinating care with RN    Problem List  Active Problems:    Pelvic abscess in female    Septicemia ([address]    Colonic mass    Cirrhosis of liver with ascites ([address]    Intra-abdominal free air of unknown etiology    PAPITO (acute kidney injury) ([address]    Lung nodules    Bandemia    Intra-abdominal abscess ([address]    E coli infection    Elevated CEA    Ex-smoker    Class 2 obesity due to excess calories with body mass index (BMI) of 36.0 to 36.9 in adult    Portal hypertension (HCC)    Mild malnutrition ([address]    Tobacco abuse counseling    Open abdominal wall wound    Receiving intravenous antibiotic treatment as outpatient    Complex care coordination  Resolved Problems:    * No resolved hospital problems. *       Assessment & Plan:     Pelvic abscess. Status post IR drainage 8/18, pelvic fluid cytology negative for malignant cells. Colonoscopy 8/20 with large necrotic ascending colon mass, likely source of abscess. Status post exp lap right transverse colectomy 8/24. Status post secondary wound closure 8/28. Initially on Zosyn, transitioned to IV cipro (8/20). IV Flagyl added 8/25. Now on meropenem and flucanazole. Remains afebrile, no leukocytosis. Blood culture results (8/25) with no growth to date. Surgery and ID on on board; awaiting ECF    Liver cirrhosis / portal HTN. Biopsy consistent with WINKLER cirrhosis, negative for adenocarcinoma involvement. Started on lactulose for ammonia level 66 and confusion, ammonia level 23 (8/28). GI following. S/P paracentesis; 10 L removed 9/1/2020 ,fluid for cx. Colon cancer. CT scan showed thickening of ascending colon suspicious for malignancy, lesions liver and lung concerning for metastasis. CEA 83.3, AFP 2.4. Colon biopsy (8/24) showed poorly differentiate adenocarcinoma, metastatic adenocarcinoma in 3 of 232 lymph nodes. Patient will need CT chest for staging once renal function improves. Plan is for chemotherapy once she recovers from surgery. Oncology on board. Grade 1 endometrial cancer: Poor candidate for any surgery at this time per oncology    PAPITO. Resolved;  nephrology on board appreciate their recs  Creatinine 1.6 on admission suspected pre-renal, hypovolemia. Hypoalbuminemia with + volume status: On Lasix drip and albumin per nephro recs    Abnormal CXR (8/27). Bilateral airspace disease, increased on right as compared to prior, pneumonia or edema are considerations. Possible 3rd spacing, received albumin. COVID-19 (8/22) negative. Remains afebrile, no cough. Encouraged IS use. Acute anemia. Hgb 10.9 on admission, no previous studies for comparison. Suspect she likely had chronic iron deficiency anemia secondary to colon cancer and post menopausal bleeding. She received 1 unit PRBCs 8/27, Hgb/Hct stable  No active bleeding. Having brown BM   Will give another unit of PRBC today  Consider IV iron tomorrow      DC planning: Plan is for SNF on DC. Case management is assisting, [facility] preference is [facility].  She is likely inpatient for the next 3 to 4 days        Diet: Dietary Nutrition Supplements: Standard High Calorie Oral

## 2023-09-06 NOTE — PLAN OF CARE
OM   Problem: Falls - Risk of:  Goal: Will remain free from falls  Description: Will remain free from falls  9/5/2020 2125 by Odilia Young RN  Outcome: Ongoing  9/5/2020 1615 by Lyn Nuñez RN  Outcome: Ongoing  Goal: Absence of physical injury  Description: Absence of physical injury  9/5/2020 2125 by Odilia Young RN  Outcome: Ongoing  9/5/2020 1615 by Lyn Nuñez RN  Outcome: Ongoing     Problem: Skin Integrity:  Goal: Will show no infection signs and symptoms  Description: Will show no infection signs and symptoms  9/5/2020 2125 by Odilia Young RN  Outcome: Ongoing  9/5/2020 1615 by Lyn Nuñez RN  Outcome: Ongoing  Goal: Absence of new skin breakdown  Description: Absence of new skin breakdown  9/5/2020 2125 by Odilia Young RN  Outcome: Ongoing  9/5/2020 1615 by Lyn Nuñez RN  Outcome: Ongoing     Problem: Infection:  Goal: Will remain free from infection  Description: Will remain free from infection  9/5/2020 2125 by Odilia Young RN  Outcome: Ongoing  9/5/2020 1615 by Lyn Nuñez RN  Outcome: Ongoing     Problem: Safety:  Goal: Free from accidental physical injury  Description: Free from accidental physical injury  9/5/2020 2125 by Odilia Young RN  Outcome: Ongoing  9/5/2020 1615 by Lyn Nuñez RN  Outcome: Ongoing  Goal: Free from intentional harm  Description: Free from intentional harm  9/5/2020 2125 by Odilia Young RN  Outcome: Ongoing  9/5/2020 1615 by Lyn Nuñez RN  Outcome: Ongoing     Problem: Daily Care:  Goal: Daily care needs are met  Description: Daily care needs are met  9/5/2020 2125 by Odilia Young RN  Outcome: Ongoing  9/5/2020 1615 by Lyn Nuñez RN  Outcome: Ongoing     Problem: Pain:  Goal: Patient's pain/discomfort is manageable  Description: Patient's pain/discomfort is manageable  9/5/2020 2125 by Odilia Young RN  Outcome: Ongoing  9/5/2020 1615 by Lyn Nuñez RN  Outcome: Ongoing  Goal: Pain level will decrease  Description: Pain level will decrease  9/5/2020 Mason Boone RN  Outcome: Ongoing  9/5/2020 1615 by Paco Allan RN  Outcome: Ongoing     Problem: Injury - Risk of, Physical Injury:  Goal: Will remain free from falls  Description: Will remain free from falls  9/5/2020 2125 by Mason Boone RN  Outcome: Ongoing  9/5/2020 1615 by Paco Allan RN  Outcome: Ongoing  Goal: Absence of physical injury  Description: Absence of physical injury  9/5/2020 2125 by Mason Boone RN  Outcome: Ongoing  9/5/2020 1615 by Paco Allan RN  Outcome: Ongoing     Problem: Mood - Altered:  Goal: Mood stable  Description: Mood stable  9/5/2020 2125 by Mason Boone RN  Outcome: Ongoing  9/5/2020 1615 by Paco Allan RN  Outcome: Ongoing  Goal: Absence of abusive behavior  Description: Absence of abusive behavior  9/5/2020 2125 by Mason Boone RN  Outcome: Ongoing  9/5/2020 1615 by Paco Allan RN  Outcome: Ongoing  Goal: Verbalizations of feeling emotionally comfortable while being cared for will increase  Description: Verbalizations of feeling emotionally comfortable while being cared for will increase  9/5/2020 2125 by Mason Boone RN  Outcome: Ongoing  9/5/2020 1615 by Paco Allan RN  Outcome: Ongoing     Problem: Psychomotor Activity - Altered:  Goal: Absence of psychomotor disturbance signs and symptoms  Description: Absence of psychomotor disturbance signs and symptoms  9/5/2020 2125 by Mason Boone RN  Outcome: Ongoing  9/5/2020 1615 by Paco Allan RN  Outcome: Ongoing     Problem: Sensory Perception - Impaired:  Goal: Demonstrations of improved sensory functioning will increase  Description: Demonstrations of improved sensory functioning will increase  9/5/2020 2125 by Mason Boone RN  Outcome: Ongoing  9/5/2020 1615 by Paco Allan RN  Outcome: Ongoing  Goal: Decrease in sensory misperception frequency  Description: Decrease in sensory misperception frequency  9/5/2020 2125 by Mason Boone RN  Outcome: Ongoing  9/5/2020 1615 by Paco Allan RN  Outcome: Ongoing  Goal: Able to refrain from responding to false sensory perceptions  Description: Able to refrain from responding to false sensory perceptions  9/5/2020 2125 by Cady Reagan RN  Outcome: Ongoing  9/5/2020 1615 by Reesa Oppenheim, RN  Outcome: Ongoing  Goal: Demonstrates accurate environmental perceptions  Description: Demonstrates accurate environmental perceptions  9/5/2020 2125 by Cady Reagan RN  Outcome: Ongoing  9/5/2020 1615 by Reesa Oppenheim, RN  Outcome: Ongoing  Goal: Able to distinguish between reality-based and nonreality-based thinking  Description: Able to distinguish between reality-based and nonreality-based thinking  9/5/2020 2125 by Cady Reagan RN  Outcome: Ongoing  9/5/2020 1615 by Reesa Oppenheim, RN  Outcome: Ongoing  Goal: Able to interrupt nonreality-based thinking  Description: Able to interrupt nonreality-based thinking  9/5/2020 2125 by Cady Reagan RN  Outcome: Ongoing  9/5/2020 1615 by Reesa Oppenheim, RN  Outcome: Ongoing     Problem: Sleep Pattern Disturbance:  Goal: Appears well-rested  Description: Appears well-rested  9/5/2020 2125 by Cady Reagan RN  Outcome: Ongoing  9/5/2020 1615 by Reesa Oppenheim, RN  Outcome: Ongoing     Problem: HEMODYNAMIC STATUS  Goal: Patient has stable vital signs and fluid balance  9/5/2020 2125 by Cady Reagan RN  Outcome: Ongoing  9/5/2020 1615 by Reesa Oppenheim, RN  Outcome: Ongoing     Problem: OXYGENATION/RESPIRATORY FUNCTION  Goal: Patient will achieve/maintain normal respiratory rate/effort  9/5/2020 2125 by Cady Reagan RN  Outcome: Ongoing  9/5/2020 1615 by Reesa Oppenheim, RN  Outcome: Ongoing     Problem: ELIMINATION  Goal: Elimination patterns are normal or improving  Description: Elimination patterns return to pre-surgery normal patterns  9/5/2020 2125 by Cady Reagan RN  Outcome: Ongoing  9/5/2020 1615 by Reesa Oppenheim, RN  Outcome: Ongoing     Problem: SKIN INTEGRITY  Goal: Skin integrity is maintained or improved  9/5/2020 2125 by Christel Armstrong Missael Botello RN  Outcome: Ongoing  9/5/2020 1615 by Janey Gonzalez RN  Outcome: Ongoing

## (undated) DEVICE — STAPLER INT H4.4X1.5MM DIA75MM 0DEG TI UNIV 6 ROW CART

## (undated) DEVICE — SUTURE PERMAHAND SZ 3-0 L18IN NONABSORBABLE BLK L26MM SH C013D

## (undated) DEVICE — PENCIL ES ULT VAC W TELSCP NOSE EZ CLN BLDE 10FT

## (undated) DEVICE — SUTURE COAT VCRL SZ 0 L18IN ABSRB UD W/O NDL POLYGLACTIN J112T

## (undated) DEVICE — MOUTHPIECE ENDOSCP L CTRL OPN AND SIDE PORTS DISP

## (undated) DEVICE — BLANKET WRM W29.9XL79.1IN UP BODY FORC AIR MISTRAL-AIR

## (undated) DEVICE — PROCEDURE KIT ENDOSCP CUST

## (undated) DEVICE — SUTURE PERMAHAND SZ 2-0 L30IN NONABSORBABLE BLK SILK W/O A305H

## (undated) DEVICE — CHLORAPREP 26ML ORANGE

## (undated) DEVICE — SYRINGE IRRIG 60ML SFT PLIABLE BLB EZ TO GRP 1 HND USE W/

## (undated) DEVICE — SOLUTION IV IRRIG POUR BRL 0.9% SODIUM CHL 2F7124

## (undated) DEVICE — MARQUEE® DISPOSABLE CORE BIOPSY INSTRUMENT KIT, 18G X 20CM: Brand: MARQUEE

## (undated) DEVICE — TOWEL,OR,DSP,ST,BLUE,STD,6/PK,12PK/CS: Brand: MEDLINE

## (undated) DEVICE — MAJOR SET UP PK

## (undated) DEVICE — MERCY FAIRFIELD TURNOVER KIT: Brand: MEDLINE INDUSTRIES, INC.

## (undated) DEVICE — SUTURE PERMAHAND SZ 2-0 L30IN NONABSORBABLE BLK SH L26MM C016D

## (undated) DEVICE — FORCEPS BX L240CM WRK CHN 2.8MM STD CAP W/ NDL MIC MESH

## (undated) DEVICE — SUTURE ABSORBABLE BRAIDED 0 CT-1 8X27 IN UD VICRYL JJ41G

## (undated) DEVICE — DRAPE,LAP,CHOLE,W/TROUGHS,STERILE: Brand: MEDLINE

## (undated) DEVICE — D & C-LF: Brand: MEDLINE INDUSTRIES, INC.

## (undated) DEVICE — STAPLER INT L60MM REG TISS BLU B FRM 8 FIRING 2 ROW AUTO

## (undated) DEVICE — ELECTRODE PT RET AD L9FT HI MOIST COND ADH HYDRGEL CORDED

## (undated) DEVICE — BASIC SINGLE BASIN 1-LF: Brand: MEDLINE INDUSTRIES, INC.

## (undated) DEVICE — GLOVE SURG SZ 6.5 L11.2IN FNGR THK9.8MIL STRW LTX POLYMER

## (undated) DEVICE — Device: Brand: DISPOSABLE ELECTROSURGICAL SNARE

## (undated) DEVICE — PAD ABSRB W8XL10IN ABD HYDROPHOBIC NONWOVEN THCK LAYR CELOS

## (undated) DEVICE — GAUZE,SPONGE,4"X4",8PLY,STRL,LF,10/TRAY: Brand: MEDLINE

## (undated) DEVICE — TOTAL TRAY, DB, 100% SILI FOLEY, 16FR 10: Brand: MEDLINE

## (undated) DEVICE — STAPLER INT L75MM H1.5X1.8X2MM STD TI 6 ROW LIN CUT

## (undated) DEVICE — SUTURE PDS II SZ 1 L96IN ABSRB VLT TP-1 L65MM 1/2 CIR Z880G

## (undated) DEVICE — DEVICE SEAL L23CM NANO COAT MARYLAND JAW OPN DIV LIGASURE

## (undated) DEVICE — INVIEW CLEAR LEGGINGS: Brand: CONVERTORS

## (undated) DEVICE — SHEET,DRAPE,40X58,STERILE: Brand: MEDLINE

## (undated) DEVICE — SUTURE PDS II SZ 0 L60IN ABSRB VLT L48MM CTX 1/2 CIR Z990G

## (undated) DEVICE — SPONGE LAP W18XL18IN WHT COT 4 PLY FLD STRUNG RADPQ DISP ST

## (undated) DEVICE — SYRINGE 60ML BULB IRRIG ST LF

## (undated) DEVICE — COVER,MAYO STAND,STERILE: Brand: MEDLINE

## (undated) DEVICE — TRAY PREP DRY W/ PREM GLV 2 APPL 6 SPNG 2 UNDPD 1 OVERWRAP

## (undated) DEVICE — DRESSING,GAUZE,XEROFORM,CURAD,5"X9",ST: Brand: CURAD

## (undated) DEVICE — SET VLV 3 PC AWS DISPOSABLE GRDIAN SCOPEVALET

## (undated) DEVICE — PAD N ADH W3XL4IN POLY COT SFT PERF FLM EASILY CUT ABSRB

## (undated) DEVICE — APPLIER CLP L L13IN TI MULT RNG HNDL 20 CLP STR LIGACLP

## (undated) DEVICE — SUTURE NONABSORBABLE MONOFILAMENT 3-0 PS-1 18 IN BLK ETHILON 1663H

## (undated) DEVICE — GOWN SIRUS NONREIN LG W/TWL: Brand: MEDLINE INDUSTRIES, INC.

## (undated) DEVICE — SUTURE VCRL SZ 3-0 L18IN ABSRB UD L26MM SH 1/2 CIR J864D

## (undated) DEVICE — ULTRACLEAN ACCESSORY ELECTRODE, 6 INCH COATED BLADE WITH EXTENDED INSULATION: Brand: ULTRACLEAN

## (undated) DEVICE — 3M™ IOBAN™ 2 ANTIMICROBIAL INCISE DRAPE 6650EZ: Brand: IOBAN™ 2

## (undated) DEVICE — TOWEL,OR,DSP,ST,BLUE,DLX,10/PK,8PK/CS: Brand: MEDLINE

## (undated) DEVICE — Z DISCONTINUED GLOVE SURG SZ 7.5 L12IN FNGR THK13MIL WHT ISOLEX

## (undated) DEVICE — CONTAINER,SPECIMEN,OR STERILE,4OZ: Brand: MEDLINE

## (undated) DEVICE — PAD TBL OP RM TRENDELENBURG STATIC TORSO W/STRAPS

## (undated) DEVICE — Device

## (undated) DEVICE — BLADE ES ELASTOMERIC COAT INSUL DURABLE BEND UPTO 90DEG

## (undated) DEVICE — COVER LT HNDL BLU PLAS

## (undated) DEVICE — SOLUTION IV IRRIG WATER 500ML POUR BRL ST 2F7113

## (undated) DEVICE — BANDAGE,GAUZE,BULKEE II,4.5"X4.1YD,STRL: Brand: MEDLINE

## (undated) DEVICE — CATHETER TRAY 16 FR 5 CC FOL ANTIREFLX SAMPLING PRT DOVER

## (undated) DEVICE — TRAP SPEC RETRV CLR PLAS POLYP IN LN SUCT QUIK CTCH

## (undated) DEVICE — BW-412T DISP COMBO CLEANING BRUSH: Brand: SINGLE USE COMBINATION CLEANING BRUSH

## (undated) DEVICE — SHEET, T, LAPAROTOMY, STERILE: Brand: MEDLINE

## (undated) DEVICE — SHEET,DRAPE,53X77,STERILE: Brand: MEDLINE

## (undated) DEVICE — BOWL MED L 32OZ PLAS W/ MOLD GRAD EZ OPN PEEL PCH